# Patient Record
Sex: FEMALE | Race: WHITE | NOT HISPANIC OR LATINO | Employment: OTHER | URBAN - METROPOLITAN AREA
[De-identification: names, ages, dates, MRNs, and addresses within clinical notes are randomized per-mention and may not be internally consistent; named-entity substitution may affect disease eponyms.]

---

## 2017-01-09 ENCOUNTER — HISTORICAL (OUTPATIENT)
Dept: LAB | Facility: HOSPITAL | Age: 72
End: 2017-01-09

## 2018-03-22 ENCOUNTER — HISTORICAL (OUTPATIENT)
Dept: LAB | Facility: HOSPITAL | Age: 73
End: 2018-03-22

## 2018-03-22 LAB
CRP SERPL HS-MCNC: 3.79 MG/L (ref 0–3)
DEPRECATED CALCIDIOL+CALCIFEROL SERPL-MC: 28.84 NG/ML (ref 30–80)

## 2018-04-16 ENCOUNTER — HISTORICAL (OUTPATIENT)
Dept: LAB | Facility: HOSPITAL | Age: 73
End: 2018-04-16

## 2018-04-16 LAB
CREAT UR-MCNC: 125 MG/DL
MICROALBUMIN UR-MCNC: 3.2 MG/DL
MICROALBUMIN/CREAT RATIO PNL UR: 25.6 MG/GM CR (ref 0–30)

## 2018-09-14 LAB
BILIRUB SERPL-MCNC: NEGATIVE MG/DL
BLOOD URINE, POC: NORMAL
CLARITY, POC UA: NORMAL
COLOR, POC UA: NORMAL
GLUCOSE UR QL STRIP: NEGATIVE
KETONES UR QL STRIP: NEGATIVE
LEUKOCYTE EST, POC UA: NORMAL
NITRITE, POC UA: NEGATIVE
PH, POC UA: 6
PROTEIN, POC: NEGATIVE
SPECIFIC GRAVITY, POC UA: 1.02
UROBILINOGEN, POC UA: NORMAL

## 2018-09-15 ENCOUNTER — HISTORICAL (OUTPATIENT)
Dept: LAB | Facility: HOSPITAL | Age: 73
End: 2018-09-15

## 2018-09-18 LAB — FINAL CULTURE: NORMAL

## 2018-12-03 ENCOUNTER — HISTORICAL (OUTPATIENT)
Dept: LAB | Facility: HOSPITAL | Age: 73
End: 2018-12-03

## 2018-12-03 LAB — DEPRECATED CALCIDIOL+CALCIFEROL SERPL-MC: 45.08 NG/ML (ref 30–80)

## 2020-05-16 LAB
ABS NEUT (OLG): 13.48 X10(3)/MCL (ref 2.1–9.2)
ALBUMIN SERPL-MCNC: 1.8 GM/DL (ref 3.4–4.8)
ALBUMIN/GLOB SERPL: 0.5 RATIO (ref 1.1–2)
ALP SERPL-CCNC: 106 UNIT/L (ref 40–150)
ALT SERPL-CCNC: 12 UNIT/L (ref 0–55)
APPEARANCE, UA: ABNORMAL
AST SERPL-CCNC: 9 UNIT/L (ref 5–34)
BACTERIA SPEC CULT: ABNORMAL /HPF
BASOPHILS # BLD AUTO: 0.06 X10(3)/MCL (ref 0–0.2)
BASOPHILS NFR BLD AUTO: 0.4 % (ref 0–1)
BILIRUB SERPL-MCNC: 0.4 MG/DL (ref 0.2–1.2)
BILIRUB UR QL STRIP: NEGATIVE
BILIRUBIN DIRECT+TOT PNL SERPL-MCNC: 0.1 MG/DL (ref 0–0.8)
BILIRUBIN DIRECT+TOT PNL SERPL-MCNC: 0.3 MG/DL (ref 0–0.5)
BUN SERPL-MCNC: 57.7 MG/DL (ref 9.8–20.1)
CALCIUM SERPL-MCNC: 8.7 MG/DL (ref 8.4–10.2)
CHLORIDE SERPL-SCNC: 104 MMOL/L (ref 98–107)
CHOLEST SERPL-MCNC: 109 MG/DL
CHOLEST/HDLC SERPL: 5 {RATIO} (ref 0–5)
CO2 SERPL-SCNC: 22 MMOL/L (ref 23–31)
COLOR UR: ABNORMAL
CREAT SERPL-MCNC: 6.11 MG/DL (ref 0.57–1.11)
DEPRECATED CALCIDIOL+CALCIFEROL SERPL-MC: 35.8 NG/ML (ref 6.6–49.9)
EOSINOPHIL # BLD AUTO: 0.18 X10(3)/MCL (ref 0–0.9)
EOSINOPHIL NFR BLD AUTO: 1.1 % (ref 0–6.4)
ERYTHROCYTE [DISTWIDTH] IN BLOOD BY AUTOMATED COUNT: 14 % (ref 11.5–17)
EST. AVERAGE GLUCOSE BLD GHB EST-MCNC: 162.8 MG/DL
GLOBULIN SER-MCNC: 3.7 GM/DL (ref 2.4–3.5)
GLUCOSE (UA): NEGATIVE
GLUCOSE SERPL-MCNC: 157 MG/DL (ref 82–115)
HBA1C MFR BLD: 7.3 %
HCT VFR BLD AUTO: 35.3 % (ref 37–47)
HDLC SERPL-MCNC: 22 MG/DL (ref 40–60)
HGB BLD-MCNC: 11.5 GM/DL (ref 12–16)
HGB UR QL STRIP: ABNORMAL
IMM GRANULOCYTES # BLD AUTO: 0.39 10*3/UL (ref 0–0.02)
IMM GRANULOCYTES NFR BLD AUTO: 2.3 % (ref 0–0.43)
KETONES UR QL STRIP: NEGATIVE
LDLC SERPL CALC-MCNC: 53 MG/DL (ref 50–140)
LEUKOCYTE ESTERASE UR QL STRIP: ABNORMAL
LYMPHOCYTES # BLD AUTO: 1.41 X10(3)/MCL (ref 0.6–4.6)
LYMPHOCYTES NFR BLD AUTO: 8.3 % (ref 16–44)
MAGNESIUM SERPL-MCNC: 2.18 MG/DL (ref 1.6–2.6)
MCH RBC QN AUTO: 30.7 PG (ref 27–31)
MCHC RBC AUTO-ENTMCNC: 32.6 GM/DL (ref 33–36)
MCV RBC AUTO: 94.1 FL (ref 80–94)
MONOCYTES # BLD AUTO: 1.54 X10(3)/MCL (ref 0.1–1.3)
MONOCYTES NFR BLD AUTO: 9 % (ref 4–12.1)
NEUTROPHILS # BLD AUTO: 13.48 X10(3)/MCL (ref 2.1–9.2)
NEUTROPHILS NFR BLD AUTO: 78.9 % (ref 43–73)
NITRITE UR QL STRIP: NEGATIVE
NRBC BLD AUTO-RTO: 0 % (ref 0–0.2)
PH UR STRIP: 5.5 [PH] (ref 5–7)
PHOSPHATE SERPL-MCNC: 7 MG/DL (ref 2.3–4.7)
PLATELET # BLD AUTO: 162 X10(3)/MCL (ref 130–400)
PMV BLD AUTO: 11.2 FL (ref 7.4–10.4)
POTASSIUM SERPL-SCNC: 4.2 MMOL/L (ref 3.5–5.1)
PREALB SERPL-MCNC: 10.9 MG/DL (ref 14–37)
PROT SERPL-MCNC: 5.5 GM/DL (ref 5.8–7.6)
PROT UR QL STRIP: ABNORMAL
RBC # BLD AUTO: 3.75 X10(6)/MCL (ref 4.2–5.4)
RBC #/AREA URNS HPF: ABNORMAL /HPF
SODIUM SERPL-SCNC: 138 MMOL/L (ref 136–145)
SP GR UR STRIP: 1.02 (ref 1–1.03)
SQUAMOUS EPITHELIAL, UA: ABNORMAL /LPF
TRIGL SERPL-MCNC: 171 MG/DL (ref 0–150)
UROBILINOGEN UR STRIP-ACNC: NEGATIVE
VLDLC SERPL CALC-MCNC: 34 MG/DL
WBC # SPEC AUTO: 17.1 X10(3)/MCL (ref 4.5–11.5)
WBC #/AREA URNS HPF: ABNORMAL /HPF

## 2020-05-18 LAB
ABS NEUT (OLG): 10.89 X10(3)/MCL (ref 2.1–9.2)
ALBUMIN SERPL-MCNC: 1.9 GM/DL (ref 3.4–4.8)
ALBUMIN/GLOB SERPL: 0.5 RATIO (ref 1.1–2)
ALP SERPL-CCNC: 111 UNIT/L (ref 40–150)
ALT SERPL-CCNC: 14 UNIT/L (ref 0–55)
AST SERPL-CCNC: 13 UNIT/L (ref 5–34)
BASOPHILS # BLD AUTO: 0.06 X10(3)/MCL (ref 0–0.2)
BASOPHILS NFR BLD AUTO: 0.4 % (ref 0–1)
BILIRUB SERPL-MCNC: 0.4 MG/DL (ref 0.2–1.2)
BILIRUBIN DIRECT+TOT PNL SERPL-MCNC: 0.2 MG/DL (ref 0–0.5)
BILIRUBIN DIRECT+TOT PNL SERPL-MCNC: 0.2 MG/DL (ref 0–0.8)
BUN SERPL-MCNC: 60.2 MG/DL (ref 9.8–20.1)
CALCIUM SERPL-MCNC: 8.8 MG/DL (ref 8.4–10.2)
CHLORIDE SERPL-SCNC: 101 MMOL/L (ref 98–107)
CO2 SERPL-SCNC: 23 MMOL/L (ref 23–31)
CREAT SERPL-MCNC: 6.37 MG/DL (ref 0.57–1.11)
EOSINOPHIL # BLD AUTO: 0.27 X10(3)/MCL (ref 0–0.9)
EOSINOPHIL NFR BLD AUTO: 1.9 % (ref 0–6.4)
ERYTHROCYTE [DISTWIDTH] IN BLOOD BY AUTOMATED COUNT: 13.4 % (ref 11.5–17)
GLOBULIN SER-MCNC: 3.8 GM/DL (ref 2.4–3.5)
GLUCOSE SERPL-MCNC: 157 MG/DL (ref 82–115)
HCT VFR BLD AUTO: 33.9 % (ref 37–47)
HGB BLD-MCNC: 11.2 GM/DL (ref 12–16)
IMM GRANULOCYTES # BLD AUTO: 0.21 10*3/UL (ref 0–0.02)
IMM GRANULOCYTES NFR BLD AUTO: 1.5 % (ref 0–0.43)
LYMPHOCYTES # BLD AUTO: 1.36 X10(3)/MCL (ref 0.6–4.6)
LYMPHOCYTES NFR BLD AUTO: 9.5 % (ref 16–44)
MAGNESIUM SERPL-MCNC: 2.37 MG/DL (ref 1.6–2.6)
MCH RBC QN AUTO: 30.9 PG (ref 27–31)
MCHC RBC AUTO-ENTMCNC: 33 GM/DL (ref 33–36)
MCV RBC AUTO: 93.4 FL (ref 80–94)
MONOCYTES # BLD AUTO: 1.54 X10(3)/MCL (ref 0.1–1.3)
MONOCYTES NFR BLD AUTO: 10.7 % (ref 4–12.1)
NEUTROPHILS # BLD AUTO: 10.89 X10(3)/MCL (ref 2.1–9.2)
NEUTROPHILS NFR BLD AUTO: 76 % (ref 43–73)
NRBC BLD AUTO-RTO: 0 % (ref 0–0.2)
PHOSPHATE SERPL-MCNC: 8.5 MG/DL (ref 2.3–4.7)
PLATELET # BLD AUTO: 236 X10(3)/MCL (ref 130–400)
PMV BLD AUTO: 11.2 FL (ref 7.4–10.4)
POTASSIUM SERPL-SCNC: 4.2 MMOL/L (ref 3.5–5.1)
PREALB SERPL-MCNC: 11.8 MG/DL (ref 14–37)
PROT SERPL-MCNC: 5.7 GM/DL (ref 5.8–7.6)
RBC # BLD AUTO: 3.63 X10(6)/MCL (ref 4.2–5.4)
SODIUM SERPL-SCNC: 137 MMOL/L (ref 136–145)
WBC # SPEC AUTO: 14.3 X10(3)/MCL (ref 4.5–11.5)

## 2020-05-19 ENCOUNTER — HISTORICAL (OUTPATIENT)
Dept: ADMINISTRATIVE | Facility: HOSPITAL | Age: 75
End: 2020-05-19

## 2020-05-20 LAB
ABS NEUT (OLG): 8.56 X10(3)/MCL (ref 2.1–9.2)
ALBUMIN SERPL-MCNC: 2 GM/DL (ref 3.4–4.8)
ALBUMIN/GLOB SERPL: 0.5 RATIO (ref 1.1–2)
ALP SERPL-CCNC: 100 UNIT/L (ref 40–150)
ALT SERPL-CCNC: 11 UNIT/L (ref 0–55)
AST SERPL-CCNC: 10 UNIT/L (ref 5–34)
BASOPHILS # BLD AUTO: 0.05 X10(3)/MCL (ref 0–0.2)
BASOPHILS NFR BLD AUTO: 0.4 % (ref 0–1)
BILIRUB SERPL-MCNC: 0.5 MG/DL (ref 0.2–1.2)
BILIRUBIN DIRECT+TOT PNL SERPL-MCNC: 0.2 MG/DL (ref 0–0.8)
BILIRUBIN DIRECT+TOT PNL SERPL-MCNC: 0.3 MG/DL (ref 0–0.5)
BUN SERPL-MCNC: 41.7 MG/DL (ref 9.8–20.1)
CALCIUM SERPL-MCNC: 8.7 MG/DL (ref 8.4–10.2)
CHLORIDE SERPL-SCNC: 96 MMOL/L (ref 98–107)
CO2 SERPL-SCNC: 26 MMOL/L (ref 23–31)
CREAT SERPL-MCNC: 6.2 MG/DL (ref 0.57–1.11)
EOSINOPHIL # BLD AUTO: 0.3 X10(3)/MCL (ref 0–0.9)
EOSINOPHIL NFR BLD AUTO: 2.6 % (ref 0–6.4)
ERYTHROCYTE [DISTWIDTH] IN BLOOD BY AUTOMATED COUNT: 13.1 % (ref 11.5–17)
GLOBULIN SER-MCNC: 4 GM/DL (ref 2.4–3.5)
GLUCOSE SERPL-MCNC: 157 MG/DL (ref 82–115)
HCT VFR BLD AUTO: 33.3 % (ref 37–47)
HGB BLD-MCNC: 10.9 GM/DL (ref 12–16)
IMM GRANULOCYTES # BLD AUTO: 0.12 10*3/UL (ref 0–0.02)
IMM GRANULOCYTES NFR BLD AUTO: 1.1 % (ref 0–0.43)
LYMPHOCYTES # BLD AUTO: 1.13 X10(3)/MCL (ref 0.6–4.6)
LYMPHOCYTES NFR BLD AUTO: 9.9 % (ref 16–44)
MCH RBC QN AUTO: 30.6 PG (ref 27–31)
MCHC RBC AUTO-ENTMCNC: 32.7 GM/DL (ref 33–36)
MCV RBC AUTO: 93.5 FL (ref 80–94)
MONOCYTES # BLD AUTO: 1.2 X10(3)/MCL (ref 0.1–1.3)
MONOCYTES NFR BLD AUTO: 10.6 % (ref 4–12.1)
NEUTROPHILS # BLD AUTO: 8.56 X10(3)/MCL (ref 2.1–9.2)
NEUTROPHILS NFR BLD AUTO: 75.4 % (ref 43–73)
NRBC BLD AUTO-RTO: 0 % (ref 0–0.2)
PLATELET # BLD AUTO: 273 X10(3)/MCL (ref 130–400)
PMV BLD AUTO: 10.5 FL (ref 7.4–10.4)
POTASSIUM SERPL-SCNC: 4.2 MMOL/L (ref 3.5–5.1)
PROT SERPL-MCNC: 6 GM/DL (ref 5.8–7.6)
RBC # BLD AUTO: 3.56 X10(6)/MCL (ref 4.2–5.4)
SODIUM SERPL-SCNC: 135 MMOL/L (ref 136–145)
WBC # SPEC AUTO: 11.4 X10(3)/MCL (ref 4.5–11.5)

## 2020-05-22 LAB
BUN SERPL-MCNC: 40.3 MG/DL (ref 9.8–20.1)
CALCIUM SERPL-MCNC: 9.1 MG/DL (ref 8.4–10.2)
CHLORIDE SERPL-SCNC: 97 MMOL/L (ref 98–107)
CO2 SERPL-SCNC: 28 MMOL/L (ref 23–31)
CREAT SERPL-MCNC: 6.34 MG/DL (ref 0.57–1.11)
CREAT/UREA NIT SERPL: 6
GLUCOSE SERPL-MCNC: 122 MG/DL (ref 82–115)
PHOSPHATE SERPL-MCNC: 7.2 MG/DL (ref 2.3–4.7)
POTASSIUM SERPL-SCNC: 4.3 MMOL/L (ref 3.5–5.1)
SODIUM SERPL-SCNC: 139 MMOL/L (ref 136–145)

## 2020-05-25 LAB
ABS NEUT (OLG): 5.3 X10(3)/MCL (ref 2.1–9.2)
ABS NEUT (OLG): 5.3 X10(3)/MCL (ref 2.1–9.2)
ALBUMIN SERPL-MCNC: 2.2 GM/DL (ref 3.4–4.8)
ALBUMIN/GLOB SERPL: 0.6 RATIO (ref 1.1–2)
ALP SERPL-CCNC: 102 UNIT/L (ref 40–150)
ALT SERPL-CCNC: 11 UNIT/L (ref 0–55)
AST SERPL-CCNC: 9 UNIT/L (ref 5–34)
BASOPHILS # BLD AUTO: 0.04 X10(3)/MCL (ref 0–0.2)
BASOPHILS NFR BLD AUTO: 0.5 % (ref 0–1)
BILIRUB SERPL-MCNC: 0.7 MG/DL (ref 0.2–1.2)
BILIRUBIN DIRECT+TOT PNL SERPL-MCNC: 0.3 MG/DL (ref 0–0.5)
BILIRUBIN DIRECT+TOT PNL SERPL-MCNC: 0.4 MG/DL (ref 0–0.8)
BUN SERPL-MCNC: 58.7 MG/DL (ref 9.8–20.1)
CALCIUM SERPL-MCNC: 8.9 MG/DL (ref 8.4–10.2)
CHLORIDE SERPL-SCNC: 97 MMOL/L (ref 98–107)
CO2 SERPL-SCNC: 26 MMOL/L (ref 23–31)
CREAT SERPL-MCNC: 8.32 MG/DL (ref 0.57–1.11)
EOSINOPHIL # BLD AUTO: 0.32 X10(3)/MCL (ref 0–0.9)
EOSINOPHIL NFR BLD AUTO: 4.2 % (ref 0–6.4)
ERYTHROCYTE [DISTWIDTH] IN BLOOD BY AUTOMATED COUNT: 12.3 % (ref 11.5–17)
ERYTHROCYTE [DISTWIDTH] IN BLOOD BY AUTOMATED COUNT: 12.3 % (ref 11.5–17)
GLOBULIN SER-MCNC: 3.9 GM/DL (ref 2.4–3.5)
GLUCOSE SERPL-MCNC: 132 MG/DL (ref 82–115)
HCT VFR BLD AUTO: 31.5 % (ref 37–47)
HCT VFR BLD AUTO: 31.5 % (ref 37–47)
HGB BLD-MCNC: 10.1 GM/DL (ref 12–16)
HGB BLD-MCNC: 10.1 GM/DL (ref 12–16)
IMM GRANULOCYTES # BLD AUTO: 0.09 10*3/UL (ref 0–0.02)
IMM GRANULOCYTES NFR BLD AUTO: 1.2 % (ref 0–0.43)
LYMPHOCYTES # BLD AUTO: 0.92 X10(3)/MCL (ref 0.6–4.6)
LYMPHOCYTES NFR BLD AUTO: 12.2 % (ref 16–44)
MAGNESIUM SERPL-MCNC: 2.58 MG/DL (ref 1.6–2.6)
MCH RBC QN AUTO: 30.2 PG (ref 27–31)
MCH RBC QN AUTO: 30.2 PG (ref 27–31)
MCHC RBC AUTO-ENTMCNC: 32.1 GM/DL (ref 33–36)
MCHC RBC AUTO-ENTMCNC: 32.1 GM/DL (ref 33–36)
MCV RBC AUTO: 94.3 FL (ref 80–94)
MCV RBC AUTO: 94.3 FL (ref 80–94)
MONOCYTES # BLD AUTO: 0.88 X10(3)/MCL (ref 0.1–1.3)
MONOCYTES NFR BLD AUTO: 11.7 % (ref 4–12.1)
NEUTROPHILS # BLD AUTO: 5.3 X10(3)/MCL (ref 2.1–9.2)
NEUTROPHILS NFR BLD AUTO: 70.2 % (ref 43–73)
NRBC BLD AUTO-RTO: 0 % (ref 0–0.2)
NRBC BLD AUTO-RTO: 0 % (ref 0–0.2)
PHOSPHATE SERPL-MCNC: 7.2 MG/DL (ref 2.3–4.7)
PLATELET # BLD AUTO: 225 X10(3)/MCL (ref 130–400)
PLATELET # BLD AUTO: 225 X10(3)/MCL (ref 130–400)
PMV BLD AUTO: 10 FL (ref 7.4–10.4)
PMV BLD AUTO: 10 FL (ref 7.4–10.4)
POTASSIUM SERPL-SCNC: 4.8 MMOL/L (ref 3.5–5.1)
PREALB SERPL-MCNC: 16.3 MG/DL (ref 14–37)
PROT SERPL-MCNC: 6.1 GM/DL (ref 5.8–7.6)
RBC # BLD AUTO: 3.34 X10(6)/MCL (ref 4.2–5.4)
RBC # BLD AUTO: 3.34 X10(6)/MCL (ref 4.2–5.4)
SODIUM SERPL-SCNC: 136 MMOL/L (ref 136–145)
WBC # SPEC AUTO: 7.6 X10(3)/MCL (ref 4.5–11.5)
WBC # SPEC AUTO: 7.6 X10(3)/MCL (ref 4.5–11.5)

## 2020-05-28 LAB — SARS-COV-2 RNA RESP QL NAA+PROBE: NOT DETECTED

## 2020-08-13 LAB
BUN SERPL-MCNC: 18 MG/DL (ref 9.8–20.1)
CALCIUM SERPL-MCNC: 9.2 MG/DL (ref 8.4–10.2)
CHLORIDE SERPL-SCNC: 97 MMOL/L (ref 98–107)
CO2 SERPL-SCNC: 29 MMOL/L (ref 23–31)
CREAT SERPL-MCNC: 4.01 MG/DL (ref 0.55–1.02)
CREAT/UREA NIT SERPL: 4
ERYTHROCYTE [DISTWIDTH] IN BLOOD BY AUTOMATED COUNT: 14.2 % (ref 11.5–17)
GLUCOSE SERPL-MCNC: 198 MG/DL (ref 82–115)
HCT VFR BLD AUTO: 37 % (ref 37–47)
HGB BLD-MCNC: 11.4 GM/DL (ref 12–16)
MCH RBC QN AUTO: 31.9 PG (ref 27–31)
MCHC RBC AUTO-ENTMCNC: 30.8 GM/DL (ref 33–36)
MCV RBC AUTO: 103.6 FL (ref 80–94)
PLATELET # BLD AUTO: 273 X10(3)/MCL (ref 130–400)
PMV BLD AUTO: 10.1 FL (ref 9.4–12.4)
POTASSIUM SERPL-SCNC: 3.6 MMOL/L (ref 3.5–5.1)
RBC # BLD AUTO: 3.57 X10(6)/MCL (ref 4.2–5.4)
SODIUM SERPL-SCNC: 141 MMOL/L (ref 136–145)
WBC # SPEC AUTO: 7.7 X10(3)/MCL (ref 4.5–11.5)

## 2020-08-19 ENCOUNTER — HISTORICAL (OUTPATIENT)
Dept: ADMINISTRATIVE | Facility: HOSPITAL | Age: 75
End: 2020-08-19

## 2020-08-19 LAB
BUN SERPL-MCNC: 31.3 MG/DL (ref 9.8–20.1)
CALCIUM SERPL-MCNC: 8.9 MG/DL (ref 8.4–10.2)
CHLORIDE SERPL-SCNC: 98 MMOL/L (ref 98–107)
CO2 SERPL-SCNC: 26 MMOL/L (ref 23–31)
CREAT SERPL-MCNC: 5.84 MG/DL (ref 0.55–1.02)
CREAT/UREA NIT SERPL: 5
GLUCOSE SERPL-MCNC: 143 MG/DL (ref 82–115)
POTASSIUM SERPL-SCNC: 2.9 MMOL/L (ref 3.5–5.1)
SODIUM SERPL-SCNC: 138 MMOL/L (ref 136–145)

## 2020-08-31 ENCOUNTER — HISTORICAL (OUTPATIENT)
Dept: ADMINISTRATIVE | Facility: HOSPITAL | Age: 75
End: 2020-08-31

## 2020-08-31 LAB
ABS NEUT (OLG): 5.32 X10(3)/MCL (ref 2.1–9.2)
ALBUMIN SERPL-MCNC: 3 GM/DL (ref 3.4–4.8)
ALBUMIN/GLOB SERPL: 0.9 RATIO (ref 1.1–2)
ALP SERPL-CCNC: 101 UNIT/L (ref 40–150)
ALT SERPL-CCNC: 12 UNIT/L (ref 0–55)
AST SERPL-CCNC: 21 UNIT/L (ref 5–34)
BASOPHILS # BLD AUTO: 0 X10(3)/MCL (ref 0–0.2)
BASOPHILS NFR BLD AUTO: 0 %
BILIRUB SERPL-MCNC: 0.4 MG/DL
BILIRUBIN DIRECT+TOT PNL SERPL-MCNC: 0.2 MG/DL (ref 0–0.5)
BILIRUBIN DIRECT+TOT PNL SERPL-MCNC: 0.2 MG/DL (ref 0–0.8)
BUN SERPL-MCNC: 35.3 MG/DL (ref 9.8–20.1)
CALCIUM SERPL-MCNC: 8.6 MG/DL (ref 8.4–10.2)
CHLORIDE SERPL-SCNC: 97 MMOL/L (ref 98–107)
CHOLEST SERPL-MCNC: 138 MG/DL
CHOLEST/HDLC SERPL: 3 {RATIO} (ref 0–5)
CO2 SERPL-SCNC: 28 MMOL/L (ref 23–31)
CREAT SERPL-MCNC: 5.62 MG/DL (ref 0.55–1.02)
EOSINOPHIL # BLD AUTO: 0.3 X10(3)/MCL (ref 0–0.9)
EOSINOPHIL NFR BLD AUTO: 4 %
ERYTHROCYTE [DISTWIDTH] IN BLOOD BY AUTOMATED COUNT: 14.1 % (ref 11.5–17)
EST. AVERAGE GLUCOSE BLD GHB EST-MCNC: 105.4 MG/DL
GLOBULIN SER-MCNC: 3.3 GM/DL (ref 2.4–3.5)
GLUCOSE SERPL-MCNC: 100 MG/DL (ref 82–115)
HBA1C MFR BLD: 5.3 %
HCT VFR BLD AUTO: 38.4 % (ref 37–47)
HDLC SERPL-MCNC: 46 MG/DL (ref 35–60)
HGB BLD-MCNC: 12.1 GM/DL (ref 12–16)
LDLC SERPL CALC-MCNC: 57 MG/DL (ref 50–140)
LYMPHOCYTES # BLD AUTO: 1.3 X10(3)/MCL (ref 0.6–4.6)
LYMPHOCYTES NFR BLD AUTO: 17 %
MCH RBC QN AUTO: 31.4 PG (ref 27–31)
MCHC RBC AUTO-ENTMCNC: 31.5 GM/DL (ref 33–36)
MCV RBC AUTO: 99.7 FL (ref 80–94)
MONOCYTES # BLD AUTO: 0.6 X10(3)/MCL (ref 0.1–1.3)
MONOCYTES NFR BLD AUTO: 9 %
NEUTROPHILS # BLD AUTO: 5.32 X10(3)/MCL (ref 2.1–9.2)
NEUTROPHILS NFR BLD AUTO: 70 %
PLATELET # BLD AUTO: 216 X10(3)/MCL (ref 130–400)
PMV BLD AUTO: 10.9 FL (ref 9.4–12.4)
POTASSIUM SERPL-SCNC: 3.8 MMOL/L (ref 3.5–5.1)
PROT SERPL-MCNC: 6.3 GM/DL (ref 5.8–7.6)
RBC # BLD AUTO: 3.85 X10(6)/MCL (ref 4.2–5.4)
SODIUM SERPL-SCNC: 141 MMOL/L (ref 136–145)
TRIGL SERPL-MCNC: 173 MG/DL (ref 37–140)
VLDLC SERPL CALC-MCNC: 35 MG/DL
WBC # SPEC AUTO: 7.6 X10(3)/MCL (ref 4.5–11.5)

## 2021-03-17 ENCOUNTER — HISTORICAL (OUTPATIENT)
Dept: ADMINISTRATIVE | Facility: HOSPITAL | Age: 76
End: 2021-03-17

## 2021-03-17 LAB
BUN SERPL-MCNC: 35.5 MG/DL (ref 9.8–20.1)
CALCIUM SERPL-MCNC: 9.1 MG/DL (ref 8.4–10.2)
CHLORIDE SERPL-SCNC: 106 MMOL/L (ref 98–107)
CO2 SERPL-SCNC: 24 MMOL/L (ref 23–31)
CREAT SERPL-MCNC: 3.42 MG/DL (ref 0.55–1.02)
CREAT/UREA NIT SERPL: 10
GLUCOSE SERPL-MCNC: 96 MG/DL (ref 82–115)
POTASSIUM SERPL-SCNC: 4.2 MMOL/L (ref 3.5–5.1)
SODIUM SERPL-SCNC: 145 MMOL/L (ref 136–145)

## 2021-03-19 ENCOUNTER — HISTORICAL (OUTPATIENT)
Dept: ADMINISTRATIVE | Facility: HOSPITAL | Age: 76
End: 2021-03-19

## 2021-03-19 LAB
BUN SERPL-MCNC: 49.3 MG/DL (ref 9.8–20.1)
CALCIUM SERPL-MCNC: 9 MG/DL (ref 8.4–10.2)
CHLORIDE SERPL-SCNC: 107 MMOL/L (ref 98–107)
CO2 SERPL-SCNC: 27 MMOL/L (ref 23–31)
CREAT SERPL-MCNC: 3.9 MG/DL (ref 0.55–1.02)
CREAT/UREA NIT SERPL: 13
GLUCOSE SERPL-MCNC: 87 MG/DL (ref 82–115)
POTASSIUM SERPL-SCNC: 4.6 MMOL/L (ref 3.5–5.1)
SODIUM SERPL-SCNC: 144 MMOL/L (ref 136–145)

## 2021-04-19 ENCOUNTER — HISTORICAL (OUTPATIENT)
Dept: ADMINISTRATIVE | Facility: HOSPITAL | Age: 76
End: 2021-04-19

## 2021-04-19 LAB
BUN SERPL-MCNC: 49.5 MG/DL (ref 9.8–20.1)
CALCIUM SERPL-MCNC: 9.2 MG/DL (ref 8.4–10.2)
CHLORIDE SERPL-SCNC: 106 MMOL/L (ref 98–107)
CO2 SERPL-SCNC: 21 MMOL/L (ref 23–31)
CREAT SERPL-MCNC: 3.72 MG/DL (ref 0.55–1.02)
CREAT/UREA NIT SERPL: 13
GLUCOSE SERPL-MCNC: 81 MG/DL (ref 82–115)
POTASSIUM SERPL-SCNC: 4.9 MMOL/L (ref 3.5–5.1)
SODIUM SERPL-SCNC: 141 MMOL/L (ref 136–145)

## 2021-05-10 ENCOUNTER — HISTORICAL (OUTPATIENT)
Dept: ADMINISTRATIVE | Facility: HOSPITAL | Age: 76
End: 2021-05-10

## 2021-05-10 LAB
ALBUMIN SERPL-MCNC: 3.3 GM/DL (ref 3.4–4.8)
ALBUMIN/GLOB SERPL: 1.3 RATIO (ref 1.1–2)
ALP SERPL-CCNC: 146 UNIT/L (ref 40–150)
ALT SERPL-CCNC: 22 UNIT/L (ref 0–55)
AST SERPL-CCNC: 18 UNIT/L (ref 5–34)
BILIRUB SERPL-MCNC: 0.6 MG/DL
BILIRUBIN DIRECT+TOT PNL SERPL-MCNC: 0.3 MG/DL (ref 0–0.5)
BILIRUBIN DIRECT+TOT PNL SERPL-MCNC: 0.3 MG/DL (ref 0–0.8)
BUN SERPL-MCNC: 45.8 MG/DL (ref 9.8–20.1)
CALCIUM SERPL-MCNC: 9.4 MG/DL (ref 8.4–10.2)
CHLORIDE SERPL-SCNC: 110 MMOL/L (ref 98–107)
CO2 SERPL-SCNC: 24 MMOL/L (ref 23–31)
CREAT SERPL-MCNC: 3.29 MG/DL (ref 0.55–1.02)
ERYTHROCYTE [DISTWIDTH] IN BLOOD BY AUTOMATED COUNT: 13 % (ref 11.5–17)
GLOBULIN SER-MCNC: 2.5 GM/DL (ref 2.4–3.5)
GLUCOSE SERPL-MCNC: 91 MG/DL (ref 82–115)
HCT VFR BLD AUTO: 33 % (ref 37–47)
HGB BLD-MCNC: 10.4 GM/DL (ref 12–16)
MCH RBC QN AUTO: 31.7 PG (ref 27–31)
MCHC RBC AUTO-ENTMCNC: 31.5 GM/DL (ref 33–36)
MCV RBC AUTO: 100.6 FL (ref 80–94)
PLATELET # BLD AUTO: 175 X10(3)/MCL (ref 130–400)
PMV BLD AUTO: 11.8 FL (ref 9.4–12.4)
POTASSIUM SERPL-SCNC: 4.3 MMOL/L (ref 3.5–5.1)
PROT SERPL-MCNC: 5.8 GM/DL (ref 5.8–7.6)
RBC # BLD AUTO: 3.28 X10(6)/MCL (ref 4.2–5.4)
SODIUM SERPL-SCNC: 143 MMOL/L (ref 136–145)
WBC # SPEC AUTO: 6.1 X10(3)/MCL (ref 4.5–11.5)

## 2021-06-17 ENCOUNTER — HISTORICAL (OUTPATIENT)
Dept: ADMINISTRATIVE | Facility: HOSPITAL | Age: 76
End: 2021-06-17

## 2021-06-17 LAB
ABS NEUT (OLG): 5.48 X10(3)/MCL (ref 2.1–9.2)
ALBUMIN SERPL-MCNC: 3.3 GM/DL (ref 3.4–4.8)
ALBUMIN/GLOB SERPL: 1.1 RATIO (ref 1.1–2)
ALP SERPL-CCNC: 160 UNIT/L (ref 40–150)
ALT SERPL-CCNC: 21 UNIT/L (ref 0–55)
AST SERPL-CCNC: 22 UNIT/L (ref 5–34)
BASOPHILS # BLD AUTO: 0 X10(3)/MCL (ref 0–0.2)
BASOPHILS NFR BLD AUTO: 0 %
BILIRUB SERPL-MCNC: 0.6 MG/DL
BILIRUBIN DIRECT+TOT PNL SERPL-MCNC: 0.3 MG/DL (ref 0–0.5)
BILIRUBIN DIRECT+TOT PNL SERPL-MCNC: 0.3 MG/DL (ref 0–0.8)
BUN SERPL-MCNC: 53.8 MG/DL (ref 9.8–20.1)
CALCIUM SERPL-MCNC: 9.6 MG/DL (ref 8.4–10.2)
CHLORIDE SERPL-SCNC: 107 MMOL/L (ref 98–107)
CO2 SERPL-SCNC: 24 MMOL/L (ref 23–31)
CREAT SERPL-MCNC: 3.57 MG/DL (ref 0.55–1.02)
DEPRECATED CALCIDIOL+CALCIFEROL SERPL-MC: 50 NG/ML (ref 30–80)
EOSINOPHIL # BLD AUTO: 0.2 X10(3)/MCL (ref 0–0.9)
EOSINOPHIL NFR BLD AUTO: 3 %
ERYTHROCYTE [DISTWIDTH] IN BLOOD BY AUTOMATED COUNT: 12.7 % (ref 11.5–17)
FERRITIN SERPL-MCNC: 1424.21 NG/ML (ref 4.63–204)
GLOBULIN SER-MCNC: 2.9 GM/DL (ref 2.4–3.5)
GLUCOSE SERPL-MCNC: 113 MG/DL (ref 82–115)
HCT VFR BLD AUTO: 35 % (ref 37–47)
HGB BLD-MCNC: 11.1 GM/DL (ref 12–16)
IRON SATN MFR SERPL: 55 % (ref 20–50)
IRON SERPL-MCNC: 105 UG/DL (ref 50–170)
LYMPHOCYTES # BLD AUTO: 0.7 X10(3)/MCL (ref 0.6–4.6)
LYMPHOCYTES NFR BLD AUTO: 10 %
MAGNESIUM SERPL-MCNC: 2.1 MG/DL (ref 1.6–2.6)
MCH RBC QN AUTO: 31.6 PG (ref 27–31)
MCHC RBC AUTO-ENTMCNC: 31.7 GM/DL (ref 33–36)
MCV RBC AUTO: 99.7 FL (ref 80–94)
MONOCYTES # BLD AUTO: 0.6 X10(3)/MCL (ref 0.1–1.3)
MONOCYTES NFR BLD AUTO: 8 %
NEUTROPHILS # BLD AUTO: 5.48 X10(3)/MCL (ref 2.1–9.2)
NEUTROPHILS NFR BLD AUTO: 78 %
PHOSPHATE SERPL-MCNC: 4.5 MG/DL (ref 2.3–4.7)
PLATELET # BLD AUTO: 209 X10(3)/MCL (ref 130–400)
PMV BLD AUTO: 11.8 FL (ref 9.4–12.4)
POTASSIUM SERPL-SCNC: 4.2 MMOL/L (ref 3.5–5.1)
PROT SERPL-MCNC: 6.2 GM/DL (ref 5.8–7.6)
RBC # BLD AUTO: 3.51 X10(6)/MCL (ref 4.2–5.4)
SODIUM SERPL-SCNC: 144 MMOL/L (ref 136–145)
TIBC SERPL-MCNC: 190 UG/DL (ref 250–450)
TIBC SERPL-MCNC: 85 UG/DL (ref 70–310)
TRANSFERRIN SERPL-MCNC: 159 MG/DL (ref 173–360)
WBC # SPEC AUTO: 7 X10(3)/MCL (ref 4.5–11.5)

## 2021-06-24 ENCOUNTER — HISTORICAL (OUTPATIENT)
Dept: ADMINISTRATIVE | Facility: HOSPITAL | Age: 76
End: 2021-06-24

## 2021-06-24 LAB
APPEARANCE, UA: ABNORMAL
BACTERIA SPEC CULT: ABNORMAL /HPF
BILIRUB UR QL STRIP: NEGATIVE
COLOR UR: YELLOW
GLUCOSE (UA): NEGATIVE
HGB UR QL STRIP: ABNORMAL
KETONES UR QL STRIP: NEGATIVE
LEUKOCYTE ESTERASE UR QL STRIP: ABNORMAL
NITRITE UR QL STRIP: NEGATIVE
PH UR STRIP: 5.5 [PH] (ref 5–9)
PROT UR QL STRIP: ABNORMAL
RBC #/AREA URNS HPF: 6 /HPF (ref 0–2)
SP GR UR STRIP: 1.01 (ref 1–1.03)
SQUAMOUS EPITHELIAL, UA: ABNORMAL /HPF (ref 0–4)
UROBILINOGEN UR STRIP-ACNC: 0.2
WBC #/AREA URNS HPF: 83 /HPF (ref 0–3)

## 2021-07-06 ENCOUNTER — HISTORICAL (OUTPATIENT)
Dept: ADMINISTRATIVE | Facility: HOSPITAL | Age: 76
End: 2021-07-06

## 2021-07-06 LAB
APPEARANCE, UA: CLEAR
BACTERIA SPEC CULT: ABNORMAL /HPF
BILIRUB UR QL STRIP: NEGATIVE
COLOR UR: YELLOW
GLUCOSE (UA): NEGATIVE
HGB UR QL STRIP: ABNORMAL
KETONES UR QL STRIP: NEGATIVE
LEUKOCYTE ESTERASE UR QL STRIP: ABNORMAL
NITRITE UR QL STRIP: NEGATIVE
PH UR STRIP: 7.5 [PH] (ref 5–9)
PROT UR QL STRIP: ABNORMAL
RBC #/AREA URNS HPF: ABNORMAL /HPF
SP GR UR STRIP: 1.01 (ref 1–1.03)
SQUAMOUS EPITHELIAL, UA: ABNORMAL /HPF
UA WBC MAN: ABNORMAL /HPF
UROBILINOGEN UR STRIP-ACNC: 0.2

## 2021-07-20 ENCOUNTER — HISTORICAL (OUTPATIENT)
Dept: ADMINISTRATIVE | Facility: HOSPITAL | Age: 76
End: 2021-07-20

## 2021-07-20 LAB
ABS NEUT (OLG): 4.44 X10(3)/MCL (ref 2.1–9.2)
ALBUMIN SERPL-MCNC: 2.9 GM/DL (ref 3.4–4.8)
ALBUMIN/GLOB SERPL: 1 RATIO (ref 1.1–2)
ALP SERPL-CCNC: 138 UNIT/L (ref 40–150)
ALT SERPL-CCNC: 16 UNIT/L (ref 0–55)
AST SERPL-CCNC: 16 UNIT/L (ref 5–34)
BASOPHILS # BLD AUTO: 0 X10(3)/MCL (ref 0–0.2)
BASOPHILS NFR BLD AUTO: 0 %
BILIRUB SERPL-MCNC: 0.5 MG/DL
BILIRUBIN DIRECT+TOT PNL SERPL-MCNC: 0.2 MG/DL (ref 0–0.8)
BILIRUBIN DIRECT+TOT PNL SERPL-MCNC: 0.3 MG/DL (ref 0–0.5)
BUN SERPL-MCNC: 46.6 MG/DL (ref 9.8–20.1)
CALCIUM SERPL-MCNC: 8.9 MG/DL (ref 8.4–10.2)
CHLORIDE SERPL-SCNC: 105 MMOL/L (ref 98–107)
CO2 SERPL-SCNC: 24 MMOL/L (ref 23–31)
CREAT SERPL-MCNC: 3.15 MG/DL (ref 0.55–1.02)
DEPRECATED CALCIDIOL+CALCIFEROL SERPL-MC: 44.3 NG/ML (ref 30–80)
EOSINOPHIL # BLD AUTO: 0.3 X10(3)/MCL (ref 0–0.9)
EOSINOPHIL NFR BLD AUTO: 5 %
ERYTHROCYTE [DISTWIDTH] IN BLOOD BY AUTOMATED COUNT: 12.5 % (ref 11.5–17)
GLOBULIN SER-MCNC: 2.8 GM/DL (ref 2.4–3.5)
GLUCOSE SERPL-MCNC: 199 MG/DL (ref 82–115)
HCT VFR BLD AUTO: 34.2 % (ref 37–47)
HGB BLD-MCNC: 10.9 GM/DL (ref 12–16)
LYMPHOCYTES # BLD AUTO: 0.9 X10(3)/MCL (ref 0.6–4.6)
LYMPHOCYTES NFR BLD AUTO: 15 %
MAGNESIUM SERPL-MCNC: 2 MG/DL (ref 1.6–2.6)
MCH RBC QN AUTO: 31.8 PG (ref 27–31)
MCHC RBC AUTO-ENTMCNC: 31.9 GM/DL (ref 33–36)
MCV RBC AUTO: 99.7 FL (ref 80–94)
MONOCYTES # BLD AUTO: 0.5 X10(3)/MCL (ref 0.1–1.3)
MONOCYTES NFR BLD AUTO: 8 %
NEUTROPHILS # BLD AUTO: 4.44 X10(3)/MCL (ref 2.1–9.2)
NEUTROPHILS NFR BLD AUTO: 72 %
PHOSPHATE SERPL-MCNC: 4.1 MG/DL (ref 2.3–4.7)
PLATELET # BLD AUTO: 196 X10(3)/MCL (ref 130–400)
PMV BLD AUTO: 11.4 FL (ref 9.4–12.4)
POTASSIUM SERPL-SCNC: 4.1 MMOL/L (ref 3.5–5.1)
PROT SERPL-MCNC: 5.7 GM/DL (ref 5.8–7.6)
PTH-INTACT SERPL-MCNC: 130.6 PG/ML (ref 8.7–77.1)
RBC # BLD AUTO: 3.43 X10(6)/MCL (ref 4.2–5.4)
SODIUM SERPL-SCNC: 140 MMOL/L (ref 136–145)
WBC # SPEC AUTO: 6.2 X10(3)/MCL (ref 4.5–11.5)

## 2021-08-20 ENCOUNTER — HISTORICAL (OUTPATIENT)
Dept: ADMINISTRATIVE | Facility: HOSPITAL | Age: 76
End: 2021-08-20

## 2021-08-20 LAB
ABS NEUT (OLG): 4.86 X10(3)/MCL (ref 2.1–9.2)
ALBUMIN SERPL-MCNC: 3.3 GM/DL (ref 3.4–4.8)
ALBUMIN/GLOB SERPL: 1.3 RATIO (ref 1.1–2)
ALP SERPL-CCNC: 150 UNIT/L (ref 40–150)
ALT SERPL-CCNC: 24 UNIT/L (ref 0–55)
AST SERPL-CCNC: 22 UNIT/L (ref 5–34)
BASOPHILS # BLD AUTO: 0 X10(3)/MCL (ref 0–0.2)
BASOPHILS NFR BLD AUTO: 0 %
BILIRUB SERPL-MCNC: 0.5 MG/DL
BILIRUBIN DIRECT+TOT PNL SERPL-MCNC: 0.2 MG/DL (ref 0–0.5)
BILIRUBIN DIRECT+TOT PNL SERPL-MCNC: 0.3 MG/DL (ref 0–0.8)
BUN SERPL-MCNC: 49.9 MG/DL (ref 9.8–20.1)
CALCIUM SERPL-MCNC: 9.3 MG/DL (ref 8.4–10.2)
CHLORIDE SERPL-SCNC: 110 MMOL/L (ref 98–107)
CO2 SERPL-SCNC: 21 MMOL/L (ref 23–31)
CREAT SERPL-MCNC: 3.16 MG/DL (ref 0.55–1.02)
DEPRECATED CALCIDIOL+CALCIFEROL SERPL-MC: 45.6 NG/ML (ref 30–80)
EOSINOPHIL # BLD AUTO: 0.3 X10(3)/MCL (ref 0–0.9)
EOSINOPHIL NFR BLD AUTO: 4 %
ERYTHROCYTE [DISTWIDTH] IN BLOOD BY AUTOMATED COUNT: 13 % (ref 11.5–17)
FERRITIN SERPL-MCNC: 1641.16 NG/ML (ref 4.63–204)
GLOBULIN SER-MCNC: 2.5 GM/DL (ref 2.4–3.5)
GLUCOSE SERPL-MCNC: 119 MG/DL (ref 82–115)
HCT VFR BLD AUTO: 37.1 % (ref 37–47)
HGB BLD-MCNC: 11.4 GM/DL (ref 12–16)
IRON SERPL-MCNC: 67 UG/DL (ref 50–170)
LYMPHOCYTES # BLD AUTO: 1 X10(3)/MCL (ref 0.6–4.6)
LYMPHOCYTES NFR BLD AUTO: 15 %
MAGNESIUM SERPL-MCNC: 2.1 MG/DL (ref 1.6–2.6)
MCH RBC QN AUTO: 32 PG (ref 27–31)
MCHC RBC AUTO-ENTMCNC: 30.7 GM/DL (ref 33–36)
MCV RBC AUTO: 104.2 FL (ref 80–94)
MONOCYTES # BLD AUTO: 0.6 X10(3)/MCL (ref 0.1–1.3)
MONOCYTES NFR BLD AUTO: 9 %
NEUTROPHILS # BLD AUTO: 4.86 X10(3)/MCL (ref 2.1–9.2)
NEUTROPHILS NFR BLD AUTO: 71 %
PHOSPHATE SERPL-MCNC: 4.8 MG/DL (ref 2.3–4.7)
PLATELET # BLD AUTO: 225 X10(3)/MCL (ref 130–400)
PMV BLD AUTO: 12.2 FL (ref 9.4–12.4)
POTASSIUM SERPL-SCNC: 4.7 MMOL/L (ref 3.5–5.1)
PROT SERPL-MCNC: 5.8 GM/DL (ref 5.8–7.6)
PTH-INTACT SERPL-MCNC: 139.5 PG/ML (ref 8.7–77.1)
RBC # BLD AUTO: 3.56 X10(6)/MCL (ref 4.2–5.4)
SODIUM SERPL-SCNC: 143 MMOL/L (ref 136–145)
WBC # SPEC AUTO: 6.8 X10(3)/MCL (ref 4.5–11.5)

## 2021-09-17 LAB
ABS NEUT (OLG): 5.86 X10(3)/MCL (ref 2.1–9.2)
ALBUMIN SERPL-MCNC: 3.4 GM/DL (ref 3.4–4.8)
ALBUMIN/GLOB SERPL: 1.1 RATIO (ref 1.1–2)
ALP SERPL-CCNC: 161 UNIT/L (ref 40–150)
ALT SERPL-CCNC: 17 UNIT/L (ref 0–55)
AST SERPL-CCNC: 25 UNIT/L (ref 5–34)
BASOPHILS # BLD AUTO: 0 X10(3)/MCL (ref 0–0.2)
BASOPHILS NFR BLD AUTO: 0 %
BILIRUB SERPL-MCNC: 0.7 MG/DL
BILIRUBIN DIRECT+TOT PNL SERPL-MCNC: 0.2 MG/DL (ref 0–0.5)
BILIRUBIN DIRECT+TOT PNL SERPL-MCNC: 0.5 MG/DL (ref 0–0.8)
BUN SERPL-MCNC: 50.1 MG/DL (ref 9.8–20.1)
CALCIUM SERPL-MCNC: 9.4 MG/DL (ref 8.4–10.2)
CHLORIDE SERPL-SCNC: 107 MMOL/L (ref 98–107)
CO2 SERPL-SCNC: 19 MMOL/L (ref 23–31)
CREAT SERPL-MCNC: 3.03 MG/DL (ref 0.55–1.02)
DEPRECATED CALCIDIOL+CALCIFEROL SERPL-MC: 51 NG/ML (ref 30–80)
EOSINOPHIL # BLD AUTO: 0.4 X10(3)/MCL (ref 0–0.9)
EOSINOPHIL NFR BLD AUTO: 4 %
ERYTHROCYTE [DISTWIDTH] IN BLOOD BY AUTOMATED COUNT: 12.3 % (ref 11.5–17)
FERRITIN SERPL-MCNC: 1584.75 NG/ML (ref 4.63–204)
GLOBULIN SER-MCNC: 3 GM/DL (ref 2.4–3.5)
GLUCOSE SERPL-MCNC: 136 MG/DL (ref 82–115)
HCT VFR BLD AUTO: 37.3 % (ref 37–47)
HGB BLD-MCNC: 12 GM/DL (ref 12–16)
IRON SATN MFR SERPL: 46 % (ref 20–50)
IRON SERPL-MCNC: 89 UG/DL (ref 50–170)
LYMPHOCYTES # BLD AUTO: 1.2 X10(3)/MCL (ref 0.6–4.6)
LYMPHOCYTES NFR BLD AUTO: 14 %
MAGNESIUM SERPL-MCNC: 2.1 MG/DL (ref 1.6–2.6)
MCH RBC QN AUTO: 31.2 PG (ref 27–31)
MCHC RBC AUTO-ENTMCNC: 32.2 GM/DL (ref 33–36)
MCV RBC AUTO: 96.9 FL (ref 80–94)
MONOCYTES # BLD AUTO: 0.6 X10(3)/MCL (ref 0.1–1.3)
MONOCYTES NFR BLD AUTO: 8 %
NEUTROPHILS # BLD AUTO: 5.86 X10(3)/MCL (ref 2.1–9.2)
NEUTROPHILS NFR BLD AUTO: 72 %
PLATELET # BLD AUTO: 221 X10(3)/MCL (ref 130–400)
PMV BLD AUTO: 11.6 FL (ref 9.4–12.4)
POTASSIUM SERPL-SCNC: 4.2 MMOL/L (ref 3.5–5.1)
PROT SERPL-MCNC: 6.4 GM/DL (ref 5.8–7.6)
PTH-INTACT SERPL-MCNC: 166.3 PG/ML (ref 8.7–77.1)
RBC # BLD AUTO: 3.85 X10(6)/MCL (ref 4.2–5.4)
SODIUM SERPL-SCNC: 142 MMOL/L (ref 136–145)
TIBC SERPL-MCNC: 103 UG/DL (ref 70–310)
TIBC SERPL-MCNC: 192 UG/DL (ref 250–450)
TRANSFERRIN SERPL-MCNC: 156 MG/DL (ref 173–360)
WBC # SPEC AUTO: 8.1 X10(3)/MCL (ref 4.5–11.5)

## 2021-10-18 ENCOUNTER — HISTORICAL (OUTPATIENT)
Dept: ADMINISTRATIVE | Facility: HOSPITAL | Age: 76
End: 2021-10-18

## 2021-10-18 LAB
ABS NEUT (OLG): 4.64 X10(3)/MCL (ref 2.1–9.2)
ALBUMIN SERPL-MCNC: 3.2 GM/DL (ref 3.4–4.8)
ALBUMIN/GLOB SERPL: 1.2 RATIO (ref 1.1–2)
ALP SERPL-CCNC: 163 UNIT/L (ref 40–150)
ALT SERPL-CCNC: 17 UNIT/L (ref 0–55)
AST SERPL-CCNC: 15 UNIT/L (ref 5–34)
BASOPHILS # BLD AUTO: 0 X10(3)/MCL (ref 0–0.2)
BASOPHILS NFR BLD AUTO: 0 %
BILIRUB SERPL-MCNC: 0.5 MG/DL
BILIRUBIN DIRECT+TOT PNL SERPL-MCNC: 0.2 MG/DL (ref 0–0.8)
BILIRUBIN DIRECT+TOT PNL SERPL-MCNC: 0.3 MG/DL (ref 0–0.5)
BUN SERPL-MCNC: 48.8 MG/DL (ref 9.8–20.1)
CALCIUM SERPL-MCNC: 9.6 MG/DL (ref 8.7–10.5)
CHLORIDE SERPL-SCNC: 107 MMOL/L (ref 98–107)
CO2 SERPL-SCNC: 27 MMOL/L (ref 23–31)
CREAT SERPL-MCNC: 2.94 MG/DL (ref 0.55–1.02)
DEPRECATED CALCIDIOL+CALCIFEROL SERPL-MC: 46.2 NG/ML (ref 30–80)
EOSINOPHIL # BLD AUTO: 0.2 X10(3)/MCL (ref 0–0.9)
EOSINOPHIL NFR BLD AUTO: 3 %
ERYTHROCYTE [DISTWIDTH] IN BLOOD BY AUTOMATED COUNT: 12.8 % (ref 11.5–17)
EST. AVERAGE GLUCOSE BLD GHB EST-MCNC: 105.4 MG/DL
GLOBULIN SER-MCNC: 2.6 GM/DL (ref 2.4–3.5)
GLUCOSE SERPL-MCNC: 95 MG/DL (ref 82–115)
HBA1C MFR BLD: 5.3 %
HCT VFR BLD AUTO: 33.4 % (ref 37–47)
HGB BLD-MCNC: 10.7 GM/DL (ref 12–16)
LYMPHOCYTES # BLD AUTO: 0.9 X10(3)/MCL (ref 0.6–4.6)
LYMPHOCYTES NFR BLD AUTO: 14 %
MAGNESIUM SERPL-MCNC: 2 MG/DL (ref 1.6–2.6)
MCH RBC QN AUTO: 31.5 PG (ref 27–31)
MCHC RBC AUTO-ENTMCNC: 32 GM/DL (ref 33–36)
MCV RBC AUTO: 98.2 FL (ref 80–94)
MONOCYTES # BLD AUTO: 0.7 X10(3)/MCL (ref 0.1–1.3)
MONOCYTES NFR BLD AUTO: 11 %
NEUTROPHILS # BLD AUTO: 4.64 X10(3)/MCL (ref 2.1–9.2)
NEUTROPHILS NFR BLD AUTO: 71 %
PHOSPHATE SERPL-MCNC: 4.2 MG/DL (ref 2.3–4.7)
PLATELET # BLD AUTO: 185 X10(3)/MCL (ref 130–400)
PMV BLD AUTO: 11.7 FL (ref 9.4–12.4)
POTASSIUM SERPL-SCNC: 3.9 MMOL/L (ref 3.5–5.1)
PROT SERPL-MCNC: 5.8 GM/DL (ref 5.8–7.6)
PTH-INTACT SERPL-MCNC: 184.9 PG/ML (ref 8.7–77.1)
RBC # BLD AUTO: 3.4 X10(6)/MCL (ref 4.2–5.4)
SODIUM SERPL-SCNC: 145 MMOL/L (ref 136–145)
TSH SERPL-ACNC: <0.0083 UIU/ML (ref 0.35–4.94)
WBC # SPEC AUTO: 6.5 X10(3)/MCL (ref 4.5–11.5)

## 2021-11-24 ENCOUNTER — HISTORICAL (OUTPATIENT)
Dept: ADMINISTRATIVE | Facility: HOSPITAL | Age: 76
End: 2021-11-24

## 2021-11-24 LAB
ABS NEUT (OLG): 4.89 X10(3)/MCL (ref 2.1–9.2)
ALBUMIN SERPL-MCNC: 3.4 GM/DL (ref 3.4–4.8)
ALBUMIN/GLOB SERPL: 1.3 RATIO (ref 1.1–2)
ALP SERPL-CCNC: 139 UNIT/L (ref 40–150)
ALT SERPL-CCNC: 12 UNIT/L (ref 0–55)
AST SERPL-CCNC: 15 UNIT/L (ref 5–34)
BASOPHILS # BLD AUTO: 0 X10(3)/MCL (ref 0–0.2)
BASOPHILS NFR BLD AUTO: 0 %
BILIRUB SERPL-MCNC: 0.5 MG/DL
BILIRUBIN DIRECT+TOT PNL SERPL-MCNC: 0.2 MG/DL (ref 0–0.5)
BILIRUBIN DIRECT+TOT PNL SERPL-MCNC: 0.3 MG/DL (ref 0–0.8)
BUN SERPL-MCNC: 57.8 MG/DL (ref 9.8–20.1)
CALCIUM SERPL-MCNC: 9.1 MG/DL (ref 8.7–10.5)
CHLORIDE SERPL-SCNC: 105 MMOL/L (ref 98–107)
CO2 SERPL-SCNC: 26 MMOL/L (ref 23–31)
CREAT SERPL-MCNC: 3.39 MG/DL (ref 0.55–1.02)
DEPRECATED CALCIDIOL+CALCIFEROL SERPL-MC: 46.2 NG/ML (ref 30–80)
EOSINOPHIL # BLD AUTO: 0.4 X10(3)/MCL (ref 0–0.9)
EOSINOPHIL NFR BLD AUTO: 5 %
ERYTHROCYTE [DISTWIDTH] IN BLOOD BY AUTOMATED COUNT: 12.7 % (ref 11.5–17)
FERRITIN SERPL-MCNC: 1274.27 NG/ML (ref 4.63–204)
GLOBULIN SER-MCNC: 2.6 GM/DL (ref 2.4–3.5)
GLUCOSE SERPL-MCNC: 110 MG/DL (ref 82–115)
HCT VFR BLD AUTO: 35.6 % (ref 37–47)
HGB BLD-MCNC: 11.4 GM/DL (ref 12–16)
IRON SATN MFR SERPL: 41 % (ref 20–50)
IRON SERPL-MCNC: 77 UG/DL (ref 50–170)
LYMPHOCYTES # BLD AUTO: 1.1 X10(3)/MCL (ref 0.6–4.6)
LYMPHOCYTES NFR BLD AUTO: 16 %
MAGNESIUM SERPL-MCNC: 1.9 MG/DL (ref 1.6–2.6)
MCH RBC QN AUTO: 31.2 PG (ref 27–31)
MCHC RBC AUTO-ENTMCNC: 32 GM/DL (ref 33–36)
MCV RBC AUTO: 97.5 FL (ref 80–94)
MONOCYTES # BLD AUTO: 0.7 X10(3)/MCL (ref 0.1–1.3)
MONOCYTES NFR BLD AUTO: 10 %
NEUTROPHILS # BLD AUTO: 4.89 X10(3)/MCL (ref 2.1–9.2)
NEUTROPHILS NFR BLD AUTO: 69 %
PHOSPHATE SERPL-MCNC: 4 MG/DL (ref 2.3–4.7)
PLATELET # BLD AUTO: 208 X10(3)/MCL (ref 130–400)
PMV BLD AUTO: 11.3 FL (ref 9.4–12.4)
POTASSIUM SERPL-SCNC: 3.9 MMOL/L (ref 3.5–5.1)
PROT SERPL-MCNC: 6 GM/DL (ref 5.8–7.6)
PTH-INTACT SERPL-MCNC: 191.3 PG/ML (ref 8.7–77.1)
RBC # BLD AUTO: 3.65 X10(6)/MCL (ref 4.2–5.4)
SODIUM SERPL-SCNC: 141 MMOL/L (ref 136–145)
TIBC SERPL-MCNC: 110 UG/DL (ref 70–310)
TIBC SERPL-MCNC: 187 UG/DL (ref 250–450)
TRANSFERRIN SERPL-MCNC: 165 MG/DL (ref 173–360)
WBC # SPEC AUTO: 7.1 X10(3)/MCL (ref 4.5–11.5)

## 2022-01-07 ENCOUNTER — HISTORICAL (OUTPATIENT)
Dept: ADMINISTRATIVE | Facility: HOSPITAL | Age: 77
End: 2022-01-07

## 2022-01-07 LAB
ABS NEUT (OLG): 4.92 X10(3)/MCL (ref 2.1–9.2)
ALBUMIN SERPL-MCNC: 3.2 GM/DL (ref 3.4–4.8)
ALBUMIN/GLOB SERPL: 1.1 RATIO (ref 1.1–2)
ALP SERPL-CCNC: 143 UNIT/L (ref 40–150)
ALT SERPL-CCNC: 16 UNIT/L (ref 0–55)
AST SERPL-CCNC: 17 UNIT/L (ref 5–34)
BASOPHILS # BLD AUTO: 0 X10(3)/MCL (ref 0–0.2)
BASOPHILS NFR BLD AUTO: 0 %
BILIRUB SERPL-MCNC: 0.5 MG/DL
BILIRUBIN DIRECT+TOT PNL SERPL-MCNC: 0.2 MG/DL (ref 0–0.8)
BILIRUBIN DIRECT+TOT PNL SERPL-MCNC: 0.3 MG/DL (ref 0–0.5)
BUN SERPL-MCNC: 55.8 MG/DL (ref 9.8–20.1)
CALCIUM SERPL-MCNC: 9.4 MG/DL (ref 8.7–10.5)
CHLORIDE SERPL-SCNC: 105 MMOL/L (ref 98–107)
CO2 SERPL-SCNC: 25 MMOL/L (ref 23–31)
CREAT SERPL-MCNC: 2.95 MG/DL (ref 0.55–1.02)
DEPRECATED CALCIDIOL+CALCIFEROL SERPL-MC: 46.6 NG/ML (ref 30–80)
EOSINOPHIL # BLD AUTO: 0.4 X10(3)/MCL (ref 0–0.9)
EOSINOPHIL NFR BLD AUTO: 6 %
ERYTHROCYTE [DISTWIDTH] IN BLOOD BY AUTOMATED COUNT: 12.1 % (ref 11.5–17)
FERRITIN SERPL-MCNC: 1498.67 NG/ML (ref 4.63–204)
GLOBULIN SER-MCNC: 3 GM/DL (ref 2.4–3.5)
GLUCOSE SERPL-MCNC: 92 MG/DL (ref 82–115)
HCT VFR BLD AUTO: 38.7 % (ref 37–47)
HGB BLD-MCNC: 12.1 GM/DL (ref 12–16)
IRON SATN MFR SERPL: 36 % (ref 20–50)
IRON SERPL-MCNC: 63 UG/DL (ref 50–170)
LYMPHOCYTES # BLD AUTO: 1.1 X10(3)/MCL (ref 0.6–4.6)
LYMPHOCYTES NFR BLD AUTO: 16 %
MAGNESIUM SERPL-MCNC: 2.1 MG/DL (ref 1.6–2.6)
MCH RBC QN AUTO: 30.6 PG (ref 27–31)
MCHC RBC AUTO-ENTMCNC: 31.3 GM/DL (ref 33–36)
MCV RBC AUTO: 97.7 FL (ref 80–94)
MONOCYTES # BLD AUTO: 0.7 X10(3)/MCL (ref 0.1–1.3)
MONOCYTES NFR BLD AUTO: 10 %
NEUTROPHILS # BLD AUTO: 4.92 X10(3)/MCL (ref 2.1–9.2)
NEUTROPHILS NFR BLD AUTO: 68 %
PHOSPHATE SERPL-MCNC: 3.9 MG/DL (ref 2.3–4.7)
PLATELET # BLD AUTO: 246 X10(3)/MCL (ref 130–400)
PMV BLD AUTO: 11.1 FL (ref 9.4–12.4)
POTASSIUM SERPL-SCNC: 4 MMOL/L (ref 3.5–5.1)
PROT SERPL-MCNC: 6.2 GM/DL (ref 5.8–7.6)
PTH-INTACT SERPL-MCNC: 143.1 PG/ML (ref 8.7–77.1)
RBC # BLD AUTO: 3.96 X10(6)/MCL (ref 4.2–5.4)
SODIUM SERPL-SCNC: 141 MMOL/L (ref 136–145)
TIBC SERPL-MCNC: 113 UG/DL (ref 70–310)
TIBC SERPL-MCNC: 176 UG/DL (ref 250–450)
TRANSFERRIN SERPL-MCNC: 149 MG/DL (ref 173–360)
WBC # SPEC AUTO: 7.3 X10(3)/MCL (ref 4.5–11.5)

## 2022-02-10 ENCOUNTER — HISTORICAL (OUTPATIENT)
Dept: ADMINISTRATIVE | Facility: HOSPITAL | Age: 77
End: 2022-02-10

## 2022-02-10 LAB
ABS NEUT (OLG): 4.83 (ref 2.1–9.2)
ALBUMIN SERPL-MCNC: 3 G/DL (ref 3.4–4.8)
ALBUMIN/GLOB SERPL: 1 {RATIO} (ref 1.1–2)
ALP SERPL-CCNC: 138 U/L (ref 40–150)
ALT SERPL-CCNC: 32 U/L (ref 0–55)
AST SERPL-CCNC: 29 U/L (ref 5–34)
BASOPHILS # BLD AUTO: 0 10*3/UL (ref 0–0.2)
BASOPHILS NFR BLD AUTO: 0 %
BILIRUB SERPL-MCNC: 0.6 MG/DL
BILIRUBIN DIRECT+TOT PNL SERPL-MCNC: 0.3 (ref 0–0.5)
BILIRUBIN DIRECT+TOT PNL SERPL-MCNC: 0.3 (ref 0–0.8)
BUN SERPL-MCNC: 42.3 MG/DL (ref 9.8–20.1)
CALCIUM SERPL-MCNC: 9.3 MG/DL (ref 8.7–10.5)
CHLORIDE SERPL-SCNC: 106 MMOL/L (ref 98–107)
CO2 SERPL-SCNC: 23 MMOL/L (ref 23–31)
CREAT SERPL-MCNC: 2.64 MG/DL (ref 0.55–1.02)
DEPRECATED CALCIDIOL+CALCIFEROL SERPL-MC: 48.5 NG/ML (ref 30–80)
EOSINOPHIL # BLD AUTO: 0.4 10*3/UL (ref 0–0.9)
EOSINOPHIL NFR BLD AUTO: 6 %
ERYTHROCYTE [DISTWIDTH] IN BLOOD BY AUTOMATED COUNT: 11.9 % (ref 11.5–17)
FERRITIN SERPL-MCNC: 1969.74 NG/ML (ref 4.63–204)
GLOBULIN SER-MCNC: 2.9 G/DL (ref 2.4–3.5)
GLUCOSE SERPL-MCNC: 102 MG/DL (ref 82–115)
HCT VFR BLD AUTO: 36 % (ref 37–47)
HEMOLYSIS INTERF INDEX SERPL-ACNC: <0
HEMOLYSIS INTERF INDEX SERPL-ACNC: <0
HGB BLD-MCNC: 11.5 G/DL (ref 12–16)
ICTERIC INTERF INDEX SERPL-ACNC: 1
IRON SATN MFR SERPL: 39 % (ref 20–50)
IRON SERPL-MCNC: 68 UG/DL (ref 50–170)
LIPEMIC INTERF INDEX SERPL-ACNC: <0
LYMPHOCYTES # BLD AUTO: 0.9 10*3/UL (ref 0.6–4.6)
LYMPHOCYTES NFR BLD AUTO: 13 %
MAGNESIUM SERPL-MCNC: 1.8 MG/DL (ref 1.6–2.6)
MANUAL DIFF? (OHS): NO
MCH RBC QN AUTO: 29.9 PG (ref 27–31)
MCHC RBC AUTO-ENTMCNC: 31.9 G/DL (ref 33–36)
MCV RBC AUTO: 93.8 FL (ref 80–94)
MONOCYTES # BLD AUTO: 0.8 10*3/UL (ref 0.1–1.3)
MONOCYTES NFR BLD AUTO: 11 %
NEUTROPHILS # BLD AUTO: 4.83 10*3/UL (ref 2.1–9.2)
NEUTROPHILS NFR BLD AUTO: 70 %
PHOSPHATE SERPL-MCNC: 3.7 MG/DL (ref 2.3–4.7)
PLATELET # BLD AUTO: 233 10*3/UL (ref 130–400)
PMV BLD AUTO: 11 FL (ref 9.4–12.4)
POTASSIUM SERPL-SCNC: 3.5 MMOL/L (ref 3.5–5.1)
PROT SERPL-MCNC: 5.9 G/DL (ref 5.8–7.6)
PTH-INTACT SERPL-MCNC: 84.9 PG/ML (ref 8.7–77.1)
RBC # BLD AUTO: 3.84 10*6/UL (ref 4.2–5.4)
SODIUM SERPL-SCNC: 142 MMOL/L (ref 136–145)
TIBC SERPL-MCNC: 108 UG/DL (ref 70–310)
TIBC SERPL-MCNC: 176 UG/DL (ref 250–450)
TRANSFERRIN SERPL-MCNC: 140 MG/DL (ref 173–360)
WBC # SPEC AUTO: 6.9 10*3/UL (ref 4.5–11.5)

## 2022-03-08 ENCOUNTER — HISTORICAL (OUTPATIENT)
Dept: ADMINISTRATIVE | Facility: HOSPITAL | Age: 77
End: 2022-03-08

## 2022-03-08 LAB
ABS NEUT (OLG): 4.92 (ref 2.1–9.2)
BASOPHILS # BLD AUTO: 0 10*3/UL (ref 0–0.2)
BASOPHILS NFR BLD AUTO: 0 %
DEPRECATED CALCIDIOL+CALCIFEROL SERPL-MC: 48.5 NG/ML (ref 30–80)
EOSINOPHIL # BLD AUTO: 0.3 10*3/UL (ref 0–0.9)
EOSINOPHIL NFR BLD AUTO: 5 %
ERYTHROCYTE [DISTWIDTH] IN BLOOD BY AUTOMATED COUNT: 13.5 % (ref 11.5–17)
FERRITIN SERPL-MCNC: 1549.77 NG/ML (ref 4.63–204)
HCT VFR BLD AUTO: 37.1 % (ref 37–47)
HEMOLYSIS INTERF INDEX SERPL-ACNC: 2
HGB BLD-MCNC: 11.7 G/DL (ref 12–16)
IRON SATN MFR SERPL: 35 % (ref 20–50)
IRON SERPL-MCNC: 72 UG/DL (ref 50–170)
LYMPHOCYTES # BLD AUTO: 0.8 10*3/UL (ref 0.6–4.6)
LYMPHOCYTES NFR BLD AUTO: 13 %
MAGNESIUM SERPL-MCNC: 1.9 MG/DL (ref 1.6–2.6)
MANUAL DIFF? (OHS): NO
MCH RBC QN AUTO: 30 PG (ref 27–31)
MCHC RBC AUTO-ENTMCNC: 31.5 G/DL (ref 33–36)
MCV RBC AUTO: 95.1 FL (ref 80–94)
MONOCYTES # BLD AUTO: 0.6 10*3/UL (ref 0.1–1.3)
MONOCYTES NFR BLD AUTO: 9 %
NEUTROPHILS # BLD AUTO: 4.92 10*3/UL (ref 2.1–9.2)
NEUTROPHILS NFR BLD AUTO: 73 %
PHOSPHATE SERPL-MCNC: 3.7 MG/DL (ref 2.3–4.7)
PLATELET # BLD AUTO: 188 10*3/UL (ref 130–400)
PMV BLD AUTO: 11.7 FL (ref 9.4–12.4)
PTH-INTACT SERPL-MCNC: 167.6 PG/ML (ref 8.7–77.1)
RBC # BLD AUTO: 3.9 10*6/UL (ref 4.2–5.4)
TIBC SERPL-MCNC: 133 UG/DL (ref 70–310)
TIBC SERPL-MCNC: 205 UG/DL (ref 250–450)
TRANSFERRIN SERPL-MCNC: 181 MG/DL (ref 173–360)
WBC # SPEC AUTO: 6.8 10*3/UL (ref 4.5–11.5)

## 2022-03-10 LAB
ALBUMIN SERPL-MCNC: 3.2 G/DL (ref 3.4–4.8)
ALBUMIN/GLOB SERPL: 1.2 {RATIO} (ref 1.1–2)
ALP SERPL-CCNC: 135 U/L (ref 40–150)
ALT SERPL-CCNC: 19 U/L (ref 0–55)
AST SERPL-CCNC: 19 U/L (ref 5–34)
BILIRUB SERPL-MCNC: 0.6 MG/DL
BILIRUBIN DIRECT+TOT PNL SERPL-MCNC: 0.3 (ref 0–0.5)
BILIRUBIN DIRECT+TOT PNL SERPL-MCNC: 0.3 (ref 0–0.8)
BUN SERPL-MCNC: 41.3 MG/DL (ref 9.8–20.1)
CALCIUM SERPL-MCNC: 9 MG/DL (ref 8.7–10.5)
CHLORIDE SERPL-SCNC: 106 MMOL/L (ref 98–107)
CO2 SERPL-SCNC: 25 MMOL/L (ref 23–31)
CREAT SERPL-MCNC: 2.78 MG/DL (ref 0.55–1.02)
GLOBULIN SER-MCNC: 2.6 G/DL (ref 2.4–3.5)
GLUCOSE SERPL-MCNC: 164 MG/DL (ref 82–115)
HEMOLYSIS INTERF INDEX SERPL-ACNC: 0
ICTERIC INTERF INDEX SERPL-ACNC: 0
LIPEMIC INTERF INDEX SERPL-ACNC: <0
POTASSIUM SERPL-SCNC: 4.1 MMOL/L (ref 3.5–5.1)
PROT SERPL-MCNC: 5.8 G/DL (ref 5.8–7.6)
SODIUM SERPL-SCNC: 142 MMOL/L (ref 136–145)

## 2022-03-31 ENCOUNTER — HISTORICAL (OUTPATIENT)
Dept: ADMINISTRATIVE | Facility: HOSPITAL | Age: 77
End: 2022-03-31

## 2022-03-31 LAB
ABS NEUT (OLG): 5.44 (ref 2.1–9.2)
ALBUMIN SERPL-MCNC: 3.4 G/DL (ref 3.4–4.8)
ALBUMIN/GLOB SERPL: 1.1 {RATIO} (ref 1.1–2)
ALP SERPL-CCNC: 167 U/L (ref 40–150)
ALT SERPL-CCNC: 31 U/L (ref 0–55)
AST SERPL-CCNC: 30 U/L (ref 5–34)
BASOPHILS # BLD AUTO: 0 10*3/UL (ref 0–0.2)
BASOPHILS NFR BLD AUTO: 1 %
BILIRUB SERPL-MCNC: 0.8 MG/DL
BILIRUBIN DIRECT+TOT PNL SERPL-MCNC: 0.4 (ref 0–0.5)
BILIRUBIN DIRECT+TOT PNL SERPL-MCNC: 0.4 (ref 0–0.8)
BUN SERPL-MCNC: 49.9 MG/DL (ref 9.8–20.1)
CALCIUM SERPL-MCNC: 9.4 MG/DL (ref 8.7–10.5)
CHLORIDE SERPL-SCNC: 105 MMOL/L (ref 98–107)
CO2 SERPL-SCNC: 23 MMOL/L (ref 23–31)
CREAT SERPL-MCNC: 2.7 MG/DL (ref 0.55–1.02)
DEPRECATED CALCIDIOL+CALCIFEROL SERPL-MC: 51.2 NG/ML (ref 30–80)
EOSINOPHIL # BLD AUTO: 0.4 10*3/UL (ref 0–0.9)
EOSINOPHIL NFR BLD AUTO: 5 %
ERYTHROCYTE [DISTWIDTH] IN BLOOD BY AUTOMATED COUNT: 13.8 % (ref 11.5–17)
FERRITIN SERPL-MCNC: 1754.62 NG/ML (ref 4.63–204)
GLOBULIN SER-MCNC: 3 G/DL (ref 2.4–3.5)
GLUCOSE SERPL-MCNC: 88 MG/DL (ref 82–115)
HCT VFR BLD AUTO: 40.4 % (ref 37–47)
HEMOLYSIS INTERF INDEX SERPL-ACNC: 0
HEMOLYSIS INTERF INDEX SERPL-ACNC: 0
HGB BLD-MCNC: 12.9 G/DL (ref 12–16)
ICTERIC INTERF INDEX SERPL-ACNC: 1
IRON SATN MFR SERPL: 45 % (ref 20–50)
IRON SERPL-MCNC: 103 UG/DL (ref 50–170)
LIPEMIC INTERF INDEX SERPL-ACNC: <0
LYMPHOCYTES # BLD AUTO: 1.4 10*3/UL (ref 0.6–4.6)
LYMPHOCYTES NFR BLD AUTO: 17 %
MAGNESIUM SERPL-MCNC: 2 MG/DL (ref 1.6–2.6)
MANUAL DIFF? (OHS): NO
MCH RBC QN AUTO: 30.9 PG (ref 27–31)
MCHC RBC AUTO-ENTMCNC: 31.9 G/DL (ref 33–36)
MCV RBC AUTO: 96.9 FL (ref 80–94)
MONOCYTES # BLD AUTO: 0.7 10*3/UL (ref 0.1–1.3)
MONOCYTES NFR BLD AUTO: 9 %
NEUTROPHILS # BLD AUTO: 5.44 10*3/UL (ref 2.1–9.2)
NEUTROPHILS NFR BLD AUTO: 68 %
PHOSPHATE SERPL-MCNC: 4.1 MG/DL (ref 2.3–4.7)
PLATELET # BLD AUTO: 263 10*3/UL (ref 130–400)
PMV BLD AUTO: 11 FL (ref 9.4–12.4)
POTASSIUM SERPL-SCNC: 3.9 MMOL/L (ref 3.5–5.1)
PROT SERPL-MCNC: 6.4 G/DL (ref 5.8–7.6)
PTH-INTACT SERPL-MCNC: 235.2 PG/ML (ref 8.7–77.1)
RBC # BLD AUTO: 4.17 10*6/UL (ref 4.2–5.4)
SODIUM SERPL-SCNC: 143 MMOL/L (ref 136–145)
TIBC SERPL-MCNC: 126 UG/DL (ref 70–310)
TIBC SERPL-MCNC: 229 UG/DL (ref 250–450)
TRANSFERRIN SERPL-MCNC: 186 MG/DL (ref 173–360)
WBC # SPEC AUTO: 8 10*3/UL (ref 4.5–11.5)

## 2022-04-10 ENCOUNTER — HISTORICAL (OUTPATIENT)
Dept: ADMINISTRATIVE | Facility: HOSPITAL | Age: 77
End: 2022-04-10
Payer: COMMERCIAL

## 2022-04-26 ENCOUNTER — HISTORICAL (OUTPATIENT)
Dept: ADMINISTRATIVE | Facility: HOSPITAL | Age: 77
End: 2022-04-26
Payer: COMMERCIAL

## 2022-04-26 LAB
ABS NEUT (OLG): 5.47 (ref 2.1–9.2)
ALBUMIN SERPL-MCNC: 3.5 G/DL (ref 3.4–4.8)
ALBUMIN/GLOB SERPL: 1.2 {RATIO} (ref 1.1–2)
ALP SERPL-CCNC: 144 U/L (ref 40–150)
ALT SERPL-CCNC: 18 U/L (ref 0–55)
AST SERPL-CCNC: 20 U/L (ref 5–34)
BASOPHILS # BLD AUTO: 0 10*3/UL (ref 0–0.2)
BASOPHILS NFR BLD AUTO: 0 %
BILIRUB SERPL-MCNC: 0.7 MG/DL
BILIRUBIN DIRECT+TOT PNL SERPL-MCNC: 0.3 (ref 0–0.5)
BILIRUBIN DIRECT+TOT PNL SERPL-MCNC: 0.4 (ref 0–0.8)
BUN SERPL-MCNC: 45.8 MG/DL (ref 9.8–20.1)
CALCIUM SERPL-MCNC: 9.5 MG/DL (ref 8.7–10.5)
CHLORIDE SERPL-SCNC: 105 MMOL/L (ref 98–107)
CO2 SERPL-SCNC: 26 MMOL/L (ref 23–31)
CREAT SERPL-MCNC: 2.71 MG/DL (ref 0.55–1.02)
CREAT UR-MCNC: 59.3 MG/DL (ref 45–106)
DEPRECATED CALCIDIOL+CALCIFEROL SERPL-MC: 51.4 NG/ML (ref 30–80)
EOSINOPHIL # BLD AUTO: 0.4 10*3/UL (ref 0–0.9)
EOSINOPHIL NFR BLD AUTO: 5 %
ERYTHROCYTE [DISTWIDTH] IN BLOOD BY AUTOMATED COUNT: 13.6 % (ref 11.5–17)
EST. AVERAGE GLUCOSE BLD GHB EST-MCNC: 99.7 MG/DL
GLOBULIN SER-MCNC: 3 G/DL (ref 2.4–3.5)
GLUCOSE SERPL-MCNC: 93 MG/DL (ref 82–115)
HBA1C MFR BLD: 5.1 %
HCT VFR BLD AUTO: 40.2 % (ref 37–47)
HEMOLYSIS INTERF INDEX SERPL-ACNC: 25
HEMOLYSIS INTERF INDEX SERPL-ACNC: 25
HGB BLD-MCNC: 12.8 G/DL (ref 12–16)
ICTERIC INTERF INDEX SERPL-ACNC: 1
LIPEMIC INTERF INDEX SERPL-ACNC: 0
LYMPHOCYTES # BLD AUTO: 1.4 10*3/UL (ref 0.6–4.6)
LYMPHOCYTES NFR BLD AUTO: 17 %
MAGNESIUM SERPL-MCNC: 2 MG/DL (ref 1.6–2.6)
MANUAL DIFF? (OHS): NO
MCH RBC QN AUTO: 30.8 PG (ref 27–31)
MCHC RBC AUTO-ENTMCNC: 31.8 G/DL (ref 33–36)
MCV RBC AUTO: 96.9 FL (ref 80–94)
MICROALBUMIN UR-MCNC: 172.9
MICROALBUMIN/CREAT RATIO PNL UR: 291.6 (ref 0–30)
MONOCYTES # BLD AUTO: 0.6 10*3/UL (ref 0.1–1.3)
MONOCYTES NFR BLD AUTO: 8 %
NEUTROPHILS # BLD AUTO: 5.47 10*3/UL (ref 2.1–9.2)
NEUTROPHILS NFR BLD AUTO: 69 %
PHOSPHATE SERPL-MCNC: 4.2 MG/DL (ref 2.3–4.7)
PLATELET # BLD AUTO: 249 10*3/UL (ref 130–400)
PMV BLD AUTO: 11.4 FL (ref 9.4–12.4)
POTASSIUM SERPL-SCNC: 4.5 MMOL/L (ref 3.5–5.1)
PROT SERPL-MCNC: 6.5 G/DL (ref 5.8–7.6)
PTH-INTACT SERPL-MCNC: 310.7 PG/ML (ref 8.7–77.1)
RBC # BLD AUTO: 4.15 10*6/UL (ref 4.2–5.4)
SODIUM SERPL-SCNC: 143 MMOL/L (ref 136–145)
WBC # SPEC AUTO: 7.9 10*3/UL (ref 4.5–11.5)

## 2022-04-28 VITALS
HEIGHT: 68 IN | OXYGEN SATURATION: 95 % | WEIGHT: 282.63 LBS | DIASTOLIC BLOOD PRESSURE: 101 MMHG | BODY MASS INDEX: 42.83 KG/M2 | SYSTOLIC BLOOD PRESSURE: 177 MMHG

## 2022-04-30 NOTE — DISCHARGE SUMMARY
Patient:   Debra Puentes            MRN: [id number]            FIN: [id number]               Age:   74 years     Sex:  Female     :  1945   Associated Diagnoses:   None   Author:   Levar Vazquez MD      Date of Service: 2020      Discharge Information      Discharge Summary Information   Date of Admission: 5/15/2020  Date of Discharge: 2020  Admit Diagnosis: Debility         E. coli bacteremia and UTI         ESRD on HD (new onset)         Chronic atrial fibrillation (new onset)         Coronary artery disease         Hypertension         Non-Hodgkin's lymphoma         Vitamin D deficiency         Hypomagnesemia         Anemia of chronic disease  Discharge Diagnosis: Debility (stable with improvement)           E. coli bacteremia and UTI (resolved)           ESRD on HD (stable)           Chronic atrial fibrillation (stable)           Coronary artery disease (stable)           Hypertension (stable)           Non-Hodgkin's lymphoma (stable)           Vitamin D deficiency (stable)           Hypomagnesemia (stable)           Internal hemorrhoids (improving)           Restless leg syndrome (stable with improvement)           Anemia of chronic disease (stable)  Attending: Levar Vazquez MD  Internal Medicine (attending): Levar Vazquez MD  Physiatry (consulting): Gregorio Degroot MD  Nephrology: Rehan Kramer MD    OUTPATIENT PROVIDERS  PCP: Aide Combs MD  Cardiology: Emir Gonzalez MD      Hospital Course   74-year-old white female presented to [facility] ED on 2020 with slurred speech and urinary incontinence.  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  Work-up significant for acute hypoxic respiratory failure 2/2 CAP with leukocytosis and elevated lactic acid, and OLRI with no response to Lasix.  Rocephin and azithromycin initiated.  Transferred to ICU on  5/7.  Zosyn initiated on admission to ICU.  Urine culture and blood culture both significant for E. coli.  New onset atrial fib RVR appreciated on 5/8.  Converted with amiodarone and metoprolol.  Developed anuric LORI and encephalopathy requiring dialysis 5/7, 5/8, and 5/9.  He remained oliguric and there was no evidence of renal function recovery.  Scheduled HD initiated MWF.  Eliquis 2.5 mg twice daily initiated on 5/13.  Patient began tolerating physical therapy.  Tolerated transfer to [facility] inpatient rehab unit on 5/15 without incident.    Clinical condition progressed with continued aggressive therapies.  Noted improvements in strengthening, mobility, endurance, and conditioning.  Nephrology consulted on 5/15 and managed electrolytes and hemodialysis throughout hospital stay.  Patient tolerated hemodialysis with no reported complications aside from ongoing extreme fatigue upon completion of hemodialysis.  Adequate rate control with no reported bleeds during hospital stay.  Toprol-XL 50 mg daily was changed to metoprolol tartrate 25 mg twice daily.  Patient denied chest pains or palpitations.  Phosphate binder initiated for hyperphosphatemia.  Completed recommended 8-day course of Rocephin with no subsequent evidence of infection or sepsis.  Initial depression screen suggested underlying depression.  This improved with the addition of Wellbutrin  mg daily, however patient continues with poor motivation.  Ropinirole 1 mg daily improved restless legs.  Colace 100 mg daily adequately controlled constipation.  Blood pressure remained well controlled.  Hypomagnesemia well controlled with magnesium replacement.  Internal hemorrhoids improved with the addition of Proctofoam.  Ongoing bilateral lower extremity edema slightly improved with continued dialysis.  Bilateral lower extremity venous NIVA on 5/19 showed no evidence of thrombus.  H&H remained stable without transfusions.  Ongoing balance, basic ADLs,  endurance, equipment, mobility, range of motion, safety awareness, strength, visual perceptual, ambulation, and bed mobility deficits.  Recommended continued daily skilled therapy.  COVID-19 PCR screen returned negative on 5/28.  Condition stable for transfer to SNF on 5/29.  Patient to follow-up with SNF MD/NP in next 24 to 48 hours.  Will likely require transition to long-term care given dangerous prior home environment.    Medications   Discontinued admission medications    Rocephin 2 g IV daily x8 days   Continued admission medications (not given inpatient)    Victoza 1.8 mg daily    Cinnamon 1000 mg daily    Centrum Silver ultra women's oral tablet daily    Vitamin E 400 IU daily   Modified admission medications    Amiodarone 200 mg daily (decreased from 200 mg twice daily)    Metoprolol tartrate 25 mg twice daily (decreased from Toprol XL 50 mg daily)   Continued admission medications (given inpatient)    Eliquis 2.5 mg twice daily    Aspirin 81 mg daily    Vitamin D3 2000 IU daily    Magnesium oxide 400 mg daily    Crestor 10 mg daily    Sodium bicarb 650 mg 3 times daily    Co-Q10 200 mg daily   Medications initiated inpatient    Wellbutrin  mg daily    Colace 100 mg twice daily    Ropinirole 1 mg daily    Sevelamer carbonate 1600 mg 3 times daily    Chief Complaint: S/p debility, E. coli bacteremia, and ESRD on HD      Physical Examination      Vital Signs (last 24 hrs)_____  Last Charted___________  Temp Oral         37 DegC  (MAY 29 06:15)  Heart Rate Peripheral   74 bpm  (MAY 29 06:15)  SBP      H 151mmHg  (MAY 29 06:15)  DBP      77 mmHg  (MAY 29 06:15)  SpO2      96 %  (MAY 29 06:15)     General:  Alert and oriented, No acute distress.    Eye:  Pupils are equal, round and reactive to light, Vision unchanged.    HENT:  Normocephalic.    Neck:  Supple, Non-tender.    Respiratory:  Lungs are clear to auscultation, Respirations are non-labored, Breath sounds are equal, Symmetrical chest wall  expansion, No chest wall tenderness.    Cardiovascular:  Normal rate, Regular rhythm, BL LE 2+ edema, minimal erythema noted to medial BLE, HD catheter to RCW.    Gastrointestinal:  Soft, Non-tender, Normal bowel sounds, Obese.    Musculoskeletal:  Generalized weakness.    Integumentary:  Warm, Dry, Pressure related injury   Incision, Wound Pressure Ulcer Stage: II, HD catheter to RCW.    Neurologic:  Alert, Oriented, Normal sensory, Normal motor function, No focal deficits, Cranial Nerves II-XII are grossly intact.    Cognition and Speech:  Oriented, Speech clear and coherent, Functional cognition intact.    Psychiatric:  Cooperative, Appropriate mood & affect, Normal judgment, Non-suicidal.    -      Results Review   General results   Today's results   5/28/2020 14:27 CDT      SARS-CoV-2 PCR            Not Detected    5/25/2020 5:35 CDT       WBC                       7.6 x10(3)/mcL                             WBC                       7.6 x10(3)/mcL                             RBC                       3.34 x10(6)/mcL  LOW                             RBC                       3.34 x10(6)/mcL  LOW                             Hgb                       10.1 gm/dL  LOW                             Hgb                       10.1 gm/dL  LOW                             Hct                       31.5 %  LOW                             Hct                       31.5 %  LOW                             Platelet                  225 x10(3)/mcL                             Platelet                  225 x10(3)/mcL                             MCV                       94.3 fL  HI                             MCV                       94.3 fL  HI                             MCH                       30.2 pg                             MCH                       30.2 pg                             MCHC                      32.1 gm/dL  LOW                             MCHC                      32.1 gm/dL  LOW                              RDW                       12.3 %                             RDW                       12.3 %                             MPV                       10.0 fL                             MPV                       10.0 fL                             Abs Neut                  5.30 x10(3)/mcL                             Abs Neut                  5.30 x10(3)/mcL                             Neutro Auto               70.2 %                             Lymph Auto                12.2 %  LOW                             Mono Auto                 11.7 %                             Eos Auto                  4.2 %                             Abs Eos                   0.32 x10(3)/mcL                             Basophil Auto             0.5 %                             Abs Neutro                5.30 x10(3)/mcL                             Abs Lymph                 0.92 x10(3)/mcL                             Abs Mono                  0.88 x10(3)/mcL                             Abs Baso                  0.04 x10(3)/mcL                             NRBC%                     0.0 %                             NRBC%                     0.0 %                             IG%                       1.200 %  HI                             IG#                       0.0900  HI                             Sodium Lvl                136 mmol/L                             Potassium Lvl             4.8 mmol/L                             Chloride                  97 mmol/L  LOW                             CO2                       26 mmol/L                             Calcium Lvl               8.9 mg/dL                             Magnesium Lvl             2.58 mg/dL                             Glucose Lvl               132 mg/dL  HI                             BUN                       58.7 mg/dL  HI                             Creatinine                8.32 mg/dL  HI                             eGFR-AA                   6  NA                              eGFR-MISHA                  5  NA                             Bili Total                0.7 mg/dL                             Bili Direct               0.3 mg/dL                             Bili Indirect             0.40 mg/dL                             AST                       9 unit/L                             ALT                       11 unit/L                             Alk Phos                  102 unit/L                             Total Protein             6.1 gm/dL                             Albumin Lvl               2.2 gm/dL  LOW                             Globulin                  3.9 gm/dL  HI                             A/G Ratio                 0.6 ratio  LOW                             Phosphorus                7.2 mg/dL  HI                             Prealbumin                16.3 mg/dL     Chest x-ray results   Anterior/posterior, Reported at  5/13/2020 16:10:00, Interpretation:  Lungs are underinflated with persistent bandlike opacity peripheral right midlung. Mediastinum is not shifted.   Radiology results   CT, Head , Reported at  5/6/2020 19:05:00, Reveals no acute disease process   Radiology results   US, Retroperitoneal , Reported at  5/7/2020 18:19:00, Interpretation:  Small heterogeneous left kidney suggestive of medical renal disease. Normal sonographic appearance of the right kidney.    -      Discharge Plan   Discharge Summary Plan   Discharge Status: improved.        Location: Discharge to SNF     Medications: See discharge medicine reconciliation     Activity: As tolerated     Diet: Renal on (on dialysis)     Instructions:  Take all medications as prescribed.          Attend appointments as scheduled.          Return to ED if symptoms worsen, or if t > 100.4.     Education: ESRD.  Atrial fibrillation.  Bleeding precautions when on anticoagulant therapy.     Follow-up: SNF MD/NP in next 24-48 hours         PCP within x7 days of discharge from [facility]         Nephrology and next 3-5  days    Discussed plan of care, and patient communicated understanding. Agreed to comply with recommendations.    This discharge took 34 minutes to complete.

## 2022-04-30 NOTE — CONSULTS
DATE OF CONSULTATION:      ATTENDING PHYSICIAN:  Levar Vazquez MD  CONSULTING PHYSICIAN:  Rehan Farley MD    We are called in consultation by Dr. Vazquez to see this 74-year-old  female who had been recently started on hemodialysis due to acute kidney injury.  Patient had been previously seen at [facility] with septic shock, complicated gram-negative urinary tract infection.  She developed acute kidney injury, and needed to be started on hemodialysis, which was done on May 7, 2020.  Patient also developed atrial fibrillation with rapid ventricular response.  However, this has been controlled with amiodarone, and she is currently having a regular rhythm.  The patient has been moved to [facility] for continued treatment.    ALLERGIES:  Lactose and codeine.    MEDICATIONS:  Medication list can be found in the medication section of the chart.    LABORATORY ANALYSIS:  Today showed a hemoglobin and hematocrit of 12.9 and 39.7%, white blood cell count somewhat elevated to 13,900, platelet count normal at 162,000.  She also had a sodium, potassium, and chloride all within normal limits, but CO2 was decreased to 18.  Blood sugar 149, BUN 42.7, creatinine 4.88.    PHYSICAL EXAMINATION:  GENERAL:  The patient is fully alert and oriented.  She is not in acute distress.   VITAL SIGNS:  Blood pressure is 141/83, with a heart rate of 85 which is regular, respiration 18, temperature 36.4 degrees Celsius.   HEENT:  Atraumatic, pupils equal and reactive to light.  No scleral discoloration.  No discharge.  Ears and nose free of lesions.   NECK:  Without jugular venous distention or masses.   CHEST:  Clear.  She has a right upper chest dialysis catheter with a clean exit site.     HEART:  Having regular rhythm.   ABDOMEN:  Soft, nontender, no masses palpated.   EXTREMITIES:  Were free of cyanosis or clubbing.  There is 1+ pretibial edema  bilaterally.    ASSESSMENT:    1. Acute kidney injury due to septic shock requiring start of hemodialysis.  2. Complicated urinary tract infection, which has been treated.  3. Disseminated intravascular coagulation coagulopathy, which is resolving.  4. Thrombocytopenia, which has resolved.  5. Atrial fibrillation with a rapid ventricular rate, but that has been resolved.  She is in a regular sinus rhythm now, on oral amiodarone.  6. Type 2 diabetes mellitus.  7. Arterial hypertension.  8. Morbid obesity.  9. Dysphagia.  She is on a modified diet.    PLAN:  We will continue monitoring this patient, and continue her dialysis on a Tuesday, Thursday, and Saturday schedule.     Thank you very much for allowing us to participate in the care of this nice lady.        ______________________________  Rehan Farley MD    AJL/UG  DD:  05/15/2020  Time:  12:32PM  DT:  05/15/2020  Time:  12:59PM  Job #:  [id number]    cc: Levar Vazquez MD

## 2022-04-30 NOTE — OP NOTE
Patient:   Debra Puentes            MRN: [id number]            FIN: [id number]               Age:   74 years     Sex:  Female     :  1945   Associated Diagnoses:   ESRD on dialysis   Author:   Alyx Michael MD      Brief Operative Note   Operative Information   Date/ Time:  2020 09:12:00.     Preoperative Diagnosis: ESRD on dialysis ([id number] N18.6).     Postoperative Diagnosis: ESRD on dialysis ([id number] N18.6).     Procedures Performed: Left upper extremity dialysis graft placement.     Surgeon: Alyx Michael MD.     Assistant: Lien Castillo.     Esimated blood loss: loss less than  100  cc.     Description of Procedure/Findings/    Complications: The patient was taken to the OR and placed in a supine position on the operative table.  The left upper extremity was prepped and draped in the usual sterile fashion.   2 grams ancef infusion started prior to the incision.  Appropriate timeout was performed. B mode u/s was utilized to identify the brachial artery and axillary vein in the proximal upper arm.  Incision was made and dissection was performed through the subcutaneous tissues and facia to expose the brachial artery and axillary vein.  Each were controlled with vessel loops.  A 4 to 7 mm goretex graft was placed via subcutaneous tunnelling in loop configuration with assistance of a counterincision in the distal upper arm.  5000 units of heparin was administered and a longitudinal incision was made on the brachial artery.   Anastomosis was performed with a [place]-Miki CV6 suture.  Flow was restored through the brachial artery.  The distal end of the graft was spatulated and venotomy was made.   Another anastamosis with [place] CV-6  suture was performed.  Flow was restored to the system.  There was thrill to the graft and a lightly palpable radial pulse.    Protamine was administered and hemostasis was obtained.  Wound was irrigated with antibiotic solution.   Both wounds  were closed with 3-0 vicryl suture and 4-0 monocryl suture.   Dermabond dressing was placed.       Lien Holman expertly assisted throughout the case.  She assisted with vessel exposure and anastamosis.  She performed wound closure.    .

## 2022-04-30 NOTE — H&P
Patient:   Debra Puentes            MRN: [id number]            FIN: [id number]               Age:   74 years     Sex:  Female     :  1945   Associated Diagnoses:   None   Author:   Jovon Kwon      Date of Service: 5/15/2020       Chief Complaint   Debility 2/2 sepsis (E. coli UTI/bacteremia), and ATN leading to new onset ESRD requiring HD, and new onset atrial fibrillation      History of Present Illness   74-year-old white female presented to [facility] ED on 2020 with slurred speech and urinary incontinence.  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  Work-up significant for acute hypoxic respiratory failure 2/2 CAP with leukocytosis and elevated lactic acid, and LORI with no response to Lasix.  Rocephin and azithromycin initiated.  Transferred to ICU on .  Zosyn initiated on admission to ICU.  Urine culture and blood culture both significant for E. coli.  New onset atrial fib RVR appreciated on .  Converted with amiodarone and metoprolol.  Developed anuric LORI and encephalopathy requiring dialysis , , and .  He remained oliguric and there was no evidence of renal function recovery.  Scheduled HD initiated MWF.  Eliquis 2.5 mg twice daily initiated on .  Patient began tolerating physical therapy.  Tolerated transfer to [facility] inpatient rehab unit on 5/15 without incident.    Sitting comfortably up in chair eating lunch.  Reports good sleep and bowel maintenance.  Last BM 5/15.  Reports appetite is improving.  Vital signs overall well controlled.  CBGs moderately controlled.  Bicarb 18.  BUN 42.7.  Creatinine 4.88.  Leukocytosis 13.9.  H&H stable within normal limits.  Recent imaging reported below.      Histories   Past Medical History: Sepsis 2/2 bacteremia and UTI, ESRD on HD (new onset 2020) , Atrial fibrillation (new onset 5/10 120),  CAD, HLD, HTN, History of non-Hodgkin's lymphoma in remission since 2018   Procedure history: Hysterectomy, Tonsillectomy   Family History: Mother: Thyroid disease + DJD +  at age 99, Father: CAD +  at age 67   Social History     (-) TOB.     (-) ETOH.     (-) Illicit drug use.     Masters degree in education.  Retired teacher, supervisor,  and principal.  Help design [employer] in [place], [place].  Single.  She has no children.  Sister and brother-in-law will be able to help upon discharge.     Prior level of function: Independent ADLs, drives, cooks, does chores, does laundry, grocery shops, manages finances, manages own meds, hired someone for lawn maintenance  Residence: Lives alone in [place], [place] in a one-story home with a small threshold.  Has a tub/shower combo.  No door, no grab bars, does not have elevated toilet or grab bars adjacent  DME: She has access to her mother's cane and rolling walker.  Hand-held shower in tub/shower  Anticipated discharge destination: Home with home health      Review of Systems   Complete 12-point review of systems negative except for HPI      Health Status   Allergies:    Allergic Reactions (Selected)  Severity Not Documented  Codeine- Vomiting.   Current medications:  (Selected)   Documented Medications  Documented  Aspir 81 oral delayed release tablet: 81 mg = 1 tab(s), Oral, Daily, # 30 tab(s), 0 Refill(s)  Centrum Silver Ultra Women's oral tablet: 1 tab(s), Oral, Daily, 0 Refill(s)  Cinnamon 500 mg oral capsule: 1,000 mg = 2 cap(s), Oral, Daily, 0 Refill(s)  Co-Q10 100 mg oral capsule: 200 mg = 2 cap(s), Oral, Daily, 0 Refill(s)  Eliquis 2.5 mg oral tablet: 2.5 mg = 1 tab(s), Oral, BID, 0 Refill(s)  METOPROL SUC TAB 50MG ER: 50 mg = 1 tab(s), Oral, Daily  Victoza 18 mg/3 mL subcutaneous injection: 1.8 mg, Subcutaneous, Daily, # 9 mL, 0 Refill(s)  Vitamin D3 1000 intl units oral capsule: 2,000 IntUnit = 2 cap(s), Oral,  Daily, 0 Refill(s)  amiodarone 200 mg oral Tab: 200 mg = 1 tab(s), Oral, BID, 0 Refill(s)  amiodarone 200 mg oral Tab: 200 mg = 1 tab(s), Oral, Daily, start 5/16, 0 Refill(s)  cefTRIAXone 2 g intravenous injection: 2,000 mg = 1 EA, IV Piggyback, q24hr, 0 Refill(s)  magnesium oxide 500 mg oral tablet: 500 mg = 1 tab(s), Oral, Daily, 0 Refill(s)  sodium bicarbonate 650 mg oral tablet: 650 mg = 1 tab(s), Oral, TID, 0 Refill(s)  vitamin E 400 intl units oral capsule: 400 IntUnit = 1 cap(s), Oral, Daily, 0 Refill(s)      Physical Examination      Vital Signs (last 24 hrs)_____  Last Charted___________  Temp Oral         36.4 DegC  (MAY 15 10:00)  Heart Rate Peripheral   85 bpm  (MAY 15 10:00)  Resp Rate             18 br/min  (MAY 15 10:00)  SBP      H 141mmHg  (MAY 15 10:00)  DBP      83 mmHg  (MAY 15 10:00)  SpO2      97 %  (MAY 15 10:00)  Weight      122.5 kg  (MAY 15 10:35)  Height      173 cm  (MAY 15 10:35)  BMI      40.93  (MAY 15 10:35)     General:  Alert and oriented, No acute distress.    Eye:  Pupils are equal, round and reactive to light.    HENT:  Normocephalic.    Neck:  Supple.    Respiratory:  Lungs are clear to auscultation, Symmetrical chest wall expansion, No chest wall tenderness.    Cardiovascular:  Normal rate, Regularly irregular rhythm, BL LE 2+ edema, HD catheter to RCW.    Gastrointestinal:  Soft, Non-tender, Normal bowel sounds, Obese.    Musculoskeletal:  Generalized weakness.    Integumentary:  Warm, Dry, HD catheter to RCW.    Neurologic:  Alert, Oriented, Cranial Nerves II-XII are grossly intact.    Cognition and Speech:  Oriented, Speech clear and coherent, Functional cognition intact.    Psychiatric:  Cooperative, Appropriate mood & affect, Normal judgment, Non-suicidal.       Review / Management   Laboratory Results   Today's Lab Results : PowerNote Discrete Results   5/15/2020 5:58 CDT       WBC                       13.9 x10(3)/mcL  HI                             RBC                        4.30 x10(6)/mcL                             Hgb                       12.9 gm/dL                             Hct                       39.7 %                             Platelet                  162 x10(3)/mcL                             MCV                       92.3 fL                             MCH                       30.0 pg                             MCHC                      32.5 gm/dL  LOW                             RDW                       14.1 %                             MPV                       11.8 fL                             Abs Neut                  11.04 x10(3)/mcL  HI                             Neutro Auto               79 %  NA                             Lymph Auto                7 %  NA                             Mono Auto                 8 %  NA                             Eos Auto                  1 %  NA                             Abs Eos                   0.2 x10(3)/mcL                             Basophil Auto             1 %  NA                             Abs Neutro                11.04 x10(3)/mcL  HI                             Abs Lymph                 1.0 x10(3)/mcL                             Abs Mono                  1.0 x10(3)/mcL                             Abs Baso                  0.1 x10(3)/mcL                             Sodium Lvl                136 mmol/L                             Potassium Lvl             4.3 mmol/L                             Chloride                  104 mmol/L                             CO2                       18 mmol/L  LOW                             Calcium Lvl               8.4 mg/dL                             Glucose Lvl               149 mg/dL  HI                             BUN                       42.7 mg/dL  HI                             Creatinine                4.88 mg/dL  HI                             eGFR-AA                   11  NA                             eGFR-MISHA                  9  NA                              Bili Total                0.5 mg/dL                             Bili Direct               0.3 mg/dL                             Bili Indirect             0.20 mg/dL                             AST                       11 unit/L                             ALT                       14 unit/L                             Alk Phos                  124 unit/L                             Total Protein             6.1 gm/dL                             Albumin Lvl               2.1 gm/dL  LOW                             Globulin                  4.0 gm/dL  HI                             A/G Ratio                 0.5 ratio  LOW        Chest x-ray results   Anterior/posterior, Reported at  5/13/2020 16:10:00, Interpretation:  Lungs are underinflated with persistent bandlike opacity peripheral right midlung. Mediastinum is not shifted.   Radiology results   CT, Head , Reported at  5/6/2020 19:05:00, Reveals no acute disease process   Radiology results   US, Retroperitoneal , Reported at  5/7/2020 18:19:00, Interpretation:  Small heterogeneous left kidney suggestive of medical renal disease. Normal sonographic appearance of the right kidney.      Impression and Plan   73 yo WF admitted on 5/15/2020    Debility  - 2/2 sepsis (E. coli UTI/bacteremia)  - 2/2 ATN leading to new onset ESRD requiring HD  - Consult physiatry for rehab and pain management  - PT/OT/RT/ST to eval and treat    Sepsis 2/2 E. coli bacteremia and UTI  - still with leukocytosis  - continue    Rocephin 2 g every 24 hours 5/14present (end date 5/20)  - obtain TTE    New onset ESRD on HD   - consult neurology for HD TTS  - continue    Sodium bicarb 650 mg 3 times daily    New onset atrial fibrillation  - Rate controlled with no evidence  - conitnue    Amiodarone 200 mg twice daily with stop date on 5/16    Amiodarone 200 mg daily (5/16)    Eliquis 2.5 mg twice daily    Metoprolol tartrate 25 mg twice daily    CAD  - stable  - [facility] with no PCI or CABG  -  continue    Aspirin 81 mg twice daily    Metoprolol tartrate 25 mg twice daily    Lipitor 40 mg daily  - ECG and Nitro PRN  - cardiac diet  - No ACEI recorded  - to follow-up with cardiology outpatient    HTN  - at goal!!  - continue    Metoprolol tartrate 25 mg twice daily  - initiate    Hydralazine 10 mg every 2 hours as needed for BP > 160/90    Labetalol 10 mg every 2 hours as needed  - Low-sodium diet     Non-Hodgkin's lymphoma  - 2/2 skin (left eyebrow)  - S/p excision and 15 XRT treatments with no evidence of metastasis in 2018    Vitamin D deficiency  - Vitamin D level with next labs  - Continue    Vitamin D3 2000 units daily    Hypomagnesemia  - Continue    Magnesium oxide 400 mg daily    AB therapy  Azithromycin 5/65/7  Rocephin 5/6  Zosyn 5/75/11  Rocephin 5/11present (end date 5/20)    VTE Prophylaxis: Eliquis 2.5 mg twice daily    POA: No  Living will: No  Contacts:  Leona Keenan  (sister) [phone]  Elio hi (brother-in-law) [phone]    CODE STATUS: FULL CODE  Internal Medicine (attending): Levar Vazquez MD  Physiatry (consulting): Gregorio Degroot MD    OUTPATIENT PROVIDERS  PCP: iAde Combs MD  Cardiology: Emir Gonzalez MD    DISPOSITION: Condition stable. Tolerated transfer.  Recent labs and imaging reviewed.  Rehab admission orders initiated.  Med reconciliation completed.  Consult physiatry for rehab and pain management.  PT/OT/RT/ST to eval and treat.  Obtain admission lab work in the morning.  Monitor closely.  Notify MD of acute changes.  Discussed plan of care with patient and sister.  Both understand POC.  Very pleasant.  Believe she will benefit greatly from her time on rehab    Total time spent on this encounter including chart review and direct 1-on-1 patient interaction: 114 minutes

## 2022-05-03 NOTE — HISTORICAL [facility] CERNER
This is a historical note converted from [place]. Formatting and pictures may have been removed.  Please reference Cerchristian for original formatting and attached multimedia. Chief Complaint  Debility 2/2 Sepsis/UTI  Reason for Consultation  Physiatry  History of Present Illness  Admit MD: Levar Vazquez MD  Consult Physiatry: Gregorio Degroot MD  HPI:???74-year-old white female presented to [facility] ED on 5/6/2020 with slurred speech and urinary incontinence. 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 ?Work-up significant for acute hypoxic respiratory failure 2/2 CAP with leukocytosis and elevated lactic acid, and LORI with no response to Lasix. ?Rocephin and azithromycin initiated. ?Transferred to ICU on 5/7. ?Zosyn initiated on admission to ICU. ?Urine culture and blood culture both significant for E. coli. ?New onset atrial fib RVR appreciated on 5/8. ?Converted with amiodarone and metoprolol. ?Developed anuric LORI and encephalopathy requiring dialysis 5/7, 5/8, and 5/9. ?He remained oliguric and there was no evidence of renal function recovery. ?Scheduled HD initiated MWF. ?Eliquis 2.5 mg twice daily initiated on 5/13. ?Patient began tolerating physical therapy. ?Tolerated transfer to [facility] inpatient rehab unit on 5/15 without incident.  5/15: Seen upon arrival in patient room, lying in bed. Patient reports coming to rehab following an UTI that caused her to be unconscious for 4 days and require dialysis. Tells me her sister found her unresponsive at home. Reports good sleep. Appetite improving with change from Pureed Diet to Chopped. States that she continues to struggle with the thickened water but is ok with thickened juice if cold. Reports cough is due to her throat being dry because she is talking alot. Reports continued diarrhea today with food changes. Denies current pain but tells me that she  has had back pain 2/2 kidney stones in the past and relieved by the chiropractor. Motivated to get stronger and start therapy. VSSAF.  Review of Systems  Comprehensive Review of Systems performed with no exceptions other than as noted in HPI.  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 Single. Children (none)/Family: ? 1) sister -she and  can assist as needed  Power of  (yes, sister/Living Will (yes)  Medical:Sepsis 2/2 bacteremia and UTI,?ESRD on HD (new onset 5/2020)?,?Atrial fibrillation (new onset 5/10 120),?CAD,?HLD,?HTN,?History of non-Hodgkins lymphoma in remission since 2018  Functional:  Prior Level of Fx:?independent ADLs, drives, cooks, does chores, does laundry, grocery shops, manages finances, manages own meds, hires someone for lawn maintenance; does not have medic alert; did flower bed maintenance but recently stopped due to a fall in the yard  Residence: ?(alone in [place]) 1 story with small threshold; has tub/shower combo (no door, no grab bars, has HHS) ?*does not have elevated toilet or grab bars adjacent  DME: ? has access to her mothers cane & RW, HHS in tub/shower combo  Diet: ?ADA, low sodium dysphagia advanced with nectar thick liquids, no straws  Psychiatric:Hx mental health/substance abuse: ?denies all  ?  ?  Physical Exam  Vitals & Measurements  T:?37? ?C (Oral)? TMIN:?36.9? ?C (Oral)? TMAX:?37? ?C (Oral)? HR:?76(Monitored)? RR:?18? BP:?128/74? SpO2:?93%?  General:?well-developed, morbid obese, in no acute distress  Eye: EOMI, clear conjunctiva, eyelids normal  HENT:?normocephalic, ?oropharynx and nasal mucosal surfaces moist  Neck: full range of motion, supple  Respiratory:?clear to auscultation bilaterally, dry cough  Cardiovascular:?regular rate and rhythm without murmurs, gallops or  rubs  Gastrointestinal:?soft, non-tender, non-distended with normal bowel sounds, without masses to palpation  Musculoskeletal:?full range of motion of all extremities/spine with generalized weakness  Integumentary: no rashes or skin lesions present, perineal/sacral thickened reddened rash  Neurologic: cranial nerves intact, no signs of peripheral neurological deficit, motor/sensory function intact  ?  Assessment/Plan  1.?Debility?R53.81  2.?Sepsis secondary to UTI?A41.9  3.?DIC (disseminated intravascular coagulation)?D65  4.?Acute renal failure?N17.9  5.?Hemodialysis status?Z99.2  6.?Diabetes mellitus?E11.9  7.?CAD - Coronary artery disease?I25.10  8.?Afib?I48.91  9.?Hypertension?I10  10.?LVH (left ventricular hypertrophy) due to hypertensive disease?I11.9  11.?Hypercholesteremia?E78.0  12.?Fatty liver?K76.0  13.?Diverticulosis of colon?K57.30  14.?Osteopenia?M85.80  15.?Morbid obesity?E66.01  16.?Thrombocytopenia?D69.6  17.?Dysphagia?R13.10  18.?Diarrhea?R19.7  19.?NH lymphoma?C85.90  20.?Vitamin D deficiency?E55.9  21.?Hypomagnesemia?E83.42  Pain:?denies ?  Wounds:perineal/sacral thickened reddened rash-wound care following?  Precautions: fallrisk? ?  Bracing: none  Bowels/Bladder: last BM 5/16?  Swallowing:?Diabetic/Low Sodium Diet with Nectar thick Liquids; no straws??  Sleep:?good ?  Depression/Anxiety:  Function: Tolerating therapy. Continue PT/OT/RT  VTE Prophylaxis:?Eliquis 2.5 mg twice daily  Code Status:??FULL CODE ?  Discharge:?(alone in [place]) 1 story with small threshold. sister -she and  can assist as needed. Date pending.?  ?  ???   Problem List/Past Medical History  Ongoing  Acute renal failure  Afib  Anaplastic large cell lymphoma  CAD - Coronary artery disease  Diabetes mellitus  Diverticulosis of colon  Fatty liver  Hypercholesteremia  Hypertension  Kidney stones  Low back pain  LVH (left ventricular hypertrophy) due to hypertensive disease  Morbid obesity  Obesity(  Confirmed   )  Osteopenia  Seasonal Allergies(  Confirmed  )  Vitamin D deficiency  Historical  No qualifying data  Procedure/Surgical History  Catheter Insertion Palindrome (None) (05/07/2020)  kidney stone extraction (04/01/2016)  Mammogram (06/10/2014)  Biopsy of breast lesion (06/01/2014)  Mammogram (06/01/2012)  Bone Density (05/25/2012)  Hysterectomy (1984)  Tonsillectomy (1953)  Dental surgical procedure   Medications  Inpatient  acetaminophen 325 mg oral tablet, 325 mg= 1 tab(s), Oral, q4hr, PRN  acetaminophen 325 mg oral tablet, 650 mg= 2 tab(s), Oral, q6hr, PRN  amiodarone 200 mg oral Tab, 200 mg= 1 tab(s), Oral, Daily  aspirin, 81 mg= 1 tab(s), Oral, Daily  atorvastatin 40 mg oral tablet, 40 mg= 1 tab(s), Oral, Daily  Co-Q10 100 mg oral capsule, 200 mg= 2 cap(s), Oral, Daily  Colace 100 mg oral capsule, 100 mg= 1 cap(s), Oral, BID  Dextrose 50% and Water (50 mL vial/syringe), 12.5 gm= 25 mL, IV Push, Once, PRN  Dextrose 50% and Water (50 mL vial/syringe), 12.5 gm= 25 mL, IV Push, As Directed, PRN  Dextrose 50% and Water (50 mL vial/syringe), 25 gm= 50 mL, IV Push, As Directed, PRN  Dextrose 50% in Water intravenous solution, 12.5 gm= 25 mL, IV Push, As Directed, PRN  Dulcolax Laxative 10 mg RECTAL suppository, 10 mg= 1 supp, AL (rectal), q3day, PRN  Eliquis 2.5 mg oral tablet, 2.5 mg= 1 tab(s), Oral, BID  glucagon, 1 mg= 1 EA, IM, q10min, PRN  glucagon, 1 mg= 1 EA, IM, q10min, PRN  hydrALAZINE, 10 mg= 0.5 mL, IV Push, q4hr, PRN  insulin lispro, 2-14 units, Subcutaneous, As Directed, PRN  labetalol, 10 mg= 2 mL, IV Push, q10min, PRN  lactulose 10 g/15 mL oral syrup, 20 gm= 30 mL, Oral, Every other day, PRN  magnesium oxide, 400 mg= 1 tab(s), Oral, Daily  metoprolol tartrate 1 mg/mL injectable sol, 5 mg= 5 mL, IV Push, q2hr, PRN  metoprolol tartrate 25 mg oral tab, 25 mg= 1 tab(s), Oral, BID  miconazole 2% topical powder, 1 krista, TOP, BID  nitroglycerin 0.4 mg sublingual TAB, 0.4 mg= 1 tab(s), SL, q5min,  PRN  ondansetron 4 mg oral tablet, disintegrating, 8 mg= 2 tab(s), Oral, q8hr, PRN  Pepcid 20 mg oral tablet, 20 mg= 1 tab(s), Oral, Daily, PRN  Rocephin (for IVPB) + Sodium Chloride 0.9% intravenous solution 100 mL  sodium bicarbonate 650 mg oral tablet, 650 mg= 1 tab(s), Oral, TID  Tessalon Perles, 100 mg= 1 cap(s), Oral, TID, PRN  Tradjenta 5 mg oral tablet, 5 mg= 1 tab(s), Oral, Daily  Vistaril, 50 mg= 1 cap(s), Oral, At Bedtime, PRN  Vitamin D3 1000 intl units (25 mcg) oral tablet, 2000 units= 2 tab(s), Oral, Daily  Xanax 0.25 mg oral tablet, 0.25 mg= 1 tab(s), Oral, TID, PRN  Zofran ODT 4 mg oral tablet, disintegrating, 4 mg= 1 tab(s), Oral, q6hr, PRN  Home  amiodarone 200 mg oral Tab, 200 mg= 1 tab(s), Oral, BID  amiodarone 200 mg oral Tab, 200 mg= 1 tab(s), Oral, Daily  Aspir 81 oral delayed release tablet, 81 mg= 1 tab(s), Oral, Daily  Evans Contour Testing Strips, See Instructions, 11 refills  cefTRIAXone 2 g intravenous injection, 2000 mg= 1 EA, IV Piggyback, q24hr  Centrum Silver Ultra Womens oral tablet, 1 tab(s), Oral, Daily  Cinnamon 500 mg oral capsule, 1000 mg= 2 cap(s), Oral, Daily  Co-Q10 100 mg oral capsule, 200 mg= 2 cap(s), Oral, Daily  Eliquis 2.5 mg oral tablet, 2.5 mg= 1 tab(s), Oral, BID  magnesium oxide 500 mg oral tablet, 500 mg= 1 tab(s), Oral, Daily  METOPROL SUC TAB 50MG ER, 50 mg= 1 tab(s), Oral, Daily  rosuvastatin 10 mg oral tablet, 10 mg= 1 tab(s), Oral, Daily, 11 refills  sodium bicarbonate 650 mg oral tablet, 650 mg= 1 tab(s), Oral, TID  Victoza 18 mg/3 mL subcutaneous injection, 1.8 mg, Subcutaneous, Daily  Vitamin D3 1000 intl units oral capsule, 2000 IntUnit= 2 cap(s), Oral, Daily  vitamin E 400 intl units oral capsule, 400 IntUnit= 1 cap(s), Oral, Daily  Allergies  Lactose?(diarrhea)  codeine?(Vomiting)  Social History  Abuse/Neglect  No, No, Yes, 05/15/2020  No, 05/07/2020  No, No, Yes, 03/10/2020  Alcohol  Liquor, 1-2 times per year, 07/15/2019  Employment/School  Retired,  05/26/2015  Exercise  Exercise type: none., 08/29/2016  Home/Environment  Lives with Alone., 05/26/2015  Nutrition/Health  Regular, Diabetic, 08/13/2018  Sexual  Sexually active: No., 09/28/2015  Substance Use - Denies Substance Abuse, 05/26/2015  Never, 09/28/2015  Tobacco - Denies Tobacco Use, 05/26/2015  Never (less than 100 in lifetime), No, 05/15/2020  Never (less than 100 in lifetime), N/A, 05/07/2020  Never (less than 100 in lifetime), N/A, 03/10/2020  Family History  Blind: Negative: Mother.  Blind eye: Negative: Father.  Coronary heart disease: Father.  DJD (degenerative joint disease).: Mother.  Hypercholesteremia.: Sister.  Thyroid disease: Mother.  Immunizations  Vaccine Date Status   influenza virus vaccine, inactivated 11/12/2019 Given   influenza virus vaccine, inactivated 12/03/2018 Given   influenza virus vaccine, inactivated 11/01/2017 Recorded   influenza virus vaccine, inactivated 11/30/2016 Given   influenza virus vaccine, inactivated 01/07/2016 Given   influenza virus vaccine, inactivated 01/07/2016 Given   influenza virus vaccine, inactivated 10/08/2014 Recorded   influenza virus vaccine, inactivated 11/12/2013 Recorded   tetanus/diphtheria/pertussis, acel(Tdap) 05/16/2012 Recorded   pneumococcal 23-polyvalent vaccine 05/05/2011 Recorded   Lab Results  Test Name Test Result Date/Time   Sodium Lvl 137 mmol/L 05/18/2020 05:42 CDT   Potassium Lvl 4.2 mmol/L 05/18/2020 05:42 CDT   Chloride 101 mmol/L 05/18/2020 05:42 CDT   CO2 23 mmol/L 05/18/2020 05:42 CDT   Calcium Lvl 8.8 mg/dL 05/18/2020 05:42 CDT   Magnesium Lvl 2.37 mg/dL 05/18/2020 05:42 CDT   Glucose Lvl 157 mg/dL (High) 05/18/2020 05:42 CDT   BUN 60.2 mg/dL (High) 05/18/2020 05:42 CDT   Creatinine 6.37 mg/dL (High) 05/18/2020 05:42 CDT   eGFR-AA 8 05/18/2020 05:42 CDT   eGFR-MISHA 7 05/18/2020 05:42 CDT   Bili Total 0.4 mg/dL 05/18/2020 05:42 CDT   Bili Direct 0.2 mg/dL 05/18/2020 05:42 CDT   Bili Indirect 0.20 mg/dL 05/18/2020 05:42 CDT    AST 13 unit/L 05/18/2020 05:42 CDT   ALT 14 unit/L 05/18/2020 05:42 CDT   Alk Phos 111 unit/L 05/18/2020 05:42 CDT   Total Protein 5.7 gm/dL (Low) 05/18/2020 05:42 CDT   Albumin Lvl 1.9 gm/dL (Low) 05/18/2020 05:42 CDT   Globulin 3.8 gm/dL (High) 05/18/2020 05:42 CDT   A/G Ratio 0.5 ratio (Low) 05/18/2020 05:42 CDT   Phosphorus 8.5 mg/dL (High) 05/18/2020 05:42 CDT   Prealbumin 11.8 mg/dL (Low) 05/18/2020 05:42 CDT   WBC 14.3 x10(3)/mcL (High) 05/18/2020 05:42 CDT   RBC 3.63 x10(6)/mcL (Low) 05/18/2020 05:42 CDT   Hgb 11.2 gm/dL (Low) 05/18/2020 05:42 CDT   Hct 33.9 % (Low) 05/18/2020 05:42 CDT   Platelet 236 x10(3)/mcL 05/18/2020 05:42 CDT   MCV 93.4 fL 05/18/2020 05:42 CDT   MCH 30.9 pg 05/18/2020 05:42 CDT   MCHC 33.0 gm/dL 05/18/2020 05:42 CDT   RDW 13.4 % 05/18/2020 05:42 CDT   MPV 11.2 fL (High) 05/18/2020 05:42 CDT   Abs Neut 10.89 x10(3)/mcL (High) 05/18/2020 05:42 CDT   Neutro Auto 76.0 % (High) 05/18/2020 05:42 CDT   Lymph Auto 9.5 % (Low) 05/18/2020 05:42 CDT   Mono Auto 10.7 % 05/18/2020 05:42 CDT   Eos Auto 1.9 % 05/18/2020 05:42 CDT   Abs Eos 0.27 x10(3)/mcL 05/18/2020 05:42 CDT   Basophil Auto 0.4 % 05/18/2020 05:42 CDT   Abs Neutro 10.89 x10(3)/mcL (High) 05/18/2020 05:42 CDT   Abs Lymph 1.36 x10(3)/mcL 05/18/2020 05:42 CDT   Abs Mono 1.54 x10(3)/mcL (High) 05/18/2020 05:42 CDT   Abs Baso 0.06 x10(3)/mcL 05/18/2020 05:42 CDT   NRBC% 0.0 % 05/18/2020 05:42 CDT   IG% 1.500 % (High) 05/18/2020 05:42 CDT   IG# 0.2100 (High) 05/18/2020 05:42 CDT   UA Appear CLOUDY 05/16/2020 08:15 CDT   UA Color STRAW 05/16/2020 08:15 CDT   UA Spec Grav 1.025 05/16/2020 08:15 CDT   UA Bili Negative UA 05/16/2020 08:15 CDT   UA pH 5.5 05/16/2020 08:15 CDT   UA Urobilinogen Negative UA 05/16/2020 08:15 CDT   UA Blood 2+ (Abnormal) 05/16/2020 08:15 CDT   UA Glucose Negative UA 05/16/2020 08:15 CDT   UA Ketones Negative UA 05/16/2020 08:15 CDT   UA Protein 1+ (Abnormal) 05/16/2020 08:15 CDT   UA Nitrite Negative UA  05/16/2020 08:15 CDT   UA Leuk Est 1+ (Abnormal) 05/16/2020 08:15 CDT   UA WBC  (Abnormal) 05/16/2020 08:15 CDT   UA RBC 5-10 (Abnormal) 05/16/2020 08:15 CDT   UA Bacteria Rare (Abnormal) 05/16/2020 08:15 CDT   UA Yeast Rare (Abnormal) 05/16/2020 08:15 CDT   UA Squam Epithelial Few 05/16/2020 08:15 CDT   Diagnostic Results  ????Chest x-ray results?  ????Anterior/posterior, Reported at ?5/13/2020 16:10:00, Interpretation: ?Lungs are underinflated with persistent bandlike opacity peripheral right midlung. Mediastinum is not shifted.?  ????Radiology results?  ????CT, Head , Reported at ?5/6/2020 19:05:00, Reveals no acute disease process?  ????Radiology results?  ????US, Retroperitoneal , Reported at ?5/7/2020 18:19:00, Interpretation: ?Small heterogeneous left kidney suggestive of medical renal disease. Normal sonographic appearance of the right kidney.?  ? [1]  ?  Urine culture-No growth @24hours     [1]?Rehab Admission H&P; [facility] FNP, Jovon Rush 05/15/2020 10:57 CDT   I have?seen and examined patient?in initial Physiatry consult  case & medical records reviewed  I have done? prescreen  ?history and PE are consistent with findings on prescreen  I have reviewed case with Chayito Devine NP  Medical management by Dr George HAYDEN completed by Dr Vazquez- I have reviewed and agree  PT,OT,ST,RT evaluations ordered and in progress  Med Reconciliation completed and reviewed in EHR  Patient remains appropriate for, in need of, should tolerate and benefit from IRF  ?

## 2022-06-01 ENCOUNTER — LAB REQUISITION (OUTPATIENT)
Dept: LAB | Facility: HOSPITAL | Age: 77
End: 2022-06-01
Payer: COMMERCIAL

## 2022-06-01 DIAGNOSIS — U07.1 COVID-19: ICD-10-CM

## 2022-06-01 LAB
ABS NEUT (OLG): 4.8 X10(3)/MCL (ref 2.1–9.2)
ALBUMIN SERPL-MCNC: 3.2 GM/DL (ref 3.4–4.8)
ALBUMIN/GLOB SERPL: 1.2 RATIO (ref 1.1–2)
ALP SERPL-CCNC: 125 UNIT/L (ref 40–150)
ALT SERPL-CCNC: 24 UNIT/L (ref 0–55)
AST SERPL-CCNC: 28 UNIT/L (ref 5–34)
BILIRUBIN DIRECT+TOT PNL SERPL-MCNC: 0.6 MG/DL
BUN SERPL-MCNC: 54.4 MG/DL (ref 9.8–20.1)
CALCIUM SERPL-MCNC: 8.9 MG/DL (ref 8.4–10.2)
CHLORIDE SERPL-SCNC: 107 MMOL/L (ref 98–107)
CO2 SERPL-SCNC: 20 MMOL/L (ref 23–31)
CREAT SERPL-MCNC: 3.37 MG/DL (ref 0.55–1.02)
D DIMER PPP IA.FEU-MCNC: 0.45 UG/ML FEU (ref 0–0.5)
EOSINOPHIL NFR BLD MANUAL: 0.06 X10(3)/MCL (ref 0–0.9)
EOSINOPHIL NFR BLD MANUAL: 1 %
ERYTHROCYTE [DISTWIDTH] IN BLOOD BY AUTOMATED COUNT: 12.5 % (ref 11.5–17)
ERYTHROCYTE [SEDIMENTATION RATE] IN BLOOD: 14 MM/HR (ref 0–20)
FERRITIN SERPL-MCNC: 2067.96 NG/ML (ref 4.63–204)
GLOBULIN SER-MCNC: 2.7 GM/DL (ref 2.4–3.5)
GLUCOSE SERPL-MCNC: 139 MG/DL (ref 82–115)
HCT VFR BLD AUTO: 38.4 % (ref 37–47)
HGB BLD-MCNC: 12.8 GM/DL (ref 12–16)
IMM GRANULOCYTES # BLD AUTO: 0.03 X10(3)/MCL (ref 0–0.02)
IMM GRANULOCYTES NFR BLD AUTO: 0.5 % (ref 0–0.43)
INSTRUMENT WBC (OLG): 6.4 X10(3)/MCL
LYMPHOCYTES NFR BLD MANUAL: 0.45 X10(3)/MCL
LYMPHOCYTES NFR BLD MANUAL: 7 %
MAGNESIUM SERPL-MCNC: 2 MG/DL (ref 1.6–2.6)
MCH RBC QN AUTO: 31.4 PG (ref 27–31)
MCHC RBC AUTO-ENTMCNC: 33.3 MG/DL (ref 33–36)
MCV RBC AUTO: 94.3 FL (ref 80–94)
MONOCYTES NFR BLD MANUAL: 1.09 X10(3)/MCL (ref 0.1–1.3)
MONOCYTES NFR BLD MANUAL: 17 %
NEUTROPHILS NFR BLD MANUAL: 75 %
NRBC BLD AUTO-RTO: 0 %
PLATELET # BLD AUTO: 172 X10(3)/MCL (ref 130–400)
PLATELET # BLD EST: ADEQUATE 10*3/UL
PMV BLD AUTO: 10.8 FL (ref 9.4–12.4)
POTASSIUM SERPL-SCNC: 4.3 MMOL/L (ref 3.5–5.1)
PROT SERPL-MCNC: 5.9 GM/DL (ref 5.8–7.6)
RBC # BLD AUTO: 4.07 X10(6)/MCL (ref 4.2–5.4)
SODIUM SERPL-SCNC: 142 MMOL/L (ref 136–145)
WBC # SPEC AUTO: 6.4 X10(3)/MCL (ref 4.5–11.5)

## 2022-06-01 PROCEDURE — 85379 FIBRIN DEGRADATION QUANT: CPT | Performed by: NURSE PRACTITIONER

## 2022-06-01 PROCEDURE — 83735 ASSAY OF MAGNESIUM: CPT | Performed by: NURSE PRACTITIONER

## 2022-06-01 PROCEDURE — 85651 RBC SED RATE NONAUTOMATED: CPT | Performed by: NURSE PRACTITIONER

## 2022-06-01 PROCEDURE — 82728 ASSAY OF FERRITIN: CPT | Performed by: NURSE PRACTITIONER

## 2022-06-01 PROCEDURE — 85025 COMPLETE CBC W/AUTO DIFF WBC: CPT | Performed by: NURSE PRACTITIONER

## 2022-06-01 PROCEDURE — 85007 BL SMEAR W/DIFF WBC COUNT: CPT | Mod: XB | Performed by: NURSE PRACTITIONER

## 2022-06-01 PROCEDURE — 80053 COMPREHEN METABOLIC PANEL: CPT | Performed by: NURSE PRACTITIONER

## 2022-07-11 ENCOUNTER — LAB REQUISITION (OUTPATIENT)
Dept: LAB | Facility: HOSPITAL | Age: 77
End: 2022-07-11
Payer: COMMERCIAL

## 2022-07-11 DIAGNOSIS — N18.4 CHRONIC KIDNEY DISEASE, STAGE 4 (SEVERE): ICD-10-CM

## 2022-07-11 LAB
ALBUMIN SERPL-MCNC: 3.5 GM/DL (ref 3.4–4.8)
ALBUMIN/GLOB SERPL: 1.2 RATIO (ref 1.1–2)
ALP SERPL-CCNC: 163 UNIT/L (ref 40–150)
ALT SERPL-CCNC: 18 UNIT/L (ref 0–55)
AST SERPL-CCNC: 18 UNIT/L (ref 5–34)
BASOPHILS # BLD AUTO: 0.03 X10(3)/MCL (ref 0–0.2)
BASOPHILS NFR BLD AUTO: 0.4 %
BILIRUBIN DIRECT+TOT PNL SERPL-MCNC: 0.5 MG/DL
BUN SERPL-MCNC: 57 MG/DL (ref 9.8–20.1)
CALCIUM SERPL-MCNC: 9.3 MG/DL (ref 8.4–10.2)
CHLORIDE SERPL-SCNC: 106 MMOL/L (ref 98–107)
CO2 SERPL-SCNC: 24 MMOL/L (ref 23–31)
CREAT SERPL-MCNC: 3.41 MG/DL (ref 0.55–1.02)
DEPRECATED CALCIDIOL+CALCIFEROL SERPL-MC: 51.5 NG/ML (ref 30–80)
EOSINOPHIL # BLD AUTO: 0.36 X10(3)/MCL (ref 0–0.9)
EOSINOPHIL NFR BLD AUTO: 4.6 %
ERYTHROCYTE [DISTWIDTH] IN BLOOD BY AUTOMATED COUNT: 13.2 % (ref 11.5–17)
FERRITIN SERPL-MCNC: 1332.29 NG/ML (ref 4.63–204)
GLOBULIN SER-MCNC: 3 GM/DL (ref 2.4–3.5)
GLUCOSE SERPL-MCNC: 133 MG/DL (ref 82–115)
HCT VFR BLD AUTO: 40.6 % (ref 37–47)
HGB BLD-MCNC: 12.9 GM/DL (ref 12–16)
IMM GRANULOCYTES # BLD AUTO: 0.03 X10(3)/MCL (ref 0–0.04)
IMM GRANULOCYTES NFR BLD AUTO: 0.4 %
IRON SATN MFR SERPL: 41 % (ref 20–50)
IRON SERPL-MCNC: 91 UG/DL (ref 50–170)
LYMPHOCYTES # BLD AUTO: 1.21 X10(3)/MCL (ref 0.6–4.6)
LYMPHOCYTES NFR BLD AUTO: 15.5 %
MAGNESIUM SERPL-MCNC: 2.1 MG/DL (ref 1.6–2.6)
MCH RBC QN AUTO: 30.8 PG (ref 27–31)
MCHC RBC AUTO-ENTMCNC: 31.8 MG/DL (ref 33–36)
MCV RBC AUTO: 96.9 FL (ref 80–94)
MONOCYTES # BLD AUTO: 0.81 X10(3)/MCL (ref 0.1–1.3)
MONOCYTES NFR BLD AUTO: 10.4 %
NEUTROPHILS # BLD AUTO: 5.4 X10(3)/MCL (ref 2.1–9.2)
NEUTROPHILS NFR BLD AUTO: 68.7 %
NRBC BLD AUTO-RTO: 0 %
PHOSPHATE SERPL-MCNC: 4.2 MG/DL (ref 2.3–4.7)
PLATELET # BLD AUTO: 235 X10(3)/MCL (ref 130–400)
PMV BLD AUTO: 11 FL (ref 7.4–10.4)
POTASSIUM SERPL-SCNC: 4.3 MMOL/L (ref 3.5–5.1)
PROT SERPL-MCNC: 6.5 GM/DL (ref 5.8–7.6)
PTH-INTACT SERPL-MCNC: 256.9 PG/ML (ref 8.7–77)
RBC # BLD AUTO: 4.19 X10(6)/MCL (ref 4.2–5.4)
SODIUM SERPL-SCNC: 140 MMOL/L (ref 136–145)
TIBC SERPL-MCNC: 131 UG/DL (ref 70–310)
TIBC SERPL-MCNC: 222 UG/DL (ref 250–450)
TRANSFERRIN SERPL-MCNC: 192 MG/DL (ref 173–360)
WBC # SPEC AUTO: 7.8 X10(3)/MCL (ref 4.5–11.5)

## 2022-07-11 PROCEDURE — 80053 COMPREHEN METABOLIC PANEL: CPT | Performed by: INTERNAL MEDICINE

## 2022-07-11 PROCEDURE — 84100 ASSAY OF PHOSPHORUS: CPT | Performed by: INTERNAL MEDICINE

## 2022-07-11 PROCEDURE — 83970 ASSAY OF PARATHORMONE: CPT | Performed by: INTERNAL MEDICINE

## 2022-07-11 PROCEDURE — 83540 ASSAY OF IRON: CPT | Performed by: INTERNAL MEDICINE

## 2022-07-11 PROCEDURE — 82306 VITAMIN D 25 HYDROXY: CPT | Performed by: INTERNAL MEDICINE

## 2022-07-11 PROCEDURE — 82728 ASSAY OF FERRITIN: CPT | Performed by: INTERNAL MEDICINE

## 2022-07-11 PROCEDURE — 85025 COMPLETE CBC W/AUTO DIFF WBC: CPT | Performed by: INTERNAL MEDICINE

## 2022-07-11 PROCEDURE — 83735 ASSAY OF MAGNESIUM: CPT | Performed by: INTERNAL MEDICINE

## 2022-07-21 ENCOUNTER — LAB REQUISITION (OUTPATIENT)
Dept: LAB | Facility: HOSPITAL | Age: 77
End: 2022-07-21
Payer: COMMERCIAL

## 2022-07-21 DIAGNOSIS — N18.4 CHRONIC KIDNEY DISEASE, STAGE 4 (SEVERE): ICD-10-CM

## 2022-07-21 LAB
ANION GAP SERPL CALC-SCNC: 12 MEQ/L
BUN SERPL-MCNC: 50.8 MG/DL (ref 9.8–20.1)
CALCIUM SERPL-MCNC: 8.9 MG/DL (ref 8.4–10.2)
CHLORIDE SERPL-SCNC: 106 MMOL/L (ref 98–107)
CO2 SERPL-SCNC: 22 MMOL/L (ref 23–31)
CREAT SERPL-MCNC: 2.92 MG/DL (ref 0.55–1.02)
CREAT/UREA NIT SERPL: 17
GLUCOSE SERPL-MCNC: 83 MG/DL (ref 82–115)
POTASSIUM SERPL-SCNC: 4.5 MMOL/L (ref 3.5–5.1)
SODIUM SERPL-SCNC: 140 MMOL/L (ref 136–145)

## 2022-07-21 PROCEDURE — 80048 BASIC METABOLIC PNL TOTAL CA: CPT | Performed by: INTERNAL MEDICINE

## 2022-08-09 ENCOUNTER — LAB REQUISITION (OUTPATIENT)
Dept: LAB | Facility: HOSPITAL | Age: 77
End: 2022-08-09
Payer: COMMERCIAL

## 2022-08-09 DIAGNOSIS — R31.9 HEMATURIA, UNSPECIFIED: ICD-10-CM

## 2022-08-09 LAB
APPEARANCE UR: ABNORMAL
BACTERIA #/AREA URNS AUTO: ABNORMAL /HPF
BILIRUB UR QL STRIP.AUTO: NEGATIVE MG/DL
COLOR UR AUTO: ABNORMAL
GLUCOSE UR QL STRIP.AUTO: NEGATIVE MG/DL
KETONES UR QL STRIP.AUTO: NEGATIVE MG/DL
LEUKOCYTE ESTERASE UR QL STRIP.AUTO: ABNORMAL UNIT/L
NITRITE UR QL STRIP.AUTO: NEGATIVE
PH UR STRIP.AUTO: 5.5 [PH]
PROT UR QL STRIP.AUTO: ABNORMAL MG/DL
RBC #/AREA URNS AUTO: 898 /HPF
RBC UR QL AUTO: ABNORMAL UNIT/L
SP GR UR STRIP.AUTO: 1.01 (ref 1–1.03)
SQUAMOUS #/AREA URNS AUTO: <5 /HPF
UROBILINOGEN UR STRIP-ACNC: 0.2 MG/DL
WBC #/AREA URNS AUTO: 201 /HPF

## 2022-08-09 PROCEDURE — 81001 URINALYSIS AUTO W/SCOPE: CPT | Performed by: NURSE PRACTITIONER

## 2022-08-09 PROCEDURE — 87077 CULTURE AEROBIC IDENTIFY: CPT | Performed by: NURSE PRACTITIONER

## 2022-08-10 ENCOUNTER — LAB REQUISITION (OUTPATIENT)
Dept: LAB | Facility: HOSPITAL | Age: 77
End: 2022-08-10
Payer: COMMERCIAL

## 2022-08-10 DIAGNOSIS — N18.4 CHRONIC KIDNEY DISEASE, STAGE 4 (SEVERE): ICD-10-CM

## 2022-08-10 LAB
ALBUMIN SERPL-MCNC: 2.8 GM/DL (ref 3.4–4.8)
ALBUMIN/GLOB SERPL: 0.8 RATIO (ref 1.1–2)
ALP SERPL-CCNC: 145 UNIT/L (ref 40–150)
ALT SERPL-CCNC: 21 UNIT/L (ref 0–55)
AST SERPL-CCNC: 19 UNIT/L (ref 5–34)
BASOPHILS # BLD AUTO: 0.02 X10(3)/MCL (ref 0–0.2)
BASOPHILS NFR BLD AUTO: 0.2 %
BILIRUBIN DIRECT+TOT PNL SERPL-MCNC: 0.6 MG/DL
BUN SERPL-MCNC: 56.7 MG/DL (ref 9.8–20.1)
CALCIUM SERPL-MCNC: 8.6 MG/DL (ref 8.4–10.2)
CHLORIDE SERPL-SCNC: 107 MMOL/L (ref 98–107)
CO2 SERPL-SCNC: 25 MMOL/L (ref 23–31)
CREAT SERPL-MCNC: 2.92 MG/DL (ref 0.55–1.02)
DEPRECATED CALCIDIOL+CALCIFEROL SERPL-MC: 48.8 NG/ML (ref 30–80)
EOSINOPHIL # BLD AUTO: 0.26 X10(3)/MCL (ref 0–0.9)
EOSINOPHIL NFR BLD AUTO: 3.1 %
ERYTHROCYTE [DISTWIDTH] IN BLOOD BY AUTOMATED COUNT: 12.8 % (ref 11.5–17)
FERRITIN SERPL-MCNC: 1472.03 NG/ML (ref 4.63–204)
GFR SERPLBLD CREATININE-BSD FMLA CKD-EPI: 16 MLS/MIN/1.73/M2
GLOBULIN SER-MCNC: 3.3 GM/DL (ref 2.4–3.5)
GLUCOSE SERPL-MCNC: 72 MG/DL (ref 82–115)
HCT VFR BLD AUTO: 36.9 % (ref 37–47)
HGB BLD-MCNC: 11.7 GM/DL (ref 12–16)
IMM GRANULOCYTES # BLD AUTO: 0.1 X10(3)/MCL (ref 0–0.04)
IMM GRANULOCYTES NFR BLD AUTO: 1.2 %
LYMPHOCYTES # BLD AUTO: 1.11 X10(3)/MCL (ref 0.6–4.6)
LYMPHOCYTES NFR BLD AUTO: 13 %
MAGNESIUM SERPL-MCNC: 2.3 MG/DL (ref 1.6–2.6)
MCH RBC QN AUTO: 31 PG (ref 27–31)
MCHC RBC AUTO-ENTMCNC: 31.7 MG/DL (ref 33–36)
MCV RBC AUTO: 97.6 FL (ref 80–94)
MONOCYTES # BLD AUTO: 0.74 X10(3)/MCL (ref 0.1–1.3)
MONOCYTES NFR BLD AUTO: 8.7 %
NEUTROPHILS # BLD AUTO: 6.3 X10(3)/MCL (ref 2.1–9.2)
NEUTROPHILS NFR BLD AUTO: 73.8 %
NRBC BLD AUTO-RTO: 0 %
PHOSPHATE SERPL-MCNC: 4.2 MG/DL (ref 2.3–4.7)
PLATELET # BLD AUTO: 267 X10(3)/MCL (ref 130–400)
PMV BLD AUTO: 10.7 FL (ref 7.4–10.4)
POTASSIUM SERPL-SCNC: 4.3 MMOL/L (ref 3.5–5.1)
PROT SERPL-MCNC: 6.1 GM/DL (ref 5.8–7.6)
PTH-INTACT SERPL-MCNC: 228.1 PG/ML (ref 8.7–77)
RBC # BLD AUTO: 3.78 X10(6)/MCL (ref 4.2–5.4)
SODIUM SERPL-SCNC: 141 MMOL/L (ref 136–145)
WBC # SPEC AUTO: 8.5 X10(3)/MCL (ref 4.5–11.5)

## 2022-08-10 PROCEDURE — 82728 ASSAY OF FERRITIN: CPT | Performed by: INTERNAL MEDICINE

## 2022-08-10 PROCEDURE — 83735 ASSAY OF MAGNESIUM: CPT | Performed by: INTERNAL MEDICINE

## 2022-08-10 PROCEDURE — 83540 ASSAY OF IRON: CPT | Performed by: INTERNAL MEDICINE

## 2022-08-10 PROCEDURE — 85025 COMPLETE CBC W/AUTO DIFF WBC: CPT | Performed by: INTERNAL MEDICINE

## 2022-08-10 PROCEDURE — 83970 ASSAY OF PARATHORMONE: CPT | Performed by: INTERNAL MEDICINE

## 2022-08-10 PROCEDURE — 84100 ASSAY OF PHOSPHORUS: CPT | Performed by: INTERNAL MEDICINE

## 2022-08-10 PROCEDURE — 80053 COMPREHEN METABOLIC PANEL: CPT | Performed by: INTERNAL MEDICINE

## 2022-08-10 PROCEDURE — 82306 VITAMIN D 25 HYDROXY: CPT | Performed by: INTERNAL MEDICINE

## 2022-08-11 LAB — BACTERIA UR CULT: ABNORMAL

## 2022-08-12 LAB
IRON SATN MFR SERPL: 32 % (ref 20–50)
IRON SERPL-MCNC: 54 UG/DL (ref 50–170)
TIBC SERPL-MCNC: 116 UG/DL (ref 70–310)
TIBC SERPL-MCNC: 170 UG/DL (ref 250–450)
TRANSFERRIN SERPL-MCNC: 148 MG/DL (ref 173–360)

## 2022-09-05 ENCOUNTER — OFFICE VISIT (OUTPATIENT)
Dept: URGENT CARE | Facility: CLINIC | Age: 77
End: 2022-09-05
Payer: COMMERCIAL

## 2022-09-05 VITALS
BODY MASS INDEX: 31.52 KG/M2 | SYSTOLIC BLOOD PRESSURE: 129 MMHG | WEIGHT: 208 LBS | OXYGEN SATURATION: 100 % | HEART RATE: 64 BPM | TEMPERATURE: 99 F | RESPIRATION RATE: 20 BRPM | HEIGHT: 68 IN | DIASTOLIC BLOOD PRESSURE: 70 MMHG

## 2022-09-05 DIAGNOSIS — R31.0 GROSS HEMATURIA: Primary | ICD-10-CM

## 2022-09-05 DIAGNOSIS — N18.4 CKD (CHRONIC KIDNEY DISEASE) STAGE 4, GFR 15-29 ML/MIN: ICD-10-CM

## 2022-09-05 DIAGNOSIS — N39.0 URINARY TRACT INFECTION WITH HEMATURIA, SITE UNSPECIFIED: ICD-10-CM

## 2022-09-05 DIAGNOSIS — R31.9 URINARY TRACT INFECTION WITH HEMATURIA, SITE UNSPECIFIED: ICD-10-CM

## 2022-09-05 LAB
APPEARANCE UR: ABNORMAL
BACTERIA #/AREA URNS AUTO: ABNORMAL /HPF
BILIRUB UR QL STRIP.AUTO: ABNORMAL
BILIRUB UR QL STRIP: POSITIVE
CAOX CRY URNS QL MICRO: ABNORMAL /HPF
COLOR UR AUTO: ABNORMAL
GLUCOSE UR QL STRIP.AUTO: ABNORMAL
GLUCOSE UR QL STRIP: NEGATIVE
GRAN CASTS URNS QL MICRO: ABNORMAL /HPF
KETONES UR QL STRIP.AUTO: ABNORMAL
KETONES UR QL STRIP: NEGATIVE
LEUKOCYTE ESTERASE UR QL STRIP.AUTO: ABNORMAL
LEUKOCYTE ESTERASE UR QL STRIP: POSITIVE
NITRITE UR QL STRIP.AUTO: ABNORMAL
PH UR STRIP.AUTO: ABNORMAL [PH]
PH, POC UA: 5
POC BLOOD, URINE: POSITIVE
POC NITRATES, URINE: POSITIVE
PROT UR QL STRIP.AUTO: ABNORMAL
PROT UR QL STRIP: POSITIVE
RBC #/AREA URNS AUTO: >200 /HPF
RBC UR QL AUTO: ABNORMAL
SP GR UR STRIP.AUTO: 1.01 (ref 1–1.03)
SP GR UR STRIP: 1.01 (ref 1–1.03)
SQUAMOUS #/AREA URNS AUTO: 5 /HPF
UROBILINOGEN UR STRIP-ACNC: 8 (ref 0.1–1.1)
UROBILINOGEN UR STRIP-ACNC: ABNORMAL
WBC #/AREA URNS AUTO: 15 /HPF

## 2022-09-05 PROCEDURE — 3074F PR MOST RECENT SYSTOLIC BLOOD PRESSURE < 130 MM HG: ICD-10-PCS | Mod: CPTII,,, | Performed by: FAMILY MEDICINE

## 2022-09-05 PROCEDURE — 3288F PR FALLS RISK ASSESSMENT DOCUMENTED: ICD-10-PCS | Mod: CPTII,,, | Performed by: FAMILY MEDICINE

## 2022-09-05 PROCEDURE — 1159F PR MEDICATION LIST DOCUMENTED IN MEDICAL RECORD: ICD-10-PCS | Mod: CPTII,,, | Performed by: FAMILY MEDICINE

## 2022-09-05 PROCEDURE — 1159F MED LIST DOCD IN RCRD: CPT | Mod: CPTII,,, | Performed by: FAMILY MEDICINE

## 2022-09-05 PROCEDURE — 3074F SYST BP LT 130 MM HG: CPT | Mod: CPTII,,, | Performed by: FAMILY MEDICINE

## 2022-09-05 PROCEDURE — 81001 URINALYSIS AUTO W/SCOPE: CPT | Performed by: STUDENT IN AN ORGANIZED HEALTH CARE EDUCATION/TRAINING PROGRAM

## 2022-09-05 PROCEDURE — 3078F DIAST BP <80 MM HG: CPT | Mod: CPTII,,, | Performed by: FAMILY MEDICINE

## 2022-09-05 PROCEDURE — 1101F PT FALLS ASSESS-DOCD LE1/YR: CPT | Mod: CPTII,,, | Performed by: FAMILY MEDICINE

## 2022-09-05 PROCEDURE — 1101F PR PT FALLS ASSESS DOC 0-1 FALLS W/OUT INJ PAST YR: ICD-10-PCS | Mod: CPTII,,, | Performed by: FAMILY MEDICINE

## 2022-09-05 PROCEDURE — 81003 POCT URINALYSIS, DIPSTICK, AUTOMATED, W/O SCOPE: ICD-10-PCS | Mod: QW,,, | Performed by: FAMILY MEDICINE

## 2022-09-05 PROCEDURE — 3288F FALL RISK ASSESSMENT DOCD: CPT | Mod: CPTII,,, | Performed by: FAMILY MEDICINE

## 2022-09-05 PROCEDURE — 99214 OFFICE O/P EST MOD 30 MIN: CPT | Mod: ,,, | Performed by: FAMILY MEDICINE

## 2022-09-05 PROCEDURE — 81003 URINALYSIS AUTO W/O SCOPE: CPT | Mod: QW,,, | Performed by: FAMILY MEDICINE

## 2022-09-05 PROCEDURE — 87077 CULTURE AEROBIC IDENTIFY: CPT | Performed by: STUDENT IN AN ORGANIZED HEALTH CARE EDUCATION/TRAINING PROGRAM

## 2022-09-05 PROCEDURE — 3078F PR MOST RECENT DIASTOLIC BLOOD PRESSURE < 80 MM HG: ICD-10-PCS | Mod: CPTII,,, | Performed by: FAMILY MEDICINE

## 2022-09-05 PROCEDURE — 99214 PR OFFICE/OUTPT VISIT, EST, LEVL IV, 30-39 MIN: ICD-10-PCS | Mod: ,,, | Performed by: FAMILY MEDICINE

## 2022-09-05 RX ORDER — DOXERCALCIFEROL 0.5 UG/1
1 CAPSULE ORAL
Status: ON HOLD | COMMUNITY
Start: 2022-08-31 | End: 2023-11-01 | Stop reason: HOSPADM

## 2022-09-05 RX ORDER — LIRAGLUTIDE 6 MG/ML
INJECTION SUBCUTANEOUS
Status: ON HOLD | COMMUNITY
Start: 2022-08-23 | End: 2023-11-01 | Stop reason: HOSPADM

## 2022-09-05 RX ORDER — ROSUVASTATIN CALCIUM 10 MG/1
10 TABLET, COATED ORAL NIGHTLY
COMMUNITY
Start: 2022-08-20

## 2022-09-05 RX ORDER — MULTIVITAMIN WITH FOLIC ACID 400 MCG
1 TABLET ORAL DAILY
COMMUNITY
Start: 2022-08-20

## 2022-09-05 RX ORDER — FOLIC ACID 1 MG/1
1000 TABLET ORAL DAILY
COMMUNITY
Start: 2022-08-20

## 2022-09-05 RX ORDER — CEFUROXIME AXETIL 500 MG/1
500 TABLET ORAL DAILY
Qty: 7 TABLET | Refills: 0 | Status: SHIPPED | OUTPATIENT
Start: 2022-09-05 | End: 2022-09-12

## 2022-09-05 RX ORDER — SODIUM BICARBONATE 650 MG/1
650 TABLET ORAL EVERY OTHER DAY
Status: ON HOLD | COMMUNITY
Start: 2022-08-20 | End: 2024-01-16 | Stop reason: HOSPADM

## 2022-09-05 RX ORDER — DOCUSATE SODIUM 100 MG/1
1 CAPSULE, LIQUID FILLED ORAL 2 TIMES DAILY
COMMUNITY

## 2022-09-05 RX ORDER — DOXYCYCLINE 100 MG/1
100 CAPSULE ORAL
Status: ON HOLD | COMMUNITY
Start: 2022-08-10 | End: 2023-11-01 | Stop reason: HOSPADM

## 2022-09-05 RX ORDER — IBUPROFEN 100 MG/5ML
1000 SUSPENSION, ORAL (FINAL DOSE FORM) ORAL
COMMUNITY

## 2022-09-05 RX ORDER — ASPIRIN 81 MG/1
81 TABLET ORAL DAILY
COMMUNITY
Start: 2022-08-20

## 2022-09-05 RX ORDER — AMIODARONE HYDROCHLORIDE 200 MG/1
200 TABLET ORAL DAILY
COMMUNITY
Start: 2022-08-20

## 2022-09-05 RX ORDER — DIMENHYDRINATE 50 MG
TABLET ORAL
Status: ON HOLD | COMMUNITY
Start: 2022-08-20 | End: 2023-11-01 | Stop reason: HOSPADM

## 2022-09-05 RX ORDER — RIVAROXABAN 15 MG/1
15 TABLET, FILM COATED ORAL DAILY
Status: ON HOLD | COMMUNITY
Start: 2022-08-19 | End: 2023-11-01 | Stop reason: HOSPADM

## 2022-09-05 RX ORDER — LORAZEPAM 2 MG
2 TABLET ORAL
Status: ON HOLD | COMMUNITY
End: 2023-11-01 | Stop reason: HOSPADM

## 2022-09-05 RX ORDER — FERRIC CITRATE 210 MG/1
TABLET, COATED ORAL DAILY PRN
Status: ON HOLD | COMMUNITY
Start: 2022-08-19 | End: 2023-11-01 | Stop reason: HOSPADM

## 2022-09-05 RX ORDER — BUPROPION HYDROCHLORIDE 150 MG/1
150 TABLET ORAL DAILY
COMMUNITY
Start: 2022-08-20

## 2022-09-05 RX ORDER — ACETAMINOPHEN 500 MG
2000 TABLET ORAL DAILY
COMMUNITY
Start: 2022-08-20

## 2022-09-05 RX ORDER — AMLODIPINE BESYLATE 5 MG/1
10 TABLET ORAL 2 TIMES DAILY
Status: ON HOLD | COMMUNITY
Start: 2022-08-20 | End: 2024-01-16 | Stop reason: HOSPADM

## 2022-09-05 RX ORDER — METOPROLOL TARTRATE 25 MG/1
25 TABLET, FILM COATED ORAL DAILY
Status: ON HOLD | COMMUNITY
Start: 2022-08-20 | End: 2023-11-01 | Stop reason: HOSPADM

## 2022-09-05 NOTE — PROGRESS NOTES
"Subjective:       Patient ID: Debra Puentes is a 76 y.o. female.    Vitals:  height is 5' 8" (1.727 m) and weight is 94.3 kg (208 lb). Her oral temperature is 98.9 °F (37.2 °C). Her blood pressure is 129/70 and her pulse is 64. Her respiration is 20 and oxygen saturation is 100%.     Chief Complaint: Hematuria (Blood in urine and diarrhea since Saturday. )    HPI  76-year-old female with HX of CKD (previously on HD for 1 year secondary to pyelonephritis), recurrent UTIs, and chronic nephrolithiasis s/p R nephrolithotomy presents with hematuria for 2 days.  Hematuria is recurrent.  Pt had hematuria secondary to UTI 1 month ago which resolved after completing course of Abx (Pt cannot recall which Abx).  Denies fever, dysuria, urinary urgency or frequency, N/V, abdominopelvic pain, or flank pain.  Denies Hx of smoking.    ROS    As per HPI    Objective:      Physical Exam  Gen: Pleasant, elderly female, NAD, afebrile  Abdomen: Soft, nondistended, nontender, normoactive bowel sounds  : No suprapubic or CVA tenderness      Assessment:       1. Gross hematuria    2. Urinary tract infection with hematuria, site unspecified    3. CKD (chronic kidney disease) stage 4, GFR 15-29 ml/min          Plan:         Gross hematuria  -     POCT Urinalysis, Dipstick, Automated, W/O Scope    Urinary tract infection with hematuria, site unspecified  -     Urinalysis  -     Urine Culture High Risk  -     cefUROXime (CEFTIN) 500 MG tablet; Take 1 tablet (500 mg total) by mouth once daily. for 7 days  Dispense: 7 tablet; Refill: 0    CKD (chronic kidney disease) stage 4, GFR 15-29 ml/min     UTI with hematuria; POCT UA nitrite +.  Hx pansensitive UCx.  Sending 7-day course of cefuroxime, renally dosed.  ER precautions given for worsening UTI Sx.            "

## 2022-09-09 LAB
BACTERIA UR CULT: ABNORMAL
BACTERIA UR CULT: ABNORMAL

## 2022-09-12 ENCOUNTER — TELEPHONE (OUTPATIENT)
Dept: URGENT CARE | Facility: CLINIC | Age: 77
End: 2022-09-12
Payer: COMMERCIAL

## 2022-09-12 NOTE — TELEPHONE ENCOUNTER
I attempted to call the following patient with final urine culture results. Per [place] [facility] [place] it seems as though pt was never contacted. The person who answered to the phone # stated clinic has incorrect number.     Sending out a letter to address on file today.

## 2022-09-17 ENCOUNTER — HISTORICAL (OUTPATIENT)
Dept: ADMINISTRATIVE | Facility: HOSPITAL | Age: 77
End: 2022-09-17
Payer: COMMERCIAL

## 2022-09-19 ENCOUNTER — LAB REQUISITION (OUTPATIENT)
Dept: LAB | Facility: HOSPITAL | Age: 77
End: 2022-09-19
Payer: COMMERCIAL

## 2022-09-19 DIAGNOSIS — N18.4 CHRONIC KIDNEY DISEASE, STAGE 4 (SEVERE): ICD-10-CM

## 2022-09-19 LAB
ALBUMIN SERPL-MCNC: 3.3 GM/DL (ref 3.4–4.8)
ALBUMIN/GLOB SERPL: 1.1 RATIO (ref 1.1–2)
ALP SERPL-CCNC: 150 UNIT/L (ref 40–150)
ALT SERPL-CCNC: 23 UNIT/L (ref 0–55)
AST SERPL-CCNC: 21 UNIT/L (ref 5–34)
BASOPHILS # BLD AUTO: 0.02 X10(3)/MCL (ref 0–0.2)
BASOPHILS NFR BLD AUTO: 0.2 %
BILIRUBIN DIRECT+TOT PNL SERPL-MCNC: 0.5 MG/DL
BUN SERPL-MCNC: 51.3 MG/DL (ref 9.8–20.1)
CALCIUM SERPL-MCNC: 9 MG/DL (ref 8.4–10.2)
CHLORIDE SERPL-SCNC: 108 MMOL/L (ref 98–107)
CO2 SERPL-SCNC: 18 MMOL/L (ref 23–31)
CREAT SERPL-MCNC: 2.91 MG/DL (ref 0.55–1.02)
DEPRECATED CALCIDIOL+CALCIFEROL SERPL-MC: 42.4 NG/ML (ref 30–80)
EOSINOPHIL # BLD AUTO: 0.35 X10(3)/MCL (ref 0–0.9)
EOSINOPHIL NFR BLD AUTO: 4.2 %
ERYTHROCYTE [DISTWIDTH] IN BLOOD BY AUTOMATED COUNT: 13.7 % (ref 11.5–17)
FERRITIN SERPL-MCNC: 1140.62 NG/ML (ref 4.63–204)
GFR SERPLBLD CREATININE-BSD FMLA CKD-EPI: 16 MLS/MIN/1.73/M2
GLOBULIN SER-MCNC: 2.9 GM/DL (ref 2.4–3.5)
GLUCOSE SERPL-MCNC: 69 MG/DL (ref 82–115)
HCT VFR BLD AUTO: 37.1 % (ref 37–47)
HGB BLD-MCNC: 11.8 GM/DL (ref 12–16)
IMM GRANULOCYTES # BLD AUTO: 0.02 X10(3)/MCL (ref 0–0.04)
IMM GRANULOCYTES NFR BLD AUTO: 0.2 %
IRON SATN MFR SERPL: 38 % (ref 20–50)
IRON SERPL-MCNC: 81 UG/DL (ref 50–170)
LYMPHOCYTES # BLD AUTO: 1.31 X10(3)/MCL (ref 0.6–4.6)
LYMPHOCYTES NFR BLD AUTO: 15.8 %
MAGNESIUM SERPL-MCNC: 1.8 MG/DL (ref 1.6–2.6)
MCH RBC QN AUTO: 31.5 PG (ref 27–31)
MCHC RBC AUTO-ENTMCNC: 31.8 MG/DL (ref 33–36)
MCV RBC AUTO: 98.9 FL (ref 80–94)
MONOCYTES # BLD AUTO: 0.79 X10(3)/MCL (ref 0.1–1.3)
MONOCYTES NFR BLD AUTO: 9.6 %
NEUTROPHILS # BLD AUTO: 5.8 X10(3)/MCL (ref 2.1–9.2)
NEUTROPHILS NFR BLD AUTO: 70 %
NRBC BLD AUTO-RTO: 0 %
PHOSPHATE SERPL-MCNC: 4 MG/DL (ref 2.3–4.7)
PLATELET # BLD AUTO: 226 X10(3)/MCL (ref 130–400)
PMV BLD AUTO: 11 FL (ref 7.4–10.4)
POTASSIUM SERPL-SCNC: 4 MMOL/L (ref 3.5–5.1)
PROT SERPL-MCNC: 6.2 GM/DL (ref 5.8–7.6)
PTH-INTACT SERPL-MCNC: 278.3 PG/ML (ref 8.7–77)
RBC # BLD AUTO: 3.75 X10(6)/MCL (ref 4.2–5.4)
SODIUM SERPL-SCNC: 142 MMOL/L (ref 136–145)
TIBC SERPL-MCNC: 130 UG/DL (ref 70–310)
TIBC SERPL-MCNC: 211 UG/DL (ref 250–450)
TRANSFERRIN SERPL-MCNC: 177 MG/DL (ref 173–360)
WBC # SPEC AUTO: 8.3 X10(3)/MCL (ref 4.5–11.5)

## 2022-09-19 PROCEDURE — 83970 ASSAY OF PARATHORMONE: CPT | Performed by: INTERNAL MEDICINE

## 2022-09-19 PROCEDURE — 80053 COMPREHEN METABOLIC PANEL: CPT | Performed by: INTERNAL MEDICINE

## 2022-09-19 PROCEDURE — 82728 ASSAY OF FERRITIN: CPT | Performed by: INTERNAL MEDICINE

## 2022-09-19 PROCEDURE — 84100 ASSAY OF PHOSPHORUS: CPT | Performed by: INTERNAL MEDICINE

## 2022-09-19 PROCEDURE — 85025 COMPLETE CBC W/AUTO DIFF WBC: CPT | Performed by: INTERNAL MEDICINE

## 2022-09-19 PROCEDURE — 83735 ASSAY OF MAGNESIUM: CPT | Performed by: INTERNAL MEDICINE

## 2022-09-19 PROCEDURE — 83540 ASSAY OF IRON: CPT | Performed by: INTERNAL MEDICINE

## 2022-09-19 PROCEDURE — 82306 VITAMIN D 25 HYDROXY: CPT | Performed by: INTERNAL MEDICINE

## 2022-10-13 ENCOUNTER — LAB REQUISITION (OUTPATIENT)
Dept: LAB | Facility: HOSPITAL | Age: 77
End: 2022-10-13
Payer: COMMERCIAL

## 2022-10-13 DIAGNOSIS — E11.22 TYPE 2 DIABETES MELLITUS WITH DIABETIC CHRONIC KIDNEY DISEASE: ICD-10-CM

## 2022-10-13 LAB
ALBUMIN SERPL-MCNC: 3.4 GM/DL (ref 3.4–4.8)
ALBUMIN/GLOB SERPL: 1.2 RATIO (ref 1.1–2)
ALP SERPL-CCNC: 160 UNIT/L (ref 40–150)
ALT SERPL-CCNC: 19 UNIT/L (ref 0–55)
AST SERPL-CCNC: 20 UNIT/L (ref 5–34)
BILIRUBIN DIRECT+TOT PNL SERPL-MCNC: 0.6 MG/DL
BUN SERPL-MCNC: 57.4 MG/DL (ref 9.8–20.1)
CALCIUM SERPL-MCNC: 9.2 MG/DL (ref 8.4–10.2)
CHLORIDE SERPL-SCNC: 108 MMOL/L (ref 98–107)
CO2 SERPL-SCNC: 23 MMOL/L (ref 23–31)
CREAT SERPL-MCNC: 3.45 MG/DL (ref 0.55–1.02)
DEPRECATED CALCIDIOL+CALCIFEROL SERPL-MC: 46.7 NG/ML (ref 30–80)
FERRITIN SERPL-MCNC: 1138.85 NG/ML (ref 4.63–204)
GFR SERPLBLD CREATININE-BSD FMLA CKD-EPI: 13 MLS/MIN/1.73/M2
GLOBULIN SER-MCNC: 2.8 GM/DL (ref 2.4–3.5)
GLUCOSE SERPL-MCNC: 120 MG/DL (ref 82–115)
IRON SATN MFR SERPL: 27 % (ref 20–50)
IRON SERPL-MCNC: 62 UG/DL (ref 50–170)
MAGNESIUM SERPL-MCNC: 2.1 MG/DL (ref 1.6–2.6)
PHOSPHATE SERPL-MCNC: 3.7 MG/DL (ref 2.3–4.7)
POTASSIUM SERPL-SCNC: 4.4 MMOL/L (ref 3.5–5.1)
PROT SERPL-MCNC: 6.2 GM/DL (ref 5.8–7.6)
SODIUM SERPL-SCNC: 140 MMOL/L (ref 136–145)
TIBC SERPL-MCNC: 168 UG/DL (ref 70–310)
TIBC SERPL-MCNC: 230 UG/DL (ref 250–450)
TRANSFERRIN SERPL-MCNC: 198 MG/DL (ref 173–360)

## 2022-10-13 PROCEDURE — 80053 COMPREHEN METABOLIC PANEL: CPT | Performed by: INTERNAL MEDICINE

## 2022-10-13 PROCEDURE — 84100 ASSAY OF PHOSPHORUS: CPT | Performed by: INTERNAL MEDICINE

## 2022-10-13 PROCEDURE — 83735 ASSAY OF MAGNESIUM: CPT | Performed by: INTERNAL MEDICINE

## 2022-10-13 PROCEDURE — 83540 ASSAY OF IRON: CPT | Performed by: INTERNAL MEDICINE

## 2022-10-13 PROCEDURE — 82728 ASSAY OF FERRITIN: CPT | Performed by: INTERNAL MEDICINE

## 2022-10-13 PROCEDURE — 82306 VITAMIN D 25 HYDROXY: CPT | Performed by: INTERNAL MEDICINE

## 2022-10-14 ENCOUNTER — LAB REQUISITION (OUTPATIENT)
Dept: LAB | Facility: HOSPITAL | Age: 77
End: 2022-10-14
Payer: COMMERCIAL

## 2022-10-14 DIAGNOSIS — D63.1 ANEMIA IN CHRONIC KIDNEY DISEASE (CODE): ICD-10-CM

## 2022-10-14 DIAGNOSIS — E11.22 TYPE 2 DIABETES MELLITUS WITH DIABETIC CHRONIC KIDNEY DISEASE: ICD-10-CM

## 2022-10-14 LAB
BASOPHILS # BLD AUTO: 0.02 X10(3)/MCL (ref 0–0.2)
BASOPHILS NFR BLD AUTO: 0.3 %
EOSINOPHIL # BLD AUTO: 0.25 X10(3)/MCL (ref 0–0.9)
EOSINOPHIL NFR BLD AUTO: 4.4 %
ERYTHROCYTE [DISTWIDTH] IN BLOOD BY AUTOMATED COUNT: 13.2 % (ref 11.5–17)
HCT VFR BLD AUTO: 35.8 % (ref 37–47)
HGB BLD-MCNC: 11.3 GM/DL (ref 12–16)
IMM GRANULOCYTES # BLD AUTO: 0.01 X10(3)/MCL (ref 0–0.04)
IMM GRANULOCYTES NFR BLD AUTO: 0.2 %
LYMPHOCYTES # BLD AUTO: 0.84 X10(3)/MCL (ref 0.6–4.6)
LYMPHOCYTES NFR BLD AUTO: 14.6 %
MCH RBC QN AUTO: 31.3 PG (ref 27–31)
MCHC RBC AUTO-ENTMCNC: 31.6 MG/DL (ref 33–36)
MCV RBC AUTO: 99.2 FL (ref 80–94)
MONOCYTES # BLD AUTO: 0.48 X10(3)/MCL (ref 0.1–1.3)
MONOCYTES NFR BLD AUTO: 8.4 %
NEUTROPHILS # BLD AUTO: 4.1 X10(3)/MCL (ref 2.1–9.2)
NEUTROPHILS NFR BLD AUTO: 72.1 %
NRBC BLD AUTO-RTO: 0 %
PLATELET # BLD AUTO: 191 X10(3)/MCL (ref 130–400)
PMV BLD AUTO: 11.2 FL (ref 7.4–10.4)
PTH-INTACT SERPL-MCNC: 200.3 PG/ML (ref 8.7–77)
RBC # BLD AUTO: 3.61 X10(6)/MCL (ref 4.2–5.4)
WBC # SPEC AUTO: 5.7 X10(3)/MCL (ref 4.5–11.5)

## 2022-10-14 PROCEDURE — 83970 ASSAY OF PARATHORMONE: CPT | Performed by: INTERNAL MEDICINE

## 2022-10-14 PROCEDURE — 85025 COMPLETE CBC W/AUTO DIFF WBC: CPT | Performed by: INTERNAL MEDICINE

## 2022-11-29 ENCOUNTER — LAB REQUISITION (OUTPATIENT)
Dept: LAB | Facility: HOSPITAL | Age: 77
End: 2022-11-29
Payer: COMMERCIAL

## 2022-11-29 DIAGNOSIS — N18.4 CHRONIC KIDNEY DISEASE, STAGE 4 (SEVERE): ICD-10-CM

## 2022-11-29 DIAGNOSIS — D63.1 ANEMIA IN CHRONIC KIDNEY DISEASE (CODE): ICD-10-CM

## 2022-11-29 DIAGNOSIS — E11.22 TYPE 2 DIABETES MELLITUS WITH DIABETIC CHRONIC KIDNEY DISEASE: ICD-10-CM

## 2022-11-29 LAB
ALBUMIN SERPL-MCNC: 3 GM/DL (ref 3.4–4.8)
ALBUMIN/GLOB SERPL: 1 RATIO (ref 1.1–2)
ALP SERPL-CCNC: 143 UNIT/L (ref 40–150)
ALT SERPL-CCNC: 19 UNIT/L (ref 0–55)
AST SERPL-CCNC: 16 UNIT/L (ref 5–34)
BASOPHILS # BLD AUTO: 0.02 X10(3)/MCL (ref 0–0.2)
BASOPHILS NFR BLD AUTO: 0.3 %
BILIRUBIN DIRECT+TOT PNL SERPL-MCNC: 0.6 MG/DL
BUN SERPL-MCNC: 56.7 MG/DL (ref 9.8–20.1)
CALCIUM SERPL-MCNC: 9.4 MG/DL (ref 8.4–10.2)
CHLORIDE SERPL-SCNC: 110 MMOL/L (ref 98–107)
CO2 SERPL-SCNC: 25 MMOL/L (ref 23–31)
CREAT SERPL-MCNC: 3.08 MG/DL (ref 0.55–1.02)
DEPRECATED CALCIDIOL+CALCIFEROL SERPL-MC: 48.3 NG/ML (ref 30–80)
EOSINOPHIL # BLD AUTO: 0.35 X10(3)/MCL (ref 0–0.9)
EOSINOPHIL NFR BLD AUTO: 5 %
ERYTHROCYTE [DISTWIDTH] IN BLOOD BY AUTOMATED COUNT: 12.6 % (ref 11.5–17)
GFR SERPLBLD CREATININE-BSD FMLA CKD-EPI: 15 MLS/MIN/1.73/M2
GLOBULIN SER-MCNC: 3.1 GM/DL (ref 2.4–3.5)
GLUCOSE SERPL-MCNC: 83 MG/DL (ref 82–115)
HCT VFR BLD AUTO: 35.2 % (ref 37–47)
HGB BLD-MCNC: 11.4 GM/DL (ref 12–16)
IMM GRANULOCYTES # BLD AUTO: 0.02 X10(3)/MCL (ref 0–0.04)
IMM GRANULOCYTES NFR BLD AUTO: 0.3 %
LYMPHOCYTES # BLD AUTO: 1.33 X10(3)/MCL (ref 0.6–4.6)
LYMPHOCYTES NFR BLD AUTO: 18.8 %
MAGNESIUM SERPL-MCNC: 2.2 MG/DL (ref 1.6–2.6)
MCH RBC QN AUTO: 31.3 PG (ref 27–31)
MCHC RBC AUTO-ENTMCNC: 32.4 MG/DL (ref 33–36)
MCV RBC AUTO: 96.7 FL (ref 80–94)
MONOCYTES # BLD AUTO: 0.82 X10(3)/MCL (ref 0.1–1.3)
MONOCYTES NFR BLD AUTO: 11.6 %
NEUTROPHILS # BLD AUTO: 4.5 X10(3)/MCL (ref 2.1–9.2)
NEUTROPHILS NFR BLD AUTO: 64 %
NRBC BLD AUTO-RTO: 0 %
PHOSPHATE SERPL-MCNC: 4.6 MG/DL (ref 2.3–4.7)
PLATELET # BLD AUTO: 208 X10(3)/MCL (ref 130–400)
PMV BLD AUTO: 10.9 FL (ref 7.4–10.4)
POTASSIUM SERPL-SCNC: 4.2 MMOL/L (ref 3.5–5.1)
PROT SERPL-MCNC: 6.1 GM/DL (ref 5.8–7.6)
PTH-INTACT SERPL-MCNC: 215.6 PG/ML (ref 8.7–77)
RBC # BLD AUTO: 3.64 X10(6)/MCL (ref 4.2–5.4)
SODIUM SERPL-SCNC: 143 MMOL/L (ref 136–145)
WBC # SPEC AUTO: 7.1 X10(3)/MCL (ref 4.5–11.5)

## 2022-11-29 PROCEDURE — 80053 COMPREHEN METABOLIC PANEL: CPT | Performed by: INTERNAL MEDICINE

## 2022-11-29 PROCEDURE — 84100 ASSAY OF PHOSPHORUS: CPT | Performed by: INTERNAL MEDICINE

## 2022-11-29 PROCEDURE — 83735 ASSAY OF MAGNESIUM: CPT | Performed by: INTERNAL MEDICINE

## 2022-11-29 PROCEDURE — 82306 VITAMIN D 25 HYDROXY: CPT | Performed by: INTERNAL MEDICINE

## 2022-11-29 PROCEDURE — 85025 COMPLETE CBC W/AUTO DIFF WBC: CPT | Performed by: INTERNAL MEDICINE

## 2022-11-29 PROCEDURE — 83970 ASSAY OF PARATHORMONE: CPT | Performed by: INTERNAL MEDICINE

## 2022-12-29 ENCOUNTER — LAB REQUISITION (OUTPATIENT)
Dept: LAB | Facility: HOSPITAL | Age: 77
End: 2022-12-29
Payer: COMMERCIAL

## 2022-12-29 DIAGNOSIS — N18.4 CHRONIC KIDNEY DISEASE, STAGE 4 (SEVERE): ICD-10-CM

## 2022-12-29 LAB
ALBUMIN SERPL-MCNC: 3.1 G/DL (ref 3.4–4.8)
ALBUMIN/GLOB SERPL: 1.2 RATIO (ref 1.1–2)
ALP SERPL-CCNC: 130 UNIT/L (ref 40–150)
ALT SERPL-CCNC: 15 UNIT/L (ref 0–55)
AST SERPL-CCNC: 17 UNIT/L (ref 5–34)
BASOPHILS # BLD AUTO: 0.03 X10(3)/MCL (ref 0–0.2)
BASOPHILS NFR BLD AUTO: 0.5 %
BILIRUBIN DIRECT+TOT PNL SERPL-MCNC: 0.6 MG/DL
BUN SERPL-MCNC: 54 MG/DL (ref 9.8–20.1)
CALCIUM SERPL-MCNC: 9.3 MG/DL (ref 8.4–10.2)
CHLORIDE SERPL-SCNC: 109 MMOL/L (ref 98–107)
CO2 SERPL-SCNC: 22 MMOL/L (ref 23–31)
CREAT SERPL-MCNC: 3.04 MG/DL (ref 0.55–1.02)
DEPRECATED CALCIDIOL+CALCIFEROL SERPL-MC: 43.7 NG/ML (ref 30–80)
EOSINOPHIL # BLD AUTO: 0.35 X10(3)/MCL (ref 0–0.9)
EOSINOPHIL NFR BLD AUTO: 5.5 %
ERYTHROCYTE [DISTWIDTH] IN BLOOD BY AUTOMATED COUNT: 13.2 % (ref 11–14.5)
GFR SERPLBLD CREATININE-BSD FMLA CKD-EPI: 15 MLS/MIN/1.73/M2
GLOBULIN SER-MCNC: 2.5 GM/DL (ref 2.4–3.5)
GLUCOSE SERPL-MCNC: 74 MG/DL (ref 82–115)
HCT VFR BLD AUTO: 35.5 % (ref 37–47)
HGB BLD-MCNC: 11.5 GM/DL (ref 12–16)
IMM GRANULOCYTES # BLD AUTO: 0.01 X10(3)/MCL (ref 0–0.04)
IMM GRANULOCYTES NFR BLD AUTO: 0.2 %
LYMPHOCYTES # BLD AUTO: 1.4 X10(3)/MCL (ref 0.6–4.6)
LYMPHOCYTES NFR BLD AUTO: 22.1 %
MAGNESIUM SERPL-MCNC: 2.2 MG/DL (ref 1.6–2.6)
MCH RBC QN AUTO: 30.7 PG
MCHC RBC AUTO-ENTMCNC: 32.4 MG/DL (ref 33–36)
MCV RBC AUTO: 94.7 FL (ref 80–94)
MONOCYTES # BLD AUTO: 0.76 X10(3)/MCL (ref 0.1–1.3)
MONOCYTES NFR BLD AUTO: 12 %
NEUTROPHILS # BLD AUTO: 3.79 X10(3)/MCL (ref 2.1–9.2)
NEUTROPHILS NFR BLD AUTO: 59.7 %
NRBC BLD AUTO-RTO: 0 % (ref 0–1)
PHOSPHATE SERPL-MCNC: 4.3 MG/DL (ref 2.3–4.7)
PLATELET # BLD AUTO: 196 X10(3)/MCL (ref 140–371)
PMV BLD AUTO: 11.1 FL (ref 9.4–12.4)
POTASSIUM SERPL-SCNC: 4.3 MMOL/L (ref 3.5–5.1)
PROT SERPL-MCNC: 5.6 GM/DL (ref 5.8–7.6)
PTH-INTACT SERPL-MCNC: 163.2 PG/ML (ref 8.7–77)
RBC # BLD AUTO: 3.75 X10(6)/MCL (ref 4.2–5.4)
SODIUM SERPL-SCNC: 143 MMOL/L (ref 136–145)
WBC # SPEC AUTO: 6.3 X10(3)/MCL (ref 4.5–11.5)

## 2022-12-29 PROCEDURE — 82306 VITAMIN D 25 HYDROXY: CPT | Performed by: INTERNAL MEDICINE

## 2022-12-29 PROCEDURE — 84100 ASSAY OF PHOSPHORUS: CPT | Performed by: INTERNAL MEDICINE

## 2022-12-29 PROCEDURE — 80053 COMPREHEN METABOLIC PANEL: CPT | Performed by: INTERNAL MEDICINE

## 2022-12-29 PROCEDURE — 83735 ASSAY OF MAGNESIUM: CPT | Performed by: INTERNAL MEDICINE

## 2022-12-29 PROCEDURE — 85025 COMPLETE CBC W/AUTO DIFF WBC: CPT | Performed by: INTERNAL MEDICINE

## 2022-12-29 PROCEDURE — 83970 ASSAY OF PARATHORMONE: CPT | Performed by: INTERNAL MEDICINE

## 2023-01-31 ENCOUNTER — LAB REQUISITION (OUTPATIENT)
Dept: LAB | Facility: HOSPITAL | Age: 78
End: 2023-01-31
Payer: COMMERCIAL

## 2023-01-31 DIAGNOSIS — N18.4 CHRONIC KIDNEY DISEASE, STAGE 4 (SEVERE): ICD-10-CM

## 2023-01-31 LAB
ALBUMIN SERPL-MCNC: 3.1 G/DL (ref 3.4–4.8)
ALBUMIN/GLOB SERPL: 1.3 RATIO (ref 1.1–2)
ALP SERPL-CCNC: 128 UNIT/L (ref 40–150)
ALT SERPL-CCNC: 14 UNIT/L (ref 0–55)
AST SERPL-CCNC: 17 UNIT/L (ref 5–34)
BASOPHILS # BLD AUTO: 0.01 X10(3)/MCL (ref 0–0.2)
BASOPHILS NFR BLD AUTO: 0.1 %
BILIRUBIN DIRECT+TOT PNL SERPL-MCNC: 0.6 MG/DL
BUN SERPL-MCNC: 44.7 MG/DL (ref 9.8–20.1)
CALCIUM SERPL-MCNC: 8.9 MG/DL (ref 8.4–10.2)
CHLORIDE SERPL-SCNC: 112 MMOL/L (ref 98–107)
CO2 SERPL-SCNC: 22 MMOL/L (ref 23–31)
CREAT SERPL-MCNC: 2.91 MG/DL (ref 0.55–1.02)
DEPRECATED CALCIDIOL+CALCIFEROL SERPL-MC: 41.9 NG/ML (ref 30–80)
EOSINOPHIL # BLD AUTO: 0.43 X10(3)/MCL (ref 0–0.9)
EOSINOPHIL NFR BLD AUTO: 6.3 %
ERYTHROCYTE [DISTWIDTH] IN BLOOD BY AUTOMATED COUNT: 13.3 % (ref 11.5–17)
FERRITIN SERPL-MCNC: 854.94 NG/ML (ref 4.63–204)
GFR SERPLBLD CREATININE-BSD FMLA CKD-EPI: 16 MLS/MIN/1.73/M2
GLOBULIN SER-MCNC: 2.4 GM/DL (ref 2.4–3.5)
GLUCOSE SERPL-MCNC: 77 MG/DL (ref 82–115)
HCT VFR BLD AUTO: 36.7 % (ref 37–47)
HGB BLD-MCNC: 11.6 GM/DL (ref 12–16)
IMM GRANULOCYTES # BLD AUTO: 0.03 X10(3)/MCL (ref 0–0.04)
IMM GRANULOCYTES NFR BLD AUTO: 0.4 %
IRON SATN MFR SERPL: 36 % (ref 20–50)
IRON SERPL-MCNC: 68 UG/DL (ref 50–170)
LYMPHOCYTES # BLD AUTO: 1.48 X10(3)/MCL (ref 0.6–4.6)
LYMPHOCYTES NFR BLD AUTO: 21.6 %
MAGNESIUM SERPL-MCNC: 2 MG/DL (ref 1.6–2.6)
MCH RBC QN AUTO: 30.6 PG
MCHC RBC AUTO-ENTMCNC: 31.6 MG/DL (ref 33–36)
MCV RBC AUTO: 96.8 FL (ref 80–94)
MONOCYTES # BLD AUTO: 0.7 X10(3)/MCL (ref 0.1–1.3)
MONOCYTES NFR BLD AUTO: 10.2 %
NEUTROPHILS # BLD AUTO: 4.19 X10(3)/MCL (ref 2.1–9.2)
NEUTROPHILS NFR BLD AUTO: 61.4 %
NRBC BLD AUTO-RTO: 0 %
PHOSPHATE SERPL-MCNC: 4.2 MG/DL (ref 2.3–4.7)
PLATELET # BLD AUTO: 176 X10(3)/MCL (ref 130–400)
PMV BLD AUTO: 11.3 FL (ref 7.4–10.4)
POTASSIUM SERPL-SCNC: 4.4 MMOL/L (ref 3.5–5.1)
PROT SERPL-MCNC: 5.5 GM/DL (ref 5.8–7.6)
PTH-INTACT SERPL-MCNC: 253.3 PG/ML (ref 8.7–77)
RBC # BLD AUTO: 3.79 X10(6)/MCL (ref 4.2–5.4)
SODIUM SERPL-SCNC: 144 MMOL/L (ref 136–145)
TIBC SERPL-MCNC: 120 UG/DL (ref 70–310)
TIBC SERPL-MCNC: 188 UG/DL (ref 250–450)
TRANSFERRIN SERPL-MCNC: 161 MG/DL (ref 173–360)
WBC # SPEC AUTO: 6.8 X10(3)/MCL (ref 4.5–11.5)

## 2023-01-31 PROCEDURE — 84100 ASSAY OF PHOSPHORUS: CPT | Performed by: INTERNAL MEDICINE

## 2023-01-31 PROCEDURE — 83550 IRON BINDING TEST: CPT | Performed by: INTERNAL MEDICINE

## 2023-01-31 PROCEDURE — 83735 ASSAY OF MAGNESIUM: CPT | Performed by: INTERNAL MEDICINE

## 2023-01-31 PROCEDURE — 82306 VITAMIN D 25 HYDROXY: CPT | Performed by: INTERNAL MEDICINE

## 2023-01-31 PROCEDURE — 85025 COMPLETE CBC W/AUTO DIFF WBC: CPT | Performed by: INTERNAL MEDICINE

## 2023-01-31 PROCEDURE — 82728 ASSAY OF FERRITIN: CPT | Performed by: INTERNAL MEDICINE

## 2023-01-31 PROCEDURE — 83970 ASSAY OF PARATHORMONE: CPT | Performed by: INTERNAL MEDICINE

## 2023-01-31 PROCEDURE — 80053 COMPREHEN METABOLIC PANEL: CPT | Performed by: INTERNAL MEDICINE

## 2023-03-10 ENCOUNTER — LAB REQUISITION (OUTPATIENT)
Dept: LAB | Facility: HOSPITAL | Age: 78
End: 2023-03-10
Payer: COMMERCIAL

## 2023-03-10 DIAGNOSIS — N18.4 CHRONIC KIDNEY DISEASE, STAGE 4 (SEVERE): ICD-10-CM

## 2023-03-10 LAB
ALBUMIN SERPL-MCNC: 3.2 G/DL (ref 3.4–4.8)
ALBUMIN/GLOB SERPL: 1.3 RATIO (ref 1.1–2)
ALP SERPL-CCNC: 126 UNIT/L (ref 40–150)
ALT SERPL-CCNC: 14 UNIT/L (ref 0–55)
AST SERPL-CCNC: 18 UNIT/L (ref 5–34)
BASOPHILS # BLD AUTO: 0.03 X10(3)/MCL (ref 0–0.2)
BASOPHILS NFR BLD AUTO: 0.4 %
BILIRUBIN DIRECT+TOT PNL SERPL-MCNC: 0.5 MG/DL
BUN SERPL-MCNC: 60.4 MG/DL (ref 9.8–20.1)
CALCIUM SERPL-MCNC: 9.1 MG/DL (ref 8.4–10.2)
CHLORIDE SERPL-SCNC: 110 MMOL/L (ref 98–107)
CO2 SERPL-SCNC: 22 MMOL/L (ref 23–31)
CREAT SERPL-MCNC: 3.43 MG/DL (ref 0.55–1.02)
DEPRECATED CALCIDIOL+CALCIFEROL SERPL-MC: 46.4 NG/ML (ref 30–80)
EOSINOPHIL # BLD AUTO: 0.37 X10(3)/MCL (ref 0–0.9)
EOSINOPHIL NFR BLD AUTO: 5.3 %
ERYTHROCYTE [DISTWIDTH] IN BLOOD BY AUTOMATED COUNT: 13.2 % (ref 11.5–17)
FERRITIN SERPL-MCNC: 904.7 NG/ML (ref 4.63–204)
GFR SERPLBLD CREATININE-BSD FMLA CKD-EPI: 13 MLS/MIN/1.73/M2
GLOBULIN SER-MCNC: 2.4 GM/DL (ref 2.4–3.5)
GLUCOSE SERPL-MCNC: 73 MG/DL (ref 82–115)
HCT VFR BLD AUTO: 37.8 % (ref 37–47)
HGB BLD-MCNC: 12 G/DL (ref 12–16)
IMM GRANULOCYTES # BLD AUTO: 0.02 X10(3)/MCL (ref 0–0.04)
IMM GRANULOCYTES NFR BLD AUTO: 0.3 %
IRON SATN MFR SERPL: 61 % (ref 20–50)
IRON SERPL-MCNC: 121 UG/DL (ref 50–170)
LYMPHOCYTES # BLD AUTO: 1.57 X10(3)/MCL (ref 0.6–4.6)
LYMPHOCYTES NFR BLD AUTO: 22.7 %
MAGNESIUM SERPL-MCNC: 2.2 MG/DL (ref 1.6–2.6)
MCH RBC QN AUTO: 30.8 PG
MCHC RBC AUTO-ENTMCNC: 31.7 G/DL (ref 33–36)
MCV RBC AUTO: 97.2 FL (ref 80–94)
MONOCYTES # BLD AUTO: 0.79 X10(3)/MCL (ref 0.1–1.3)
MONOCYTES NFR BLD AUTO: 11.4 %
NEUTROPHILS # BLD AUTO: 4.14 X10(3)/MCL (ref 2.1–9.2)
NEUTROPHILS NFR BLD AUTO: 59.9 %
NRBC BLD AUTO-RTO: 0 %
PHOSPHATE SERPL-MCNC: 5 MG/DL (ref 2.3–4.7)
PLATELET # BLD AUTO: 177 X10(3)/MCL (ref 130–400)
PMV BLD AUTO: 11.3 FL (ref 7.4–10.4)
POTASSIUM SERPL-SCNC: 4.4 MMOL/L (ref 3.5–5.1)
PROT SERPL-MCNC: 5.6 GM/DL (ref 5.8–7.6)
PTH-INTACT SERPL-MCNC: 197.4 PG/ML (ref 8.7–77)
RBC # BLD AUTO: 3.89 X10(6)/MCL (ref 4.2–5.4)
SODIUM SERPL-SCNC: 145 MMOL/L (ref 136–145)
TIBC SERPL-MCNC: 198 UG/DL (ref 250–450)
TIBC SERPL-MCNC: 77 UG/DL (ref 70–310)
TRANSFERRIN SERPL-MCNC: 157 MG/DL (ref 173–360)
WBC # SPEC AUTO: 6.9 X10(3)/MCL (ref 4.5–11.5)

## 2023-03-10 PROCEDURE — 84100 ASSAY OF PHOSPHORUS: CPT | Performed by: INTERNAL MEDICINE

## 2023-03-10 PROCEDURE — 83970 ASSAY OF PARATHORMONE: CPT | Performed by: INTERNAL MEDICINE

## 2023-03-10 PROCEDURE — 82306 VITAMIN D 25 HYDROXY: CPT | Performed by: INTERNAL MEDICINE

## 2023-03-10 PROCEDURE — 82728 ASSAY OF FERRITIN: CPT | Performed by: INTERNAL MEDICINE

## 2023-03-10 PROCEDURE — 85025 COMPLETE CBC W/AUTO DIFF WBC: CPT | Performed by: INTERNAL MEDICINE

## 2023-03-10 PROCEDURE — 83735 ASSAY OF MAGNESIUM: CPT | Performed by: INTERNAL MEDICINE

## 2023-03-10 PROCEDURE — 83550 IRON BINDING TEST: CPT | Performed by: INTERNAL MEDICINE

## 2023-03-10 PROCEDURE — 80053 COMPREHEN METABOLIC PANEL: CPT | Performed by: INTERNAL MEDICINE

## 2023-04-06 ENCOUNTER — LAB REQUISITION (OUTPATIENT)
Dept: LAB | Facility: HOSPITAL | Age: 78
End: 2023-04-06
Payer: COMMERCIAL

## 2023-04-06 DIAGNOSIS — N18.4 CHRONIC KIDNEY DISEASE, STAGE 4 (SEVERE): ICD-10-CM

## 2023-04-06 LAB
BASOPHILS # BLD AUTO: 0.02 X10(3)/MCL (ref 0–0.2)
BASOPHILS NFR BLD AUTO: 0.3 %
DEPRECATED CALCIDIOL+CALCIFEROL SERPL-MC: 42 NG/ML (ref 30–80)
EOSINOPHIL # BLD AUTO: 0.39 X10(3)/MCL (ref 0–0.9)
EOSINOPHIL NFR BLD AUTO: 5.1 %
ERYTHROCYTE [DISTWIDTH] IN BLOOD BY AUTOMATED COUNT: 13.6 % (ref 11.5–17)
FERRITIN SERPL-MCNC: 810.74 NG/ML (ref 4.63–204)
HCT VFR BLD AUTO: 33.8 % (ref 37–47)
HGB BLD-MCNC: 10.9 G/DL (ref 12–16)
IMM GRANULOCYTES # BLD AUTO: 0.02 X10(3)/MCL (ref 0–0.04)
IMM GRANULOCYTES NFR BLD AUTO: 0.3 %
IRON SATN MFR SERPL: 32 % (ref 20–50)
IRON SERPL-MCNC: 58 UG/DL (ref 50–170)
LYMPHOCYTES # BLD AUTO: 1.15 X10(3)/MCL (ref 0.6–4.6)
LYMPHOCYTES NFR BLD AUTO: 15.1 %
MAGNESIUM SERPL-MCNC: 2 MG/DL (ref 1.6–2.6)
MCH RBC QN AUTO: 31.2 PG (ref 27–31)
MCHC RBC AUTO-ENTMCNC: 32.2 G/DL (ref 33–36)
MCV RBC AUTO: 96.8 FL (ref 80–94)
MONOCYTES # BLD AUTO: 0.85 X10(3)/MCL (ref 0.1–1.3)
MONOCYTES NFR BLD AUTO: 11.2 %
NEUTROPHILS # BLD AUTO: 5.19 X10(3)/MCL (ref 2.1–9.2)
NEUTROPHILS NFR BLD AUTO: 68 %
NRBC BLD AUTO-RTO: 0 %
PHOSPHATE SERPL-MCNC: 4.1 MG/DL (ref 2.3–4.7)
PLATELET # BLD AUTO: 167 X10(3)/MCL (ref 130–400)
PMV BLD AUTO: 11.3 FL (ref 7.4–10.4)
PTH-INTACT SERPL-MCNC: 219.9 PG/ML (ref 8.7–77)
RBC # BLD AUTO: 3.49 X10(6)/MCL (ref 4.2–5.4)
TIBC SERPL-MCNC: 126 UG/DL (ref 70–310)
TIBC SERPL-MCNC: 184 UG/DL (ref 250–450)
WBC # SPEC AUTO: 7.6 X10(3)/MCL (ref 4.5–11.5)

## 2023-04-06 PROCEDURE — 83970 ASSAY OF PARATHORMONE: CPT | Performed by: INTERNAL MEDICINE

## 2023-04-06 PROCEDURE — 82306 VITAMIN D 25 HYDROXY: CPT | Performed by: INTERNAL MEDICINE

## 2023-04-06 PROCEDURE — 82728 ASSAY OF FERRITIN: CPT | Performed by: INTERNAL MEDICINE

## 2023-04-06 PROCEDURE — 84100 ASSAY OF PHOSPHORUS: CPT | Performed by: INTERNAL MEDICINE

## 2023-04-06 PROCEDURE — 85025 COMPLETE CBC W/AUTO DIFF WBC: CPT | Performed by: INTERNAL MEDICINE

## 2023-04-06 PROCEDURE — 83735 ASSAY OF MAGNESIUM: CPT | Performed by: INTERNAL MEDICINE

## 2023-04-06 PROCEDURE — 83550 IRON BINDING TEST: CPT | Performed by: INTERNAL MEDICINE

## 2023-04-11 ENCOUNTER — LAB REQUISITION (OUTPATIENT)
Dept: LAB | Facility: HOSPITAL | Age: 78
End: 2023-04-11
Payer: COMMERCIAL

## 2023-04-11 DIAGNOSIS — D63.1 ANEMIA IN CHRONIC KIDNEY DISEASE (CODE): ICD-10-CM

## 2023-04-11 DIAGNOSIS — E83.42 HYPOMAGNESEMIA: ICD-10-CM

## 2023-04-11 DIAGNOSIS — I12.9 HYPERTENSIVE CHRONIC KIDNEY DISEASE WITH STAGE 1 THROUGH STAGE 4 CHRONIC KIDNEY DISEASE, OR UNSPECIFIED CHRONIC KIDNEY DISEASE: ICD-10-CM

## 2023-04-11 DIAGNOSIS — N18.4 CHRONIC KIDNEY DISEASE, STAGE 4 (SEVERE): ICD-10-CM

## 2023-04-11 DIAGNOSIS — E11.22 TYPE 2 DIABETES MELLITUS WITH DIABETIC CHRONIC KIDNEY DISEASE: ICD-10-CM

## 2023-04-11 DIAGNOSIS — E55.9 VITAMIN D DEFICIENCY, UNSPECIFIED: ICD-10-CM

## 2023-04-11 LAB
ALBUMIN SERPL-MCNC: 3.1 G/DL (ref 3.4–4.8)
ALBUMIN/GLOB SERPL: 1.1 RATIO (ref 1.1–2)
ALP SERPL-CCNC: 122 UNIT/L (ref 40–150)
ALT SERPL-CCNC: 14 UNIT/L (ref 0–55)
AST SERPL-CCNC: 16 UNIT/L (ref 5–34)
BILIRUBIN DIRECT+TOT PNL SERPL-MCNC: 0.8 MG/DL
BUN SERPL-MCNC: 46.7 MG/DL (ref 9.8–20.1)
CALCIUM SERPL-MCNC: 9.7 MG/DL (ref 8.4–10.2)
CHLORIDE SERPL-SCNC: 111 MMOL/L (ref 98–107)
CO2 SERPL-SCNC: 23 MMOL/L (ref 23–31)
CREAT SERPL-MCNC: 2.97 MG/DL (ref 0.55–1.02)
GFR SERPLBLD CREATININE-BSD FMLA CKD-EPI: 16 MLS/MIN/1.73/M2
GLOBULIN SER-MCNC: 2.9 GM/DL (ref 2.4–3.5)
GLUCOSE SERPL-MCNC: 107 MG/DL (ref 82–115)
POTASSIUM SERPL-SCNC: 4.2 MMOL/L (ref 3.5–5.1)
PROT SERPL-MCNC: 6 GM/DL (ref 5.8–7.6)
SODIUM SERPL-SCNC: 143 MMOL/L (ref 136–145)

## 2023-04-11 PROCEDURE — 80053 COMPREHEN METABOLIC PANEL: CPT | Performed by: INTERNAL MEDICINE

## 2023-05-30 ENCOUNTER — LAB REQUISITION (OUTPATIENT)
Dept: LAB | Facility: HOSPITAL | Age: 78
End: 2023-05-30
Payer: COMMERCIAL

## 2023-05-30 DIAGNOSIS — N18.4 CHRONIC KIDNEY DISEASE, STAGE 4 (SEVERE): ICD-10-CM

## 2023-05-30 DIAGNOSIS — E55.9 VITAMIN D DEFICIENCY, UNSPECIFIED: ICD-10-CM

## 2023-05-30 DIAGNOSIS — E21.3 HYPERPARATHYROIDISM, UNSPECIFIED: ICD-10-CM

## 2023-05-30 DIAGNOSIS — D63.1 ANEMIA IN CHRONIC KIDNEY DISEASE (CODE): ICD-10-CM

## 2023-05-30 DIAGNOSIS — E11.22 TYPE 2 DIABETES MELLITUS WITH DIABETIC CHRONIC KIDNEY DISEASE: ICD-10-CM

## 2023-05-30 DIAGNOSIS — I12.9 HYPERTENSIVE CHRONIC KIDNEY DISEASE WITH STAGE 1 THROUGH STAGE 4 CHRONIC KIDNEY DISEASE, OR UNSPECIFIED CHRONIC KIDNEY DISEASE: ICD-10-CM

## 2023-05-30 LAB
ABS NEUT CALC (OHS): 2.85 X10(3)/MCL (ref 2.1–9.2)
ALBUMIN SERPL-MCNC: 2.9 G/DL (ref 3.4–4.8)
ALBUMIN/GLOB SERPL: 0.9 RATIO (ref 1.1–2)
ALP SERPL-CCNC: 118 UNIT/L (ref 40–150)
ALT SERPL-CCNC: 15 UNIT/L (ref 0–55)
AST SERPL-CCNC: 19 UNIT/L (ref 5–34)
BILIRUBIN DIRECT+TOT PNL SERPL-MCNC: 0.5 MG/DL
BUN SERPL-MCNC: 48.2 MG/DL (ref 9.8–20.1)
CALCIUM SERPL-MCNC: 9.5 MG/DL (ref 8.4–10.2)
CHLORIDE SERPL-SCNC: 109 MMOL/L (ref 98–107)
CO2 SERPL-SCNC: 22 MMOL/L (ref 23–31)
CREAT SERPL-MCNC: 2.62 MG/DL (ref 0.55–1.02)
DEPRECATED CALCIDIOL+CALCIFEROL SERPL-MC: 56.6 NG/ML (ref 30–80)
EOSINOPHIL NFR BLD MANUAL: 0.59 X10(3)/MCL (ref 0–0.9)
EOSINOPHIL NFR BLD MANUAL: 10 % (ref 0–8)
ERYTHROCYTE [DISTWIDTH] IN BLOOD BY AUTOMATED COUNT: 12.9 % (ref 11.5–17)
FERRITIN SERPL-MCNC: 813.76 NG/ML (ref 4.63–204)
GFR SERPLBLD CREATININE-BSD FMLA CKD-EPI: 18 MLS/MIN/1.73/M2
GLOBULIN SER-MCNC: 3.1 GM/DL (ref 2.4–3.5)
GLUCOSE SERPL-MCNC: 56 MG/DL (ref 82–115)
HCT VFR BLD AUTO: 37.2 % (ref 37–47)
HGB BLD-MCNC: 12 G/DL (ref 12–16)
IRON SATN MFR SERPL: 30 % (ref 20–50)
IRON SERPL-MCNC: 55 UG/DL (ref 50–170)
LYMPHOCYTES NFR BLD MANUAL: 1.66 X10(3)/MCL
LYMPHOCYTES NFR BLD MANUAL: 28 % (ref 13–40)
MAGNESIUM SERPL-MCNC: 2.1 MG/DL (ref 1.6–2.6)
MCH RBC QN AUTO: 31.4 PG (ref 27–31)
MCHC RBC AUTO-ENTMCNC: 32.3 G/DL (ref 33–36)
MCV RBC AUTO: 97.4 FL (ref 80–94)
MONOCYTES NFR BLD MANUAL: 0.83 X10(3)/MCL (ref 0.1–1.3)
MONOCYTES NFR BLD MANUAL: 14 % (ref 2–11)
NEUTROPHILS NFR BLD MANUAL: 48 % (ref 47–80)
NRBC BLD AUTO-RTO: 0 %
PHOSPHATE SERPL-MCNC: 3.9 MG/DL (ref 2.3–4.7)
PLATELET # BLD AUTO: 182 X10(3)/MCL (ref 130–400)
PLATELET # BLD EST: ADEQUATE 10*3/UL
PMV BLD AUTO: 11.2 FL (ref 7.4–10.4)
POTASSIUM SERPL-SCNC: 4.1 MMOL/L (ref 3.5–5.1)
PROT SERPL-MCNC: 6 GM/DL (ref 5.8–7.6)
PTH-INTACT SERPL-MCNC: 124.2 PG/ML (ref 8.7–77)
RBC # BLD AUTO: 3.82 X10(6)/MCL (ref 4.2–5.4)
RBC MORPH BLD: NORMAL
SODIUM SERPL-SCNC: 143 MMOL/L (ref 136–145)
TIBC SERPL-MCNC: 130 UG/DL (ref 70–310)
TIBC SERPL-MCNC: 185 UG/DL (ref 250–450)
TRANSFERRIN SERPL-MCNC: 162 MG/DL (ref 173–360)
WBC # SPEC AUTO: 5.93 X10(3)/MCL (ref 4.5–11.5)

## 2023-05-30 PROCEDURE — 83970 ASSAY OF PARATHORMONE: CPT | Performed by: INTERNAL MEDICINE

## 2023-05-30 PROCEDURE — 85027 COMPLETE CBC AUTOMATED: CPT | Performed by: INTERNAL MEDICINE

## 2023-05-30 PROCEDURE — 80053 COMPREHEN METABOLIC PANEL: CPT | Performed by: INTERNAL MEDICINE

## 2023-05-30 PROCEDURE — 82306 VITAMIN D 25 HYDROXY: CPT | Performed by: INTERNAL MEDICINE

## 2023-05-30 PROCEDURE — 83550 IRON BINDING TEST: CPT | Performed by: INTERNAL MEDICINE

## 2023-05-30 PROCEDURE — 83735 ASSAY OF MAGNESIUM: CPT | Performed by: INTERNAL MEDICINE

## 2023-05-30 PROCEDURE — 84100 ASSAY OF PHOSPHORUS: CPT | Performed by: INTERNAL MEDICINE

## 2023-05-30 PROCEDURE — 82728 ASSAY OF FERRITIN: CPT | Performed by: INTERNAL MEDICINE

## 2023-06-22 ENCOUNTER — LAB REQUISITION (OUTPATIENT)
Dept: LAB | Facility: HOSPITAL | Age: 78
End: 2023-06-22
Payer: COMMERCIAL

## 2023-06-22 DIAGNOSIS — E11.22 TYPE 2 DIABETES MELLITUS WITH DIABETIC CHRONIC KIDNEY DISEASE: ICD-10-CM

## 2023-06-22 LAB
EST. AVERAGE GLUCOSE BLD GHB EST-MCNC: 108.3 MG/DL
HBA1C MFR BLD: 5.4 %

## 2023-06-22 PROCEDURE — 83036 HEMOGLOBIN GLYCOSYLATED A1C: CPT | Performed by: NURSE PRACTITIONER

## 2023-07-13 ENCOUNTER — LAB REQUISITION (OUTPATIENT)
Dept: LAB | Facility: HOSPITAL | Age: 78
End: 2023-07-13
Payer: COMMERCIAL

## 2023-07-13 DIAGNOSIS — E11.22 TYPE 2 DIABETES MELLITUS WITH DIABETIC CHRONIC KIDNEY DISEASE: ICD-10-CM

## 2023-07-13 DIAGNOSIS — E83.42 HYPOMAGNESEMIA: ICD-10-CM

## 2023-07-13 DIAGNOSIS — D63.1 ANEMIA IN CHRONIC KIDNEY DISEASE (CODE): ICD-10-CM

## 2023-07-13 DIAGNOSIS — E21.3 HYPERPARATHYROIDISM, UNSPECIFIED: ICD-10-CM

## 2023-07-13 DIAGNOSIS — N18.4 CHRONIC KIDNEY DISEASE, STAGE 4 (SEVERE): ICD-10-CM

## 2023-07-13 DIAGNOSIS — I12.9 HYPERTENSIVE CHRONIC KIDNEY DISEASE WITH STAGE 1 THROUGH STAGE 4 CHRONIC KIDNEY DISEASE, OR UNSPECIFIED CHRONIC KIDNEY DISEASE: ICD-10-CM

## 2023-07-13 LAB
ALBUMIN SERPL-MCNC: 2.9 G/DL (ref 3.4–4.8)
ALBUMIN/GLOB SERPL: 1.1 RATIO (ref 1.1–2)
ALP SERPL-CCNC: 124 UNIT/L (ref 40–150)
ALT SERPL-CCNC: 18 UNIT/L (ref 0–55)
AST SERPL-CCNC: 20 UNIT/L (ref 5–34)
BASOPHILS # BLD AUTO: 0.04 X10(3)/MCL
BASOPHILS NFR BLD AUTO: 0.6 %
BILIRUBIN DIRECT+TOT PNL SERPL-MCNC: 0.4 MG/DL
BUN SERPL-MCNC: 58.1 MG/DL (ref 9.8–20.1)
CALCIUM SERPL-MCNC: 9.5 MG/DL (ref 8.4–10.2)
CHLORIDE SERPL-SCNC: 108 MMOL/L (ref 98–107)
CO2 SERPL-SCNC: 25 MMOL/L (ref 23–31)
CREAT SERPL-MCNC: 2.95 MG/DL (ref 0.55–1.02)
EOSINOPHIL # BLD AUTO: 0.37 X10(3)/MCL (ref 0–0.9)
EOSINOPHIL NFR BLD AUTO: 5.5 %
ERYTHROCYTE [DISTWIDTH] IN BLOOD BY AUTOMATED COUNT: 13.1 % (ref 11.5–17)
GFR SERPLBLD CREATININE-BSD FMLA CKD-EPI: 16 MLS/MIN/1.73/M2
GLOBULIN SER-MCNC: 2.7 GM/DL (ref 2.4–3.5)
GLUCOSE SERPL-MCNC: 103 MG/DL (ref 82–115)
HCT VFR BLD AUTO: 37 % (ref 37–47)
HGB BLD-MCNC: 11.9 G/DL (ref 12–16)
IMM GRANULOCYTES # BLD AUTO: 0.02 X10(3)/MCL (ref 0–0.04)
IMM GRANULOCYTES NFR BLD AUTO: 0.3 %
LYMPHOCYTES # BLD AUTO: 1.63 X10(3)/MCL (ref 0.6–4.6)
LYMPHOCYTES NFR BLD AUTO: 24.2 %
MAGNESIUM SERPL-MCNC: 2.3 MG/DL (ref 1.6–2.6)
MCH RBC QN AUTO: 30.8 PG (ref 27–31)
MCHC RBC AUTO-ENTMCNC: 32.2 G/DL (ref 33–36)
MCV RBC AUTO: 95.9 FL (ref 80–94)
MONOCYTES # BLD AUTO: 0.94 X10(3)/MCL (ref 0.1–1.3)
MONOCYTES NFR BLD AUTO: 13.9 %
NEUTROPHILS # BLD AUTO: 3.74 X10(3)/MCL (ref 2.1–9.2)
NEUTROPHILS NFR BLD AUTO: 55.5 %
NRBC BLD AUTO-RTO: 0 %
PHOSPHATE SERPL-MCNC: 4.4 MG/DL (ref 2.3–4.7)
PLATELET # BLD AUTO: 181 X10(3)/MCL (ref 130–400)
PMV BLD AUTO: 10.9 FL (ref 7.4–10.4)
POTASSIUM SERPL-SCNC: 4.8 MMOL/L (ref 3.5–5.1)
PROT SERPL-MCNC: 5.6 GM/DL (ref 5.8–7.6)
RBC # BLD AUTO: 3.86 X10(6)/MCL (ref 4.2–5.4)
SODIUM SERPL-SCNC: 146 MMOL/L (ref 136–145)
WBC # SPEC AUTO: 6.74 X10(3)/MCL (ref 4.5–11.5)

## 2023-07-13 PROCEDURE — 80053 COMPREHEN METABOLIC PANEL: CPT | Performed by: INTERNAL MEDICINE

## 2023-07-13 PROCEDURE — 83735 ASSAY OF MAGNESIUM: CPT | Performed by: INTERNAL MEDICINE

## 2023-07-13 PROCEDURE — 84100 ASSAY OF PHOSPHORUS: CPT | Performed by: INTERNAL MEDICINE

## 2023-07-13 PROCEDURE — 85025 COMPLETE CBC W/AUTO DIFF WBC: CPT | Performed by: INTERNAL MEDICINE

## 2023-08-08 ENCOUNTER — OFFICE VISIT (OUTPATIENT)
Dept: URGENT CARE | Facility: CLINIC | Age: 78
End: 2023-08-08
Payer: COMMERCIAL

## 2023-08-08 VITALS
BODY MASS INDEX: 33.34 KG/M2 | HEART RATE: 68 BPM | HEIGHT: 68 IN | WEIGHT: 220 LBS | SYSTOLIC BLOOD PRESSURE: 147 MMHG | TEMPERATURE: 99 F | RESPIRATION RATE: 16 BRPM | OXYGEN SATURATION: 96 % | DIASTOLIC BLOOD PRESSURE: 81 MMHG

## 2023-08-08 DIAGNOSIS — J00 NASOPHARYNGITIS: Primary | ICD-10-CM

## 2023-08-08 DIAGNOSIS — R05.9 COUGH, UNSPECIFIED TYPE: ICD-10-CM

## 2023-08-08 LAB
CTP QC/QA: YES
SARS-COV-2 RDRP RESP QL NAA+PROBE: NEGATIVE

## 2023-08-08 PROCEDURE — 87635 SARS-COV-2 COVID-19 AMP PRB: CPT | Mod: QW,,, | Performed by: FAMILY MEDICINE

## 2023-08-08 PROCEDURE — 99213 PR OFFICE/OUTPT VISIT, EST, LEVL III, 20-29 MIN: ICD-10-PCS | Mod: ,,, | Performed by: FAMILY MEDICINE

## 2023-08-08 PROCEDURE — 87635: ICD-10-PCS | Mod: QW,,, | Performed by: FAMILY MEDICINE

## 2023-08-08 PROCEDURE — 99213 OFFICE O/P EST LOW 20 MIN: CPT | Mod: ,,, | Performed by: FAMILY MEDICINE

## 2023-08-08 RX ORDER — ROPINIROLE 0.5 MG/1
0.5 TABLET, FILM COATED ORAL DAILY
COMMUNITY

## 2023-08-08 RX ORDER — CEPHALEXIN 250 MG/1
0.5 TABLET ORAL
Status: ON HOLD | COMMUNITY
Start: 2023-07-20 | End: 2023-11-01 | Stop reason: HOSPADM

## 2023-08-08 RX ORDER — METOPROLOL SUCCINATE 50 MG/1
50 TABLET, EXTENDED RELEASE ORAL 2 TIMES DAILY
Status: ON HOLD | COMMUNITY
Start: 2023-06-12 | End: 2024-01-16 | Stop reason: HOSPADM

## 2023-08-08 RX ORDER — ESTRADIOL 0.1 MG/G
CREAM VAGINAL
Status: ON HOLD | COMMUNITY
Start: 2023-08-06 | End: 2023-11-01 | Stop reason: HOSPADM

## 2023-08-08 NOTE — PROGRESS NOTES
"Subjective:      Patient ID: Debra Puentes is a 77 y.o. female.    Vitals:  height is 5' 8" (1.727 m) and weight is 99.8 kg (220 lb). Her temperature is 99 °F (37.2 °C). Her blood pressure is 147/81 (abnormal) and her pulse is 68. Her respiration is 16 and oxygen saturation is 96%.     Chief Complaint: Cough (Sunday night cough, sinus drip. Took benadryl. Cough keeping her up at night. )    2-3 days of cough mainly at bedtime.  No fever, no CP or SOB.  Overall cough is improving.         Constitution: Negative for chills, fatigue and fever.   HENT:  Positive for postnasal drip. Negative for congestion, sinus pressure and trouble swallowing.    Neck: Negative for neck pain and neck stiffness.   Cardiovascular:  Negative for chest pain, leg swelling and sob on exertion.   Respiratory:  Positive for cough. Negative for chest tightness, shortness of breath and wheezing.    Neurological:  Negative for dizziness, disorientation and altered mental status.   Psychiatric/Behavioral:  Negative for altered mental status and disorientation.       Objective:     Physical Exam   Constitutional: She is oriented to person, place, and time. She appears well-developed.  Non-toxic appearance. No distress.   HENT:   Head: Normocephalic.   Ears:   Right Ear: Tympanic membrane and external ear normal.   Left Ear: Tympanic membrane and external ear normal.   Nose: Rhinorrhea present.   Mouth/Throat: Uvula is midline and mucous membranes are normal. No uvula swelling. Posterior oropharyngeal erythema and cobblestoning present. No oropharyngeal exudate or posterior oropharyngeal edema. Tonsils are 0 on the right. Tonsils are 0 on the left. No tonsillar exudate.   Eyes: Pupils are equal, round, and reactive to light. Right eye exhibits no discharge. Left eye exhibits no discharge.   Neck: Neck supple. No tracheal deviation present.   Cardiovascular: Normal rate, regular rhythm and normal heart sounds.   No murmur " heard.  Pulmonary/Chest: Effort normal and breath sounds normal. No stridor. No respiratory distress. She has no wheezes.   Lymphadenopathy:     She has no cervical adenopathy.   Neurological: no focal deficit. She is alert and oriented to person, place, and time.   Skin: Skin is warm and dry.   Psychiatric: Mood and thought content normal.   Nursing note and vitals reviewed.      Assessment:     1. Nasopharyngitis    2. Cough, unspecified type        Plan:       Nasopharyngitis    Cough, unspecified type  -     POCT COVID-19 Rapid Screening           COVID test negative.

## 2023-08-11 ENCOUNTER — LAB REQUISITION (OUTPATIENT)
Dept: LAB | Facility: HOSPITAL | Age: 78
End: 2023-08-11
Payer: COMMERCIAL

## 2023-08-11 DIAGNOSIS — E21.3 HYPERPARATHYROIDISM, UNSPECIFIED: ICD-10-CM

## 2023-08-11 DIAGNOSIS — N18.4 CHRONIC KIDNEY DISEASE, STAGE 4 (SEVERE): ICD-10-CM

## 2023-08-11 DIAGNOSIS — E11.22 TYPE 2 DIABETES MELLITUS WITH DIABETIC CHRONIC KIDNEY DISEASE: ICD-10-CM

## 2023-08-11 DIAGNOSIS — D63.1 ANEMIA IN CHRONIC KIDNEY DISEASE (CODE): ICD-10-CM

## 2023-08-11 LAB
ALBUMIN SERPL-MCNC: 3.1 G/DL (ref 3.4–4.8)
ALBUMIN/GLOB SERPL: 1.3 RATIO (ref 1.1–2)
ALP SERPL-CCNC: 123 UNIT/L (ref 40–150)
ALT SERPL-CCNC: 14 UNIT/L (ref 0–55)
AST SERPL-CCNC: 18 UNIT/L (ref 5–34)
BASOPHILS # BLD AUTO: 0.02 X10(3)/MCL
BASOPHILS NFR BLD AUTO: 0.3 %
BILIRUB SERPL-MCNC: 0.5 MG/DL
BUN SERPL-MCNC: 54.9 MG/DL (ref 9.8–20.1)
CALCIUM SERPL-MCNC: 9 MG/DL (ref 8.4–10.2)
CHLORIDE SERPL-SCNC: 108 MMOL/L (ref 98–107)
CO2 SERPL-SCNC: 24 MMOL/L (ref 23–31)
CREAT SERPL-MCNC: 2.33 MG/DL (ref 0.55–1.02)
DEPRECATED CALCIDIOL+CALCIFEROL SERPL-MC: 51.3 NG/ML (ref 30–80)
EOSINOPHIL # BLD AUTO: 0.32 X10(3)/MCL (ref 0–0.9)
EOSINOPHIL NFR BLD AUTO: 5.5 %
ERYTHROCYTE [DISTWIDTH] IN BLOOD BY AUTOMATED COUNT: 13.4 % (ref 11.5–17)
GFR SERPLBLD CREATININE-BSD FMLA CKD-EPI: 21 MLS/MIN/1.73/M2
GLOBULIN SER-MCNC: 2.4 GM/DL (ref 2.4–3.5)
GLUCOSE SERPL-MCNC: 77 MG/DL (ref 82–115)
HCT VFR BLD AUTO: 37.9 % (ref 37–47)
HGB BLD-MCNC: 12.1 G/DL (ref 12–16)
IMM GRANULOCYTES # BLD AUTO: 0.03 X10(3)/MCL (ref 0–0.04)
IMM GRANULOCYTES NFR BLD AUTO: 0.5 %
LYMPHOCYTES # BLD AUTO: 1.66 X10(3)/MCL (ref 0.6–4.6)
LYMPHOCYTES NFR BLD AUTO: 28.4 %
MAGNESIUM SERPL-MCNC: 2.2 MG/DL (ref 1.6–2.6)
MCH RBC QN AUTO: 30.6 PG (ref 27–31)
MCHC RBC AUTO-ENTMCNC: 31.9 G/DL (ref 33–36)
MCV RBC AUTO: 95.9 FL (ref 80–94)
MONOCYTES # BLD AUTO: 0.62 X10(3)/MCL (ref 0.1–1.3)
MONOCYTES NFR BLD AUTO: 10.6 %
NEUTROPHILS # BLD AUTO: 3.2 X10(3)/MCL (ref 2.1–9.2)
NEUTROPHILS NFR BLD AUTO: 54.7 %
NRBC BLD AUTO-RTO: 0 %
PHOSPHATE SERPL-MCNC: 4.1 MG/DL (ref 2.3–4.7)
PLATELET # BLD AUTO: 180 X10(3)/MCL (ref 130–400)
PMV BLD AUTO: 11.1 FL (ref 7.4–10.4)
POTASSIUM SERPL-SCNC: 4.1 MMOL/L (ref 3.5–5.1)
PROT SERPL-MCNC: 5.5 GM/DL (ref 5.8–7.6)
PTH-INTACT SERPL-MCNC: 221.1 PG/ML (ref 8.7–77)
RBC # BLD AUTO: 3.95 X10(6)/MCL (ref 4.2–5.4)
SODIUM SERPL-SCNC: 144 MMOL/L (ref 136–145)
WBC # SPEC AUTO: 5.85 X10(3)/MCL (ref 4.5–11.5)

## 2023-08-11 PROCEDURE — 83735 ASSAY OF MAGNESIUM: CPT | Performed by: INTERNAL MEDICINE

## 2023-08-11 PROCEDURE — 83970 ASSAY OF PARATHORMONE: CPT | Performed by: INTERNAL MEDICINE

## 2023-08-11 PROCEDURE — 80053 COMPREHEN METABOLIC PANEL: CPT | Performed by: INTERNAL MEDICINE

## 2023-08-11 PROCEDURE — 84100 ASSAY OF PHOSPHORUS: CPT | Performed by: INTERNAL MEDICINE

## 2023-08-11 PROCEDURE — 85025 COMPLETE CBC W/AUTO DIFF WBC: CPT | Performed by: INTERNAL MEDICINE

## 2023-08-11 PROCEDURE — 82306 VITAMIN D 25 HYDROXY: CPT | Performed by: INTERNAL MEDICINE

## 2023-08-15 PROCEDURE — 84156 ASSAY OF PROTEIN URINE: CPT | Performed by: INTERNAL MEDICINE

## 2023-08-15 PROCEDURE — 82575 CREATININE CLEARANCE TEST: CPT | Performed by: INTERNAL MEDICINE

## 2023-08-16 ENCOUNTER — LAB REQUISITION (OUTPATIENT)
Dept: LAB | Facility: HOSPITAL | Age: 78
End: 2023-08-16
Payer: COMMERCIAL

## 2023-08-16 DIAGNOSIS — D63.1 ANEMIA IN CHRONIC KIDNEY DISEASE (CODE): ICD-10-CM

## 2023-08-16 LAB
CREAT CL 24H UR+SERPL-VRATE: 12.6 ML/MIN (ref 70–157)
CREAT SERPL-MCNC: 2.87 MG/DL (ref 0.55–1.02)
CREAT SERPL-MCNC: 2.87 MG/DL (ref 0.55–1.02)
CREAT UR-MCNC: 45.3 MG/DL (ref 45–106)
CREATININE, SER - MANUAL VALUE (OHS): 2.87
GFR SERPLBLD CREATININE-BSD FMLA CKD-EPI: 16 MLS/MIN/1.73/M2
HRS COLLECTED CREATININE (OHS): 24 HR
PROT 24H UR-MCNC: 342.7 MG/24 H (ref 0–165)
PROT UR STRIP-MCNC: 29.8 MG/DL
TOTAL VOLUME  (OHS): 1150 ML
TOTAL VOLUME  (OHS): 1150 ML

## 2023-08-18 ENCOUNTER — LAB REQUISITION (OUTPATIENT)
Dept: LAB | Facility: HOSPITAL | Age: 78
End: 2023-08-18
Payer: COMMERCIAL

## 2023-08-18 DIAGNOSIS — N18.4 CHRONIC KIDNEY DISEASE, STAGE 4 (SEVERE): ICD-10-CM

## 2023-08-18 LAB
ANION GAP SERPL CALC-SCNC: 12 MEQ/L
BUN SERPL-MCNC: 60.1 MG/DL (ref 9.8–20.1)
CALCIUM SERPL-MCNC: 9.2 MG/DL (ref 8.4–10.2)
CHLORIDE SERPL-SCNC: 109 MMOL/L (ref 98–107)
CO2 SERPL-SCNC: 23 MMOL/L (ref 23–31)
CREAT SERPL-MCNC: 2.74 MG/DL (ref 0.55–1.02)
CREAT/UREA NIT SERPL: 22
GFR SERPLBLD CREATININE-BSD FMLA CKD-EPI: 17 MLS/MIN/1.73/M2
GLUCOSE SERPL-MCNC: 70 MG/DL (ref 82–115)
POTASSIUM SERPL-SCNC: 4 MMOL/L (ref 3.5–5.1)
SODIUM SERPL-SCNC: 144 MMOL/L (ref 136–145)

## 2023-08-18 PROCEDURE — 80048 BASIC METABOLIC PNL TOTAL CA: CPT | Performed by: INTERNAL MEDICINE

## 2023-09-21 ENCOUNTER — LAB REQUISITION (OUTPATIENT)
Dept: LAB | Facility: HOSPITAL | Age: 78
End: 2023-09-21
Payer: COMMERCIAL

## 2023-09-21 DIAGNOSIS — N18.4 CHRONIC KIDNEY DISEASE, STAGE 4 (SEVERE): ICD-10-CM

## 2023-09-21 LAB
ALBUMIN SERPL-MCNC: 3.1 G/DL (ref 3.4–4.8)
ALBUMIN/GLOB SERPL: 1.2 RATIO (ref 1.1–2)
ALP SERPL-CCNC: 128 UNIT/L (ref 40–150)
ALT SERPL-CCNC: 12 UNIT/L (ref 0–55)
AST SERPL-CCNC: 15 UNIT/L (ref 5–34)
BASOPHILS # BLD AUTO: 0.03 X10(3)/MCL
BASOPHILS NFR BLD AUTO: 0.5 %
BILIRUB SERPL-MCNC: 0.5 MG/DL
BUN SERPL-MCNC: 49.8 MG/DL (ref 9.8–20.1)
CALCIUM SERPL-MCNC: 8.9 MG/DL (ref 8.4–10.2)
CHLORIDE SERPL-SCNC: 109 MMOL/L (ref 98–107)
CO2 SERPL-SCNC: 24 MMOL/L (ref 23–31)
CREAT SERPL-MCNC: 3.38 MG/DL (ref 0.55–1.02)
DEPRECATED CALCIDIOL+CALCIFEROL SERPL-MC: 49.4 NG/ML (ref 30–80)
EOSINOPHIL # BLD AUTO: 0.42 X10(3)/MCL (ref 0–0.9)
EOSINOPHIL NFR BLD AUTO: 6.6 %
ERYTHROCYTE [DISTWIDTH] IN BLOOD BY AUTOMATED COUNT: 14 % (ref 11.5–17)
GFR SERPLBLD CREATININE-BSD FMLA CKD-EPI: 13 MLS/MIN/1.73/M2
GLOBULIN SER-MCNC: 2.5 GM/DL (ref 2.4–3.5)
GLUCOSE SERPL-MCNC: 60 MG/DL (ref 82–115)
HCT VFR BLD AUTO: 39 % (ref 37–47)
HGB BLD-MCNC: 12.5 G/DL (ref 12–16)
IMM GRANULOCYTES # BLD AUTO: 0.02 X10(3)/MCL (ref 0–0.04)
IMM GRANULOCYTES NFR BLD AUTO: 0.3 %
LYMPHOCYTES # BLD AUTO: 1.42 X10(3)/MCL (ref 0.6–4.6)
LYMPHOCYTES NFR BLD AUTO: 22.3 %
MAGNESIUM SERPL-MCNC: 2.3 MG/DL (ref 1.6–2.6)
MCH RBC QN AUTO: 31.4 PG (ref 27–31)
MCHC RBC AUTO-ENTMCNC: 32.1 G/DL (ref 33–36)
MCV RBC AUTO: 98 FL (ref 80–94)
MONOCYTES # BLD AUTO: 0.86 X10(3)/MCL (ref 0.1–1.3)
MONOCYTES NFR BLD AUTO: 13.5 %
NEUTROPHILS # BLD AUTO: 3.61 X10(3)/MCL (ref 2.1–9.2)
NEUTROPHILS NFR BLD AUTO: 56.8 %
NRBC BLD AUTO-RTO: 0 %
PHOSPHATE SERPL-MCNC: 4.5 MG/DL (ref 2.3–4.7)
PLATELET # BLD AUTO: 179 X10(3)/MCL (ref 130–400)
PMV BLD AUTO: 10.9 FL (ref 7.4–10.4)
POTASSIUM SERPL-SCNC: 4 MMOL/L (ref 3.5–5.1)
PROT SERPL-MCNC: 5.6 GM/DL (ref 5.8–7.6)
PTH-INTACT SERPL-MCNC: 295.5 PG/ML (ref 8.7–77)
RBC # BLD AUTO: 3.98 X10(6)/MCL (ref 4.2–5.4)
SODIUM SERPL-SCNC: 147 MMOL/L (ref 136–145)
WBC # SPEC AUTO: 6.36 X10(3)/MCL (ref 4.5–11.5)

## 2023-09-21 PROCEDURE — 83970 ASSAY OF PARATHORMONE: CPT | Performed by: INTERNAL MEDICINE

## 2023-09-21 PROCEDURE — 84100 ASSAY OF PHOSPHORUS: CPT | Performed by: INTERNAL MEDICINE

## 2023-09-21 PROCEDURE — 83735 ASSAY OF MAGNESIUM: CPT | Performed by: INTERNAL MEDICINE

## 2023-09-21 PROCEDURE — 82306 VITAMIN D 25 HYDROXY: CPT | Performed by: INTERNAL MEDICINE

## 2023-09-21 PROCEDURE — 80053 COMPREHEN METABOLIC PANEL: CPT | Performed by: INTERNAL MEDICINE

## 2023-09-21 PROCEDURE — 85025 COMPLETE CBC W/AUTO DIFF WBC: CPT | Performed by: INTERNAL MEDICINE

## 2023-10-26 ENCOUNTER — HOSPITAL ENCOUNTER (INPATIENT)
Facility: HOSPITAL | Age: 78
LOS: 14 days | Discharge: NURSING, REHAB OR LONG-TERM CARE | DRG: 683 | End: 2023-11-09
Attending: EMERGENCY MEDICINE | Admitting: INTERNAL MEDICINE
Payer: COMMERCIAL

## 2023-10-26 DIAGNOSIS — E86.0 DEHYDRATION: ICD-10-CM

## 2023-10-26 DIAGNOSIS — S22.080D COMPRESSION FRACTURE OF T12 VERTEBRA WITH ROUTINE HEALING, SUBSEQUENT ENCOUNTER: Primary | ICD-10-CM

## 2023-10-26 DIAGNOSIS — M54.9 BACK PAIN, UNSPECIFIED BACK LOCATION, UNSPECIFIED BACK PAIN LATERALITY, UNSPECIFIED CHRONICITY: ICD-10-CM

## 2023-10-26 DIAGNOSIS — N17.9 ACUTE KIDNEY INJURY: ICD-10-CM

## 2023-10-26 DIAGNOSIS — N17.9 AKI (ACUTE KIDNEY INJURY): ICD-10-CM

## 2023-10-26 LAB
ALBUMIN SERPL-MCNC: 2.8 G/DL (ref 3.4–4.8)
ALBUMIN/GLOB SERPL: 0.6 RATIO (ref 1.1–2)
ALP SERPL-CCNC: 136 UNIT/L (ref 40–150)
ALT SERPL-CCNC: 15 UNIT/L (ref 0–55)
APPEARANCE UR: ABNORMAL
AST SERPL-CCNC: 17 UNIT/L (ref 5–34)
BACTERIA #/AREA URNS AUTO: 3 /HPF
BASOPHILS # BLD AUTO: 0.04 X10(3)/MCL
BASOPHILS NFR BLD AUTO: 0.3 %
BILIRUB SERPL-MCNC: 0.7 MG/DL
BILIRUB UR QL STRIP.AUTO: NEGATIVE
BUN SERPL-MCNC: 129.8 MG/DL (ref 9.8–20.1)
CALCIUM SERPL-MCNC: 9.7 MG/DL (ref 8.4–10.2)
CHLORIDE SERPL-SCNC: 102 MMOL/L (ref 98–107)
CO2 SERPL-SCNC: 15 MMOL/L (ref 23–31)
COLOR UR AUTO: ABNORMAL
CREAT SERPL-MCNC: 7.29 MG/DL (ref 0.55–1.02)
EOSINOPHIL # BLD AUTO: 0.04 X10(3)/MCL (ref 0–0.9)
EOSINOPHIL NFR BLD AUTO: 0.3 %
ERYTHROCYTE [DISTWIDTH] IN BLOOD BY AUTOMATED COUNT: 13.5 % (ref 11.5–17)
GFR SERPLBLD CREATININE-BSD FMLA CKD-EPI: 5 MLS/MIN/1.73/M2
GLOBULIN SER-MCNC: 4.5 GM/DL (ref 2.4–3.5)
GLUCOSE SERPL-MCNC: 200 MG/DL (ref 82–115)
GLUCOSE UR QL STRIP.AUTO: NEGATIVE
HCT VFR BLD AUTO: 45.5 % (ref 37–47)
HGB BLD-MCNC: 15.2 G/DL (ref 12–16)
IMM GRANULOCYTES # BLD AUTO: 0.1 X10(3)/MCL (ref 0–0.04)
IMM GRANULOCYTES NFR BLD AUTO: 0.7 %
KETONES UR QL STRIP.AUTO: NEGATIVE
LEUKOCYTE ESTERASE UR QL STRIP.AUTO: ABNORMAL
LIPASE SERPL-CCNC: 51 U/L
LYMPHOCYTES # BLD AUTO: 0.78 X10(3)/MCL (ref 0.6–4.6)
LYMPHOCYTES NFR BLD AUTO: 5.7 %
MAGNESIUM SERPL-MCNC: 2.7 MG/DL (ref 1.6–2.6)
MCH RBC QN AUTO: 32 PG (ref 27–31)
MCHC RBC AUTO-ENTMCNC: 33.4 G/DL (ref 33–36)
MCV RBC AUTO: 95.8 FL (ref 80–94)
MONOCYTES # BLD AUTO: 1.49 X10(3)/MCL (ref 0.1–1.3)
MONOCYTES NFR BLD AUTO: 10.9 %
NEUTROPHILS # BLD AUTO: 11.19 X10(3)/MCL (ref 2.1–9.2)
NEUTROPHILS NFR BLD AUTO: 82.1 %
NITRITE UR QL STRIP.AUTO: NEGATIVE
NRBC BLD AUTO-RTO: 0 %
PH UR STRIP.AUTO: 6.5 [PH]
PLATELET # BLD AUTO: 326 X10(3)/MCL (ref 130–400)
PMV BLD AUTO: 10.4 FL (ref 7.4–10.4)
POTASSIUM SERPL-SCNC: 4 MMOL/L (ref 3.5–5.1)
PROT SERPL-MCNC: 7.3 GM/DL (ref 5.8–7.6)
PROT UR QL STRIP.AUTO: 30
RBC # BLD AUTO: 4.75 X10(6)/MCL (ref 4.2–5.4)
RBC #/AREA URNS AUTO: ABNORMAL /HPF
RBC UR QL AUTO: ABNORMAL
SODIUM SERPL-SCNC: 138 MMOL/L (ref 136–145)
SP GR UR STRIP.AUTO: 1.01 (ref 1–1.03)
SQUAMOUS #/AREA URNS AUTO: ABNORMAL /HPF
UROBILINOGEN UR STRIP-ACNC: 0.2
WBC # SPEC AUTO: 13.64 X10(3)/MCL (ref 4.5–11.5)
WBC #/AREA URNS AUTO: ABNORMAL /HPF

## 2023-10-26 PROCEDURE — 63600175 PHARM REV CODE 636 W HCPCS: Performed by: EMERGENCY MEDICINE

## 2023-10-26 PROCEDURE — 87077 CULTURE AEROBIC IDENTIFY: CPT | Performed by: EMERGENCY MEDICINE

## 2023-10-26 PROCEDURE — 25000003 PHARM REV CODE 250: Performed by: EMERGENCY MEDICINE

## 2023-10-26 PROCEDURE — 99285 EMERGENCY DEPT VISIT HI MDM: CPT

## 2023-10-26 PROCEDURE — 83735 ASSAY OF MAGNESIUM: CPT | Performed by: EMERGENCY MEDICINE

## 2023-10-26 PROCEDURE — 83690 ASSAY OF LIPASE: CPT | Performed by: EMERGENCY MEDICINE

## 2023-10-26 PROCEDURE — 21400001 HC TELEMETRY ROOM

## 2023-10-26 PROCEDURE — 96361 HYDRATE IV INFUSION ADD-ON: CPT

## 2023-10-26 PROCEDURE — 81001 URINALYSIS AUTO W/SCOPE: CPT | Performed by: EMERGENCY MEDICINE

## 2023-10-26 PROCEDURE — 11000001 HC ACUTE MED/SURG PRIVATE ROOM

## 2023-10-26 PROCEDURE — 85025 COMPLETE CBC W/AUTO DIFF WBC: CPT | Performed by: EMERGENCY MEDICINE

## 2023-10-26 PROCEDURE — 96374 THER/PROPH/DIAG INJ IV PUSH: CPT

## 2023-10-26 PROCEDURE — 80053 COMPREHEN METABOLIC PANEL: CPT | Performed by: EMERGENCY MEDICINE

## 2023-10-26 RX ORDER — SODIUM CHLORIDE 9 MG/ML
1000 INJECTION, SOLUTION INTRAVENOUS
Status: COMPLETED | OUTPATIENT
Start: 2023-10-26 | End: 2023-10-26

## 2023-10-26 RX ORDER — MORPHINE SULFATE 4 MG/ML
4 INJECTION, SOLUTION INTRAMUSCULAR; INTRAVENOUS
Status: COMPLETED | OUTPATIENT
Start: 2023-10-26 | End: 2023-10-26

## 2023-10-26 RX ORDER — MORPHINE SULFATE 4 MG/ML
2 INJECTION, SOLUTION INTRAMUSCULAR; INTRAVENOUS
Status: COMPLETED | OUTPATIENT
Start: 2023-10-26 | End: 2023-10-26

## 2023-10-26 RX ORDER — ONDANSETRON 2 MG/ML
4 INJECTION INTRAMUSCULAR; INTRAVENOUS
Status: COMPLETED | OUTPATIENT
Start: 2023-10-26 | End: 2023-10-26

## 2023-10-26 RX ADMIN — MORPHINE SULFATE 2 MG: 4 INJECTION, SOLUTION INTRAMUSCULAR; INTRAVENOUS at 07:10

## 2023-10-26 RX ADMIN — MORPHINE SULFATE 4 MG: 4 INJECTION, SOLUTION INTRAMUSCULAR; INTRAVENOUS at 08:10

## 2023-10-26 RX ADMIN — ONDANSETRON 4 MG: 2 INJECTION INTRAMUSCULAR; INTRAVENOUS at 07:10

## 2023-10-26 RX ADMIN — SODIUM CHLORIDE 1000 ML: 9 INJECTION, SOLUTION INTRAVENOUS at 08:10

## 2023-10-26 NOTE — ED TRIAGE NOTES
c/o continuing back pain and weakness s/p fall on Sunday. Sates no appetite since the fall and diagnosis of T12 compression fracture. Pt in back brace

## 2023-10-26 NOTE — Clinical Note
Diagnosis: Acute kidney injury [[id number]]   Admitting Provider:: JOON MADDEN [[id number]]   Future Attending Provider: JOON MADDEN [[id number]]   Reason for IP Medical Treatment  (Clinical interventions that can only be accomplished in the IP setting? ) :: nephrology consult, fluids, neurosurgery   I certify that Inpatient services for greater than or equal to 2 midnights are medically necessary:: Yes   Plans for Post-Acute care--if anticipated (pick the single best option):: A. No post acute care anticipated at this time

## 2023-10-26 NOTE — ED PROVIDER NOTES
Encounter Date: 10/26/2023       History     Chief Complaint   Patient presents with    Fatigue     c/o continuing back pain and weakness s/p fall on Sunday. Sates no appetite since the fall and diagnosis of T12 compression fracture. Pt in back brace     77-year-old female who lives at assisted living with a history of chronic kidney disease, diabetes, hypertension, hyperlipidemia complains of continued back pain.  She states she is had back pain for 2 weeks, seen in the emergency room 2 days ago and diagnosed with an acute T-12 compression fracture and L3 hemangioma.  She states that the pain medications have done nothing for her.  She states that she usually can walk with a walker or with a cane and now she is unable to walk.  She states she is also not eating drinking much of the severe back pain.  She is had no new injuries or falls.        Review of patient's allergies indicates:   Allergen Reactions    Lactose Diarrhea    Codeine Other (See Comments) and Nausea And Vomiting     Past Medical History:   Diagnosis Date    Cancer     Chronic kidney disease, unspecified     Diabetes mellitus, type 2     History of arteriovenostomy for renal dialysis     Hyperlipidemia     Hypertension      Past Surgical History:   Procedure Laterality Date    HYSTERECTOMY      KIDNEY STONE SURGERY      TONSILLECTOMY       Family History   Problem Relation Age of Onset    No Known Problems Mother     No Known Problems Father     No Known Problems Sister     No Known Problems Brother      Social History     Tobacco Use    Smoking status: Never     Passive exposure: Never    Smokeless tobacco: Never   Substance Use Topics    Alcohol use: Never    Drug use: Never     Review of Systems   Constitutional:  Positive for appetite change.   Musculoskeletal:  Positive for back pain and gait problem.   All other systems reviewed and are negative.      Physical Exam     Initial Vitals [10/26/23 1830]   BP Pulse Resp Temp SpO2   136/73 104 18  98.6 °F (37 °C) 97 %      MAP       --         Physical Exam    Nursing note and vitals reviewed.  Constitutional: She appears well-developed and well-nourished. She is not diaphoretic. No distress.   HENT:   Head: Normocephalic and atraumatic.   Mouth/Throat: Oropharynx is clear and moist.   Eyes: Conjunctivae are normal. Pupils are equal, round, and reactive to light.   Neck: Neck supple.   Cardiovascular:  Normal rate, regular rhythm, normal heart sounds and intact distal pulses.           Pulmonary/Chest: Breath sounds normal. No respiratory distress. She has no wheezes. She has no rhonchi. She has no rales.   Abdominal: Abdomen is soft. She exhibits no distension. There is abdominal tenderness.   Mild left flank tenderness of uncertain etiology There is no guarding.   Musculoskeletal:      Cervical back: Neck supple.      Comments: No tenderness to the lower extremities.  She is able to lift each leg against gravity, flex and extend at the knee with reasonable strength, normal strength with great toe dorsiflexion.  Pedal pulses are trace.  Sensation to touch throughout the lower extremities.     Neurological: She is alert and oriented to person, place, and time.   Skin: Skin is warm and dry. Capillary refill takes less than 2 seconds. No rash noted.   Psychiatric: She has a normal mood and affect. Thought content normal.         ED Course   Procedures  Labs Reviewed   CBC WITH DIFFERENTIAL - Abnormal; Notable for the following components:       Result Value    WBC 13.64 (*)     MCV 95.8 (*)     MCH 32.0 (*)     Neut # 11.19 (*)     Mono # 1.49 (*)     IG# 0.10 (*)     All other components within normal limits   COMPREHENSIVE METABOLIC PANEL - Abnormal; Notable for the following components:    Carbon Dioxide 15 (*)     Glucose Level 200 (*)     Blood Urea Nitrogen 129.8 (*)     Creatinine 7.29 (*)     Albumin Level 2.8 (*)     Globulin 4.5 (*)     Albumin/Globulin Ratio 0.6 (*)     All other components within  normal limits   MAGNESIUM - Abnormal; Notable for the following components:    Magnesium Level 2.70 (*)     All other components within normal limits   URINALYSIS, REFLEX TO URINE CULTURE - Abnormal; Notable for the following components:    Appearance, UA Cloudy (*)     Protein, UA 30 (*)     Blood, UA Trace (*)     Leukocyte Esterase, UA Large (*)     All other components within normal limits   URINALYSIS, MICROSCOPIC - Abnormal; Notable for the following components:    WBC, UA 21-50 (*)     Squamous Epithelial Cells, UA Few (*)     All other components within normal limits   BASIC METABOLIC PANEL - Abnormal; Notable for the following components:    Carbon Dioxide 14 (*)     Glucose Level 151 (*)     Blood Urea Nitrogen 121.7 (*)     Creatinine 6.61 (*)     All other components within normal limits   CBC WITH DIFFERENTIAL - Abnormal; Notable for the following components:    MCV 96.2 (*)     MCH 31.9 (*)     IG# 0.06 (*)     All other components within normal limits   POCT GLUCOSE - Abnormal; Notable for the following components:    POCT Glucose 141 (*)     All other components within normal limits   POCT GLUCOSE - Abnormal; Notable for the following components:    POCT Glucose 182 (*)     All other components within normal limits   LIPASE - Normal   CULTURE, URINE   CBC W/ AUTO DIFFERENTIAL    Narrative:     The following orders were created for panel order CBC auto differential.  Procedure                               Abnormality         Status                     ---------                               -----------         ------                     CBC with Differential[[id number]]       Abnormal            Final result                 Please view results for these tests on the individual orders.   HEMOGLOBIN A1C   POCT GLUCOSE MONITORING CONTINUOUS          Imaging Results              US Retroperitoneal Complete (Final result)  Result time 10/27/23 18:47:52      Final result by Reza Huerta MD (10/27/23  18:47:52)                   Impression:      1.  No significant right renal sonographic abnormality identified.    2.  Left kidney cortical thinning and loss of corticomedullary differentiation is consistent with medical renal disease.      Electronically signed by: Reza Huerta  Date:    10/27/2023  Time:    18:47               Narrative:    EXAMINATION:  US RETROPERITONEAL COMPLETE    CLINICAL HISTORY:  Acute CKD.    TECHNIQUE:  Multiple real-time images were performed of the kidneys in various planes by the sonographer.    COMPARISON:  CT abdomen pelvis October 23, 2023.    FINDINGS:  This is a limited study due to patient's body habitus.    Right kidney measures 10.0 x 5.9 x 4.6 cm.  Right kidney cortical volume is adequate and the corticomedullary differentiation preserved.  No right kidney collecting system dilatation.    Left kidney measures 10.2 x 3.4 x 4.5 cm.  There is left kidney considerable loss of cortical volume and partial loss of corticomedullary differentiation.  Previous CT exam was remarkable for left kidney lower pole non occluding calculus which is not delineated with ultrasound exam.  Left kidney collecting system is without dilatation.    Urinary bladder is adequately distended without thickened wall and there is no intraluminal echogenic debris.                                       Medications   cefTRIAXone (ROCEPHIN) 1 g in dextrose 5 % in water (D5W) 100 mL IVPB (MB+) (0 g Intravenous Stopped 10/27/23 0437)   morphine injection 2 mg (2 mg Intravenous Given 10/27/23 1503)   acetaminophen tablet 650 mg (has no administration in time range)   ondansetron injection 4 mg (4 mg Intravenous Given 10/27/23 0827)   amiodarone tablet 200 mg (200 mg Oral Given 10/27/23 0832)   amLODIPine tablet 10 mg (10 mg Oral Given 10/27/23 2030)   ascorbic acid (vitamin C) tablet 1,000 mg (1,000 mg Oral Given 10/27/23 0831)   aspirin EC tablet 81 mg (81 mg Oral Given 10/27/23 0832)   buPROPion TB24 tablet 150 mg  (150 mg Oral Given 10/27/23 0832)   multivitamin tablet (1 tablet Oral Given 10/27/23 0831)   docusate sodium capsule 100 mg (100 mg Oral Given 10/27/23 2030)   folic acid tablet 1,000 mcg (1,000 mcg Oral Given 10/27/23 0832)   metoprolol succinate (TOPROL-XL) 24 hr tablet 50 mg (50 mg Oral Given 10/27/23 2030)   rOPINIRole tablet 0.5 mg (0.5 mg Oral Given 10/27/23 1628)   atorvastatin tablet 10 mg (10 mg Oral Given 10/27/23 0832)   vitamin E (dl, acetate) capsule 400 Units (400 Units Oral Given 10/27/23 0842)   rivaroxaban tablet 15 mg (15 mg Oral Given 10/27/23 0831)   glucose chewable tablet 16 g (has no administration in time range)   glucose chewable tablet 24 g (has no administration in time range)   glucagon (human recombinant) injection 1 mg (has no administration in time range)   insulin aspart U-100 injection 0-10 Units (2 Units Subcutaneous Given 10/27/23 1627)   dextrose 10% bolus 125 mL 125 mL (has no administration in time range)   dextrose 10% bolus 250 mL 250 mL (has no administration in time range)   sodium bicarbonate 150 mEq in dextrose 5 % (D5W) 1,000 mL infusion ( Intravenous New Bag 10/27/23 1949)   sodium bicarbonate tablet 650 mg (650 mg Oral Given 10/27/23 2030)   traMADoL tablet 50 mg (50 mg Oral Not Given 10/27/23 1812)   morphine injection 2 mg (2 mg Intravenous Given 10/26/23 1928)   ondansetron injection 4 mg (4 mg Intravenous Given 10/26/23 1928)   0.9%  NaCl infusion (0 mLs Intravenous Stopped 10/26/23 2111)   morphine injection 4 mg (4 mg Intravenous Given 10/26/23 2012)   morphine injection 2 mg (2 mg Intravenous Given 10/27/23 0644)     Medical Decision Making  77-year-old female who lives at assisted living with a history of chronic kidney disease, diabetes, hypertension, hyperlipidemia complains of continued back pain.  She states she is had back pain for 2 weeks, seen in the emergency room 2 days ago and diagnosed with an acute T-12 compression fracture and L3 hemangioma.  She  states that the pain medications have done nothing for her.  She states that she usually can walk with a walker or with a cane and now she is unable to walk.  She states she is also not eating drinking much of the severe back pain.  She is had no new injuries or falls.              Amount and/or Complexity of Data Reviewed  Labs: ordered. Decision-making details documented in ED Course.  Radiology: ordered.  Discussion of management or test interpretation with external provider(s): Care was transferred to Dr. Lay at 7:00 p.m. who found the patient had severe LORI and ordered antibiotics, and has arranged for hospital admission.    Risk  Prescription drug management.               ED Course as of 10/27/23 2339   Thu Oct 26, 2023   1954 Creatinine(!): 7.29  Patient's creatinine is typically in the 2.7-3.3 range, so doubled today. [GM]   2014 Message left with hospitalist.  [GM]   Fri Oct 27, 2023   0326 UA shows UTI. Ceftriaxone ordered. Consults placed to nephrology. Upon arrival to [facility] will need neurosurgery consult for possible kyphoplasty.  [GM]      ED Course User Index  [GM] Dinorah Lay MD                    Clinical Impression:   Final diagnoses:  [S22.080D] Compression fracture of T12 vertebra with routine healing, subsequent encounter (Primary)  [N17.9] LORI (acute kidney injury)  [M54.9] Back pain, unspecified back location, unspecified back pain laterality, unspecified chronicity  [E86.0] Dehydration  [N17.9] Acute kidney injury        ED Disposition Condition    Transfer to Another Facility Stable                Emmy Coates MD  10/27/23 0623

## 2023-10-27 ENCOUNTER — HOSPITAL ENCOUNTER (OUTPATIENT)
Dept: RADIOLOGY | Facility: HOSPITAL | Age: 78
Discharge: HOME | End: 2023-10-27
Attending: EMERGENCY MEDICINE
Payer: COMMERCIAL

## 2023-10-27 PROBLEM — I48.0 PAROXYSMAL A-FIB: Status: ACTIVE | Noted: 2023-10-27

## 2023-10-27 PROBLEM — I10 HTN (HYPERTENSION): Status: ACTIVE | Noted: 2023-10-27

## 2023-10-27 PROBLEM — E11.9 T2DM (TYPE 2 DIABETES MELLITUS): Status: ACTIVE | Noted: 2023-10-27

## 2023-10-27 PROBLEM — S22.080A COMPRESSION FRACTURE OF T12 VERTEBRA: Status: ACTIVE | Noted: 2023-10-27

## 2023-10-27 PROBLEM — N17.9 ACUTE KIDNEY INJURY: Status: ACTIVE | Noted: 2023-10-27

## 2023-10-27 PROBLEM — N18.9 ACUTE KIDNEY INJURY SUPERIMPOSED ON CHRONIC KIDNEY DISEASE: Status: ACTIVE | Noted: 2023-10-27

## 2023-10-27 PROBLEM — E78.5 HLD (HYPERLIPIDEMIA): Status: ACTIVE | Noted: 2023-10-27

## 2023-10-27 PROBLEM — N17.9 ACUTE KIDNEY INJURY SUPERIMPOSED ON CHRONIC KIDNEY DISEASE: Status: ACTIVE | Noted: 2023-10-27

## 2023-10-27 PROBLEM — N39.0 UTI (URINARY TRACT INFECTION): Status: ACTIVE | Noted: 2023-10-27

## 2023-10-27 LAB
ANION GAP SERPL CALC-SCNC: 18 MEQ/L
BASOPHILS # BLD AUTO: 0.04 X10(3)/MCL
BASOPHILS NFR BLD AUTO: 0.4 %
BUN SERPL-MCNC: 121.7 MG/DL (ref 9.8–20.1)
CALCIUM SERPL-MCNC: 9.4 MG/DL (ref 8.4–10.2)
CHLORIDE SERPL-SCNC: 105 MMOL/L (ref 98–107)
CO2 SERPL-SCNC: 14 MMOL/L (ref 23–31)
CREAT SERPL-MCNC: 6.61 MG/DL (ref 0.55–1.02)
CREAT/UREA NIT SERPL: 18
EOSINOPHIL # BLD AUTO: 0.26 X10(3)/MCL (ref 0–0.9)
EOSINOPHIL NFR BLD AUTO: 2.7 %
ERYTHROCYTE [DISTWIDTH] IN BLOOD BY AUTOMATED COUNT: 13.8 % (ref 11.5–17)
EST. AVERAGE GLUCOSE BLD GHB EST-MCNC: 111.2 MG/DL
GFR SERPLBLD CREATININE-BSD FMLA CKD-EPI: 6 MLS/MIN/1.73/M2
GLUCOSE SERPL-MCNC: 151 MG/DL (ref 82–115)
HBA1C MFR BLD: 5.5 %
HCT VFR BLD AUTO: 45.8 % (ref 37–47)
HGB BLD-MCNC: 15.2 G/DL (ref 12–16)
IMM GRANULOCYTES # BLD AUTO: 0.06 X10(3)/MCL (ref 0–0.04)
IMM GRANULOCYTES NFR BLD AUTO: 0.6 %
LYMPHOCYTES # BLD AUTO: 1.23 X10(3)/MCL (ref 0.6–4.6)
LYMPHOCYTES NFR BLD AUTO: 12.8 %
MCH RBC QN AUTO: 31.9 PG (ref 27–31)
MCHC RBC AUTO-ENTMCNC: 33.2 G/DL (ref 33–36)
MCV RBC AUTO: 96.2 FL (ref 80–94)
MONOCYTES # BLD AUTO: 1.3 X10(3)/MCL (ref 0.1–1.3)
MONOCYTES NFR BLD AUTO: 13.5 %
NEUTROPHILS # BLD AUTO: 6.75 X10(3)/MCL (ref 2.1–9.2)
NEUTROPHILS NFR BLD AUTO: 70 %
NRBC BLD AUTO-RTO: 0 %
PLATELET # BLD AUTO: 263 X10(3)/MCL (ref 130–400)
PMV BLD AUTO: 10.3 FL (ref 7.4–10.4)
POCT GLUCOSE: 141 MG/DL (ref 70–110)
POCT GLUCOSE: 144 MG/DL (ref 70–110)
POCT GLUCOSE: 182 MG/DL (ref 70–110)
POTASSIUM SERPL-SCNC: 4 MMOL/L (ref 3.5–5.1)
RBC # BLD AUTO: 4.76 X10(6)/MCL (ref 4.2–5.4)
SODIUM SERPL-SCNC: 137 MMOL/L (ref 136–145)
WBC # SPEC AUTO: 9.64 X10(3)/MCL (ref 4.5–11.5)

## 2023-10-27 PROCEDURE — 11000001 HC ACUTE MED/SURG PRIVATE ROOM

## 2023-10-27 PROCEDURE — 83036 HEMOGLOBIN GLYCOSYLATED A1C: CPT | Performed by: INTERNAL MEDICINE

## 2023-10-27 PROCEDURE — 25000003 PHARM REV CODE 250: Performed by: EMERGENCY MEDICINE

## 2023-10-27 PROCEDURE — 85025 COMPLETE CBC W/AUTO DIFF WBC: CPT | Performed by: EMERGENCY MEDICINE

## 2023-10-27 PROCEDURE — 25000003 PHARM REV CODE 250: Performed by: INTERNAL MEDICINE

## 2023-10-27 PROCEDURE — 76770 US EXAM ABDO BACK WALL COMP: CPT | Mod: TC

## 2023-10-27 PROCEDURE — 63600175 PHARM REV CODE 636 W HCPCS: Performed by: EMERGENCY MEDICINE

## 2023-10-27 PROCEDURE — 25000003 PHARM REV CODE 250: Performed by: STUDENT IN AN ORGANIZED HEALTH CARE EDUCATION/TRAINING PROGRAM

## 2023-10-27 PROCEDURE — 80048 BASIC METABOLIC PNL TOTAL CA: CPT | Performed by: EMERGENCY MEDICINE

## 2023-10-27 PROCEDURE — 63600175 PHARM REV CODE 636 W HCPCS: Performed by: INTERNAL MEDICINE

## 2023-10-27 PROCEDURE — 21400001 HC TELEMETRY ROOM

## 2023-10-27 RX ORDER — BUPROPION HYDROCHLORIDE 150 MG/1
150 TABLET ORAL DAILY
Status: DISCONTINUED | OUTPATIENT
Start: 2023-10-27 | End: 2023-11-09 | Stop reason: HOSPADM

## 2023-10-27 RX ORDER — FOLIC ACID 1 MG/1
1000 TABLET ORAL DAILY
Status: DISCONTINUED | OUTPATIENT
Start: 2023-10-27 | End: 2023-11-09 | Stop reason: HOSPADM

## 2023-10-27 RX ORDER — AMIODARONE HYDROCHLORIDE 200 MG/1
200 TABLET ORAL DAILY
Status: DISCONTINUED | OUTPATIENT
Start: 2023-10-27 | End: 2023-11-09 | Stop reason: HOSPADM

## 2023-10-27 RX ORDER — MORPHINE SULFATE 4 MG/ML
2 INJECTION, SOLUTION INTRAMUSCULAR; INTRAVENOUS
Status: COMPLETED | OUTPATIENT
Start: 2023-10-27 | End: 2023-10-27

## 2023-10-27 RX ORDER — ACETAMINOPHEN 325 MG/1
650 TABLET ORAL EVERY 6 HOURS PRN
Status: DISCONTINUED | OUTPATIENT
Start: 2023-10-27 | End: 2023-11-09 | Stop reason: HOSPADM

## 2023-10-27 RX ORDER — ROPINIROLE 0.25 MG/1
0.5 TABLET, FILM COATED ORAL DAILY
Status: DISCONTINUED | OUTPATIENT
Start: 2023-10-27 | End: 2023-11-09 | Stop reason: HOSPADM

## 2023-10-27 RX ORDER — MORPHINE SULFATE 4 MG/ML
2 INJECTION, SOLUTION INTRAMUSCULAR; INTRAVENOUS EVERY 4 HOURS PRN
Status: DISCONTINUED | OUTPATIENT
Start: 2023-10-27 | End: 2023-11-09 | Stop reason: HOSPADM

## 2023-10-27 RX ORDER — TRAMADOL HYDROCHLORIDE 50 MG/1
50 TABLET ORAL EVERY 8 HOURS PRN
Status: DISCONTINUED | OUTPATIENT
Start: 2023-10-27 | End: 2023-11-09 | Stop reason: HOSPADM

## 2023-10-27 RX ORDER — SODIUM CHLORIDE 450 MG/100ML
INJECTION, SOLUTION INTRAVENOUS CONTINUOUS
Status: DISCONTINUED | OUTPATIENT
Start: 2023-10-27 | End: 2023-10-27

## 2023-10-27 RX ORDER — SODIUM CHLORIDE 450 MG/100ML
INJECTION, SOLUTION INTRAVENOUS
Status: DISCONTINUED | OUTPATIENT
Start: 2023-10-27 | End: 2023-10-27

## 2023-10-27 RX ORDER — INSULIN ASPART 100 [IU]/ML
0-10 INJECTION, SOLUTION INTRAVENOUS; SUBCUTANEOUS
Status: DISCONTINUED | OUTPATIENT
Start: 2023-10-27 | End: 2023-11-09 | Stop reason: HOSPADM

## 2023-10-27 RX ORDER — ATORVASTATIN CALCIUM 10 MG/1
10 TABLET, FILM COATED ORAL DAILY
Status: DISCONTINUED | OUTPATIENT
Start: 2023-10-27 | End: 2023-11-09 | Stop reason: HOSPADM

## 2023-10-27 RX ORDER — SODIUM BICARBONATE 650 MG/1
650 TABLET ORAL EVERY OTHER DAY
Status: DISCONTINUED | OUTPATIENT
Start: 2023-10-27 | End: 2023-10-27

## 2023-10-27 RX ORDER — ONDANSETRON 2 MG/ML
4 INJECTION INTRAMUSCULAR; INTRAVENOUS EVERY 6 HOURS PRN
Status: DISCONTINUED | OUTPATIENT
Start: 2023-10-27 | End: 2023-11-09 | Stop reason: HOSPADM

## 2023-10-27 RX ORDER — IBUPROFEN 200 MG
16 TABLET ORAL
Status: DISCONTINUED | OUTPATIENT
Start: 2023-10-27 | End: 2023-11-09 | Stop reason: HOSPADM

## 2023-10-27 RX ORDER — SODIUM BICARBONATE 650 MG/1
650 TABLET ORAL 3 TIMES DAILY
Status: DISCONTINUED | OUTPATIENT
Start: 2023-10-27 | End: 2023-10-31

## 2023-10-27 RX ORDER — ASCORBIC ACID 250 MG
1000 TABLET ORAL DAILY
Status: DISCONTINUED | OUTPATIENT
Start: 2023-10-27 | End: 2023-11-09 | Stop reason: HOSPADM

## 2023-10-27 RX ORDER — VITAMIN E MIXED 400 UNIT
400 CAPSULE ORAL DAILY
Status: DISCONTINUED | OUTPATIENT
Start: 2023-10-27 | End: 2023-11-09 | Stop reason: HOSPADM

## 2023-10-27 RX ORDER — ASPIRIN 81 MG/1
81 TABLET ORAL DAILY
Status: DISCONTINUED | OUTPATIENT
Start: 2023-10-27 | End: 2023-11-09 | Stop reason: HOSPADM

## 2023-10-27 RX ORDER — AMLODIPINE BESYLATE 5 MG/1
10 TABLET ORAL 2 TIMES DAILY
Status: DISCONTINUED | OUTPATIENT
Start: 2023-10-27 | End: 2023-11-09 | Stop reason: HOSPADM

## 2023-10-27 RX ORDER — METOPROLOL SUCCINATE 50 MG/1
50 TABLET, EXTENDED RELEASE ORAL 2 TIMES DAILY
Status: DISCONTINUED | OUTPATIENT
Start: 2023-10-27 | End: 2023-11-09 | Stop reason: HOSPADM

## 2023-10-27 RX ORDER — GLUCAGON 1 MG
1 KIT INJECTION
Status: DISCONTINUED | OUTPATIENT
Start: 2023-10-27 | End: 2023-11-09 | Stop reason: HOSPADM

## 2023-10-27 RX ORDER — IBUPROFEN 200 MG
24 TABLET ORAL
Status: DISCONTINUED | OUTPATIENT
Start: 2023-10-27 | End: 2023-11-09 | Stop reason: HOSPADM

## 2023-10-27 RX ORDER — DOCUSATE SODIUM 100 MG/1
100 CAPSULE, LIQUID FILLED ORAL 2 TIMES DAILY
Status: DISCONTINUED | OUTPATIENT
Start: 2023-10-27 | End: 2023-11-09 | Stop reason: HOSPADM

## 2023-10-27 RX ADMIN — SODIUM BICARBONATE 650 MG: 650 TABLET ORAL at 08:10

## 2023-10-27 RX ADMIN — RIVAROXABAN 15 MG: 15 TABLET, FILM COATED ORAL at 08:10

## 2023-10-27 RX ADMIN — AMLODIPINE BESYLATE 10 MG: 5 TABLET ORAL at 08:10

## 2023-10-27 RX ADMIN — Medication 400 UNITS: at 08:10

## 2023-10-27 RX ADMIN — INSULIN ASPART 2 UNITS: 100 INJECTION, SOLUTION INTRAVENOUS; SUBCUTANEOUS at 04:10

## 2023-10-27 RX ADMIN — FOLIC ACID 1000 MCG: 1 TABLET ORAL at 08:10

## 2023-10-27 RX ADMIN — AMIODARONE HYDROCHLORIDE 200 MG: 200 TABLET ORAL at 08:10

## 2023-10-27 RX ADMIN — ONDANSETRON 4 MG: 2 INJECTION INTRAMUSCULAR; INTRAVENOUS at 08:10

## 2023-10-27 RX ADMIN — METOPROLOL SUCCINATE 50 MG: 50 TABLET, EXTENDED RELEASE ORAL at 08:10

## 2023-10-27 RX ADMIN — SODIUM CHLORIDE: 4.5 INJECTION, SOLUTION INTRAVENOUS at 04:10

## 2023-10-27 RX ADMIN — CEFTRIAXONE SODIUM 1 G: 1 INJECTION, POWDER, FOR SOLUTION INTRAMUSCULAR; INTRAVENOUS at 04:10

## 2023-10-27 RX ADMIN — BUPROPION HYDROCHLORIDE 150 MG: 150 TABLET, FILM COATED, EXTENDED RELEASE ORAL at 08:10

## 2023-10-27 RX ADMIN — Medication 1000 MG: at 08:10

## 2023-10-27 RX ADMIN — MORPHINE SULFATE 2 MG: 4 INJECTION, SOLUTION INTRAMUSCULAR; INTRAVENOUS at 03:10

## 2023-10-27 RX ADMIN — MORPHINE SULFATE 2 MG: 4 INJECTION, SOLUTION INTRAMUSCULAR; INTRAVENOUS at 06:10

## 2023-10-27 RX ADMIN — ATORVASTATIN CALCIUM 10 MG: 10 TABLET, FILM COATED ORAL at 08:10

## 2023-10-27 RX ADMIN — SODIUM BICARBONATE: 84 INJECTION, SOLUTION INTRAVENOUS at 08:10

## 2023-10-27 RX ADMIN — MULTIPLE VITAMINS W/ MINERALS TAB 1 TABLET: TAB at 08:10

## 2023-10-27 RX ADMIN — DOCUSATE SODIUM 100 MG: 100 CAPSULE, LIQUID FILLED ORAL at 08:10

## 2023-10-27 RX ADMIN — SODIUM BICARBONATE 650 MG: 650 TABLET ORAL at 03:10

## 2023-10-27 RX ADMIN — MORPHINE SULFATE 2 MG: 4 INJECTION, SOLUTION INTRAMUSCULAR; INTRAVENOUS at 04:10

## 2023-10-27 RX ADMIN — ASPIRIN 81 MG: 81 TABLET, COATED ORAL at 08:10

## 2023-10-27 RX ADMIN — MORPHINE SULFATE 2 MG: 4 INJECTION, SOLUTION INTRAMUSCULAR; INTRAVENOUS at 08:10

## 2023-10-27 RX ADMIN — SODIUM BICARBONATE: 84 INJECTION, SOLUTION INTRAVENOUS at 07:10

## 2023-10-27 RX ADMIN — ROPINIROLE HYDROCHLORIDE 0.5 MG: 0.25 TABLET, FILM COATED ORAL at 04:10

## 2023-10-27 NOTE — H&P
[facility] - Emergency Dept  [facility]  History & Physical    Patient Name: Debra Puentes  MRN: [id number]  Patient Class: IP- Inpatient  Admission Date: 10/26/2023  Attending Physician: Abdirizak Rondon MD   Primary Care Provider: [facility]         Patient information was obtained from patient and ER records.     Subjective:     Principal Problem:Acute kidney injury superimposed on chronic kidney disease    Chief Complaint:   Chief Complaint   Patient presents with    Fatigue     c/o continuing back pain and weakness s/p fall on Sunday. Sates no appetite since the fall and diagnosis of T12 compression fracture. Pt in back brace        HPI: Chief complaint: Back pain    History given by: patient    HPI: 69 year old F patient from an assisted living presents to the ER with back pain and weakness since sustaining a fall on Sunday. She was seen 2 days ago in the ER and diagnosed with a compression fracture of the T12 vertebra. She reports the pain as constant and 8/10 in intensity. She was previously able to walk with a cane or walker but has been unable to walk since the fall. Denies fever, chills, dysuria, burning micturition, nausea, vomiting, urinary retention, constipation.     I personally spoke with ED clinician regarding the case and reviewed their notes. I personally reviewed all imaging and lab findings as well as any prior medical records that were available within the chart prior to admission. Workup in the ER showed Cr 7.29 (baseline 3.3), UA shows UTI, CT shows acute compression fracture of T12 vertebra and L3 hemangioma. She was admitted for further management.       Past Medical History:   Diagnosis Date    Cancer     Chronic kidney disease, unspecified     Diabetes mellitus, type 2     History of arteriovenostomy for renal dialysis     Hyperlipidemia     Hypertension        Past Surgical History:   Procedure Laterality Date    HYSTERECTOMY       KIDNEY STONE SURGERY      TONSILLECTOMY         Review of patient's allergies indicates:   Allergen Reactions    Lactose Diarrhea    Codeine Other (See Comments) and Nausea And Vomiting       No current facility-administered medications on file prior to encounter.     Current Outpatient Medications on File Prior to Encounter   Medication Sig    amiodarone (PACERONE) 200 MG Tab Take 200 mg by mouth once daily.    amLODIPine (NORVASC) 5 MG tablet Take 10 mg by mouth 2 (two) times daily.    ascorbic acid, vitamin C, (VITAMIN C) 1000 MG tablet Take 1,000 mg by mouth.    aspirin (ECOTRIN) 81 MG EC tablet Take 81 mg by mouth once daily.    buPROPion (WELLBUTRIN XL) 150 MG TB24 tablet Take 150 mg by mouth daily as needed.    CO Q-10 100 mg capsule Take by mouth.    DAILY-GEETA, WITH FOLIC ACID, 400 mcg Tab Take by mouth.    docusate sodium (COLACE) 100 MG capsule Take 1 capsule by mouth 2 (two) times daily.    doxercalciferoL (HECTOROL) 0.5 MCG capsule Take 1 mcg by mouth. Take 2 caps by mouth 1 time each day    estradioL (ESTRACE) 0.01 % (0.1 mg/gram) vaginal cream Place vaginally.    folic acid (FOLVITE) 1 MG tablet Take 1,000 mcg by mouth once daily.    metoprolol succinate (TOPROL-XL) 50 MG 24 hr tablet Take 50 mg by mouth 2 (two) times daily.    NON FORMULARY MEDICATION Take 10 mLs by mouth. Amino Acids-Protein Hydrolys (Proteinex-18) liquid    rOPINIRole (REQUIP) 0.5 MG tablet Take 0.5 mg by mouth Daily.    rosuvastatin (CRESTOR) 10 MG tablet Take 10 mg by mouth every evening.    sodium bicarbonate 650 MG tablet Take 650 mg by mouth every other day.    VICTOZA 2-MERON 0.6 mg/0.1 mL (18 mg/3 mL) PnIj pen Inject into the skin.    vitamin E 400 UNIT capsule Take 400 Units by mouth every Mon, Wed, Fri.    XARELTO 15 mg Tab Take 15 mg by mouth once daily.    AURYXIA 210 mg iron Tab Take by mouth daily as needed.    cephalexin 250 mg Tab Take 0.5 tablets by mouth.    cholecalciferol, vitamin D3,  (VITAMIN D3) 50 mcg (2,000 unit) Cap capsule Take 2,000 Units by mouth once daily.    doxycycline (VIBRAMYCIN) 100 MG Cap Take 100 mg by mouth.    HYDROcodone-acetaminophen (NORCO) 7.5-325 mg per tablet Take 1 tablet by mouth every 6 (six) hours as needed for Pain.    methocarbamoL (ROBAXIN) 500 MG Tab Take 500 mg by mouth 3 (three) times daily.    metoprolol tartrate (LOPRESSOR) 25 MG tablet Take 25 mg by mouth once daily.    ubidecarenone/red yeast rice (CO Q10-RED YEAST RICE)  mg Cap Take 2 capsules by mouth.     Family History       Problem Relation (Age of Onset)    No Known Problems Mother, Father, Sister, Brother          Tobacco Use    Smoking status: Never     Passive exposure: Never    Smokeless tobacco: Never   Substance and Sexual Activity    Alcohol use: Never    Drug use: Never    Sexual activity: Not on file     Review of Systems  Except as documented, all other systems are negative.      Objective:     Vital Signs (Most Recent):  Temp: 97.6 °F (36.4 °C) (10/27/23 0025)  Pulse: 92 (10/27/23 0355)  Resp: 14 (10/27/23 0355)  BP: 132/70 (10/27/23 0355)  SpO2: 95 % (10/27/23 0025) Vital Signs (24h Range):  Temp:  [97.6 °F (36.4 °C)-98.6 °F (37 °C)] 97.6 °F (36.4 °C)  Pulse:  [] 92  Resp:  [14-20] 14  SpO2:  [89 %-98 %] 95 %  BP: (110-136)/(63-78) 132/70     Weight: 104.3 kg (230 lb)  Body mass index is 34.97 kg/m².     Physical Exam     CONSTITUTIONAL: vitals as noted, alert, awake, no distress  HEENT: No scleral icterus, oropharynx clear  RESPIRATORY: clear to auscultation  CVS: regular rate and rhythm  GASTROINTESTINAL: soft, non-tender, non-distended  NEURO: grossly normal exam, moves all extremities  HEM/LYMPH: no lymphadenopathy  PSYCH: alert and oriented x 3, normal affect  SKIN: no rashes noted         Significant Labs: All pertinent labs within the past 24 hours have been reviewed.  CBC:   Recent Labs   Lab 10/26/23  1900   WBC 13.64*   HGB 15.2   HCT 45.5        CMP:    Recent Labs   Lab 10/26/23  1900      K 4.0   CO2 15*   .8*   CREATININE 7.29*   CALCIUM 9.7   ALBUMIN 2.8*   BILITOT 0.7   ALKPHOS 136   AST 17   ALT 15       Significant Imaging: I have reviewed all pertinent imaging results/findings within the past 24 hours. As noted in HPI    Assessment/Plan:     * Acute kidney injury superimposed on chronic kidney disease  - likely pre-renal as patient is not eating or drinking  - IVF for now  -  kidney  - nephrology consult    Compression fracture of T12 vertebra  - pain control  - will likely need rehab    UTI (urinary tract infection)  - IV ceftriaxone and follow cultures    Paroxysmal A-fib  - home meds including amiodarone and Xarelto    T2DM (type 2 diabetes mellitus)  -SSI and Accu-Cheks    HLD (hyperlipidemia)  - home meds    HTN (hypertension)  - home meds      VTE Risk Mitigation (From admission, onward)         Ordered     rivaroxaban tablet 15 mg  Daily         10/27/23 0346     Place sequential compression device  Until discontinued         10/27/23 0342                 The patient is expected to have a LOS more than 2 midnights and will be admitted to inpatient status under my care    Service was provided using HIPAA compliant web platform using SOC for audio/visual equipment.   Patient Location: Hospital  Provider Location: Home ([place], [place])  Participants on call: bedside RN, patient  Consent was obtained, and the patient was seen with nurse assisting from the bedside.        Patricia Sevilla MD  Department of Hospital Medicine  [facility] - Emergency Dept

## 2023-10-27 NOTE — CONSULTS
Nephrology Initial Consult Note    Patient Name: Debra Puentes  Age: 77 y.o.  : 1945  MRN: [id number]  Admission Date: 10/26/2023    Reason for Consult:      Acute kidney injury on CKD  Self, Aaareferral    HPI:     Debra Puentes is a 77 y.o. female with back pain, and decreased p.o. intake.  Past medical history significant for CKD 4, diabetes, hypertension, and hyperlipidemia.  She presents with 1 week of worsening back pain.  She says that it has caused her to essentially not be able to move.  Due to this her appetite has decreased.  Water intake has also decreased, she estimates she was only drinking about 500 mL to 1 L daily over the last few days.  Of note patient was reportedly also started on antibiotics 2 weeks ago for a urinary tract infection by her urologist.  She states she has finished antibiotics.  She denies taking NSAIDs.  She only takes Tylenol, and Robaxin for back pain.  Of note she was seen in in the emergency department 2 days ago and found to have a T12 compression fracture.  She sees Dr. Smith in clinic.  Last seen in August at which time Farxiga was started.  Of note patient did require hemodialysis temporarily in .  Her baseline creatinine appears to be around 2.6-3.0.  Creatinine at admission had worsened to 7.2 which improved to 6.6 at time of Nephrology consultation with IV fluids overnight.  She also has metabolic acidosis with bicarb of 14.  Urinalysis shows 30 protein, trace occult blood, large leukocytes, 21-50 WBC, 0-2 RBC.  She denies chest pain, shortness of breath, nausea, vomiting, or lower extremity edema.        Current Facility-Administered Medications   Medication Dose Route Frequency Provider Last Rate Last Admin    acetaminophen tablet 650 mg  650 mg Oral Q6H PRN Patricia Sevilla MD        amiodarone tablet 200 mg  200 mg Oral Daily Patricia Sevilla MD        amLODIPine tablet 10 mg  10 mg Oral BID Patricia Sevilla MD        ascorbic acid  (vitamin C) tablet 1,000 mg  1,000 mg Oral Daily Patricia Sevilla MD        aspirin EC tablet 81 mg  81 mg Oral Daily Patricia Sevilla MD        atorvastatin tablet 10 mg  10 mg Oral Daily Patricia Sevilla MD        buPROPion TB24 tablet 150 mg  150 mg Oral Daily Patricia Sevilla MD        cefTRIAXone (ROCEPHIN) 1 g in dextrose 5 % in water (D5W) 100 mL IVPB (MB+)  1 g Intravenous Q24H Dinorah Lay  mL/hr at 10/27/23 0407 1 g at 10/27/23 0407    dextrose 10% bolus 125 mL 125 mL  12.5 g Intravenous PRN Patricia Sevilla MD        dextrose 10% bolus 250 mL 250 mL  25 g Intravenous PRN Patricia Sevilla MD        docusate sodium capsule 100 mg  100 mg Oral BID Patricia Sevilla MD        folic acid tablet 1,000 mcg  1,000 mcg Oral Daily Patricia Sevilla MD        glucagon (human recombinant) injection 1 mg  1 mg Intramuscular PRN Patricia Sevilla MD        glucose chewable tablet 16 g  16 g Oral PRN Patricia Sevilla MD        glucose chewable tablet 24 g  24 g Oral PRN Patricia Sevilla MD        insulin aspart U-100 injection 0-10 Units  0-10 Units Subcutaneous QID (AC + HS) PRN Patricia Sevilla MD        metoprolol succinate (TOPROL-XL) 24 hr tablet 50 mg  50 mg Oral BID Patricia Sevilla MD        morphine injection 2 mg  2 mg Intravenous Q4H PRN Dinorah Lay MD   2 mg at 10/27/23 0407    multivitamin tablet  1 tablet Oral Daily Patricia Sevilla MD        ondansetron injection 4 mg  4 mg Intravenous Q6H PRN Patricia Sevilla MD        rivaroxaban tablet 15 mg  15 mg Oral Daily Patricia Sevilla MD        rOPINIRole tablet 0.5 mg  0.5 mg Oral Daily Patricia Sevilla MD        sodium bicarbonate 150 mEq in dextrose 5 % (D5W) 1,000 mL infusion   Intravenous Continuous Noah Jung T,         sodium bicarbonate tablet 650 mg  650 mg Oral TID Noah Jung,         vitamin E (dl, acetate) capsule 400 Units  400 Units Oral Daily Roel  Patricia CORRIGAN MD         Current Outpatient Medications   Medication Sig Dispense Refill    amiodarone (PACERONE) 200 MG Tab Take 200 mg by mouth once daily.      amLODIPine (NORVASC) 5 MG tablet Take 10 mg by mouth 2 (two) times daily.      ascorbic acid, vitamin C, (VITAMIN C) 1000 MG tablet Take 1,000 mg by mouth.      aspirin (ECOTRIN) 81 MG EC tablet Take 81 mg by mouth once daily.      buPROPion (WELLBUTRIN XL) 150 MG TB24 tablet Take 150 mg by mouth daily as needed.      CO Q-10 100 mg capsule Take by mouth.      DAILY-GEETA, WITH FOLIC ACID, 400 mcg Tab Take by mouth.      docusate sodium (COLACE) 100 MG capsule Take 1 capsule by mouth 2 (two) times daily.      doxercalciferoL (HECTOROL) 0.5 MCG capsule Take 1 mcg by mouth. Take 2 caps by mouth 1 time each day      estradioL (ESTRACE) 0.01 % (0.1 mg/gram) vaginal cream Place vaginally.      folic acid (FOLVITE) 1 MG tablet Take 1,000 mcg by mouth once daily.      metoprolol succinate (TOPROL-XL) 50 MG 24 hr tablet Take 50 mg by mouth 2 (two) times daily.      NON FORMULARY MEDICATION Take 10 mLs by mouth. Amino Acids-Protein Hydrolys (Proteinex-18) liquid      rOPINIRole (REQUIP) 0.5 MG tablet Take 0.5 mg by mouth Daily.      rosuvastatin (CRESTOR) 10 MG tablet Take 10 mg by mouth every evening.      sodium bicarbonate 650 MG tablet Take 650 mg by mouth every other day.      VICTOZA 2-MERON 0.6 mg/0.1 mL (18 mg/3 mL) PnIj pen Inject into the skin.      vitamin E 400 UNIT capsule Take 400 Units by mouth every Mon, Wed, Fri.      XARELTO 15 mg Tab Take 15 mg by mouth once daily.      AURYXIA 210 mg iron Tab Take by mouth daily as needed.      cephalexin 250 mg Tab Take 0.5 tablets by mouth.      cholecalciferol, vitamin D3, (VITAMIN D3) 50 mcg (2,000 unit) Cap capsule Take 2,000 Units by mouth once daily.      doxycycline (VIBRAMYCIN) 100 MG Cap Take 100 mg by mouth.      HYDROcodone-acetaminophen (NORCO) 7.5-325 mg per tablet Take 1 tablet by mouth every 6 (six)  hours as needed for Pain. 20 tablet 0    methocarbamoL (ROBAXIN) 500 MG Tab Take 500 mg by mouth 3 (three) times daily.      metoprolol tartrate (LOPRESSOR) 25 MG tablet Take 25 mg by mouth once daily.      ubidecarenone/red yeast rice (CO Q10-RED YEAST RICE)  mg Cap Take 2 capsules by mouth.         No, Primary Doctor    Past Medical History:   Diagnosis Date    Cancer     Chronic kidney disease, unspecified     Diabetes mellitus, type 2     History of arteriovenostomy for renal dialysis     Hyperlipidemia     Hypertension       Past Surgical History:   Procedure Laterality Date    HYSTERECTOMY      KIDNEY STONE SURGERY      TONSILLECTOMY        Family History   Problem Relation Age of Onset    No Known Problems Mother     No Known Problems Father     No Known Problems Sister     No Known Problems Brother      Social History     Tobacco Use    Smoking status: Never     Passive exposure: Never    Smokeless tobacco: Never   Substance Use Topics    Alcohol use: Never     (Not in a hospital admission)    Review of patient's allergies indicates:   Allergen Reactions    Lactose Diarrhea    Codeine Other (See Comments) and Nausea And Vomiting            Review of Systems:     Comprehensive 10pt ROS negative except as noted per history.      Objective:       VITAL SIGNS: 24 HR MIN & MAX LAST    Temp  Min: 97.6 °F (36.4 °C)  Max: 98.6 °F (37 °C)  97.6 °F (36.4 °C)        BP  Min: 110/72  Max: 136/73  132/70     Pulse  Min: 92  Max: 104  92     Resp  Min: 14  Max: 20  20    SpO2  Min: 89 %  Max: 98 %  95 %      GEN:  Well-appearing elderly WF  HEENT: Conjunctiva anicteric, pupils equal   CV: RRR +S1,S2 without murmur  PULM: CTAB, unlabored  ABD: Soft, NT/ND abdomen with NABS  EXT: No cyanosis or edema  SKIN: Warm and dry  PSYCH: Awake, alert and appropriately conversant.   Dialysis access:  No dialysis access            Component Value Date/Time     10/27/2023 0557     10/26/2023 1900     (L)  07/02/2020 0600     06/11/2020 2324    K 4.0 10/27/2023 0557    K 4.0 10/26/2023 1900    K 3.9 07/02/2020 0600    K 3.0 (L) 06/11/2020 2324    CHLORIDE 105 10/27/2023 0557    CHLORIDE 102 10/26/2023 1900    CO2 14 (L) 10/27/2023 0557    CO2 15 (L) 10/26/2023 1900    CO2 30 07/02/2020 0600    CO2 30 06/11/2020 2324    .7 (H) 10/27/2023 0557    .8 (H) 10/26/2023 1900    BUN 17 07/02/2020 0600    BUN 33 (H) 06/11/2020 2324    CREATININE 6.61 (H) 10/27/2023 0557    CREATININE 7.29 (H) 10/26/2023 1900    CREATININE 2.57 (H) 07/02/2020 0600    CREATININE 6.33 (H) 06/11/2020 2324    CALCIUM 9.4 10/27/2023 0557    CALCIUM 9.7 10/26/2023 1900    CALCIUM 7.6 (L) 07/02/2020 0600    CALCIUM 7.9 (L) 06/11/2020 2324    PHOS 4.5 09/21/2023 0445            Component Value Date/Time    WBC 9.64 10/27/2023 0557    WBC 13.64 (H) 10/26/2023 1900    WBC 6.4 06/01/2022 1205    HGB 15.2 10/27/2023 0557    HGB 15.2 10/26/2023 1900    HCT 45.8 10/27/2023 0557    HCT 45.5 10/26/2023 1900     10/27/2023 0557     10/26/2023 1900             US Retroperitoneal Complete    (Results Pending)       Assessment / Plan:       Active Hospital Problems    Diagnosis  POA    *Acute kidney injury superimposed on chronic kidney disease [N17.9, N18.9]  Yes    Compression fracture of T12 vertebra [S22.080A]  Yes    UTI (urinary tract infection) [N39.0]  Yes    HTN (hypertension) [I10]  Yes    HLD (hyperlipidemia) [E78.5]  Yes    T2DM (type 2 diabetes mellitus) [E11.9]  Yes    Paroxysmal A-fib [I48.0]  Yes      Resolved Hospital Problems   No resolved problems to display.     Acute kidney injury on CKD 4 - baseline creatinine 2.6-3.0.  Follows with Dr. Smith.  Metabolic acidosis  Back pain/T12 compression fracture  Nephrolithiasis    Plan:  Renal function slightly improved overnight with IV fluid administration.  Patient currently not having any uremic symptoms or volume overload.  Change IV fluids to D5+150 mEq sodium  bicarbonate at 100 mL/hr for metabolic acidosis.  Hopefully this is just prerenal LORI, however, patient does have 21-50 WBC on UA and recent history of antibiotic exposure.  This is concerning for acute interstitial nephritis.  If patient's renal function does not continue to improve with IV fluids, she may need a renal biopsy.  Renal ultrasound has been ordered due to history of nephrolithiasis to rule out obstruction.  No acute indication for hemodialysis at this time.  Repeat labs in a.m..  We will follow.  Patient is planning to transfer to [facility].     Noah Jung DO  Nephrology  [facility]  Clinic number: [phone]

## 2023-10-27 NOTE — SUBJECTIVE & OBJECTIVE
Past Medical History:   Diagnosis Date    Cancer     Chronic kidney disease, unspecified     Diabetes mellitus, type 2     History of arteriovenostomy for renal dialysis     Hyperlipidemia     Hypertension        Past Surgical History:   Procedure Laterality Date    HYSTERECTOMY      KIDNEY STONE SURGERY      TONSILLECTOMY         Review of patient's allergies indicates:   Allergen Reactions    Lactose Diarrhea    Codeine Other (See Comments) and Nausea And Vomiting       No current facility-administered medications on file prior to encounter.     Current Outpatient Medications on File Prior to Encounter   Medication Sig    amiodarone (PACERONE) 200 MG Tab Take 200 mg by mouth once daily.    amLODIPine (NORVASC) 5 MG tablet Take 10 mg by mouth 2 (two) times daily.    ascorbic acid, vitamin C, (VITAMIN C) 1000 MG tablet Take 1,000 mg by mouth.    aspirin (ECOTRIN) 81 MG EC tablet Take 81 mg by mouth once daily.    buPROPion (WELLBUTRIN XL) 150 MG TB24 tablet Take 150 mg by mouth daily as needed.    CO Q-10 100 mg capsule Take by mouth.    DAILY-GEETA, WITH FOLIC ACID, 400 mcg Tab Take by mouth.    docusate sodium (COLACE) 100 MG capsule Take 1 capsule by mouth 2 (two) times daily.    doxercalciferoL (HECTOROL) 0.5 MCG capsule Take 1 mcg by mouth. Take 2 caps by mouth 1 time each day    estradioL (ESTRACE) 0.01 % (0.1 mg/gram) vaginal cream Place vaginally.    folic acid (FOLVITE) 1 MG tablet Take 1,000 mcg by mouth once daily.    metoprolol succinate (TOPROL-XL) 50 MG 24 hr tablet Take 50 mg by mouth 2 (two) times daily.    NON FORMULARY MEDICATION Take 10 mLs by mouth. Amino Acids-Protein Hydrolys (Proteinex-18) liquid    rOPINIRole (REQUIP) 0.5 MG tablet Take 0.5 mg by mouth Daily.    rosuvastatin (CRESTOR) 10 MG tablet Take 10 mg by mouth every evening.    sodium bicarbonate 650 MG tablet Take 650 mg by mouth every other day.    VICTOZA 2-MERON 0.6 mg/0.1 mL (18 mg/3 mL) PnIj pen Inject into the skin.    vitamin E  400 UNIT capsule Take 400 Units by mouth every Mon, Wed, Fri.    XARELTO 15 mg Tab Take 15 mg by mouth once daily.    AURYXIA 210 mg iron Tab Take by mouth daily as needed.    cephalexin 250 mg Tab Take 0.5 tablets by mouth.    cholecalciferol, vitamin D3, (VITAMIN D3) 50 mcg (2,000 unit) Cap capsule Take 2,000 Units by mouth once daily.    doxycycline (VIBRAMYCIN) 100 MG Cap Take 100 mg by mouth.    HYDROcodone-acetaminophen (NORCO) 7.5-325 mg per tablet Take 1 tablet by mouth every 6 (six) hours as needed for Pain.    methocarbamoL (ROBAXIN) 500 MG Tab Take 500 mg by mouth 3 (three) times daily.    metoprolol tartrate (LOPRESSOR) 25 MG tablet Take 25 mg by mouth once daily.    ubidecarenone/red yeast rice (CO Q10-RED YEAST RICE)  mg Cap Take 2 capsules by mouth.     Family History       Problem Relation (Age of Onset)    No Known Problems Mother, Father, Sister, Brother          Tobacco Use    Smoking status: Never     Passive exposure: Never    Smokeless tobacco: Never   Substance and Sexual Activity    Alcohol use: Never    Drug use: Never    Sexual activity: Not on file     Review of Systems  Except as documented, all other systems are negative.      Objective:     Vital Signs (Most Recent):  Temp: 97.6 °F (36.4 °C) (10/27/23 0025)  Pulse: 92 (10/27/23 0355)  Resp: 14 (10/27/23 0355)  BP: 132/70 (10/27/23 0355)  SpO2: 95 % (10/27/23 0025) Vital Signs (24h Range):  Temp:  [97.6 °F (36.4 °C)-98.6 °F (37 °C)] 97.6 °F (36.4 °C)  Pulse:  [] 92  Resp:  [14-20] 14  SpO2:  [89 %-98 %] 95 %  BP: (110-136)/(63-78) 132/70     Weight: 104.3 kg (230 lb)  Body mass index is 34.97 kg/m².     Physical Exam     CONSTITUTIONAL: vitals as noted, alert, awake, no distress  HEENT: No scleral icterus, oropharynx clear  RESPIRATORY: clear to auscultation  CVS: regular rate and rhythm  GASTROINTESTINAL: soft, non-tender, non-distended  NEURO: grossly normal exam, moves all extremities  HEM/LYMPH: no lymphadenopathy  PSYCH:  alert and oriented x 3, normal affect  SKIN: no rashes noted         Significant Labs: All pertinent labs within the past 24 hours have been reviewed.  CBC:   Recent Labs   Lab 10/26/23  1900   WBC 13.64*   HGB 15.2   HCT 45.5        CMP:   Recent Labs   Lab 10/26/23  1900      K 4.0   CO2 15*   .8*   CREATININE 7.29*   CALCIUM 9.7   ALBUMIN 2.8*   BILITOT 0.7   ALKPHOS 136   AST 17   ALT 15       Significant Imaging: I have reviewed all pertinent imaging results/findings within the past 24 hours. As noted in HPI

## 2023-10-27 NOTE — HPI
Chief complaint: Back pain    History given by: patient    HPI: 69 year old F patient from an assisted living presents to the ER with back pain and weakness since sustaining a fall on Sunday. She was seen 2 days ago in the ER and diagnosed with a compression fracture of the T12 vertebra. She reports the pain as constant and 8/10 in intensity. She was previously able to walk with a cane or walker but has been unable to walk since the fall. Denies fever, chills, dysuria, burning micturition, nausea, vomiting, urinary retention, constipation.     I personally spoke with ED clinician regarding the case and reviewed their notes. I personally reviewed all imaging and lab findings as well as any prior medical records that were available within the chart prior to admission. Workup in the ER showed Cr 7.29 (baseline 3.3), UA shows UTI, CT shows acute compression fracture of T12 vertebra and L3 hemangioma. She was admitted for further management.

## 2023-10-27 NOTE — ASSESSMENT & PLAN NOTE
- likely pre-renal as patient is not eating or drinking  - IVF for now  -  kidney  - nephrology consult

## 2023-10-28 ENCOUNTER — APPOINTMENT (OUTPATIENT)
Dept: RADIOLOGY | Facility: HOSPITAL | Age: 78
End: 2023-10-28
Attending: NEUROLOGICAL SURGERY
Payer: COMMERCIAL

## 2023-10-28 LAB
ANION GAP SERPL CALC-SCNC: 14 MEQ/L
BASOPHILS # BLD AUTO: 0.03 X10(3)/MCL
BASOPHILS NFR BLD AUTO: 0.4 %
BUN SERPL-MCNC: 123.5 MG/DL (ref 9.8–20.1)
CALCIUM SERPL-MCNC: 8.7 MG/DL (ref 8.4–10.2)
CHLORIDE SERPL-SCNC: 101 MMOL/L (ref 98–107)
CO2 SERPL-SCNC: 23 MMOL/L (ref 23–31)
CREAT SERPL-MCNC: 5.33 MG/DL (ref 0.55–1.02)
CREAT/UREA NIT SERPL: 23
EOSINOPHIL # BLD AUTO: 0.47 X10(3)/MCL (ref 0–0.9)
EOSINOPHIL NFR BLD AUTO: 5.9 %
ERYTHROCYTE [DISTWIDTH] IN BLOOD BY AUTOMATED COUNT: 13.6 % (ref 11.5–17)
GFR SERPLBLD CREATININE-BSD FMLA CKD-EPI: 8 MLS/MIN/1.73/M2
GLUCOSE SERPL-MCNC: 177 MG/DL (ref 82–115)
HCT VFR BLD AUTO: 40.4 % (ref 37–47)
HGB BLD-MCNC: 13.6 G/DL (ref 12–16)
IMM GRANULOCYTES # BLD AUTO: 0.08 X10(3)/MCL (ref 0–0.04)
IMM GRANULOCYTES NFR BLD AUTO: 1 %
LYMPHOCYTES # BLD AUTO: 1.2 X10(3)/MCL (ref 0.6–4.6)
LYMPHOCYTES NFR BLD AUTO: 15 %
MCH RBC QN AUTO: 31.6 PG (ref 27–31)
MCHC RBC AUTO-ENTMCNC: 33.7 G/DL (ref 33–36)
MCV RBC AUTO: 93.7 FL (ref 80–94)
MONOCYTES # BLD AUTO: 1.25 X10(3)/MCL (ref 0.1–1.3)
MONOCYTES NFR BLD AUTO: 15.6 %
NEUTROPHILS # BLD AUTO: 4.98 X10(3)/MCL (ref 2.1–9.2)
NEUTROPHILS NFR BLD AUTO: 62.1 %
NRBC BLD AUTO-RTO: 0 %
PLATELET # BLD AUTO: 254 X10(3)/MCL (ref 130–400)
PMV BLD AUTO: 10.2 FL (ref 7.4–10.4)
POCT GLUCOSE: 121 MG/DL (ref 70–110)
POCT GLUCOSE: 151 MG/DL (ref 70–110)
POCT GLUCOSE: 161 MG/DL (ref 70–110)
POTASSIUM SERPL-SCNC: 3.3 MMOL/L (ref 3.5–5.1)
RBC # BLD AUTO: 4.31 X10(6)/MCL (ref 4.2–5.4)
SODIUM SERPL-SCNC: 138 MMOL/L (ref 136–145)
WBC # SPEC AUTO: 8.01 X10(3)/MCL (ref 4.5–11.5)

## 2023-10-28 PROCEDURE — 25000003 PHARM REV CODE 250: Performed by: INTERNAL MEDICINE

## 2023-10-28 PROCEDURE — 25000003 PHARM REV CODE 250: Performed by: EMERGENCY MEDICINE

## 2023-10-28 PROCEDURE — 25000003 PHARM REV CODE 250: Performed by: NURSE PRACTITIONER

## 2023-10-28 PROCEDURE — 80048 BASIC METABOLIC PNL TOTAL CA: CPT | Performed by: EMERGENCY MEDICINE

## 2023-10-28 PROCEDURE — 25000003 PHARM REV CODE 250: Performed by: STUDENT IN AN ORGANIZED HEALTH CARE EDUCATION/TRAINING PROGRAM

## 2023-10-28 PROCEDURE — 72146 MRI CHEST SPINE W/O DYE: CPT | Mod: TC

## 2023-10-28 PROCEDURE — 85025 COMPLETE CBC W/AUTO DIFF WBC: CPT | Performed by: EMERGENCY MEDICINE

## 2023-10-28 PROCEDURE — 21400001 HC TELEMETRY ROOM

## 2023-10-28 PROCEDURE — 63600175 PHARM REV CODE 636 W HCPCS: Performed by: EMERGENCY MEDICINE

## 2023-10-28 PROCEDURE — 63600175 PHARM REV CODE 636 W HCPCS: Performed by: INTERNAL MEDICINE

## 2023-10-28 RX ORDER — FAMOTIDINE 20 MG/1
20 TABLET, FILM COATED ORAL DAILY
Status: DISCONTINUED | OUTPATIENT
Start: 2023-10-28 | End: 2023-11-09 | Stop reason: HOSPADM

## 2023-10-28 RX ORDER — GABAPENTIN 100 MG/1
100 CAPSULE ORAL 2 TIMES DAILY
Status: DISCONTINUED | OUTPATIENT
Start: 2023-10-28 | End: 2023-11-09 | Stop reason: HOSPADM

## 2023-10-28 RX ORDER — POTASSIUM CHLORIDE 20 MEQ/1
20 TABLET, EXTENDED RELEASE ORAL ONCE
Status: COMPLETED | OUTPATIENT
Start: 2023-10-28 | End: 2023-10-28

## 2023-10-28 RX ORDER — SODIUM CHLORIDE 9 MG/ML
INJECTION, SOLUTION INTRAVENOUS CONTINUOUS
Status: DISCONTINUED | OUTPATIENT
Start: 2023-10-28 | End: 2023-10-30

## 2023-10-28 RX ADMIN — ASPIRIN 81 MG: 81 TABLET, COATED ORAL at 09:10

## 2023-10-28 RX ADMIN — AMIODARONE HYDROCHLORIDE 200 MG: 200 TABLET ORAL at 09:10

## 2023-10-28 RX ADMIN — SODIUM BICARBONATE 650 MG: 650 TABLET ORAL at 09:10

## 2023-10-28 RX ADMIN — TRAMADOL HYDROCHLORIDE 50 MG: 50 TABLET, COATED ORAL at 07:10

## 2023-10-28 RX ADMIN — INSULIN ASPART 2 UNITS: 100 INJECTION, SOLUTION INTRAVENOUS; SUBCUTANEOUS at 06:10

## 2023-10-28 RX ADMIN — SODIUM BICARBONATE 650 MG: 650 TABLET ORAL at 10:10

## 2023-10-28 RX ADMIN — MULTIPLE VITAMINS W/ MINERALS TAB 1 TABLET: TAB at 09:10

## 2023-10-28 RX ADMIN — Medication 400 UNITS: at 10:10

## 2023-10-28 RX ADMIN — AMLODIPINE BESYLATE 10 MG: 5 TABLET ORAL at 09:10

## 2023-10-28 RX ADMIN — Medication 1000 MG: at 09:10

## 2023-10-28 RX ADMIN — GABAPENTIN 100 MG: 100 CAPSULE ORAL at 10:10

## 2023-10-28 RX ADMIN — BUPROPION HYDROCHLORIDE 150 MG: 150 TABLET, FILM COATED, EXTENDED RELEASE ORAL at 09:10

## 2023-10-28 RX ADMIN — METOPROLOL SUCCINATE 50 MG: 50 TABLET, EXTENDED RELEASE ORAL at 10:10

## 2023-10-28 RX ADMIN — SODIUM CHLORIDE: 9 INJECTION, SOLUTION INTRAVENOUS at 03:10

## 2023-10-28 RX ADMIN — DOCUSATE SODIUM 100 MG: 100 CAPSULE, LIQUID FILLED ORAL at 10:10

## 2023-10-28 RX ADMIN — AMLODIPINE BESYLATE 10 MG: 5 TABLET ORAL at 10:10

## 2023-10-28 RX ADMIN — APIXABAN 5 MG: 5 TABLET, FILM COATED ORAL at 10:10

## 2023-10-28 RX ADMIN — DOCUSATE SODIUM 100 MG: 100 CAPSULE, LIQUID FILLED ORAL at 09:10

## 2023-10-28 RX ADMIN — ATORVASTATIN CALCIUM 10 MG: 10 TABLET, FILM COATED ORAL at 09:10

## 2023-10-28 RX ADMIN — FAMOTIDINE 20 MG: 20 TABLET, FILM COATED ORAL at 01:10

## 2023-10-28 RX ADMIN — CEFTRIAXONE SODIUM 1 G: 1 INJECTION, POWDER, FOR SOLUTION INTRAMUSCULAR; INTRAVENOUS at 03:10

## 2023-10-28 RX ADMIN — INSULIN ASPART 2 UNITS: 100 INJECTION, SOLUTION INTRAVENOUS; SUBCUTANEOUS at 01:10

## 2023-10-28 RX ADMIN — POTASSIUM CHLORIDE 20 MEQ: 1500 TABLET, EXTENDED RELEASE ORAL at 03:10

## 2023-10-28 RX ADMIN — APIXABAN 5 MG: 5 TABLET, FILM COATED ORAL at 01:10

## 2023-10-28 RX ADMIN — METOPROLOL SUCCINATE 50 MG: 50 TABLET, EXTENDED RELEASE ORAL at 09:10

## 2023-10-28 RX ADMIN — SODIUM BICARBONATE 650 MG: 650 TABLET ORAL at 03:10

## 2023-10-28 RX ADMIN — FOLIC ACID 1000 MCG: 1 TABLET ORAL at 09:10

## 2023-10-28 RX ADMIN — ROPINIROLE HYDROCHLORIDE 0.5 MG: 0.25 TABLET, FILM COATED ORAL at 04:10

## 2023-10-28 NOTE — NURSING
Pharmacist advised nurse to not give Xarelto given patient's creatinine level and substitute with Eliquis instead. ROHIIN Bhatt was notified and told nurse to hold Xarelto until nephrology rounds on patient tomorrow morning.

## 2023-10-28 NOTE — NURSING
"Nurses Note -- 4 Eyes      10/27/2023   8:20 PM    Patient refusing to turn. Patient states "I'm comfortable in this bed. I don't want to turn" and "I'm already in a lot of pain" when asked to turn over to do 4-eyes assessment.         "

## 2023-10-28 NOTE — PROGRESS NOTES
[facility] Hospital Medicine Progress Note        Chief Complaint: Inpatient Follow-up for back pain    HPI:   69-year-old obese woman with history of AFib on Xarelto, T2 dm, CKD, hypertension, hyperlipidemia presented with worsening of lower back pain, generalized weakness.  Patient had a fall 2 days ago, diagnosed with T12 vertebral fracture.  She denied any loss of bladder or bowels.  At admission she was normotensive and hemodynamically stable.  Lab work revealed elevated BUN and serum creatinine, cloudy urine with multiple white cells.  Urine sample were sent for cultures.  Recent CT scan obtained on 10/23/2023 revealed atrophy of left kidney, nonobstructing nephrolithiasis in the lower pole of the left kidney, cholelithiasis, umbilical hernia, diverticulosis.    Interval Hx:   Blood pressure borderline low.  Except for back pain she has no issues.  Alert, lethargic.  Motivated for physical therapy.  Labs showed mild hypokalemia, stable hemoglobin and platelets    MRI spine spine revealed T12 vertebral body compression deformity with multilevel spondylitic changes.    Urine cultures in the lab and she currently is on ceftriaxone.    Objective/physical exam:  Vitals:    10/28/23 0359 10/28/23 0700 10/28/23 0945 10/28/23 1057   BP: (!) 103/57  (!) 103/57 98/62   BP Location:       Patient Position:       Pulse: 86   85   Resp: 18 16     Temp: 97.6 °F (36.4 °C)   97.9 °F (36.6 °C)   TempSrc: Oral   Oral   SpO2: (!) 93%   (!) 92%   Weight:       Height:         General: In no acute distress, afebrile  Respiratory: Clear to auscultation bilaterally  Cardiovascular: S1, S2, no appreciable murmur  Abdomen: Soft, nontender, BS +  MSK: Warm, no lower extremity edema, no clubbing or cyanosis  Neurologic: Alert and oriented x4, bilateral lower extremities 1-2/5.  Upper extremities intact    Lab Results   Component Value Date     10/28/2023    K 3.3 (L) 10/28/2023    CL 92 (L)  07/02/2020    CO2 23 10/28/2023    .5 (H) 10/28/2023    CREATININE 5.33 (H) 10/28/2023    CALCIUM 8.7 10/28/2023    ANIONGAP 7 (L) 07/02/2020    ESTGFRAFRICA 22 07/02/2020    EGFRNONAA 17 07/21/2022      Lab Results   Component Value Date    ALT 15 10/26/2023    AST 17 10/26/2023    ALKPHOS 136 10/26/2023    BILITOT 0.7 10/26/2023      Lab Results   Component Value Date    WBC 8.01 10/28/2023    HGB 13.6 10/28/2023    HCT 40.4 10/28/2023    MCV 93.7 10/28/2023     10/28/2023           Medications:   amiodarone  200 mg Oral Daily    amLODIPine  10 mg Oral BID    ascorbic acid (vitamin C)  1,000 mg Oral Daily    aspirin  81 mg Oral Daily    atorvastatin  10 mg Oral Daily    buPROPion  150 mg Oral Daily    cefTRIAXone (ROCEPHIN) IVPB  1 g Intravenous Q24H    docusate sodium  100 mg Oral BID    folic acid  1,000 mcg Oral Daily    metoprolol succinate  50 mg Oral BID    multivitamin  1 tablet Oral Daily    rivaroxaban  15 mg Oral Daily    rOPINIRole  0.5 mg Oral Daily    sodium bicarbonate  650 mg Oral TID    vitamin E (dl, acetate)  400 Units Oral Daily      acetaminophen, dextrose 10%, dextrose 10%, glucagon (human recombinant), glucose, glucose, insulin aspart U-100, morphine, ondansetron, traMADoL     Assessment/Plan:    T12 vertebral body compression fracture   Impingement of L2-L4  nerve roots, severe bilateral neural foraminal narrowing  Multilevel spondylitic changes  Bilateral lower extremity weakness due to above   LORI on CKD  Metabolic acidosis stent possible UTI-POA    HX:  T2 dm, AFib on Xarelto, HTN, HLD, ACD    Plan:  -MRI reviewed.  Neurosurgery consulted.  Continue analgesics as needed.  PT when applicable   -nephrology following.  DC bicarb drip.  Continue p.o. bicarb.    -replace potassium  -switch to Eliquis.  We will review and renew other appropriate home medications  -continue ceftriaxone.  Follow urine cultures until finalized  -continue sliding scale insulin    Eliquis    Charlotte Andrews MD

## 2023-10-29 LAB
ALBUMIN SERPL-MCNC: 2.2 G/DL (ref 3.4–4.8)
ALBUMIN/GLOB SERPL: 0.6 RATIO (ref 1.1–2)
ALP SERPL-CCNC: 101 UNIT/L (ref 40–150)
ALT SERPL-CCNC: 15 UNIT/L (ref 0–55)
AST SERPL-CCNC: 22 UNIT/L (ref 5–34)
BASOPHILS # BLD AUTO: 0.05 X10(3)/MCL
BASOPHILS NFR BLD AUTO: 0.6 %
BILIRUB SERPL-MCNC: 0.5 MG/DL
BUN SERPL-MCNC: 105.5 MG/DL (ref 9.8–20.1)
CALCIUM SERPL-MCNC: 8.4 MG/DL (ref 8.4–10.2)
CHLORIDE SERPL-SCNC: 103 MMOL/L (ref 98–107)
CO2 SERPL-SCNC: 23 MMOL/L (ref 23–31)
CREAT SERPL-MCNC: 4.18 MG/DL (ref 0.55–1.02)
EOSINOPHIL # BLD AUTO: 0.43 X10(3)/MCL (ref 0–0.9)
EOSINOPHIL NFR BLD AUTO: 5.4 %
ERYTHROCYTE [DISTWIDTH] IN BLOOD BY AUTOMATED COUNT: 13.7 % (ref 11.5–17)
GFR SERPLBLD CREATININE-BSD FMLA CKD-EPI: 10 MLS/MIN/1.73/M2
GLOBULIN SER-MCNC: 3.5 GM/DL (ref 2.4–3.5)
GLUCOSE SERPL-MCNC: 100 MG/DL (ref 82–115)
HCT VFR BLD AUTO: 39.6 % (ref 37–47)
HGB BLD-MCNC: 13.7 G/DL (ref 12–16)
IMM GRANULOCYTES # BLD AUTO: 0.11 X10(3)/MCL (ref 0–0.04)
IMM GRANULOCYTES NFR BLD AUTO: 1.4 %
LYMPHOCYTES # BLD AUTO: 1.22 X10(3)/MCL (ref 0.6–4.6)
LYMPHOCYTES NFR BLD AUTO: 15.4 %
MAGNESIUM SERPL-MCNC: 2.3 MG/DL (ref 1.6–2.6)
MCH RBC QN AUTO: 32.5 PG (ref 27–31)
MCHC RBC AUTO-ENTMCNC: 34.6 G/DL (ref 33–36)
MCV RBC AUTO: 94.1 FL (ref 80–94)
MONOCYTES # BLD AUTO: 1.15 X10(3)/MCL (ref 0.1–1.3)
MONOCYTES NFR BLD AUTO: 14.5 %
NEUTROPHILS # BLD AUTO: 4.97 X10(3)/MCL (ref 2.1–9.2)
NEUTROPHILS NFR BLD AUTO: 62.7 %
NRBC BLD AUTO-RTO: 0 %
PHOSPHATE SERPL-MCNC: 4.5 MG/DL (ref 2.3–4.7)
PLATELET # BLD AUTO: 253 X10(3)/MCL (ref 130–400)
PMV BLD AUTO: 10.3 FL (ref 7.4–10.4)
POCT GLUCOSE: 116 MG/DL (ref 70–110)
POCT GLUCOSE: 135 MG/DL (ref 70–110)
POCT GLUCOSE: 156 MG/DL (ref 70–110)
POCT GLUCOSE: 183 MG/DL (ref 70–110)
POTASSIUM SERPL-SCNC: 3.4 MMOL/L (ref 3.5–5.1)
PROT SERPL-MCNC: 5.7 GM/DL (ref 5.8–7.6)
RBC # BLD AUTO: 4.21 X10(6)/MCL (ref 4.2–5.4)
SODIUM SERPL-SCNC: 141 MMOL/L (ref 136–145)
WBC # SPEC AUTO: 7.93 X10(3)/MCL (ref 4.5–11.5)

## 2023-10-29 PROCEDURE — 25000003 PHARM REV CODE 250: Performed by: EMERGENCY MEDICINE

## 2023-10-29 PROCEDURE — 80053 COMPREHEN METABOLIC PANEL: CPT | Performed by: INTERNAL MEDICINE

## 2023-10-29 PROCEDURE — 92610 EVALUATE SWALLOWING FUNCTION: CPT

## 2023-10-29 PROCEDURE — 63600175 PHARM REV CODE 636 W HCPCS: Performed by: EMERGENCY MEDICINE

## 2023-10-29 PROCEDURE — 85025 COMPLETE CBC W/AUTO DIFF WBC: CPT | Performed by: INTERNAL MEDICINE

## 2023-10-29 PROCEDURE — 25000003 PHARM REV CODE 250: Performed by: STUDENT IN AN ORGANIZED HEALTH CARE EDUCATION/TRAINING PROGRAM

## 2023-10-29 PROCEDURE — 25000003 PHARM REV CODE 250: Performed by: NURSE PRACTITIONER

## 2023-10-29 PROCEDURE — 21400001 HC TELEMETRY ROOM

## 2023-10-29 PROCEDURE — 25000003 PHARM REV CODE 250: Performed by: INTERNAL MEDICINE

## 2023-10-29 PROCEDURE — 83735 ASSAY OF MAGNESIUM: CPT | Performed by: INTERNAL MEDICINE

## 2023-10-29 PROCEDURE — 84100 ASSAY OF PHOSPHORUS: CPT | Performed by: INTERNAL MEDICINE

## 2023-10-29 RX ORDER — POTASSIUM CHLORIDE 20 MEQ/1
20 TABLET, EXTENDED RELEASE ORAL ONCE
Status: COMPLETED | OUTPATIENT
Start: 2023-10-29 | End: 2023-10-29

## 2023-10-29 RX ORDER — SODIUM CHLORIDE 9 MG/ML
INJECTION, SOLUTION INTRAVENOUS
Status: DISCONTINUED | OUTPATIENT
Start: 2023-10-29 | End: 2023-11-09 | Stop reason: HOSPADM

## 2023-10-29 RX ORDER — POTASSIUM CHLORIDE 20 MEQ/1
40 TABLET, EXTENDED RELEASE ORAL ONCE
Status: COMPLETED | OUTPATIENT
Start: 2023-10-29 | End: 2023-10-29

## 2023-10-29 RX ADMIN — TRAMADOL HYDROCHLORIDE 50 MG: 50 TABLET, COATED ORAL at 07:10

## 2023-10-29 RX ADMIN — SODIUM BICARBONATE 650 MG: 650 TABLET ORAL at 09:10

## 2023-10-29 RX ADMIN — Medication 400 UNITS: at 09:10

## 2023-10-29 RX ADMIN — TRAMADOL HYDROCHLORIDE 50 MG: 50 TABLET, COATED ORAL at 09:10

## 2023-10-29 RX ADMIN — BUPROPION HYDROCHLORIDE 150 MG: 150 TABLET, FILM COATED, EXTENDED RELEASE ORAL at 09:10

## 2023-10-29 RX ADMIN — GABAPENTIN 100 MG: 100 CAPSULE ORAL at 09:10

## 2023-10-29 RX ADMIN — APIXABAN 5 MG: 5 TABLET, FILM COATED ORAL at 09:10

## 2023-10-29 RX ADMIN — FOLIC ACID 1000 MCG: 1 TABLET ORAL at 09:10

## 2023-10-29 RX ADMIN — SODIUM CHLORIDE: 9 INJECTION, SOLUTION INTRAVENOUS at 03:10

## 2023-10-29 RX ADMIN — AMLODIPINE BESYLATE 10 MG: 5 TABLET ORAL at 08:10

## 2023-10-29 RX ADMIN — MULTIPLE VITAMINS W/ MINERALS TAB 1 TABLET: TAB at 09:10

## 2023-10-29 RX ADMIN — APIXABAN 5 MG: 5 TABLET, FILM COATED ORAL at 08:10

## 2023-10-29 RX ADMIN — Medication 1000 MG: at 09:10

## 2023-10-29 RX ADMIN — METOPROLOL SUCCINATE 50 MG: 50 TABLET, EXTENDED RELEASE ORAL at 09:10

## 2023-10-29 RX ADMIN — SODIUM CHLORIDE: 9 INJECTION, SOLUTION INTRAVENOUS at 09:10

## 2023-10-29 RX ADMIN — POTASSIUM CHLORIDE 40 MEQ: 1500 TABLET, EXTENDED RELEASE ORAL at 01:10

## 2023-10-29 RX ADMIN — FAMOTIDINE 20 MG: 20 TABLET, FILM COATED ORAL at 09:10

## 2023-10-29 RX ADMIN — AMIODARONE HYDROCHLORIDE 200 MG: 200 TABLET ORAL at 09:10

## 2023-10-29 RX ADMIN — POTASSIUM CHLORIDE 20 MEQ: 1500 TABLET, EXTENDED RELEASE ORAL at 09:10

## 2023-10-29 RX ADMIN — DOCUSATE SODIUM 100 MG: 100 CAPSULE, LIQUID FILLED ORAL at 08:10

## 2023-10-29 RX ADMIN — DOCUSATE SODIUM 100 MG: 100 CAPSULE, LIQUID FILLED ORAL at 09:10

## 2023-10-29 RX ADMIN — GABAPENTIN 100 MG: 100 CAPSULE ORAL at 08:10

## 2023-10-29 RX ADMIN — ROPINIROLE HYDROCHLORIDE 0.5 MG: 0.25 TABLET, FILM COATED ORAL at 04:10

## 2023-10-29 RX ADMIN — SODIUM BICARBONATE 650 MG: 650 TABLET ORAL at 04:10

## 2023-10-29 RX ADMIN — METOPROLOL SUCCINATE 50 MG: 50 TABLET, EXTENDED RELEASE ORAL at 08:10

## 2023-10-29 RX ADMIN — ATORVASTATIN CALCIUM 10 MG: 10 TABLET, FILM COATED ORAL at 09:10

## 2023-10-29 RX ADMIN — SODIUM BICARBONATE 650 MG: 650 TABLET ORAL at 08:10

## 2023-10-29 RX ADMIN — ASPIRIN 81 MG: 81 TABLET, COATED ORAL at 09:10

## 2023-10-29 RX ADMIN — SODIUM CHLORIDE: 9 INJECTION, SOLUTION INTRAVENOUS at 05:10

## 2023-10-29 RX ADMIN — CEFTRIAXONE SODIUM 1 G: 1 INJECTION, POWDER, FOR SOLUTION INTRAMUSCULAR; INTRAVENOUS at 03:10

## 2023-10-29 RX ADMIN — AMLODIPINE BESYLATE 10 MG: 5 TABLET ORAL at 09:10

## 2023-10-29 NOTE — PLAN OF CARE
Problem: Adult Inpatient Plan of Care  Goal: Plan of Care Review  Outcome: Ongoing, Progressing  Flowsheets (Taken 10/29/2023 0143)  Plan of Care Reviewed With: patient  Goal: Patient-Specific Goal (Individualized)  Outcome: Ongoing, Progressing  Goal: Absence of Hospital-Acquired Illness or Injury  Outcome: Ongoing, Progressing  Intervention: Identify and Manage Fall Risk  Flowsheets (Taken 10/29/2023 0146)  Safety Promotion/Fall Prevention:   assistive device/personal item within reach   Fall Risk reviewed with patient/family   Fall Risk signage in place   medications reviewed   nonskid shoes/socks when out of bed  Intervention: Prevent Skin Injury  Flowsheets (Taken 10/29/2023 0146)  Body Position:   position changed independently   position maintained   supine  Skin Protection:   adhesive use limited   transparent dressing maintained   incontinence pads utilized  Intervention: Prevent and Manage VTE (Venous Thromboembolism) Risk  Flowsheets (Taken 10/29/2023 0146)  Activity Management: Rolling - L1  VTE Prevention/Management:   ambulation promoted   bleeding risk assessed   ROM (passive) performed   ROM (active) performed   intravenous hydration   dorsiflexion/plantar flexion performed   fluids promoted  Range of Motion:   active ROM (range of motion) encouraged   ROM (range of motion) performed  Intervention: Prevent Infection  Flowsheets (Taken 10/29/2023 0146)  Infection Prevention:   rest/sleep promoted   single patient room provided  Goal: Optimal Comfort and Wellbeing  Outcome: Ongoing, Progressing  Intervention: Monitor Pain and Promote Comfort  Flowsheets (Taken 10/29/2023 0146)  Pain Management Interventions:   care clustered   pain management plan reviewed with patient/caregiver   medication offered  Intervention: Provide Person-Centered Care  Flowsheets (Taken 10/29/2023 0146)  Trust Relationship/Rapport:   questions encouraged   choices provided   reassurance provided   emotional support provided    thoughts/feelings acknowledged   empathic listening provided   questions answered   care explained  Goal: Readiness for Transition of Care  Outcome: Ongoing, Progressing

## 2023-10-29 NOTE — PT/OT/SLP EVAL
[facility]  Speech Language Pathology Department  Clinical Swallow Evaluation    Patient Name:  Debra Puentes   MRN:  [id number]    Recommendations:     General recommendations:  SLP intervention not indicated  Diet texture/consistency recommendations:  Regular solids (IDDSI 7) and thin liquids (IDDSI 0)  Medications: per patient preference  Swallow strategies/precautions: small bites/sips and slow rate  Precautions: Standard,      History:     Past Medical History:   Diagnosis Date    Cancer     Chronic kidney disease, unspecified     Diabetes mellitus, type 2     History of arteriovenostomy for renal dialysis     Hyperlipidemia     Hypertension      Past Surgical History:   Procedure Laterality Date    HYSTERECTOMY      KIDNEY STONE SURGERY      TONSILLECTOMY       Home diet texture/consistency: Regular and thin liquids      Imaging   No results found for this or any previous visit.    No results found for this or any previous visit.    No results found for this or any previous visit.    Subjective     Patient awake, alert, and cooperative.    Pain/Comfort: Pain Rating 1: 0/10      Objective:     ORAL MUSCULATURE  Dentition: own teeth  Secretion Management: adequate  Facial Movement: WFL  Buccal Strength & Mobility: WFL  Mandibular Strength & Mobility: WFL  Oral Labial Strength & Mobility: WFL  Lingual Strength & Mobility: WFL  Velar Elevation: WFL  Vocal Quality: adequate      Consistency Fed By Oral Symptoms Pharyngeal Symptoms   Thin liquid by straw Self None None   Puree Self None None   Chewable solid Self None None     Assessment:     No sings/symptoms of aspiration. ST to sign off.    Goals:     Multidisciplinary Problems       SLP Goals       Not on file                  Patient Education:     Patient provided with verbal education regarding POC.  Understanding was verbalized.    Plan:     SLP Follow-Up:  No   Patient to be seen:      Plan of Care expires:     Plan of Care  reviewed with:  patient      Time Tracking:     SLP Treatment Date:   10/29/23  Speech Start Time:  1100  Speech Stop Time:  1120     Speech Total Time (min):  20 min    Billable minutes:  Swallow and Oral Function Evaluation, 20 minutes     10/29/2023

## 2023-10-29 NOTE — PLAN OF CARE
Problem: Adult Inpatient Plan of Care  Goal: Plan of Care Review  Outcome: Ongoing, Progressing  Goal: Patient-Specific Goal (Individualized)  Outcome: Ongoing, Progressing  Goal: Absence of Hospital-Acquired Illness or Injury  Outcome: Ongoing, Progressing  Intervention: Prevent and Manage VTE (Venous Thromboembolism) Risk  Flowsheets (Taken 10/29/2023 1607)  Activity Management:   Heel slide - L1   Leg kicks - L2   Partial stand - L2  Goal: Optimal Comfort and Wellbeing  Outcome: Ongoing, Progressing  Goal: Readiness for Transition of Care  Outcome: Ongoing, Progressing     Problem: Diabetes Comorbidity  Goal: Blood Glucose Level Within Targeted Range  Outcome: Ongoing, Progressing  Intervention: Monitor and Manage Glycemia  Flowsheets (Taken 10/29/2023 1607)  Glycemic Management: blood glucose monitored     Problem: Fluid and Electrolyte Imbalance (Acute Kidney Injury/Impairment)  Goal: Fluid and Electrolyte Balance  Outcome: Ongoing, Progressing     Problem: Renal Function Impairment (Acute Kidney Injury/Impairment)  Goal: Effective Renal Function  Outcome: Ongoing, Progressing     Problem: Oral Intake Inadequate (Acute Kidney Injury/Impairment)  Goal: Optimal Nutrition Intake  Outcome: Ongoing, Progressing     Problem: Fall Injury Risk  Goal: Absence of Fall and Fall-Related Injury  Outcome: Ongoing, Progressing     Problem: Skin Injury Risk Increased  Goal: Skin Health and Integrity  Outcome: Ongoing, Progressing     Problem: Impaired Wound Healing  Goal: Optimal Wound Healing  Outcome: Ongoing, Progressing

## 2023-10-29 NOTE — PROGRESS NOTES
Nephrology consult follow up note    HPI:      Debra Puentes is a 77 y.o. female with back pain, and decreased p.o. intake.  Past medical history significant for CKD 4, diabetes, hypertension, and hyperlipidemia.  She presents with 1 week of worsening back pain.  She says that it has caused her to essentially not be able to move.  Due to this her appetite has decreased.  Water intake has also decreased, she estimates she was only drinking about 500 mL to 1 L daily over the last few days.  Of note patient was reportedly also started on antibiotics 2 weeks ago for a urinary tract infection by her urologist.  She states she has finished antibiotics.  She denies taking NSAIDs.  She only takes Tylenol, and Robaxin for back pain.  Of note she was seen in in the emergency department 2 days ago and found to have a T12 compression fracture.  She sees Dr. Smith in clinic.  Last seen in August at which time Farxiga was started.  Of note patient did require hemodialysis temporarily in 2020.  Her baseline creatinine appears to be around 2.6-3.0.  Creatinine at admission had worsened to 7.2 which improved to 6.6 at time of Nephrology consultation with IV fluids overnight.  She also has metabolic acidosis with bicarb of 14.  Urinalysis shows 30 protein, trace occult blood, large leukocytes, 21-50 WBC, 0-2 RBC.  She denies chest pain, shortness of breath, nausea, vomiting, or lower extremity edema.    Interval history:     10/29/23- renal indices continue to trend down. Mild hypokalemia  Appetite improved, she says she ate a good breakfast today finally for the first time in days, po fluid intake--still eating ice chips.     Review of Systems:     Comprehensive 10pt ROS negative except as noted per HPI.       Past medical, family, surgical, and social history reviewed and unchanged from initial consult note.     MAR reviewed. See bottom of note for current medications.    Objective:       VITAL SIGNS: 24 HR MIN & MAX LAST     Temp  Min: 97.5 °F (36.4 °C)  Max: 98 °F (36.7 °C)  98 °F (36.7 °C)        BP  Min: 121/67  Max: 142/86  130/72     Pulse  Min: 76  Max: 77  76     Resp  Min: 16  Max: 20  18    SpO2  Min: 93 %  Max: 95 %  95 %      GEN: Chronically ill appearing in NAD  CV: RRR without murmur  PULM: CTAB, unlabored  ABD: Soft, NT/ND abdomen with NABS  EXT: No cyanosis or edema  SKIN: Warm and dry  PSYCH: mood and affect appropriate  Vascular access: no HD access  24h I&O:      Intake/Output Summary (Last 24 hours) at 10/29/2023 1131  Last data filed at 10/29/2023 0725  Gross per 24 hour   Intake 1171.85 ml   Output 1400 ml   Net -228.15 ml            Component Value Date/Time     10/29/2023 0403     10/28/2023 0336     (L) 07/02/2020 0600     06/11/2020 2324    K 3.4 (L) 10/29/2023 0403    K 3.3 (L) 10/28/2023 0336    K 3.9 07/02/2020 0600    K 3.0 (L) 06/11/2020 2324    CL 92 (L) 07/02/2020 0600    CL 95 (L) 06/11/2020 2324    CO2 23 10/29/2023 0403    CO2 23 10/28/2023 0336    CO2 30 07/02/2020 0600    CO2 30 06/11/2020 2324    .5 (H) 10/29/2023 0403    .5 (H) 10/28/2023 0336    BUN 17 07/02/2020 0600    BUN 33 (H) 06/11/2020 2324    CREATININE 4.18 (H) 10/29/2023 0403    CREATININE 5.33 (H) 10/28/2023 0336    CREATININE 2.57 (H) 07/02/2020 0600    CREATININE 6.33 (H) 06/11/2020 2324    CALCIUM 8.4 10/29/2023 0403    CALCIUM 8.7 10/28/2023 0336    CALCIUM 7.6 (L) 07/02/2020 0600    CALCIUM 7.9 (L) 06/11/2020 2324    PHOS 4.5 10/29/2023 0403            Component Value Date/Time    WBC 7.93 10/29/2023 0403    WBC 8.01 10/28/2023 0336    WBC 6.4 06/01/2022 1205    HGB 13.7 10/29/2023 0403    HGB 13.6 10/28/2023 0336    HCT 39.6 10/29/2023 0403    HCT 40.4 10/28/2023 0336     10/29/2023 0403     10/28/2023 0336     Imaging reviewed      Assessment / Plan:       Active Hospital Problems    Diagnosis  POA    *Acute kidney injury superimposed on chronic kidney disease [N17.9, N18.9]   Yes    Compression fracture of T12 vertebra [S22.080A]  Yes    UTI (urinary tract infection) [N39.0]  Yes    HTN (hypertension) [I10]  Yes    HLD (hyperlipidemia) [E78.5]  Yes    T2DM (type 2 diabetes mellitus) [E11.9]  Yes    Paroxysmal A-fib [I48.0]  Yes      Resolved Hospital Problems   No resolved problems to display.     Acute kidney injury on CKD 4 - baseline creatinine 2.6-3.0.  Follows with Dr. Smith.  Metabolic acidosis  Back pain/T12 compression fracture  Hypokalemia--started on po KCl    Continue IVF. Recheck labs in am. Again encouraged po intake.     Kerry Calzada NP      Current Facility-Administered Medications   Medication Dose Route Frequency Provider Last Rate Last Admin    0.9%  NaCl infusion   Intravenous Continuous Derick Andrews MD 75 mL/hr at 10/29/23 0507 New Bag at 10/29/23 0507    0.9%  NaCl infusion   Intravenous PRN Abdirizak Rondon MD 5 mL/hr at 10/29/23 0725 Rate Verify at 10/29/23 0725    acetaminophen tablet 650 mg  650 mg Oral Q6H PRN Patricia Sevilla MD        amiodarone tablet 200 mg  200 mg Oral Daily Patricia Sevilla MD   200 mg at 10/29/23 0906    amLODIPine tablet 10 mg  10 mg Oral BID Patricia Sevilla MD   10 mg at 10/29/23 0906    apixaban tablet 5 mg  5 mg Oral BID Derick Andrews MD   5 mg at 10/29/23 0906    ascorbic acid (vitamin C) tablet 1,000 mg  1,000 mg Oral Daily Patricia Sevilla MD   1,000 mg at 10/29/23 0906    aspirin EC tablet 81 mg  81 mg Oral Daily Patricia Sevilla MD   81 mg at 10/29/23 0906    atorvastatin tablet 10 mg  10 mg Oral Daily Patricia Sevilla MD   10 mg at 10/29/23 0907    buPROPion TB24 tablet 150 mg  150 mg Oral Daily Patricia Sevilla MD   150 mg at 10/29/23 0906    cefTRIAXone (ROCEPHIN) 1 g in dextrose 5 % in water (D5W) 100 mL IVPB (MB+)  1 g Intravenous Q24H Dinorah Lay MD   Stopped at 10/29/23 0428    dextrose 10% bolus 125 mL 125 mL  12.5 g Intravenous PRN Patricia Sevilla MD         dextrose 10% bolus 250 mL 250 mL  25 g Intravenous PRN Patricia Sevilla MD        docusate sodium capsule 100 mg  100 mg Oral BID Patricia Sevilla MD   100 mg at 10/29/23 0906    famotidine tablet 20 mg  20 mg Oral Daily Derick Andrews MD   20 mg at 10/29/23 0906    folic acid tablet 1,000 mcg  1,000 mcg Oral Daily Patricia Sevilla MD   1,000 mcg at 10/29/23 0906    gabapentin capsule 100 mg  100 mg Oral BID Derick Andrews MD   100 mg at 10/29/23 0906    glucagon (human recombinant) injection 1 mg  1 mg Intramuscular PRN Patricia Sevilla MD        glucose chewable tablet 16 g  16 g Oral PRN Patricia Sevilla MD        glucose chewable tablet 24 g  24 g Oral PRN Patricia Sevilla MD        insulin aspart U-100 injection 0-10 Units  0-10 Units Subcutaneous QID (AC + HS) PRN Patricia Sevilla MD   2 Units at 10/28/23 1301    metoprolol succinate (TOPROL-XL) 24 hr tablet 50 mg  50 mg Oral BID Patricia Sevilla MD   50 mg at 10/29/23 0906    morphine injection 2 mg  2 mg Intravenous Q4H PRN Dinorah Lay MD   2 mg at 10/27/23 1503    multivitamin tablet  1 tablet Oral Daily Patricia Sevilla MD   1 tablet at 10/29/23 0906    ondansetron injection 4 mg  4 mg Intravenous Q6H PRN Patricia Sevilla MD   4 mg at 10/27/23 0827    rOPINIRole tablet 0.5 mg  0.5 mg Oral Daily Patricia Sevilla MD   0.5 mg at 10/28/23 1641    sodium bicarbonate tablet 650 mg  650 mg Oral TID Noah Jung DO   650 mg at 10/29/23 0906    traMADoL tablet 50 mg  50 mg Oral Q8H PRN Ladonna Finley FNP   50 mg at 10/29/23 0907    vitamin E (dl, acetate) capsule 400 Units  400 Units Oral Daily Patricia Sevilla MD   400 Units at 10/29/23 0906

## 2023-10-29 NOTE — PROGRESS NOTES
MRI done yesterday-Shows T12 fracture, no stenosis.  MOVES legs well.  Will do brace/therapy.  No surgical intervention at this time.  Will sign off-call if needed.

## 2023-10-29 NOTE — PROGRESS NOTES
[facility] Hospital Medicine Progress Note        Chief Complaint: Inpatient Follow-up for back pain    HPI:   69-year-old obese woman with history of AFib on Xarelto, T2 dm, CKD, hypertension, hyperlipidemia presented with worsening of lower back pain, generalized weakness.  Patient had a fall 2 days ago, diagnosed with T12 vertebral fracture.  She denied any loss of bladder or bowels.  At admission she was normotensive and hemodynamically stable.  Lab work revealed elevated BUN and serum creatinine, cloudy urine with multiple white cells.  Urine sample were sent for cultures.  Recent CT scan obtained on 10/23/2023 revealed atrophy of left kidney, nonobstructing nephrolithiasis in the lower pole of the left kidney, cholelithiasis, umbilical hernia, diverticulosis.    MRI spine spine revealed T12 vertebral body compression deformity with multilevel spondylitic changes.  Neurosurgery recommended brace and no surgical intervention.  Nephrology was consulted for LORI.  Bicarb drip was added admission due to metabolic acidosis and later transitioned to normal saline.  P.o. bicarb was continued.      Interval Hx:       Hemodynamically stable.  Except for the pain she has no new complaints or concerns.  Caregiver was at bedside.  She was alert, oriented, denies any new complaints or concerns.  Morning labs revealed mild hypokalemia, stable bicarb    Urine cultures revealing Proteus presumptively    Objective/physical exam:  Vitals:    10/28/23 2123 10/28/23 2213 10/29/23 0009 10/29/23 0359   BP: 126/76 126/76 121/67 (!) 142/86   Pulse: 76 76 76 77   Resp: 18  18 20   Temp: 97.5 °F (36.4 °C)  97.7 °F (36.5 °C) 97.8 °F (36.6 °C)   TempSrc: Oral  Oral Axillary   SpO2: (!) 94%  (!) 94% (!) 93%   Weight:       Height:         General: In no acute distress, afebrile  Respiratory: Clear to auscultation bilaterally  Cardiovascular: S1, S2, no appreciable murmur  Abdomen: Soft, nontender, BS +  MSK:  Warm, no lower extremity edema, no clubbing or cyanosis  Neurologic: Alert and oriented x4, bilateral lower extremities 1-2/5.  Upper extremities intact    Lab Results   Component Value Date     10/29/2023    K 3.4 (L) 10/29/2023    CL 92 (L) 07/02/2020    CO2 23 10/29/2023    .5 (H) 10/29/2023    CREATININE 4.18 (H) 10/29/2023    CALCIUM 8.4 10/29/2023    ANIONGAP 7 (L) 07/02/2020    ESTGFRAFRICA 22 07/02/2020    EGFRNONAA 17 07/21/2022      Lab Results   Component Value Date    ALT 15 10/29/2023    AST 22 10/29/2023    ALKPHOS 101 10/29/2023    BILITOT 0.5 10/29/2023      Lab Results   Component Value Date    WBC 7.93 10/29/2023    HGB 13.7 10/29/2023    HCT 39.6 10/29/2023    MCV 94.1 (H) 10/29/2023     10/29/2023           Medications:   amiodarone  200 mg Oral Daily    amLODIPine  10 mg Oral BID    apixaban  5 mg Oral BID    ascorbic acid (vitamin C)  1,000 mg Oral Daily    aspirin  81 mg Oral Daily    atorvastatin  10 mg Oral Daily    buPROPion  150 mg Oral Daily    cefTRIAXone (ROCEPHIN) IVPB  1 g Intravenous Q24H    docusate sodium  100 mg Oral BID    famotidine  20 mg Oral Daily    folic acid  1,000 mcg Oral Daily    gabapentin  100 mg Oral BID    metoprolol succinate  50 mg Oral BID    multivitamin  1 tablet Oral Daily    potassium chloride  20 mEq Oral Once    rOPINIRole  0.5 mg Oral Daily    sodium bicarbonate  650 mg Oral TID    vitamin E (dl, acetate)  400 Units Oral Daily      sodium chloride 0.9%, acetaminophen, dextrose 10%, dextrose 10%, glucagon (human recombinant), glucose, glucose, insulin aspart U-100, morphine, ondansetron, traMADoL     Assessment/Plan:    T12 vertebral body compression fracture   Impingement of L2-L4  nerve roots, severe bilateral neural foraminal narrowing  Multilevel spondylitic changes  Bilateral lower extremity weakness due to above   LORI on CKD-improving  Metabolic acidosis   Possibly Proteus UTI-POA    HX:  T2 dm, AFib, HTN, HLD,  ACD    Plan:  -neurosurgery recommendations noted.  Continue brace, therapy as tolerated.  Analgesics as needed  - Nephrology following.  Minimally improving LORI.  Replace potassium  -switched to Eliquis due to CKD.  Other home medications were reviewed  -continue ceftriaxone for 3 days.  Follow cultures until finalized  -continue current insulin regimen     Charlotte Andrews MD

## 2023-10-29 NOTE — NURSING
Nurses Note -- 4 Eyes      10/29/2023   6:29 AM      Skin assessed during: Admit      [] No Altered Skin Integrity Present    []Prevention Measures Documented      [x] Yes- Altered Skin Integrity Present or Discovered   [x] LDA Added if Not in Epic (Describe Wound)   [] New Altered Skin Integrity was Present on Admit and Documented in LDA   [x] Wound Image Taken    Wound Care Consulted? Yes    Attending Nurse:  Leonila Salas RN     Second RN/Staff Member:  Jagdeep Jeter CNA

## 2023-10-30 LAB
ALBUMIN SERPL-MCNC: 2.1 G/DL (ref 3.4–4.8)
ALBUMIN/GLOB SERPL: 0.8 RATIO (ref 1.1–2)
ALP SERPL-CCNC: 104 UNIT/L (ref 40–150)
ALT SERPL-CCNC: 18 UNIT/L (ref 0–55)
AST SERPL-CCNC: 19 UNIT/L (ref 5–34)
BASOPHILS # BLD AUTO: 0.04 X10(3)/MCL
BASOPHILS NFR BLD AUTO: 0.5 %
BILIRUB SERPL-MCNC: 0.4 MG/DL
BUN SERPL-MCNC: 93.7 MG/DL (ref 9.8–20.1)
CALCIUM SERPL-MCNC: 7.8 MG/DL (ref 8.4–10.2)
CHLORIDE SERPL-SCNC: 106 MMOL/L (ref 98–107)
CO2 SERPL-SCNC: 26 MMOL/L (ref 23–31)
CREAT SERPL-MCNC: 3.47 MG/DL (ref 0.55–1.02)
EOSINOPHIL # BLD AUTO: 0.42 X10(3)/MCL (ref 0–0.9)
EOSINOPHIL NFR BLD AUTO: 5.2 %
ERYTHROCYTE [DISTWIDTH] IN BLOOD BY AUTOMATED COUNT: 13.6 % (ref 11.5–17)
GFR SERPLBLD CREATININE-BSD FMLA CKD-EPI: 13 MLS/MIN/1.73/M2
GLOBULIN SER-MCNC: 2.6 GM/DL (ref 2.4–3.5)
GLUCOSE SERPL-MCNC: 98 MG/DL (ref 70–110)
GLUCOSE SERPL-MCNC: 98 MG/DL (ref 82–115)
HCT VFR BLD AUTO: 38.4 % (ref 37–47)
HGB BLD-MCNC: 12.7 G/DL (ref 12–16)
IMM GRANULOCYTES # BLD AUTO: 0.18 X10(3)/MCL (ref 0–0.04)
IMM GRANULOCYTES NFR BLD AUTO: 2.2 %
LYMPHOCYTES # BLD AUTO: 1.06 X10(3)/MCL (ref 0.6–4.6)
LYMPHOCYTES NFR BLD AUTO: 13.1 %
MAGNESIUM SERPL-MCNC: 1.9 MG/DL (ref 1.6–2.6)
MCH RBC QN AUTO: 31.5 PG (ref 27–31)
MCHC RBC AUTO-ENTMCNC: 33.1 G/DL (ref 33–36)
MCV RBC AUTO: 95.3 FL (ref 80–94)
MONOCYTES # BLD AUTO: 1.19 X10(3)/MCL (ref 0.1–1.3)
MONOCYTES NFR BLD AUTO: 14.7 %
NEUTROPHILS # BLD AUTO: 5.21 X10(3)/MCL (ref 2.1–9.2)
NEUTROPHILS NFR BLD AUTO: 64.3 %
NRBC BLD AUTO-RTO: 0 %
PHOSPHATE SERPL-MCNC: 4 MG/DL (ref 2.3–4.7)
PLATELET # BLD AUTO: 239 X10(3)/MCL (ref 130–400)
PMV BLD AUTO: 10.3 FL (ref 7.4–10.4)
POCT GLUCOSE: 162 MG/DL (ref 70–110)
POCT GLUCOSE: 170 MG/DL (ref 70–110)
POTASSIUM SERPL-SCNC: 3.7 MMOL/L (ref 3.5–5.1)
PROT SERPL-MCNC: 4.7 GM/DL (ref 5.8–7.6)
RBC # BLD AUTO: 4.03 X10(6)/MCL (ref 4.2–5.4)
SODIUM SERPL-SCNC: 141 MMOL/L (ref 136–145)
WBC # SPEC AUTO: 8.1 X10(3)/MCL (ref 4.5–11.5)

## 2023-10-30 PROCEDURE — 25000003 PHARM REV CODE 250: Performed by: NURSE PRACTITIONER

## 2023-10-30 PROCEDURE — 80053 COMPREHEN METABOLIC PANEL: CPT | Performed by: INTERNAL MEDICINE

## 2023-10-30 PROCEDURE — 25000003 PHARM REV CODE 250: Performed by: HOSPITALIST

## 2023-10-30 PROCEDURE — 25000003 PHARM REV CODE 250: Performed by: INTERNAL MEDICINE

## 2023-10-30 PROCEDURE — 83735 ASSAY OF MAGNESIUM: CPT | Performed by: INTERNAL MEDICINE

## 2023-10-30 PROCEDURE — 84100 ASSAY OF PHOSPHORUS: CPT | Performed by: INTERNAL MEDICINE

## 2023-10-30 PROCEDURE — 25000003 PHARM REV CODE 250: Performed by: EMERGENCY MEDICINE

## 2023-10-30 PROCEDURE — 97162 PT EVAL MOD COMPLEX 30 MIN: CPT

## 2023-10-30 PROCEDURE — 25000003 PHARM REV CODE 250: Performed by: STUDENT IN AN ORGANIZED HEALTH CARE EDUCATION/TRAINING PROGRAM

## 2023-10-30 PROCEDURE — 21400001 HC TELEMETRY ROOM

## 2023-10-30 PROCEDURE — 97166 OT EVAL MOD COMPLEX 45 MIN: CPT

## 2023-10-30 PROCEDURE — 85025 COMPLETE CBC W/AUTO DIFF WBC: CPT | Performed by: INTERNAL MEDICINE

## 2023-10-30 PROCEDURE — 63600175 PHARM REV CODE 636 W HCPCS: Performed by: EMERGENCY MEDICINE

## 2023-10-30 RX ADMIN — ATORVASTATIN CALCIUM 10 MG: 10 TABLET, FILM COATED ORAL at 08:10

## 2023-10-30 RX ADMIN — ASPIRIN 81 MG: 81 TABLET, COATED ORAL at 08:10

## 2023-10-30 RX ADMIN — FAMOTIDINE 20 MG: 20 TABLET, FILM COATED ORAL at 08:10

## 2023-10-30 RX ADMIN — DOCUSATE SODIUM 100 MG: 100 CAPSULE, LIQUID FILLED ORAL at 08:10

## 2023-10-30 RX ADMIN — APIXABAN 5 MG: 5 TABLET, FILM COATED ORAL at 08:10

## 2023-10-30 RX ADMIN — Medication 1000 MG: at 08:10

## 2023-10-30 RX ADMIN — SODIUM BICARBONATE 650 MG: 650 TABLET ORAL at 04:10

## 2023-10-30 RX ADMIN — AMIODARONE HYDROCHLORIDE 200 MG: 200 TABLET ORAL at 08:10

## 2023-10-30 RX ADMIN — SODIUM BICARBONATE 650 MG: 650 TABLET ORAL at 08:10

## 2023-10-30 RX ADMIN — AMLODIPINE BESYLATE 10 MG: 5 TABLET ORAL at 08:10

## 2023-10-30 RX ADMIN — FOLIC ACID 1000 MCG: 1 TABLET ORAL at 08:10

## 2023-10-30 RX ADMIN — GABAPENTIN 100 MG: 100 CAPSULE ORAL at 08:10

## 2023-10-30 RX ADMIN — BUPROPION HYDROCHLORIDE 150 MG: 150 TABLET, FILM COATED, EXTENDED RELEASE ORAL at 08:10

## 2023-10-30 RX ADMIN — Medication: at 04:10

## 2023-10-30 RX ADMIN — CEFTRIAXONE SODIUM 1 G: 1 INJECTION, POWDER, FOR SOLUTION INTRAMUSCULAR; INTRAVENOUS at 03:10

## 2023-10-30 RX ADMIN — Medication 400 UNITS: at 09:10

## 2023-10-30 RX ADMIN — METOPROLOL SUCCINATE 50 MG: 50 TABLET, EXTENDED RELEASE ORAL at 08:10

## 2023-10-30 RX ADMIN — MULTIPLE VITAMINS W/ MINERALS TAB 1 TABLET: TAB at 08:10

## 2023-10-30 RX ADMIN — TRAMADOL HYDROCHLORIDE 50 MG: 50 TABLET, COATED ORAL at 01:10

## 2023-10-30 RX ADMIN — ROPINIROLE HYDROCHLORIDE 0.5 MG: 0.25 TABLET, FILM COATED ORAL at 04:10

## 2023-10-30 NOTE — PT/OT/SLP EVAL
Occupational Therapy  Evaluation    Name: Debra Puentes  MRN: [id number]  Recent Surgery: * No surgery found *      Recommendations:     Discharge therapy intensity: Moderate Intensity Therapy   Discharge Equipment Recommendations:  bedside commode  Barriers to discharge:   (pain, decreased mobillity 2/2 pain and weakness)    Assessment:     Debra Puentes is a 77 y.o. female with a medical diagnosis of fall per previous notes, pt reports she did not fall and does not know what happened to cause her back to break.  Pt with T12 compression fracture, LORI and L3 hemangioma, UTI and impingement of L2-4. Prior to admission pt was living in an assisted living facility, would ambulate with cane or RW to avoid falling but was IND with ADLs. Pt tolerated session fairly with some pain. Pt required MOD A x 2 for bed mobility and transfers and MIN A x2 for ambulation She presents with the following performance deficits affecting function: weakness, impaired endurance, pain, impaired balance, gait instability, impaired functional mobility, impaired self care skills, orthopedic precautions.     Rehab Prognosis: Good; patient would benefit from acute skilled OT services to address these deficits and reach maximum level of function.       Plan:     Patient to be seen 4 x/week to address the above listed problems via self-care/home management, therapeutic exercises, therapeutic activities  Plan of Care Expires: 11/30/23  Plan of Care Reviewed with: patient    Subjective     Chief Complaint: Lower back pain 6/10 sitting and 8/10 during activity  Patient/Family Comments/goals: To be able to walk around again and be ind    Occupational Profile:  Living Environment: Pt lived in assisted living facility with no LEXI, walk in shower with a shower chair and grab bars.  Previous level of function: IND  Roles and Routines: Friend  Equipment Used at Home: walker, rolling, cane, straight shower chair  Assistance upon Discharge:  TBD    Pain/Comfort:  Pain Rating 1: 8/10  Location - Orientation 1: lower  Location 1: back  Pain Addressed 1: Distraction, Cessation of Activity  Pain Rating Post-Intervention 1: 6/10    Patients cultural, spiritual, Yazidi conflicts given the current situation: no    Objective:     OT communicated with RN prior to session. Co-eval performed with PT Mahin    Patient was found supine with PureWick, peripheral IV upon OT entry to room.    General Precautions: Standard, fall  Orthopedic Precautions: N/A  Braces: LSO    Vital Signs: Respiratory Status: on room air    Bed Mobility:    Patient completed Rolling/Turning to Left with  minimum assistance  Patient completed Rolling/Turning to Right with minimum assistance  Patient completed Scooting/Bridging with moderate assistance  Patient completed Supine to Sit with moderate assistance  Pt with inc pain during bed mobility.    Functional Mobility/Transfers:  Patient completed Sit <> Stand Transfer with moderate assistance  with  rolling walker   Functional Mobility: Pt able to perform sit stand with MOD A x 2 and ambulate ~6 feet forward and backward with some increase in pain, pt reports it is harder to step on L foot 2/2 pain in lower back, pt required MIN A x 2 while ambulating. A gait belt was used for all transfers and mobility.    Activities of Daily Living:  Lower Body Dressing: max assistance pt required MOD A for shoe placement on toes pt able to slide foot into shoe, required assist for heel placement as well.  Toileting: total assistance pt using purewick, will order BSC for future toileting       Functional Cognition:  Intact    Visual Perceptual Skills:  Intact    Upper Extremity Function:  Right Upper Extremity:   Range of Motion: WFL except pt reports pain with movement  Strength: Impaired. 2/2 pain pt with gross MMT 3+/5    Left Upper Extremity:  Range of Motion: WFL except shoulder flexion/abduction ~90 degrees AROM and WFL with PROM, pt with  increased pain with movement  Strength: Impaired. 2/2 pain gross MMT 3+/5 and 3-/5 shoulder flexion/abduction    Balance:   Intact    Therapeutic Positioning  Risk for acquired pressure injuries is increased due to impaired mobility.    OT interventions performed during the course of today's session:   Education was provided on benefits of and recommendations for therapeutic positioning    Skin assessment: all bony prominences were assessed    Findings: no redness or breakdown noted preventative pad placed on sacrum, no redness or breakdown noted    OT recommendations for therapeutic positioning throughout hospitalization:   Follow [facility] Pressure Injury Prevention Protocol      Patient Education:  Patient provided with verbal education education regarding OT role/goals/POC, fall prevention, and safety awareness.  Understanding was verbalized.     Patient left HOB elevated with all lines intact, call button in reach, and sitter present    GOALS:   Multidisciplinary Problems       Occupational Therapy Goals          Problem: Occupational Therapy    Goal Priority Disciplines Outcome Interventions   Occupational Therapy Goal     OT, PT/OT Ongoing, Progressing    Description: Goals to be met by: 11/30/2023    Patient will increase functional independence with ADLs by performing:    LE Dressing with MIN A.  Grooming while standing at sink with Modified [place].  Toileting from bedside commode with Stand-by Assistance for hygiene and clothing management.   Sitting at edge of bed x5 minutes with SBA with a MAX of 1 UE for stability.  Supine to sit/scooting and bridging with Modified [place].  Toilet transfer to bedside commode with Minimal Assistance.                         History:     Past Medical History:   Diagnosis Date    Cancer     Chronic kidney disease, unspecified     Diabetes mellitus, type 2     History of arteriovenostomy for renal dialysis     Hyperlipidemia     Hypertension          Past Surgical  History:   Procedure Laterality Date    HYSTERECTOMY      KIDNEY STONE SURGERY      TONSILLECTOMY         Time Tracking:     OT Date of Treatment:    OT Start Time: 1052  OT Stop Time: 1125  OT Total Time (min): 33 min    Billable Minutes:Evaluation MOD    10/30/2023

## 2023-10-30 NOTE — PLAN OF CARE
Problem: Adult Inpatient Plan of Care  Goal: Plan of Care Review  Outcome: Ongoing, Progressing  Flowsheets (Taken 10/29/2023 1925)  Plan of Care Reviewed With: patient  Goal: Patient-Specific Goal (Individualized)  Outcome: Ongoing, Progressing  Goal: Absence of Hospital-Acquired Illness or Injury  Outcome: Ongoing, Progressing  Intervention: Identify and Manage Fall Risk  Flowsheets (Taken 10/29/2023 1925)  Safety Promotion/Fall Prevention:   assistive device/personal item within reach   /camera at bedside   side rails raised x 3  Intervention: Prevent Skin Injury  Flowsheets (Taken 10/29/2023 1925)  Body Position:   position changed independently   weight shifting  Skin Protection: tubing/devices free from skin contact  Intervention: Prevent and Manage VTE (Venous Thromboembolism) Risk  Flowsheets (Taken 10/29/2023 1925)  VTE Prevention/Management:   dorsiflexion/plantar flexion performed   remove, assess skin, and reapply sequential compression device   ROM (active) performed   ROM (passive) performed  Range of Motion:   active ROM (range of motion) encouraged   ROM (range of motion) performed  Intervention: Prevent Infection  Flowsheets (Taken 10/29/2023 1925)  Infection Prevention:   environmental surveillance performed   single patient room provided   visitors restricted/screened   equipment surfaces disinfected   hand hygiene promoted   personal protective equipment utilized   rest/sleep promoted   cohorting utilized  Goal: Optimal Comfort and Wellbeing  Outcome: Ongoing, Progressing  Intervention: Monitor Pain and Promote Comfort  Flowsheets (Taken 10/29/2023 1925)  Pain Management Interventions:   care clustered   medication offered   relaxation techniques promoted   quiet environment facilitated   position adjusted  Intervention: Provide Person-Centered Care  Flowsheets (Taken 10/29/2023 1925)  Trust Relationship/Rapport:   care explained   reassurance provided   choices provided    thoughts/feelings acknowledged   emotional support provided   empathic listening provided   questions answered   questions encouraged  Goal: Readiness for Transition of Care  Outcome: Ongoing, Progressing     Problem: Diabetes Comorbidity  Goal: Blood Glucose Level Within Targeted Range  Outcome: Ongoing, Progressing  Intervention: Monitor and Manage Glycemia  Flowsheets (Taken 10/29/2023 1925)  Glycemic Management:   blood glucose monitored   supplemental insulin given     Problem: Fluid and Electrolyte Imbalance (Acute Kidney Injury/Impairment)  Goal: Fluid and Electrolyte Balance  Outcome: Ongoing, Progressing     Problem: Oral Intake Inadequate (Acute Kidney Injury/Impairment)  Goal: Optimal Nutrition Intake  Outcome: Ongoing, Progressing  Intervention: Promote and Optimize Nutrition  Flowsheets (Taken 10/29/2023 1925)  Oral Nutrition Promotion: safe use of adaptive equipment encouraged     Problem: Renal Function Impairment (Acute Kidney Injury/Impairment)  Goal: Effective Renal Function  Outcome: Ongoing, Progressing     Problem: Fall Injury Risk  Goal: Absence of Fall and Fall-Related Injury  Outcome: Ongoing, Progressing     Problem: Skin Injury Risk Increased  Goal: Skin Health and Integrity  Outcome: Ongoing, Progressing     Problem: Impaired Wound Healing  Goal: Optimal Wound Healing  Outcome: Ongoing, Progressing  Intervention: Promote Wound Healing  Flowsheets (Taken 10/29/2023 1925)  Oral Nutrition Promotion: safe use of adaptive equipment encouraged  Pain Management Interventions:   care clustered   medication offered   relaxation techniques promoted   quiet environment facilitated   position adjusted

## 2023-10-30 NOTE — PROGRESS NOTES
[facility] Hospital Medicine Progress Note        Chief Complaint: Inpatient Follow-up     HPI:   69-year-old obese woman with history of AFib on Xarelto, T2 dm, CKD, hypertension, hyperlipidemia presented with worsening of lower back pain, generalized weakness.  Patient had a fall 2 days ago, diagnosed with T12 vertebral fracture.  She denied any loss of bladder or bowels.  At admission she was normotensive and hemodynamically stable.  Lab work revealed elevated BUN and serum creatinine, cloudy urine with multiple white cells.  Urine sample were sent for cultures.  Recent CT scan obtained on 10/23/2023 revealed atrophy of left kidney, nonobstructing nephrolithiasis in the lower pole of the left kidney, cholelithiasis, umbilical hernia, diverticulosis.     MRI spine spine revealed T12 vertebral body compression deformity with multilevel spondylitic changes.  Neurosurgery recommended brace and no surgical intervention.  Nephrology was consulted for LORI.  Bicarb drip was added admission due to metabolic acidosis and later transitioned to normal saline.  P.o. bicarb was continued.    Interval Hx:  No changes overnight.  Patient was completed IV antibiotics.  Renal function stable.  No new complaints.    Objective/physical exam:  General: In no acute distress, afebrile  Chest: Clear to auscultation bilaterally  Heart: RRR, +S1, S2, no appreciable murmur  Abdomen: Soft, nontender, BS +  MSK: Warm, no lower extremity edema, no clubbing or cyanosis  Neurologic: Alert and oriented x4, Cranial nerve II-XII intact, Strength 5/5 in all 4 extremities    VITAL SIGNS: 24 HRS MIN & MAX LAST   Temp  Min: 97.2 °F (36.2 °C)  Max: 98 °F (36.7 °C) 97.4 °F (36.3 °C)   BP  Min: 101/62  Max: 130/72 122/75   Pulse  Min: 60  Max: 76  73   Resp  Min: 16  Max: 18 16   SpO2  Min: 94 %  Max: 96 % 96 %       Recent Labs   Lab 10/28/23  0336 10/29/23  0403 10/30/23  0319   WBC 8.01 7.93 8.10   RBC 4.31 4.21 4.03*    HGB 13.6 13.7 12.7   HCT 40.4 39.6 38.4   MCV 93.7 94.1* 95.3*   MCH 31.6* 32.5* 31.5*   MCHC 33.7 34.6 33.1   RDW 13.6 13.7 13.6    253 239   MPV 10.2 10.3 10.3       Recent Labs   Lab 10/26/23  1900 10/27/23  0557 10/28/23  0336 10/29/23  0403 10/30/23  0319      < > 138 141 141   K 4.0   < > 3.3* 3.4* 3.7   CO2 15*   < > 23 23 26   .8*   < > 123.5* 105.5* 93.7*   CREATININE 7.29*   < > 5.33* 4.18* 3.47*   CALCIUM 9.7   < > 8.7 8.4 7.8*   MG 2.70*  --   --  2.30 1.90   ALBUMIN 2.8*  --   --  2.2* 2.1*   ALKPHOS 136  --   --  101 104   ALT 15  --   --  15 18   AST 17  --   --  22 19   BILITOT 0.7  --   --  0.5 0.4    < > = values in this interval not displayed.          Microbiology Results (last 7 days)       Procedure Component Value Units Date/Time    Urine culture [[id number]]  (Abnormal) Collected: 10/26/23 2132    Order Status: Completed Specimen: Urine Updated: 10/29/23 0911     Urine Culture >/= 100,000 colonies/ml Presumptive proteus species             Radiology:  MRI Lumbar Spine Without Contrast  Narrative: EXAMINATION:  MRI LUMBAR SPINE WITHOUT CONTRAST    CLINICAL HISTORY:  Low back pain, no red flags, no prior management;    TECHNIQUE:  Multiplanar, multisequence MR images were acquired from the thoracolumbar junction to the sacrum without the administration of contrast.    COMPARISON:  Same    FINDINGS:  Five lumbar type vertebral bodies.  The alignment curvature lumbar spine is normal.  The vertebral heights of the lumbar spine are within normal limits with marrow signal pattern maintained.  Diffuse intervertebral disc space narrowing is identified.  Compression deformity without retropulsion of the T12 vertebral body is identified.  Scattered anterior osteophytes are identified.    The conus medullaris is at the level of L1.  Proximal spinal cord is normal caliber.  The distal nerve roots are normal.    T12-L1: Ligament flavum hypertrophy and degenerative change of facets  without evidence for central canal stenosis.  Moderate bilateral neural foraminal narrowing is identified.    L1-L2: Transverse spondylotic ridge with ligament flavum hypertrophy and degenerative change of facets.  Encroachment upon the right lateral recesses identified with impingement of the transiting L2 nerve root.  Severe right and minimal left neural foraminal narrowing.    L2-L3: Transverse spondylotic ridge with ligament flavum hypertrophy and degenerative change of facets.  Encroachment upon the thecal sac is esther with impingement of the transiting L3 nerve root secondary to involvement of bilateral lateral recesses.  Minimal bilateral neural foraminal narrowing.    L3-L4: Transverse spondylotic ridge with ligament flavum hypertrophy and degenerative change of facets.  Involvement of bilateral lateral recesses with impingement of the transiting L4 nerve roots greater on the right than left with moderate right and minimal left neural foraminal narrowing.    L4-5: Transverse spondylotic ridge with ligament flavum hypertrophy and degenerative change of facets causing minimal central canal stenosis.  Involvement of bilateral lateral recesses with impingement of the transiting L5 nerve roots.  Severe left and minimal right neural foraminal narrowing.    L5-S1: Broad-based disc bulge with ligament flavum hypertrophy and degenerative change of facets without evidence for central canal stenosis or neural foraminal narrowing.    The degenerative changes of bilateral SI joints.  Atretic appearance of the kidneys with likely changes of chronic uremia.  Gallbladder is hydropic with cholelithiasis.  Impression: 1. Redemonstration of the T12 vertebral body compression deformity.  2. Multilevel spondylotic changes lumbar spine with level by level discussion as above.    Electronically signed by: Kar Kunz MD  Date:    10/28/2023  Time:    10:06  MRI Thoracic Spine Without Contrast  Narrative: EXAMINATION:  MRI THORACIC  SPINE WITHOUT CONTRAST    CLINICAL HISTORY:  Mid-back pain;    TECHNIQUE:  Multiplanar, multisequence images were performed through the thoracic spine.  Contrast was not administered.    COMPARISON:  10/28/2023 and 10/23/2023    FINDINGS:  The alignment curvature of the thoracic spine is normal.  Redemonstration of the compression deformity the T12 vertebral body without significant retropulsion.  The remaining vertebral heights are maintained with preservation of the marrow signal pattern.  Diffuse intervertebral disc space narrowing is identified with disc desiccation.  Scattered T2 and T1 hyperintensities are identified throughout the thoracic spine suggestive of hemangioma.    The spinal cord is of normal caliber.  No evidence for signal abnormality.  No central canal stenosis    The visualized mediastinum as well as of the hollow and solid viscera of the upper abdomen are grossly normal.  Cholelithiasis is noted.  Impression: Redemonstration of compression deformity of the T12 vertebral body without significant retropulsion.  No evidence for epidural hematoma.    Electronically signed by: Kar Kunz MD  Date:    10/28/2023  Time:    09:12      Scheduled Med:   amiodarone  200 mg Oral Daily    amLODIPine  10 mg Oral BID    apixaban  5 mg Oral BID    ascorbic acid (vitamin C)  1,000 mg Oral Daily    aspirin  81 mg Oral Daily    atorvastatin  10 mg Oral Daily    buPROPion  150 mg Oral Daily    docusate sodium  100 mg Oral BID    famotidine  20 mg Oral Daily    folic acid  1,000 mcg Oral Daily    gabapentin  100 mg Oral BID    metoprolol succinate  50 mg Oral BID    multivitamin  1 tablet Oral Daily    rOPINIRole  0.5 mg Oral Daily    sodium bicarbonate  650 mg Oral TID    vitamin E (dl, acetate)  400 Units Oral Daily        Continuous Infusions:   sodium chloride 0.9% 50 mL/hr at 10/29/23 2102        PRN Meds:  sodium chloride 0.9%, acetaminophen, dextrose 10%, dextrose 10%, glucagon (human recombinant), glucose,  glucose, insulin aspart U-100, morphine, ondansetron, traMADoL       Assessment/Plan:   T12 vertebral body compression fracture   Impingement of L2-L4  nerve roots, severe bilateral neural foraminal narrowing  Multilevel spondylitic changes  Bilateral lower extremity weakness due to above   LORI on CKD-improving  Metabolic acidosis   Possibly Proteus UTI-POA     HX:  T2 dm, AFib, HTN, HLD, ACD    Patient was completed IV antibiotics for UTI.  Discontinue Rocephin this morning.  Renal function slowly improving.  Creatinine 3.4 this morning.  Continue with gentle IV hydration.  Avoid nephrotoxic meds.    Vital signs are stable.  Continue with Eliquis for history of atrial fibrillation.  Needs PT and OT evaluation.    No further recommendations from Neurosurgery.  Needs brace for when out of bed.  Suspect she will require some placement at discharge      Peter Horton MD   10/30/2023     All diagnosis and differential diagnosis have been reviewed; assessment and plan has been documented; I have personally reviewed the labs and test results that are presently available; I have reviewed the patients medication list; I have reviewed the consulting providers response and recommendations. I have reviewed or attempted to review medical records based upon their availability    All of the patient's questions have been  addressed and answered. Patient's is agreeable to the above stated plan. I will continue to monitor closely and make adjustments to medical management as needed.  _____________________________________________________________________

## 2023-10-30 NOTE — PLAN OF CARE
10/30/23 1349   Discharge Assessment   Assessment Type Discharge Planning Assessment   Confirmed/corrected address, phone number and insurance Yes   Confirmed Demographics Correct on Facesheet   Source of Information patient   When was your last doctors appointment? 09/15/23   Communicated FRAN with patient/caregiver Date not available/Unable to determine   Reason For Admission b/l lower extermites weakness   People in Home alone   Facility Arrived From: [facility]""   Do you expect to return to your current living situation? Yes   Do you have help at home or someone to help you manage your care at home? Yes   Who are your caregiver(s) and their phone number(s)? [facility] sit[facility] 8hr/day temp while her sister Betzy is out of town   Prior to hospitilization cognitive status: Alert/Oriented   Current cognitive status: Alert/Oriented   Walking or Climbing Stairs ambulation difficulty, requires equipment   Mobility Management rollator, straight cane   Dressing/Bathing bathing difficulty, requires equipment   Dressing/Bathing Management shower chair   Home Accessibility wheelchair accessible   Home Layout Able to live on 1st floor   Equipment Currently Used at Home cane, straight;shower chair;rollator   Readmission within 30 days? No   Do you currently have service(s) that help you manage your care at home? Yes   Name and Contact number of agency [facility]   Is the pt/caregiver preference to resume services with current agency Yes   Do you take prescription medications? Yes   Do you have any problems affording any of your prescribed medications? No   Is the patient taking medications as prescribed? yes   Who is going to help you get home at discharge? facility   How do you get to doctors appointments? car, drives self   Are you on dialysis? No   DME Needed Upon Discharge  none   Discharge Plan discussed with: Patient   Transition of Care Barriers None   Discharge Plan A Skilled Nursing  Facility   Discharge Plan B Skilled Nursing Facility   Financial Resource Strain   How hard is it for you to pay for the very basics like food, housing, medical care, and heating? Not very   Housing Stability   In the last 12 months, was there a time when you were not able to pay the mortgage or rent on time? N   In the last 12 months, was there a time when you did not have a steady place to sleep or slept in a shelter (including now)? N   Transportation Needs   In the past 12 months, has lack of transportation kept you from medical appointments or from getting medications? no   In the past 12 months, has lack of transportation kept you from meetings, work, or from getting things needed for daily living? No   Food Insecurity   Within the past 12 months, you worried that your food would run out before you got the money to buy more. Never true   Social Connections   Are you , , , , never , or living with a partner? Never marrie     Pt states she lives in [facility]isted Living at [facility]. She is never  and no children. Her sister Betzy Anaya is her POA. Pt states she would like to do skilled rehab at [facility]. I will message April [facility] to start referral.

## 2023-10-30 NOTE — PLAN OF CARE
Problem: Occupational Therapy  Goal: Occupational Therapy Goal  Description: Goals to be met by: 11/30/2023    Patient will increase functional independence with ADLs by performing:    LE Dressing with MIN A.  Grooming while standing at sink with Modified [place].  Toileting from bedside commode with Stand-by Assistance for hygiene and clothing management.   Sitting at edge of bed x5 minutes with SBA with a MAX of 1 UE for stability.  Supine to sit/scooting and bridging with Modified [place].  Toilet transfer to bedside commode with Minimal Assistance.    Outcome: Ongoing, Progressing

## 2023-10-30 NOTE — PROGRESS NOTES
Nephrology consult follow up note    HPI:      Debra Puentes is a 77 y.o. female with back pain, and decreased p.o. intake.  Past medical history significant for CKD 4, diabetes, hypertension, and hyperlipidemia.  She presents with 1 week of worsening back pain.  She says that it has caused her to essentially not be able to move.  Due to this her appetite has decreased.  Water intake has also decreased, she estimates she was only drinking about 500 mL to 1 L daily over the last few days.  Of note patient was reportedly also started on antibiotics 2 weeks ago for a urinary tract infection by her urologist.  She states she has finished antibiotics.  She denies taking NSAIDs.  She only takes Tylenol, and Robaxin for back pain.  Of note she was seen in in the emergency department 2 days ago and found to have a T12 compression fracture.  She sees Dr. Smith in clinic.  Last seen in August at which time Farxiga was started.  Patient did require hemodialysis temporarily in 2020.  Her baseline creatinine appears to be around 2.6-3.0.  Creatinine at admission had worsened to 7.2 which improved to 6.6 at time of Nephrology consultation with IV fluids overnight.  She also has metabolic acidosis with bicarb of 14.  Urinalysis shows 30 protein, trace occult blood, large leukocytes, 21-50 WBC, 0-2 RBC.  She denies chest pain, shortness of breath, nausea, vomiting, or lower extremity edema.    Interval history:     10/29/23 - renal indices continue to trend down. Mild hypokalemia  Appetite improved, she says she ate a good breakfast today finally for the first time in days, po fluid intake--still eating ice chips.    10/30/23 - kidney function improving nicely. Suspect inaccurate UOP documentation for the past 24 hours. She is eating a lot of ice to attempt to increase fluid intake along with NS at 50 mL/hr    Objective:       VITAL SIGNS: 24 HR MIN & MAX LAST    Temp  Min: 97.2 °F (36.2 °C)  Max: 97.9 °F (36.6 °C)  97.4 °F  (36.3 °C)        BP  Min: 101/62  Max: 150/82  123/74     Pulse  Min: 60  Max: 79  71     Resp  Min: 16  Max: 18  18    SpO2  Min: 94 %  Max: 96 %  96 %      GEN: Chronically ill appearing in NAD  CV: RRR without murmur  PULM: CTAB, unlabored  ABD: Soft, NT/ND abdomen with NABS  EXT:  1 + pitting to distal third of extremities   SKIN: Warm and dry  PSYCH: mood and affect appropriate  Vascular access: no HD access  24h I&O:      Intake/Output Summary (Last 24 hours) at 10/30/2023 1253  Last data filed at 10/30/2023 0212  Gross per 24 hour   Intake 2074.1 ml   Output 500 ml   Net 1574.1 ml           Component Value Date/Time     10/30/2023 0319     10/29/2023 0403     (L) 07/02/2020 0600     06/11/2020 2324    K 3.7 10/30/2023 0319    K 3.4 (L) 10/29/2023 0403    K 3.9 07/02/2020 0600    K 3.0 (L) 06/11/2020 2324    CL 92 (L) 07/02/2020 0600    CL 95 (L) 06/11/2020 2324    CO2 26 10/30/2023 0319    CO2 23 10/29/2023 0403    CO2 30 07/02/2020 0600    CO2 30 06/11/2020 2324    BUN 93.7 (H) 10/30/2023 0319    .5 (H) 10/29/2023 0403    BUN 17 07/02/2020 0600    BUN 33 (H) 06/11/2020 2324    CREATININE 3.47 (H) 10/30/2023 0319    CREATININE 4.18 (H) 10/29/2023 0403    CREATININE 2.57 (H) 07/02/2020 0600    CREATININE 6.33 (H) 06/11/2020 2324    CALCIUM 7.8 (L) 10/30/2023 0319    CALCIUM 8.4 10/29/2023 0403    CALCIUM 7.6 (L) 07/02/2020 0600    CALCIUM 7.9 (L) 06/11/2020 2324    PHOS 4.0 10/30/2023 0319            Component Value Date/Time    WBC 8.10 10/30/2023 0319    WBC 7.93 10/29/2023 0403    WBC 6.4 06/01/2022 1205    HGB 12.7 10/30/2023 0319    HGB 13.7 10/29/2023 0403    HCT 38.4 10/30/2023 0319    HCT 39.6 10/29/2023 0403     10/30/2023 0319     10/29/2023 0403     Assessment / Plan:   Acute kidney injury on CKD 4 - baseline creatinine 2.6-3.0.  Follows with Dr. Smith.  Metabolic acidosis  Back pain/T12 compression fracture  Hypokalemia resolved with replacement      Renal indices continue to improve. Encouraged to increase water intake.   Repeat lab tomorrow

## 2023-10-30 NOTE — PT/OT/SLP EVAL
Physical Therapy Evaluation    Patient Name:  Debra Puentes   MRN:  [id number]    Recommendations:     Discharge therapy intensity: moderate intensity   Discharge Equipment Recommendations:  (tbd)   Barriers to discharge: Impaired mobility    Assessment:     Debra Puentes is a 77 y.o. female admitted with a medical diagnosis of Acute kidney injury superimposed on chronic kidney disease.  She presents with the following impairments/functional limitations: weakness, impaired endurance, impaired self care skills, impaired functional mobility, impaired balance, gait instability, decreased upper extremity function, decreased lower extremity function, decreased safety awareness, pain . Pt should progress well once pain is under control. Currently she requires a lot of assist with her fxn'l mobility.    Rehab Prognosis: Good; patient would benefit from acute skilled PT services to address these deficits and reach maximum level of function.    Recent Surgery: * No surgery found *      Plan:     During this hospitalization, patient to be seen 5 x/week to address the identified rehab impairments via gait training, therapeutic activities, therapeutic exercises and progress toward the following goals:    Plan of Care Expires:       Subjective     Chief Complaint: back pain  Patient/Family Comments/goals:   Pain/Comfort:       Patients cultural, spiritual, Anabaptism conflicts given the current situation:      Living Environment:  Pt lives in assisted living  Prior to admission, patients level of function was pt stating that she did most things herself.  Equipment used at home: wheelchair, walker, rolling.  DME owned (not currently used):   Upon discharge, patient will have assistance from assisted [facility] staff and her sitter.    Objective:     Communicated with olayinka prior to session.  Patient found supine with PureWick, peripheral IV, telemetry  upon PT entry to room.    General Precautions: Standard,  fall  Orthopedic Precautions:spinal precautions   Braces: LSO  Respiratory Status: Room air  Blood Pressure:       Exams:  RLE ROM: WFL  RLE Strength: good except 3-/5 hip  LLE ROM: WFL  LLE Strength: good except 3-/5 hip  Skin integrity: Visible skin intact      Functional Mobility:  Bed Mobility:     Rolling Left:  moderate assistance  Rolling Right: moderate assistance  Supine to Sit: moderate assistance  Sit to Supine: moderate assistance  Transfers:     Sit to Stand:  moderate assistance with rolling walker  Bed to Chair: moderate assistance with  rolling walker  using  Stand Pivot  Gait: amb 5-6feet with mauricio with a rw      AM-PAC 6 CLICK MOBILITY  Total Score:        Treatment & Education:      Patient provided with verbal education education regarding POC.  Understanding was verbalized.     Patient left supine with all lines intact and call button in reach.    GOALS:   Multidisciplinary Problems       Physical Therapy Goals          Problem: Physical Therapy    Goal Priority Disciplines Outcome Goal Variances Interventions   Physical Therapy Goal     PT, PT/OT Ongoing, Progressing     Description: Pt will be seen for the following goals  1. Pt will be sba with all bed mobility  2. Pt will be mauricio with transfers with a rw   3. Pt will amb 100ft with a rw sba                       History:     Past Medical History:   Diagnosis Date    Cancer     Chronic kidney disease, unspecified     Diabetes mellitus, type 2     History of arteriovenostomy for renal dialysis     Hyperlipidemia     Hypertension        Past Surgical History:   Procedure Laterality Date    HYSTERECTOMY      KIDNEY STONE SURGERY      TONSILLECTOMY         Time Tracking:     PT Received On:    PT Start Time: 1056     PT Stop Time: 1120  PT Total Time (min): 24 min     Billable Minutes: Evaluation 24      10/30/2023

## 2023-10-30 NOTE — PROGRESS NOTES
Inpatient Nutrition Assessment    Admit Date: 10/26/2023   Total duration of encounter: 4 days     Nutrition Recommendation/Prescription     Continue current diet as tolerated  Encouraged adequate PO intake  Monitor appetite/PO intake, weight, and labs    Communication of Recommendations: reviewed with patient and reviewed with caregiver    Nutrition Assessment     Malnutrition Assessment/Nutrition-Focused Physical Exam    Malnutrition Context: other (see comments) (Does not meet criteria at this time) (10/30/23 1207)  Malnutrition Level: other (see comments) (Does not meet criteria at this time) (10/30/23 1207)  Energy Intake (Malnutrition): other (see comments) (Unable to assess) (10/30/23 1207)  Weight Loss (Malnutrition): other (see comments) (Does not meet criteria) (10/30/23 1207)  Subcutaneous Fat (Malnutrition): other (see comments) (Does not meet criteria) (10/30/23 1207)           Muscle Mass (Malnutrition): other (see comments) (Does not meet criteria) (10/30/23 1207)                          Fluid Accumulation (Malnutrition): other (see comments) (Does not meet criteria) (10/30/23 1207)        A minimum of two characteristics is recommended for diagnosis of either severe or non-severe malnutrition.    Chart Review    Reason Seen: continuous nutrition monitoring CKD    Malnutrition Screening Tool Results   Have you recently lost weight without trying?: No  Have you been eating poorly because of a decreased appetite?: No   MST Score: 0   Diagnosis:  T12 vertebral body compression fracture   Impingement of L2-L4  nerve roots, severe bilateral neural foraminal narrowing  Multilevel spondylitic changes  Bilateral lower extremity weakness due to above   LORI on CKD-improving  Metabolic acidosis   Possibly Proteus UTI-POA     HX:  T2 dm, AFib, HTN, HLD, ACD    Relevant Medical History:    Cancer      Chronic kidney disease, unspecified      Diabetes mellitus, type 2      History of arteriovenostomy for renal  "dialysis      Hyperlipidemia      Hypertension        Nutrition-Related Medications:   Scheduled Meds:   amiodarone  200 mg Oral Daily    amLODIPine  10 mg Oral BID    apixaban  5 mg Oral BID    ascorbic acid (vitamin C)  1,000 mg Oral Daily    aspirin  81 mg Oral Daily    atorvastatin  10 mg Oral Daily    buPROPion  150 mg Oral Daily    docusate sodium  100 mg Oral BID    famotidine  20 mg Oral Daily    folic acid  1,000 mcg Oral Daily    gabapentin  100 mg Oral BID    metoprolol succinate  50 mg Oral BID    multivitamin  1 tablet Oral Daily    rOPINIRole  0.5 mg Oral Daily    sodium bicarbonate  650 mg Oral TID    vitamin E (dl, acetate)  400 Units Oral Daily     Continuous Infusions:   sodium chloride 0.9% 50 mL/hr at 10/29/23 2102     PRN Meds:.sodium chloride 0.9%, acetaminophen, dextrose 10%, dextrose 10%, glucagon (human recombinant), glucose, glucose, insulin aspart U-100, morphine, ondansetron, traMADoL    Calorie Containing IV Medications: no significant kcals from medications at this time    Nutrition-Related Labs:  10/30/2023: BUN 93.7, Crea 3.47, Ca 7.8, total protein 4.7, Alb 2.1, Gluc  98    Diet/PN Order: Diet diabetic  Oral Supplement Order: none  Tube Feeding Order: none  Appetite/Oral Intake: good/% of meals  Factors Affecting Nutritional Intake: decreased appetite  Food/Samaritan/Cultural Preferences: none reported  Food Allergies:  lactose    Wound(s):   none noted    Last Bowel Movement: 10/26/23    Comments    10/30/2023: Pt reports poor appetite/PO intake ~3 days. Pt reports a good appetite/PO intake. No reported N/V and chew/swallowing difficulties. When asked about wt, pt reports "a few years ago in 1970", went and relayed this information to the nurse. Per EMR, pt weighed 102 kg. Encouraged adequate PO intake. Will monitor.    Anthropometrics    Height: 5' 8" (172.7 cm) Height Method: Measured  Last Weight: 104.3 kg (230 lb) (10/27/23 1710) Weight Method: Standard Scale  BMI " (Calculated): 35  BMI Classification: obese grade I (BMI 30-34.9)     Ideal Body Weight (IBW), Female: 140 lb     % Ideal Body Weight, Female (lb): 164.29 %                    Usual Body Weight (UBW), k kg  % Usual Body Weight: 102.5     Usual Weight Provided By: EMR weight history    Wt Readings from Last 5 Encounters:   10/27/23 104.3 kg (230 lb)   10/23/23 103 kg (227 lb 1.2 oz)   23 99.8 kg (220 lb)   22 94.3 kg (208 lb)   18 128.2 kg (282 lb 10.1 oz)     Weight Change(s) Since Admission:  Admit Weight: 104.3 kg (230 lb) (10/26/23 1830)  10/27/2023: 104.3 kg    Estimated Needs    Weight Used For Calorie Calculations: 104.3 kg (229 lb 15 oz)  Energy Calorie Requirements (kcal): 6 (20 kcal/kg)  Energy Need Method: Kcal/kg  Weight Used For Protein Calculations: 104.3 kg (229 lb 15 oz)  Protein Requirements:  (1.0-1.2 g/kg)  Fluid Requirements (mL): 500 + urine output  Temp (24hrs), Av.5 °F (36.4 °C), Min:97.2 °F (36.2 °C), Max:97.9 °F (36.6 °C)       Enteral Nutrition    Patient not receiving enteral nutrition at this time.    Parenteral Nutrition    Patient not receiving parenteral nutrition support at this time.    Evaluation of Received Nutrient Intake    Calories: meeting estimated needs  Protein: meeting estimated needs    Patient Education    Not applicable.    Nutrition Diagnosis     PES: Increased nutrient needs related to acute illness as evidenced by increased nutrient demand. (new)    Interventions/Goals     Intervention(s): general/healthful diet and commercial beverage  Goal: Meet greater than 75% of nutritional needs by follow-up. (new)    Monitoring & Evaluation     Dietitian will monitor food and beverage intake, weight, electrolyte/renal panel, glucose/endocrine profile, and gastrointestinal profile.  Nutrition Risk/Follow-Up: moderate (follow-up in 3-5 days)   Please consult if re-assessment needed sooner.

## 2023-10-30 NOTE — PLAN OF CARE
Problem: Physical Therapy  Goal: Physical Therapy Goal  Description: Pt will be seen for the following goals  1. Pt will be sba with all bed mobility  2. Pt will be mauricio with transfers with a rw   3. Pt will amb 100ft with a rw sba  Outcome: Ongoing, Progressing

## 2023-10-30 NOTE — PROGRESS NOTES
[facility] - Oncology Acute  Wound Care    Patient Name:  Debra Puentes   MRN:  [id number]  Date: 10/30/2023  Diagnosis: Acute kidney injury superimposed on chronic kidney disease    History:     Past Medical History:   Diagnosis Date    Cancer     Chronic kidney disease, unspecified     Diabetes mellitus, type 2     History of arteriovenostomy for renal dialysis     Hyperlipidemia     Hypertension        Social History     Socioeconomic History    Marital status: Unknown   Tobacco Use    Smoking status: Never     Passive exposure: Never    Smokeless tobacco: Never   Substance and Sexual Activity    Alcohol use: Never    Drug use: Never     Social Determinants of Health     Financial Resource Strain: Low Risk  (10/30/2023)    Overall Financial Resource Strain (CARDIA)     Difficulty of Paying Living Expenses: Not very hard   Food Insecurity: Unknown (10/30/2023)    Hunger Vital Sign     Worried About Running Out of Food in the Last Year: Never true   Transportation Needs: No Transportation Needs (10/30/2023)    PRAPARE - Transportation     Lack of Transportation (Medical): No     Lack of Transportation (Non-Medical): No   Social Connections: Unknown (10/30/2023)    Social Connection and Isolation Panel [NHANES]     Marital Status: Never    Housing Stability: Unknown (10/30/2023)    Housing Stability Vital Sign     Unable to Pay for Housing in the Last Year: No     Unstable Housing in the Last Year: No       Precautions:     Allergies as of 10/26/2023 - Reviewed 10/26/2023   Allergen Reaction Noted    Lactose Diarrhea 05/29/2020    Codeine Other (See Comments) and Nausea And Vomiting 05/28/2020       [facility] Assessment Details/Treatment        10/30/23 1207   WO Assessment   Visit Date 10/30/23   Visit Time 1207   Consult Type New   WO Speciality Wound   Intervention assessed;applied;chart review;orders   Teaching on-going   Skin Interventions   Device Skin Pressure Protection absorbent pad  utilized/changed        Altered Skin Integrity 10/27/23 1600 Buttocks   Date First Assessed/Time First Assessed: 10/27/23 1600   Altered Skin Integrity Present on Admission - Did Patient arrive to the hospital with altered skin?: yes  Location: Buttocks   Wound Image    Description of Altered Skin Integrity Partial thickness tissue loss. Shallow open ulcer with a red or pink wound bed, without slough. Intact or Open/Ruptured Serum-filled blister.   Dressing Appearance Open to air   Drainage Amount Scant   Drainage Characteristics/Odor Serous   Appearance Red;Moist   Tissue loss description Partial thickness   Red (%), Wound Tissue Color 100 %   Periwound Area Moist;Redness   Wound Edges Defined   Care Cleansed with:;Sterile normal saline   Dressing Applied     WOCN consulted for sacrum. Caretaker at bedside. Patient educated on the importance of turning frequently for pressure relief. She voices understanding. Treatment recommendations put into place.  Sacrum: Cleanse with NS. Apply Desitin, cover with ABD pad, secure with medipore tape. Change BID and PRN. Keep areas clean and dry, no adult briefs while in bed. Nursing to continue with turning every two hours, wedge, and floating heels.  Head of bed elevated, bed in lowest position, and call bell within reach. Patient is comfortable on current bed and doesn't want TIFFANIE mattress. Will follow up.  10/30/2023

## 2023-10-31 ENCOUNTER — HOSPITAL ENCOUNTER (OUTPATIENT)
Dept: RADIOLOGY | Facility: HOSPITAL | Age: 78
Discharge: HOME | End: 2023-10-31
Attending: INTERNAL MEDICINE
Payer: COMMERCIAL

## 2023-10-31 LAB
ANION GAP SERPL CALC-SCNC: 14 MEQ/L
BASOPHILS # BLD AUTO: 0.03 X10(3)/MCL
BASOPHILS NFR BLD AUTO: 0.3 %
BUN SERPL-MCNC: 82.9 MG/DL (ref 9.8–20.1)
CALCIUM SERPL-MCNC: 8.5 MG/DL (ref 8.4–10.2)
CHLORIDE SERPL-SCNC: 105 MMOL/L (ref 98–107)
CO2 SERPL-SCNC: 24 MMOL/L (ref 23–31)
CREAT SERPL-MCNC: 3.13 MG/DL (ref 0.55–1.02)
CREAT/UREA NIT SERPL: 26
EOSINOPHIL # BLD AUTO: 0.46 X10(3)/MCL (ref 0–0.9)
EOSINOPHIL NFR BLD AUTO: 4.7 %
ERYTHROCYTE [DISTWIDTH] IN BLOOD BY AUTOMATED COUNT: 13.7 % (ref 11.5–17)
GFR SERPLBLD CREATININE-BSD FMLA CKD-EPI: 15 MLS/MIN/1.73/M2
GLUCOSE SERPL-MCNC: 98 MG/DL (ref 70–110)
GLUCOSE SERPL-MCNC: 98 MG/DL (ref 82–115)
HCT VFR BLD AUTO: 39.1 % (ref 37–47)
HGB BLD-MCNC: 13.2 G/DL (ref 12–16)
IMM GRANULOCYTES # BLD AUTO: 0.27 X10(3)/MCL (ref 0–0.04)
IMM GRANULOCYTES NFR BLD AUTO: 2.8 %
LYMPHOCYTES # BLD AUTO: 1.57 X10(3)/MCL (ref 0.6–4.6)
LYMPHOCYTES NFR BLD AUTO: 16.1 %
MCH RBC QN AUTO: 31.5 PG (ref 27–31)
MCHC RBC AUTO-ENTMCNC: 33.8 G/DL (ref 33–36)
MCV RBC AUTO: 93.3 FL (ref 80–94)
MONOCYTES # BLD AUTO: 1.01 X10(3)/MCL (ref 0.1–1.3)
MONOCYTES NFR BLD AUTO: 10.4 %
NEUTROPHILS # BLD AUTO: 6.4 X10(3)/MCL (ref 2.1–9.2)
NEUTROPHILS NFR BLD AUTO: 65.7 %
NRBC BLD AUTO-RTO: 0 %
PLATELET # BLD AUTO: 258 X10(3)/MCL (ref 130–400)
PMV BLD AUTO: 10.2 FL (ref 7.4–10.4)
POCT GLUCOSE: 141 MG/DL (ref 70–110)
POTASSIUM SERPL-SCNC: 4.2 MMOL/L (ref 3.5–5.1)
RBC # BLD AUTO: 4.19 X10(6)/MCL (ref 4.2–5.4)
SODIUM SERPL-SCNC: 143 MMOL/L (ref 136–145)
WBC # SPEC AUTO: 9.74 X10(3)/MCL (ref 4.5–11.5)

## 2023-10-31 PROCEDURE — 70450 CT HEAD/BRAIN W/O DYE: CPT | Mod: TC

## 2023-10-31 PROCEDURE — 25000003 PHARM REV CODE 250: Performed by: INTERNAL MEDICINE

## 2023-10-31 PROCEDURE — 21400001 HC TELEMETRY ROOM

## 2023-10-31 PROCEDURE — 25000003 PHARM REV CODE 250: Performed by: STUDENT IN AN ORGANIZED HEALTH CARE EDUCATION/TRAINING PROGRAM

## 2023-10-31 PROCEDURE — 63600175 PHARM REV CODE 636 W HCPCS: Performed by: INTERNAL MEDICINE

## 2023-10-31 PROCEDURE — 85025 COMPLETE CBC W/AUTO DIFF WBC: CPT | Performed by: HOSPITALIST

## 2023-10-31 PROCEDURE — 97530 THERAPEUTIC ACTIVITIES: CPT | Mod: CO

## 2023-10-31 PROCEDURE — 80048 BASIC METABOLIC PNL TOTAL CA: CPT | Performed by: HOSPITALIST

## 2023-10-31 RX ORDER — SODIUM BICARBONATE 650 MG/1
650 TABLET ORAL DAILY
Status: DISCONTINUED | OUTPATIENT
Start: 2023-10-31 | End: 2023-11-09 | Stop reason: HOSPADM

## 2023-10-31 RX ORDER — ALPRAZOLAM 0.5 MG/1
0.5 TABLET ORAL ONCE
Status: COMPLETED | OUTPATIENT
Start: 2023-10-31 | End: 2023-10-31

## 2023-10-31 RX ADMIN — MULTIPLE VITAMINS W/ MINERALS TAB 1 TABLET: TAB at 09:10

## 2023-10-31 RX ADMIN — METOPROLOL SUCCINATE 50 MG: 50 TABLET, EXTENDED RELEASE ORAL at 09:10

## 2023-10-31 RX ADMIN — DOCUSATE SODIUM 100 MG: 100 CAPSULE, LIQUID FILLED ORAL at 09:10

## 2023-10-31 RX ADMIN — Medication: at 09:10

## 2023-10-31 RX ADMIN — ATORVASTATIN CALCIUM 10 MG: 10 TABLET, FILM COATED ORAL at 09:10

## 2023-10-31 RX ADMIN — APIXABAN 5 MG: 5 TABLET, FILM COATED ORAL at 09:10

## 2023-10-31 RX ADMIN — AMIODARONE HYDROCHLORIDE 200 MG: 200 TABLET ORAL at 09:10

## 2023-10-31 RX ADMIN — Medication 400 UNITS: at 09:10

## 2023-10-31 RX ADMIN — GABAPENTIN 100 MG: 100 CAPSULE ORAL at 09:10

## 2023-10-31 RX ADMIN — ASPIRIN 81 MG: 81 TABLET, COATED ORAL at 09:10

## 2023-10-31 RX ADMIN — ROPINIROLE HYDROCHLORIDE 0.5 MG: 0.25 TABLET, FILM COATED ORAL at 04:10

## 2023-10-31 RX ADMIN — AMLODIPINE BESYLATE 10 MG: 5 TABLET ORAL at 09:10

## 2023-10-31 RX ADMIN — SODIUM BICARBONATE 650 MG: 650 TABLET ORAL at 09:10

## 2023-10-31 RX ADMIN — BUPROPION HYDROCHLORIDE 150 MG: 150 TABLET, FILM COATED, EXTENDED RELEASE ORAL at 09:10

## 2023-10-31 RX ADMIN — FAMOTIDINE 20 MG: 20 TABLET, FILM COATED ORAL at 09:10

## 2023-10-31 RX ADMIN — ALPRAZOLAM 0.5 MG: 0.5 TABLET ORAL at 02:10

## 2023-10-31 RX ADMIN — SODIUM BICARBONATE 650 MG TABLET 650 MG: at 11:10

## 2023-10-31 RX ADMIN — Medication 1000 MG: at 09:10

## 2023-10-31 RX ADMIN — INSULIN ASPART 2 UNITS: 100 INJECTION, SOLUTION INTRAVENOUS; SUBCUTANEOUS at 12:10

## 2023-10-31 RX ADMIN — FOLIC ACID 1000 MCG: 1 TABLET ORAL at 09:10

## 2023-10-31 NOTE — PT/OT/SLP PROGRESS
Occupational Therapy   Treatment    Name: Debra Puentes  MRN: [id number]  Admitting Diagnosis:  Acute kidney injury superimposed on chronic kidney disease       Recommendations:     Recommended therapy intensity at discharge: Moderate Intensity Therapy   Discharge Equipment Recommendations:  bedside commode  Barriers to discharge:       Assessment:     Debra Puentes is a 77 y.o. female with a medical diagnosis of Acute kidney injury superimposed on chronic kidney disease.  She presents with improved balance and increased endurance. Anxious with mobility at times Performance deficits affecting function are weakness, impaired endurance, impaired self care skills, impaired functional mobility, impaired balance.     Rehab Prognosis:  Good; patient would benefit from acute skilled OT services to address these deficits and reach maximum level of function.       Plan:     Patient to be seen 4 x/week to address the above listed problems via self-care/home management, therapeutic activities, therapeutic exercises  Plan of Care Expires: 11/30/23  Plan of Care Reviewed with: patient    Subjective     Pain/Comfort:       Objective:     Communicated with: RN prior to session.  Patient found HOB elevated with   upon OT entry to room.    General Precautions: Standard, fall    Orthopedic Precautions:N/A  Braces: LSO  Respiratory Status: Room air       Occupational Performance:   (Bed Mobility-Max A) Log Roll  (Sitting balance- Min A progressing to SBA ) LSO donned and adjusted appropriately.   (Sit to stand- Mod-Min A)  Pt. Performing stand step t/f from EOB to BS chair with Min A using RW for UE support with balance. Pt. Left on geomatt with sitter and all needs within reach.      Therapeutic Positioning    OT interventions performed during the course of today's session in an effort to prevent and/or reduce acquired pressure injuries:   Education was provided on benefits of and recommendations for therapeutic  positioning        [facility] 6 Click ADL:      Patient Education:  Patient provided with verbal education education regarding fall prevention and safety awareness.  Additional teaching is warranted.      Patient left up in chair with all lines intact and call button in reach    GOALS:   Multidisciplinary Problems       Occupational Therapy Goals          Problem: Occupational Therapy    Goal Priority Disciplines Outcome Interventions   Occupational Therapy Goal     OT, PT/OT Ongoing, Progressing    Description: Goals to be met by: 11/30/2023    Patient will increase functional independence with ADLs by performing:    LE Dressing with MIN A.  Grooming while standing at sink with Modified Titus.  Toileting from bedside commode with Stand-by Assistance for hygiene and clothing management.   Sitting at edge of bed x5 minutes with SBA with a MAX of 1 UE for stability.  Supine to sit/scooting and bridging with Modified Titus.  Toilet transfer to bedside commode with Minimal Assistance.                         Time Tracking:     OT Date of Treatment: 10/31/23  OT Start Time: 0915  OT Stop Time: 0934  OT Total Time (min): 19 min    Billable Minutes:Therapeutic Activity 1    OT/SELENA: SELENA     Number of SELENA visits since last OT visit: 1    10/31/2023

## 2023-10-31 NOTE — PROGRESS NOTES
"10/31/2023  Debra Puentes   1945   [id number]            Psychiatry Inpatient Admission Note    Date of Admission: 10/26/2023  6:27 PM      Chief Complaint: "I broke my back".    SUBJECTIVE:   History of Present Illness:   Debra Puentes is a 77 y.o. female with history of AFib on Xarelto, T2 dm, CKD, hypertension, hyperlipidemia presented with worsening of lower back pain, generalized weakness.  S/P fall 2 days ago and diagnosed with T12 vertebral fracture. Psych consult for Level 2 placement. At time of visit, she was observed sitting in chair watching tv with a visitor. She is in a pleasant mood. She denies history of depression and anxiety. However, she is treated with Wellbutrin. She reports she was sleeping well until she had the fall. She reports she was having difficulty sleeping due to pain. She reports sleep is improving with decreased pain. At times she may have difficulty falling asleep but when she falls asleep she reports she "sleeps like a log". Appetite is improving s/p surgery. Energy is good. She denies AVH and paranoia.       Past Psychiatric History:   Previous Psychiatric Hospitalizations: she denies   Previous Medication Trials: she denies  Previous Suicide Attempts: she denies   Outpatient psychiatrist: she denies    Past Medical/Surgical History:   Past Medical History:   Diagnosis Date    Cancer     Chronic kidney disease, unspecified     Diabetes mellitus, type 2     History of arteriovenostomy for renal dialysis     Hyperlipidemia     Hypertension      Past Surgical History:   Procedure Laterality Date    HYSTERECTOMY      KIDNEY STONE SURGERY      TONSILLECTOMY       Family Psychiatric History:   She denies     Allergies:   Review of patient's allergies indicates:   Allergen Reactions    Lactose Diarrhea    Codeine Other (See Comments) and Nausea And Vomiting       Substance Abuse History:   Tobacco: She denies  Alcohol: She denies  Illicit Substances: She denies  Treatment: " She denies      Current Medications:   Home Psychiatric Meds: Wellbutrin    Scheduled Meds:    amiodarone  200 mg Oral Daily    amLODIPine  10 mg Oral BID    apixaban  5 mg Oral BID    ascorbic acid (vitamin C)  1,000 mg Oral Daily    aspirin  81 mg Oral Daily    atorvastatin  10 mg Oral Daily    buPROPion  150 mg Oral Daily    docusate sodium  100 mg Oral BID    famotidine  20 mg Oral Daily    folic acid  1,000 mcg Oral Daily    gabapentin  100 mg Oral BID    metoprolol succinate  50 mg Oral BID    multivitamin  1 tablet Oral Daily    rOPINIRole  0.5 mg Oral Daily    sodium bicarbonate  650 mg Oral Daily    vitamin E (dl, acetate)  400 Units Oral Daily    zinc oxide-cod liver oil   Topical (Top) BID      PRN Meds: sodium chloride 0.9%, acetaminophen, dextrose 10%, dextrose 10%, glucagon (human recombinant), glucose, glucose, insulin aspart U-100, morphine, ondansetron, traMADoL   Psychotherapeutics (From admission, onward)      Start     Stop Route Frequency Ordered    10/27/23 0900  buPROPion TB24 tablet 150 mg         -- Oral Daily 10/27/23 0346              Social History:  Housing Status: she resides at [facility]  Relationship Status/Sexual Orientation: heterosexual   Children: 0  Education: Masters +30 hours   Employment Status/Info: retired from [employer]    history: She denies  History of physical/sexual abuse: She denies   Access to gun: She denies       Legal History:   Past Charges/Incarcerations: She denies   Pending charges: She denies      OBJECTIVE:       Vitals   Vitals:    10/31/23 1120   BP: 123/75   Pulse: 81   Resp:    Temp: 98 °F (36.7 °C)        Labs/Imaging/Studies:   Recent Results (from the past 48 hour(s))   POCT glucose    Collection Time: 10/29/23  4:23 PM   Result Value Ref Range    POCT Glucose 156 (H) 70 - 110 mg/dL   POCT glucose    Collection Time: 10/29/23  7:50 PM   Result Value Ref Range    POCT Glucose 183 (H) 70 - 110 mg/dL   Comprehensive Metabolic  Panel    Collection Time: 10/30/23  3:19 AM   Result Value Ref Range    Sodium Level 141 136 - 145 mmol/L    Potassium Level 3.7 3.5 - 5.1 mmol/L    Chloride 106 98 - 107 mmol/L    Carbon Dioxide 26 23 - 31 mmol/L    Glucose Level 98 82 - 115 mg/dL    Blood Urea Nitrogen 93.7 (H) 9.8 - 20.1 mg/dL    Creatinine 3.47 (H) 0.55 - 1.02 mg/dL    Calcium Level Total 7.8 (L) 8.4 - 10.2 mg/dL    Protein Total 4.7 (L) 5.8 - 7.6 gm/dL    Albumin Level 2.1 (L) 3.4 - 4.8 g/dL    Globulin 2.6 2.4 - 3.5 gm/dL    Albumin/Globulin Ratio 0.8 (L) 1.1 - 2.0 ratio    Bilirubin Total 0.4 <=1.5 mg/dL    Alkaline Phosphatase 104 40 - 150 unit/L    Alanine Aminotransferase 18 0 - 55 unit/L    Aspartate Aminotransferase 19 5 - 34 unit/L    eGFR 13 mls/min/1.73/m2   CBC with Differential    Collection Time: 10/30/23  3:19 AM   Result Value Ref Range    WBC 8.10 4.50 - 11.50 x10(3)/mcL    RBC 4.03 (L) 4.20 - 5.40 x10(6)/mcL    Hgb 12.7 12.0 - 16.0 g/dL    Hct 38.4 37.0 - 47.0 %    MCV 95.3 (H) 80.0 - 94.0 fL    MCH 31.5 (H) 27.0 - 31.0 pg    MCHC 33.1 33.0 - 36.0 g/dL    RDW 13.6 11.5 - 17.0 %    Platelet 239 130 - 400 x10(3)/mcL    MPV 10.3 7.4 - 10.4 fL    Neut % 64.3 %    Lymph % 13.1 %    Mono % 14.7 %    Eos % 5.2 %    Basophil % 0.5 %    Lymph # 1.06 0.6 - 4.6 x10(3)/mcL    Neut # 5.21 2.1 - 9.2 x10(3)/mcL    Mono # 1.19 0.1 - 1.3 x10(3)/mcL    Eos # 0.42 0 - 0.9 x10(3)/mcL    Baso # 0.04 <=0.2 x10(3)/mcL    IG# 0.18 (H) 0 - 0.04 x10(3)/mcL    IG% 2.2 %    NRBC% 0.0 %   Magnesium    Collection Time: 10/30/23  3:19 AM   Result Value Ref Range    Magnesium Level 1.90 1.60 - 2.60 mg/dL   Phosphorus    Collection Time: 10/30/23  3:19 AM   Result Value Ref Range    Phosphorus Level 4.0 2.3 - 4.7 mg/dL   POCT Glucose, Hand-Held Device    Collection Time: 10/30/23  6:29 AM   Result Value Ref Range    POC Glucose 98 70 - 110 MG/DL   POCT glucose    Collection Time: 10/30/23 12:34 PM   Result Value Ref Range    POCT Glucose 170 (H) 70 - 110 mg/dL  "  POCT glucose    Collection Time: 10/30/23  8:12 PM   Result Value Ref Range    POCT Glucose 162 (H) 70 - 110 mg/dL   Basic Metabolic Panel    Collection Time: 10/31/23  3:29 AM   Result Value Ref Range    Sodium Level 143 136 - 145 mmol/L    Potassium Level 4.2 3.5 - 5.1 mmol/L    Chloride 105 98 - 107 mmol/L    Carbon Dioxide 24 23 - 31 mmol/L    Glucose Level 98 82 - 115 mg/dL    Blood Urea Nitrogen 82.9 (H) 9.8 - 20.1 mg/dL    Creatinine 3.13 (H) 0.55 - 1.02 mg/dL    BUN/Creatinine Ratio 26     Calcium Level Total 8.5 8.4 - 10.2 mg/dL    Anion Gap 14.0 mEq/L    eGFR 15 mls/min/1.73/m2   CBC with Differential    Collection Time: 10/31/23  3:29 AM   Result Value Ref Range    WBC 9.74 4.50 - 11.50 x10(3)/mcL    RBC 4.19 (L) 4.20 - 5.40 x10(6)/mcL    Hgb 13.2 12.0 - 16.0 g/dL    Hct 39.1 37.0 - 47.0 %    MCV 93.3 80.0 - 94.0 fL    MCH 31.5 (H) 27.0 - 31.0 pg    MCHC 33.8 33.0 - 36.0 g/dL    RDW 13.7 11.5 - 17.0 %    Platelet 258 130 - 400 x10(3)/mcL    MPV 10.2 7.4 - 10.4 fL    Neut % 65.7 %    Lymph % 16.1 %    Mono % 10.4 %    Eos % 4.7 %    Basophil % 0.3 %    Lymph # 1.57 0.6 - 4.6 x10(3)/mcL    Neut # 6.40 2.1 - 9.2 x10(3)/mcL    Mono # 1.01 0.1 - 1.3 x10(3)/mcL    Eos # 0.46 0 - 0.9 x10(3)/mcL    Baso # 0.03 <=0.2 x10(3)/mcL    IG# 0.27 (H) 0 - 0.04 x10(3)/mcL    IG% 2.8 %    NRBC% 0.0 %   POCT Glucose, Hand-Held Device    Collection Time: 10/31/23  6:25 AM   Result Value Ref Range    POC Glucose 98 70 - 110 MG/DL      No results found for: "PHENYTOIN", "PHENOBARB", "VALPROATE", "CBMZ"        Psychiatric Mental Status Exam:  General Appearance: appears stated age, dressed in hospital garb, in no acute distress, sitting in chair  Arousal: alert with clear sensorium  Behavior: normal; cooperative; reasonably friendly, pleasant, and polite; appropriate eye-contact; under good behavioral control  Movements and Motor Activity: no abnormal involuntary movements noted; no tics, no tremors, no akathisia, no dystonia, " no evidence of tardive dyskinesia; no psychomotor agitation or retardation  Orientation: oriented to person, place, time, and situation  Speech: normal rate, rhythm, volume, tone and pitch  Mood: Near euthymic  Affect: mood-incongruent  Thought Process: intact, linear, goal-directed, organized, logical  Associations: intact, no loosening of associations  Thought Content and Perceptions: no suicidal or homicidal ideation, no auditory or visual hallucinations, no paranoid ideation, no ideas of reference, no evidence of delusions or psychosis  Recent and Remote Memory: grossly intact; per interview/observation with patient  Attention and Concentration: grossly intact; per interview/observation with patient  Fund of Knowledge: grossly intact; based on history, vocabulary, fund of knowledge, syntax, grammar, and content  Insight: good; based on understanding of severity of illness and HPI  Judgment: good; based on patient's behavior and HPI      ASSESSMENT/PLAN:   Diagnoses:  MOOD DISORDERS; Mood Disorder NOS (F39)       Past Medical History:   Diagnosis Date    Cancer     Chronic kidney disease, unspecified     Diabetes mellitus, type 2     History of arteriovenostomy for renal dialysis     Hyperlipidemia     Hypertension           Problem lists and Management Plans:  Continue plan of care  Re-consult psych if needed    On this date, I have reviewed the medical history and Nursing Assessment, as well as records from referral source.  I have evaluated the mental status of the above named person and concur with the findings of all assessments.  I have provided medical direction for the development of the Treatment Plan.      Jennifer Underwood    none

## 2023-10-31 NOTE — PROGRESS NOTES
[facility] Hospital Medicine Progress Note        Chief Complaint: Inpatient Follow-up for T12 compression fracture    HPI:   69-year-old obese woman with history of AFib on Xarelto, T2 dm, CKD, hypertension, hyperlipidemia presented with worsening of lower back pain, generalized weakness.  Patient had a fall 2 days ago, diagnosed with T12 vertebral fracture.  She denied any loss of bladder or bowels.  At admission she was normotensive and hemodynamically stable.  Lab work revealed elevated BUN and serum creatinine, cloudy urine with multiple white cells.  Urine sample were sent for cultures.  Recent CT scan obtained on 10/23/2023 revealed atrophy of left kidney, nonobstructing nephrolithiasis in the lower pole of the left kidney, cholelithiasis, umbilical hernia, diverticulosis.     MRI spine spine revealed T12 vertebral body compression deformity with multilevel spondylitic changes.  Neurosurgery recommended brace and no surgical intervention.  Nephrology was consulted for LORI.  Bicarb drip was added admission due to metabolic acidosis and later transitioned to normal saline.  P.o. bicarb was continued.       Interval Hx:   Patient today awake and comfortable. Back pain well controlled. Sitter at bedside. Patient participating with OT/PT.       Objective/physical exam:  General: In no acute distress, obese   Chest: Clear to auscultation bilaterally  Heart: RRR, +S1, S2, no appreciable murmur  Abdomen: Soft, nontender, BS +  MSK: Warm, no lower extremity edema, no clubbing or cyanosis  Neurologic: Cranial nerve II-XII intact, Strength 5/5 in all 4 extremities    VITAL SIGNS: 24 HRS MIN & MAX LAST   Temp  Min: 97.4 °F (36.3 °C)  Max: 98.5 °F (36.9 °C) 97.9 °F (36.6 °C)   BP  Min: 114/75  Max: 147/76 123/71   Pulse  Min: 72  Max: 101  101   Resp  Min: 18  Max: 18 18   SpO2  Min: 93 %  Max: 99 % (!) 93 %     I have reviewed the following labs:  Recent Labs   Lab 10/29/23  5460  10/30/23  0319 10/31/23  0329   WBC 7.93 8.10 9.74   RBC 4.21 4.03* 4.19*   HGB 13.7 12.7 13.2   HCT 39.6 38.4 39.1   MCV 94.1* 95.3* 93.3   MCH 32.5* 31.5* 31.5*   MCHC 34.6 33.1 33.8   RDW 13.7 13.6 13.7    239 258   MPV 10.3 10.3 10.2     Recent Labs   Lab 10/26/23  1900 10/27/23  0557 10/29/23  0403 10/30/23  0319 10/31/23  0329      < > 141 141 143   K 4.0   < > 3.4* 3.7 4.2   CO2 15*   < > 23 26 24   .8*   < > 105.5* 93.7* 82.9*   CREATININE 7.29*   < > 4.18* 3.47* 3.13*   CALCIUM 9.7   < > 8.4 7.8* 8.5   MG 2.70*  --  2.30 1.90  --    ALBUMIN 2.8*  --  2.2* 2.1*  --    ALKPHOS 136  --  101 104  --    ALT 15  --  15 18  --    AST 17  --  22 19  --    BILITOT 0.7  --  0.5 0.4  --     < > = values in this interval not displayed.     Microbiology Results (last 7 days)       Procedure Component Value Units Date/Time    Urine culture [[id number]]  (Abnormal)  (Susceptibility) Collected: 10/26/23 2132    Order Status: Completed Specimen: Urine Updated: 10/31/23 0851     Urine Culture >/= 100,000 colonies/ml Enterobacter cloacae complex      >/= 100,000 colonies/ml Proteus mirabilis             See below for Radiology    Scheduled Med:   amiodarone  200 mg Oral Daily    amLODIPine  10 mg Oral BID    apixaban  5 mg Oral BID    ascorbic acid (vitamin C)  1,000 mg Oral Daily    aspirin  81 mg Oral Daily    atorvastatin  10 mg Oral Daily    buPROPion  150 mg Oral Daily    docusate sodium  100 mg Oral BID    famotidine  20 mg Oral Daily    folic acid  1,000 mcg Oral Daily    gabapentin  100 mg Oral BID    metoprolol succinate  50 mg Oral BID    multivitamin  1 tablet Oral Daily    rOPINIRole  0.5 mg Oral Daily    sodium bicarbonate  650 mg Oral Daily    vitamin E (dl, acetate)  400 Units Oral Daily    zinc oxide-cod liver oil   Topical (Top) BID      Continuous Infusions:     PRN Meds:  sodium chloride 0.9%, acetaminophen, dextrose 10%, dextrose 10%, glucagon (human recombinant), glucose, glucose,  insulin aspart U-100, morphine, ondansetron, traMADoL     Assessment/Plan:  T12 vertebral body compression fracture   Impingement of L2-L4  nerve roots, severe bilateral neural foraminal narrowing  Multilevel spondylitic changes  Bilateral lower extremity weakness due to above   LORI on CKD-improving  Metabolic acidosis   Possibly Proteus UTI-POA     HX:  T2 dm, AFib, HTN, HLD, ACD      Plan:  Patient now doing well and pain controlled  Renal functions improving   Cont po bicarb per renal team   Will closely monitor patients daily weight, urine out put, renal parameters and volume status    Continue strict aspiration, fall and decubitus precautions      Avoid NSAIDs     VTE prophylaxis: eliquis     Patient condition:  Fair    Anticipated discharge and Disposition:   Rehab/SNF      All diagnosis and differential diagnosis have been reviewed; assessment and plan has been documented; I have personally reviewed the labs and test results that are presently available; I have reviewed the patients medication list; I have reviewed the consulting providers response and recommendations. I have reviewed or attempted to review medical records based upon their availability    All of the patient's questions have been  addressed and answered. Patient's is agreeable to the above stated plan. I will continue to monitor closely and make adjustments to medical management as needed.  _____________________________________________________________________    Nutrition Status:    Radiology:  I have personally reviewed the following imaging and agree with the radiologist.     CT Head Without Contrast  Narrative: EXAMINATION:  CT HEAD WITHOUT CONTRAST    CLINICAL HISTORY:  triggered level 2 for [facility] SN[facility] placement;    TECHNIQUE:  Low dose axial images were obtained through the head.  Coronal and sagittal reformations were also performed. Contrast was not administered.    Automatic exposure control was utilized to reduce the patient's  radiation dose.    DLP= 1100    COMPARISON:  05/06/2020    FINDINGS:  No acute intracranial hemorrhage, edema or mass. No acute parenchymal abnormality.    Diffuse cerebral atrophy with concordant ventricular enlargement.    Scattered hypodensities throughout the deep periventricular white matter.    The osseous structures are normal.    The mastoid air cells are clear.    Debris within the bilateral auditory canals.    The globes and orbital contents are normal bilaterally.    The visualized maxillary, ethmoid and sphenoid sinuses are clear.  Impression: No acute intracranial abnormality identified.  Findings of chronic microvascular ischemic disease.    Electronically signed by: Jay Ernst  Date:    10/31/2023  Time:    15:41      Chas Francisco MD   10/31/2023

## 2023-10-31 NOTE — PROGRESS NOTES
Nephrology consult follow up note    HPI:      Debra Puentes is a 77 y.o. female with back pain, and decreased p.o. intake.  Past medical history significant for CKD 4, diabetes, hypertension, and hyperlipidemia.  She presents with 1 week of worsening back pain.  She says that it has caused her to essentially not be able to move.  Due to this her appetite has decreased.  Water intake has also decreased, she estimates she was only drinking about 500 mL to 1 L daily over the last few days.  Of note patient was reportedly also started on antibiotics 2 weeks ago for a urinary tract infection by her urologist.  She states she has finished antibiotics.  She denies taking NSAIDs.  She only takes Tylenol, and Robaxin for back pain.  Of note she was seen in in the emergency department 2 days ago and found to have a T12 compression fracture.  She sees Dr. Smith in clinic.  Last seen in August at which time Farxiga was started.  Patient did require hemodialysis temporarily in 2020.  Her baseline creatinine appears to be around 2.6-3.0.  Creatinine 7.2 at admission.  Nephrology consulted for acute kidney injury.    Interval history:     No acute events overnight.  Patient states she is eating and drinking better.  If no chest pain, shortness of breath, nausea, vomiting, or lower extremity edema.  She was able to get up to a chair this morning.     Review of Systems:       Past medical, family, surgical, and social history reviewed and unchanged from initial consult note.     Objective:       VITAL SIGNS: 24 HR MIN & MAX LAST    Temp  Min: 97.4 °F (36.3 °C)  Max: 98.5 °F (36.9 °C)  97.5 °F (36.4 °C)        BP  Min: 114/75  Max: 147/76  (!) 147/76     Pulse  Min: 63  Max: 83  72     Resp  Min: 18  Max: 18  18    SpO2  Min: 93 %  Max: 96 %  96 %      GEN: Chronically ill appearing WF in NAD  CV: RRR +S1,S2 without murmur  PULM: CTAB, unlabored  ABD: Soft, NT/ND abdomen with NABS  EXT: No cyanosis or edema  SKIN: Warm and  dry  PSYCH: Awake, alert and appropriately conversant.   Dialysis access:  No dialysis access            Component Value Date/Time     10/31/2023 0329     10/30/2023 0319     (L) 07/02/2020 0600     06/11/2020 2324    K 4.2 10/31/2023 0329    K 3.7 10/30/2023 0319    K 3.9 07/02/2020 0600    K 3.0 (L) 06/11/2020 2324    CHLORIDE 105 10/31/2023 0329    CHLORIDE 106 10/30/2023 0319    CO2 24 10/31/2023 0329    CO2 26 10/30/2023 0319    CO2 30 07/02/2020 0600    CO2 30 06/11/2020 2324    BUN 82.9 (H) 10/31/2023 0329    BUN 93.7 (H) 10/30/2023 0319    BUN 17 07/02/2020 0600    BUN 33 (H) 06/11/2020 2324    CREATININE 3.13 (H) 10/31/2023 0329    CREATININE 3.47 (H) 10/30/2023 0319    CREATININE 2.57 (H) 07/02/2020 0600    CREATININE 6.33 (H) 06/11/2020 2324    CALCIUM 8.5 10/31/2023 0329    CALCIUM 7.8 (L) 10/30/2023 0319    CALCIUM 7.6 (L) 07/02/2020 0600    CALCIUM 7.9 (L) 06/11/2020 2324    PHOS 4.0 10/30/2023 0319            Component Value Date/Time    WBC 9.74 10/31/2023 0329    WBC 8.10 10/30/2023 0319    WBC 6.4 06/01/2022 1205    HGB 13.2 10/31/2023 0329    HGB 12.7 10/30/2023 0319    HCT 39.1 10/31/2023 0329    HCT 38.4 10/30/2023 0319     10/31/2023 0329     10/30/2023 0319         Imaging reviewed      Assessment / Plan:       Active Hospital Problems    Diagnosis  POA    *Acute kidney injury superimposed on chronic kidney disease [N17.9, N18.9]  Yes    Compression fracture of T12 vertebra [S22.080A]  Yes    UTI (urinary tract infection) [N39.0]  Yes    HTN (hypertension) [I10]  Yes    HLD (hyperlipidemia) [E78.5]  Yes    T2DM (type 2 diabetes mellitus) [E11.9]  Yes    Paroxysmal A-fib [I48.0]  Yes      Resolved Hospital Problems   No resolved problems to display.       Acute kidney injury on CKD 4 - baseline creatinine 2.6-3.0.  Follows with Dr. Smith.  Metabolic acidosis - resolved  Back pain/T12 compression fracture  Hypokalemia resolved with replacement      Plan:  Renal function continues to improve, almost back to baseline.  Patient's p.o. intake has improved.  Continue to encourage p.o. water intake.  Decrease sodium bicarbonate to 650 mg p.o. daily.  Okay to discharge from a Nephrology standpoint whenever ready.      Noah Jung DO  Nephrology  [facility]  Clinic number: [phone]

## 2023-10-31 NOTE — PLAN OF CARE
Problem: Adult Inpatient Plan of Care  Goal: Plan of Care Review  Outcome: Ongoing, Progressing  Flowsheets (Taken 10/30/2023 2156)  Plan of Care Reviewed With: patient  Goal: Patient-Specific Goal (Individualized)  Outcome: Ongoing, Progressing  Goal: Absence of Hospital-Acquired Illness or Injury  Outcome: Ongoing, Progressing  Goal: Optimal Comfort and Wellbeing  Outcome: Ongoing, Progressing  Intervention: Monitor Pain and Promote Comfort  Flowsheets (Taken 10/30/2023 2156)  Pain Management Interventions:   care clustered   pain management plan reviewed with patient/caregiver   quiet environment facilitated   medication offered but refused   pillow support provided   relaxation techniques promoted  Intervention: Provide Person-Centered Care  Flowsheets (Taken 10/30/2023 2156)  Trust Relationship/Rapport:   care explained   reassurance provided   thoughts/feelings acknowledged   choices provided   emotional support provided   empathic listening provided   questions answered   questions encouraged  Goal: Readiness for Transition of Care  Outcome: Ongoing, Progressing     Problem: Diabetes Comorbidity  Goal: Blood Glucose Level Within Targeted Range  Outcome: Ongoing, Progressing  Intervention: Monitor and Manage Glycemia  Flowsheets (Taken 10/30/2023 2156)  Glycemic Management: blood glucose monitored     Problem: Fluid and Electrolyte Imbalance (Acute Kidney Injury/Impairment)  Goal: Fluid and Electrolyte Balance  Outcome: Ongoing, Progressing  Intervention: Monitor and Manage Fluid and Electrolyte Balance  Flowsheets (Taken 10/30/2023 2156)  Fluid/Electrolyte Management: fluids provided     Problem: Oral Intake Inadequate (Acute Kidney Injury/Impairment)  Goal: Optimal Nutrition Intake  Outcome: Ongoing, Progressing  Intervention: Promote and Optimize Nutrition  Flowsheets (Taken 10/30/2023 2156)  Oral Nutrition Promotion:   safe use of adaptive equipment encouraged   rest periods promoted     Problem: Renal  Function Impairment (Acute Kidney Injury/Impairment)  Goal: Effective Renal Function  Outcome: Ongoing, Progressing  Intervention: Monitor and Support Renal Function  Flowsheets (Taken 10/30/2023 2156)  Medication Review/Management: medications reviewed     Problem: Fall Injury Risk  Goal: Absence of Fall and Fall-Related Injury  Outcome: Ongoing, Progressing  Intervention: Identify and Manage Contributors  Flowsheets (Taken 10/30/2023 2156)  Self-Care Promotion:   independence encouraged   safe use of adaptive equipment encouraged  Medication Review/Management: medications reviewed     Problem: Skin Injury Risk Increased  Goal: Skin Health and Integrity  Outcome: Ongoing, Progressing     Problem: Impaired Wound Healing  Goal: Optimal Wound Healing  Outcome: Ongoing, Progressing

## 2023-10-31 NOTE — PLAN OF CARE
SSC noted to CM patients PASSAR triggered Level II this morning. We are awaiting Psych. Eval and CT scan or MRI of head.

## 2023-10-31 NOTE — PT/OT/SLP PROGRESS
Attempted to see pt for PT tx. Pt [facility] upon arrival. Pt declining tx session stating she already had therapy today and was hurting at this time. Therapist explained the difference between OT and PT. Pt understanding but still declining at this time. Pt states she will stay [facility] longer and let NSG know when she is ready to get B2B. PT to f/u as schedule allows for tx session. Time spent: 5301-4576.

## 2023-10-31 NOTE — PLAN OF CARE
Problem: Adult Inpatient Plan of Care  Goal: Plan of Care Review  Outcome: Ongoing, Progressing  Flowsheets (Taken 10/31/2023 1815)  Plan of Care Reviewed With: patient  Goal: Patient-Specific Goal (Individualized)  Outcome: Ongoing, Progressing  Goal: Absence of Hospital-Acquired Illness or Injury  Outcome: Ongoing, Progressing  Intervention: Identify and Manage Fall Risk  Flowsheets (Taken 10/31/2023 1815)  Safety Promotion/Fall Prevention:   assistive device/personal item within reach   medications reviewed   nonskid shoes/socks when out of bed   side rails raised x 2  Intervention: Prevent Skin Injury  Flowsheets (Taken 10/31/2023 1815)  Body Position: position changed independently  Skin Protection:   adhesive use limited   incontinence pads utilized   tubing/devices free from skin contact  Intervention: Prevent and Manage VTE (Venous Thromboembolism) Risk  Flowsheets (Taken 10/31/2023 1815)  Activity Management: Up in chair - L3  VTE Prevention/Management:   ambulation promoted   bleeding risk assessed   ROM (active) performed  Range of Motion:   active ROM (range of motion) encouraged   ROM (range of motion) performed  Intervention: Prevent Infection  Flowsheets (Taken 10/31/2023 1815)  Infection Prevention:   rest/sleep promoted   single patient room provided  Goal: Optimal Comfort and Wellbeing  Outcome: Ongoing, Progressing  Intervention: Monitor Pain and Promote Comfort  Flowsheets (Taken 10/31/2023 1815)  Pain Management Interventions:   care clustered   medication offered   pain management plan reviewed with patient/caregiver   pillow support provided   position adjusted  Intervention: Provide Person-Centered Care  Flowsheets (Taken 10/31/2023 1815)  Trust Relationship/Rapport: care explained  Goal: Readiness for Transition of Care  Outcome: Ongoing, Progressing     Problem: Diabetes Comorbidity  Goal: Blood Glucose Level Within Targeted Range  Outcome: Ongoing, Progressing  Intervention: Monitor and  Manage Glycemia  Flowsheets (Taken 10/31/2023 1815)  Glycemic Management: blood glucose monitored     Problem: Fluid and Electrolyte Imbalance (Acute Kidney Injury/Impairment)  Goal: Fluid and Electrolyte Balance  Outcome: Ongoing, Progressing     Problem: Oral Intake Inadequate (Acute Kidney Injury/Impairment)  Goal: Optimal Nutrition Intake  Outcome: Ongoing, Progressing  Intervention: Promote and Optimize Nutrition  Flowsheets (Taken 10/31/2023 1815)  Oral Nutrition Promotion: rest periods promoted     Problem: Renal Function Impairment (Acute Kidney Injury/Impairment)  Goal: Effective Renal Function  Outcome: Ongoing, Progressing  Intervention: Monitor and Support Renal Function  Flowsheets (Taken 10/31/2023 1815)  Medication Review/Management: medications reviewed     Problem: Fall Injury Risk  Goal: Absence of Fall and Fall-Related Injury  Outcome: Ongoing, Progressing  Intervention: Identify and Manage Contributors  Flowsheets (Taken 10/31/2023 1815)  Self-Care Promotion: independence encouraged  Medication Review/Management: medications reviewed  Intervention: Promote Injury-Free Environment  Flowsheets (Taken 10/31/2023 1815)  Safety Promotion/Fall Prevention:   assistive device/personal item within reach   medications reviewed   nonskid shoes/socks when out of bed   side rails raised x 2     Problem: Skin Injury Risk Increased  Goal: Skin Health and Integrity  Outcome: Ongoing, Progressing  Intervention: Optimize Skin Protection  Flowsheets (Taken 10/31/2023 1815)  Pressure Reduction Techniques:   frequent weight shift encouraged   weight shift assistance provided  Pressure Reduction Devices: positioning supports utilized  Skin Protection:   adhesive use limited   incontinence pads utilized   tubing/devices free from skin contact  Head of Bed (HOB) Positioning: HOB at 30-45 degrees  Intervention: Promote and Optimize Oral Intake  Flowsheets (Taken 10/31/2023 1815)  Oral Nutrition Promotion: rest periods  promoted     Problem: Impaired Wound Healing  Goal: Optimal Wound Healing  Outcome: Ongoing, Progressing  Intervention: Promote Wound Healing  Flowsheets (Taken 10/31/2023 1815)  Oral Nutrition Promotion: rest periods promoted  Sleep/Rest Enhancement: regular sleep/rest pattern promoted  Activity Management: Up in chair - L3  Pain Management Interventions:   care clustered   medication offered   pain management plan reviewed with patient/caregiver   pillow support provided   position adjusted

## 2023-11-01 LAB
ANION GAP SERPL CALC-SCNC: 13 MEQ/L
BACTERIA UR CULT: ABNORMAL
BUN SERPL-MCNC: 70.5 MG/DL (ref 9.8–20.1)
CALCIUM SERPL-MCNC: 9.2 MG/DL (ref 8.4–10.2)
CHLORIDE SERPL-SCNC: 101 MMOL/L (ref 98–107)
CO2 SERPL-SCNC: 24 MMOL/L (ref 23–31)
CREAT SERPL-MCNC: 3.14 MG/DL (ref 0.55–1.02)
CREAT/UREA NIT SERPL: 22
GFR SERPLBLD CREATININE-BSD FMLA CKD-EPI: 15 MLS/MIN/1.73/M2
GLUCOSE SERPL-MCNC: 200 MG/DL (ref 82–115)
POCT GLUCOSE: 100 MG/DL (ref 70–110)
POCT GLUCOSE: 129 MG/DL (ref 70–110)
POCT GLUCOSE: 131 MG/DL (ref 70–110)
POCT GLUCOSE: 158 MG/DL (ref 70–110)
POCT GLUCOSE: 197 MG/DL (ref 70–110)
POCT GLUCOSE: 99 MG/DL (ref 70–110)
POTASSIUM SERPL-SCNC: 4.1 MMOL/L (ref 3.5–5.1)
SODIUM SERPL-SCNC: 138 MMOL/L (ref 136–145)

## 2023-11-01 PROCEDURE — 25000003 PHARM REV CODE 250: Performed by: INTERNAL MEDICINE

## 2023-11-01 PROCEDURE — 25000003 PHARM REV CODE 250: Performed by: STUDENT IN AN ORGANIZED HEALTH CARE EDUCATION/TRAINING PROGRAM

## 2023-11-01 PROCEDURE — 21400001 HC TELEMETRY ROOM

## 2023-11-01 PROCEDURE — 97116 GAIT TRAINING THERAPY: CPT | Mod: CQ

## 2023-11-01 PROCEDURE — 97530 THERAPEUTIC ACTIVITIES: CPT | Mod: CQ

## 2023-11-01 PROCEDURE — 80048 BASIC METABOLIC PNL TOTAL CA: CPT | Performed by: STUDENT IN AN ORGANIZED HEALTH CARE EDUCATION/TRAINING PROGRAM

## 2023-11-01 PROCEDURE — 25000003 PHARM REV CODE 250: Performed by: NURSE PRACTITIONER

## 2023-11-01 RX ORDER — GABAPENTIN 100 MG/1
100 CAPSULE ORAL 2 TIMES DAILY
Qty: 60 CAPSULE | Refills: 11 | Status: SHIPPED | OUTPATIENT
Start: 2023-11-01 | End: 2023-11-09 | Stop reason: SDUPTHER

## 2023-11-01 RX ORDER — ACETAMINOPHEN 325 MG/1
650 TABLET ORAL EVERY 6 HOURS PRN
Refills: 0
Start: 2023-11-01

## 2023-11-01 RX ORDER — TRAMADOL HYDROCHLORIDE 50 MG/1
50 TABLET ORAL EVERY 8 HOURS PRN
Qty: 20 TABLET | Refills: 0 | Status: SHIPPED | OUTPATIENT
Start: 2023-11-01 | End: 2023-11-09 | Stop reason: SDUPTHER

## 2023-11-01 RX ADMIN — APIXABAN 5 MG: 5 TABLET, FILM COATED ORAL at 09:11

## 2023-11-01 RX ADMIN — Medication: at 10:11

## 2023-11-01 RX ADMIN — Medication 1000 MG: at 09:11

## 2023-11-01 RX ADMIN — Medication: at 09:11

## 2023-11-01 RX ADMIN — METOPROLOL SUCCINATE 50 MG: 50 TABLET, EXTENDED RELEASE ORAL at 09:11

## 2023-11-01 RX ADMIN — DOCUSATE SODIUM 100 MG: 100 CAPSULE, LIQUID FILLED ORAL at 09:11

## 2023-11-01 RX ADMIN — AMLODIPINE BESYLATE 10 MG: 5 TABLET ORAL at 09:11

## 2023-11-01 RX ADMIN — Medication 400 UNITS: at 09:11

## 2023-11-01 RX ADMIN — FAMOTIDINE 20 MG: 20 TABLET, FILM COATED ORAL at 10:11

## 2023-11-01 RX ADMIN — MULTIPLE VITAMINS W/ MINERALS TAB 1 TABLET: TAB at 09:11

## 2023-11-01 RX ADMIN — AMIODARONE HYDROCHLORIDE 200 MG: 200 TABLET ORAL at 09:11

## 2023-11-01 RX ADMIN — TRAMADOL HYDROCHLORIDE 50 MG: 50 TABLET, COATED ORAL at 11:11

## 2023-11-01 RX ADMIN — ROPINIROLE HYDROCHLORIDE 0.5 MG: 0.25 TABLET, FILM COATED ORAL at 04:11

## 2023-11-01 RX ADMIN — ATORVASTATIN CALCIUM 10 MG: 10 TABLET, FILM COATED ORAL at 09:11

## 2023-11-01 RX ADMIN — FOLIC ACID 1000 MCG: 1 TABLET ORAL at 09:11

## 2023-11-01 RX ADMIN — ASPIRIN 81 MG: 81 TABLET, COATED ORAL at 09:11

## 2023-11-01 RX ADMIN — GABAPENTIN 100 MG: 100 CAPSULE ORAL at 09:11

## 2023-11-01 RX ADMIN — BUPROPION HYDROCHLORIDE 150 MG: 150 TABLET, FILM COATED, EXTENDED RELEASE ORAL at 09:11

## 2023-11-01 RX ADMIN — SODIUM BICARBONATE 650 MG TABLET 650 MG: at 09:11

## 2023-11-01 NOTE — PROGRESS NOTES
[facility] Hospital Medicine Progress Note        Chief Complaint: Inpatient Follow-up for T12 compression fracture    HPI:   69-year-old obese woman with history of AFib on Xarelto, T2 dm, CKD, hypertension, hyperlipidemia presented with worsening of lower back pain, generalized weakness.  Patient had a fall 2 days ago, diagnosed with T12 vertebral fracture.  She denied any loss of bladder or bowels.  At admission she was normotensive and hemodynamically stable.  Lab work revealed elevated BUN and serum creatinine, cloudy urine with multiple white cells.  Urine sample were sent for cultures.  Recent CT scan obtained on 10/23/2023 revealed atrophy of left kidney, nonobstructing nephrolithiasis in the lower pole of the left kidney, cholelithiasis, umbilical hernia, diverticulosis.     MRI spine spine revealed T12 vertebral body compression deformity with multilevel spondylitic changes.  Neurosurgery recommended brace and no surgical intervention.  Nephrology was consulted for LORI.  Bicarb drip was added admission due to metabolic acidosis and later transitioned to normal saline.  P.o. bicarb was continued. PT was started and she was participating. Cm working on SNF placement.        Interval Hx:   Patient today awake and comfortable. No new issues. Pain well controlled.     Family at bedside, explained in detail about the patients condition, diagnosis, vitals, labs and treatment plan. They understand and agree with the plan. All their questions were answered.        Objective/physical exam:  General: In no acute distress, obese   Chest: Clear to auscultation bilaterally  Heart: RRR, +S1, S2, no appreciable murmur  Abdomen: Soft, nontender, BS +  MSK: Warm, no lower extremity edema, no clubbing or cyanosis  Neurologic: Cranial nerve II-XII intact, Strength 5/5 in all 4 extremities    VITAL SIGNS: 24 HRS MIN & MAX LAST   Temp  Min: 97.5 °F (36.4 °C)  Max: 98.2 °F (36.8 °C) 97.5 °F (36.4  °C)   BP  Min: 123/79  Max: 144/83 132/84   Pulse  Min: 73  Max: 101  90   Resp  Min: 16  Max: 18 18   SpO2  Min: 93 %  Max: 98 % 97 %     I have reviewed the following labs:  Recent Labs   Lab 10/29/23  0403 10/30/23  0319 10/31/23  0329   WBC 7.93 8.10 9.74   RBC 4.21 4.03* 4.19*   HGB 13.7 12.7 13.2   HCT 39.6 38.4 39.1   MCV 94.1* 95.3* 93.3   MCH 32.5* 31.5* 31.5*   MCHC 34.6 33.1 33.8   RDW 13.7 13.6 13.7    239 258   MPV 10.3 10.3 10.2     Recent Labs   Lab 10/26/23  1900 10/27/23  0557 10/29/23  0403 10/30/23  0319 10/31/23  0329      < > 141 141 143   K 4.0   < > 3.4* 3.7 4.2   CO2 15*   < > 23 26 24   .8*   < > 105.5* 93.7* 82.9*   CREATININE 7.29*   < > 4.18* 3.47* 3.13*   CALCIUM 9.7   < > 8.4 7.8* 8.5   MG 2.70*  --  2.30 1.90  --    ALBUMIN 2.8*  --  2.2* 2.1*  --    ALKPHOS 136  --  101 104  --    ALT 15  --  15 18  --    AST 17  --  22 19  --    BILITOT 0.7  --  0.5 0.4  --     < > = values in this interval not displayed.     Microbiology Results (last 7 days)       Procedure Component Value Units Date/Time    Urine culture [[id number]]  (Abnormal)  (Susceptibility) Collected: 10/26/23 2132    Order Status: Completed Specimen: Urine Updated: 11/01/23 1116     Urine Culture >/= 100,000 colonies/ml Enterobacter cloacae complex      >/= 100,000 colonies/ml Proteus mirabilis      >/= 100,000 colonies/ml Enterobacter cloacae complex             See below for Radiology    Scheduled Med:   amiodarone  200 mg Oral Daily    amLODIPine  10 mg Oral BID    apixaban  5 mg Oral BID    ascorbic acid (vitamin C)  1,000 mg Oral Daily    aspirin  81 mg Oral Daily    atorvastatin  10 mg Oral Daily    buPROPion  150 mg Oral Daily    docusate sodium  100 mg Oral BID    famotidine  20 mg Oral Daily    folic acid  1,000 mcg Oral Daily    gabapentin  100 mg Oral BID    metoprolol succinate  50 mg Oral BID    multivitamin  1 tablet Oral Daily    rOPINIRole  0.5 mg Oral Daily    sodium bicarbonate  650 mg  Oral Daily    vitamin E (dl, acetate)  400 Units Oral Daily    zinc oxide-cod liver oil   Topical (Top) BID      Continuous Infusions:     PRN Meds:  sodium chloride 0.9%, acetaminophen, dextrose 10%, dextrose 10%, glucagon (human recombinant), glucose, glucose, insulin aspart U-100, morphine, ondansetron, traMADoL     Assessment/Plan:  T12 vertebral body compression fracture   Impingement of L2-L4  nerve roots, severe bilateral neural foraminal narrowing  Multilevel spondylitic changes  Bilateral lower extremity weakness due to above   LORI on CKD-improving  Metabolic acidosis   Possibly Proteus UTI-POA     HX:  T2 dm, AFib, HTN, HLD, ACD      Plan:  Patient continues to do well and has no new issues.   Pain well controlled   Renal functions improving   Cont po bicarb per renal team   Will closely monitor patients daily weight, urine out put, renal parameters and volume status    Continue strict aspiration, fall and decubitus precautions      Avoid NSAIDs     VTE prophylaxis: eliquis     Patient condition:  Fair    Anticipated discharge and Disposition:   Rehab/SNF      All diagnosis and differential diagnosis have been reviewed; assessment and plan has been documented; I have personally reviewed the labs and test results that are presently available; I have reviewed the patients medication list; I have reviewed the consulting providers response and recommendations. I have reviewed or attempted to review medical records based upon their availability    All of the patient's questions have been  addressed and answered. Patient's is agreeable to the above stated plan. I will continue to monitor closely and make adjustments to medical management as needed.  _____________________________________________________________________    Nutrition Status:    Radiology:  I have personally reviewed the following imaging and agree with the radiologist.     CT Head Without Contrast  Narrative: EXAMINATION:  CT HEAD WITHOUT  CONTRAST    CLINICAL HISTORY:  triggered level 2 for [facility] SNF placement;    TECHNIQUE:  Low dose axial images were obtained through the head.  Coronal and sagittal reformations were also performed. Contrast was not administered.    Automatic exposure control was utilized to reduce the patient's radiation dose.    DLP= 1100    COMPARISON:  05/06/2020    FINDINGS:  No acute intracranial hemorrhage, edema or mass. No acute parenchymal abnormality.    Diffuse cerebral atrophy with concordant ventricular enlargement.    Scattered hypodensities throughout the deep periventricular white matter.    The osseous structures are normal.    The mastoid air cells are clear.    Debris within the bilateral auditory canals.    The globes and orbital contents are normal bilaterally.    The visualized maxillary, ethmoid and sphenoid sinuses are clear.  Impression: No acute intracranial abnormality identified.  Findings of chronic microvascular ischemic disease.    Electronically signed by: Jay Ernst  Date:    10/31/2023  Time:    15:41      Chas Francisco MD   11/01/2023

## 2023-11-01 NOTE — PLAN OF CARE
[facility] [place] clinicals to [facility] via [employer]. Level II information was sent to [facility] via fax, process can take 7-10 days. We are awaiting 142 for SNF appvl.

## 2023-11-01 NOTE — PT/OT/SLP PROGRESS
Physical Therapy Treatment    Patient Name:  Debra Puentes   MRN:  [id number]    Recommendations:     Discharge therapy intensity: moderate intensity   Discharge Equipment Recommendations: to be determined by next level of care  Barriers to discharge: Impaired mobility    Assessment:     Debra Puentes is a 77 y.o. female admitted with a medical diagnosis of Acute kidney injury superimposed on chronic kidney disease.  She presents with the following impairments/functional limitations: weakness, impaired endurance, impaired self care skills, impaired functional mobility, impaired balance, gait instability, decreased upper extremity function, decreased lower extremity function, decreased safety awareness, pain .    Pt limited by pain. Required heavy encouragement to attempt amb trial.     Rehab Prognosis: Good; patient would benefit from acute skilled PT services to address these deficits and reach maximum level of function.    Recent Surgery: * No surgery found *      Plan:     During this hospitalization, patient to be seen 5 x/week to address the identified rehab impairments via gait training, therapeutic activities, therapeutic exercises and progress toward the following goals:    Plan of Care Expires:       Subjective     Chief Complaint: back pain  Patient/Family Comments/goals:   Pain/Comfort:  Location 1: back  Pain Addressed 1: Reposition, Distraction, Cessation of Activity      Objective:     Communicated with NSG prior to session.  Patient found HOB elevated with PureWick upon PT entry to room.     General Precautions: Standard, fall  Orthopedic Precautions: spinal precautions  Braces: LSO  Respiratory Status: Room air  Blood Pressure:   Skin Integrity: Visible skin intact      Functional Mobility:  Bed Mobility:     Scooting: moderate assistance and of 2 persons  Supine to Sit: moderate assistance  Transfers:     Sit to Stand:  minimum assistance and moderate assistance with rolling walker and 1  trial from EOB and 1 trial from bedside chair. Pt varied min-modA.   Bed to Chair: minimum assistance with  rolling walker  using  Step Transfer  Gait: pt amb 10ft with RW Mauricio. Pt with step-to gait pattern. Limited by pain and required max coaxing.         Patient left up in chair with all lines intact and call button in reach..    GOALS:   Multidisciplinary Problems       Physical Therapy Goals          Problem: Physical Therapy    Goal Priority Disciplines Outcome Goal Variances Interventions   Physical Therapy Goal     PT, PT/OT Ongoing, Progressing     Description: Pt will be seen for the following goals  1. Pt will be sba with all bed mobility  2. Pt will be mauricio with transfers with a rw   3. Pt will amb 100ft with a rw sba                       Time Tracking:     PT Received On: 11/01/23  PT Start Time: 1056     PT Stop Time: 1119  PT Total Time (min): 23 min     Billable Minutes: Gait Training 8 and Therapeutic Activity 15    Treatment Type: Treatment  PT/PTA: PTA     Number of PTA visits since last PT visit: 1     11/01/2023

## 2023-11-01 NOTE — PROGRESS NOTES
Nephrology consult follow up note    HPI:      Debra Puentes is a 77 y.o. female with back pain, and decreased p.o. intake.  Past medical history significant for CKD 4, diabetes, hypertension, and hyperlipidemia.  She presents with 1 week of worsening back pain.  She says that it has caused her to essentially not be able to move.  Due to this her appetite has decreased.  Water intake has also decreased, she estimates she was only drinking about 500 mL to 1 L daily over the last few days.  Of note patient was reportedly also started on antibiotics 2 weeks ago for a urinary tract infection by her urologist.  She states she has finished antibiotics.  She denies taking NSAIDs.  She only takes Tylenol, and Robaxin for back pain.  Of note she was seen in in the emergency department 2 days ago and found to have a T12 compression fracture.  She sees Dr. Smith in clinic.  Last seen in August at which time Farxiga was started.  Patient did require hemodialysis temporarily in 2020.  Her baseline creatinine appears to be around 2.6-3.0.  Creatinine 7.2 at admission.  Nephrology consulted for acute kidney injury.    Interval history:     No acute events overnight.  No new complaints. She is eating and drinking better. Good UOP. Working with therapy. No CP, SOB, abd pain, N/V.     Review of Systems:       Past medical, family, surgical, and social history reviewed and unchanged from initial consult note.     Objective:       VITAL SIGNS: 24 HR MIN & MAX LAST    Temp  Min: 97.5 °F (36.4 °C)  Max: 98.2 °F (36.8 °C)  97.5 °F (36.4 °C)        BP  Min: 106/59  Max: 144/83  (!) 106/59     Pulse  Min: 73  Max: 101  93     Resp  Min: 16  Max: 18  18    SpO2  Min: 93 %  Max: 98 %  97 %      GEN: Chronically ill appearing WF in NAD  CV: RRR +S1,S2 without murmur  PULM: CTAB, unlabored  ABD: Soft, NT/ND abdomen with NABS  EXT: No cyanosis or edema  SKIN: Warm and dry  PSYCH: Awake, alert and appropriately conversant.   Dialysis  access:  No dialysis access            Component Value Date/Time     10/31/2023 0329     10/30/2023 0319     (L) 07/02/2020 0600     06/11/2020 2324    K 4.2 10/31/2023 0329    K 3.7 10/30/2023 0319    K 3.9 07/02/2020 0600    K 3.0 (L) 06/11/2020 2324    CHLORIDE 105 10/31/2023 0329    CHLORIDE 106 10/30/2023 0319    CO2 24 10/31/2023 0329    CO2 26 10/30/2023 0319    CO2 30 07/02/2020 0600    CO2 30 06/11/2020 2324    BUN 82.9 (H) 10/31/2023 0329    BUN 93.7 (H) 10/30/2023 0319    BUN 17 07/02/2020 0600    BUN 33 (H) 06/11/2020 2324    CREATININE 3.13 (H) 10/31/2023 0329    CREATININE 3.47 (H) 10/30/2023 0319    CREATININE 2.57 (H) 07/02/2020 0600    CREATININE 6.33 (H) 06/11/2020 2324    CALCIUM 8.5 10/31/2023 0329    CALCIUM 7.8 (L) 10/30/2023 0319    CALCIUM 7.6 (L) 07/02/2020 0600    CALCIUM 7.9 (L) 06/11/2020 2324    PHOS 4.0 10/30/2023 0319            Component Value Date/Time    WBC 9.74 10/31/2023 0329    WBC 8.10 10/30/2023 0319    WBC 6.4 06/01/2022 1205    HGB 13.2 10/31/2023 0329    HGB 12.7 10/30/2023 0319    HCT 39.1 10/31/2023 0329    HCT 38.4 10/30/2023 0319     10/31/2023 0329     10/30/2023 0319         Imaging reviewed      Assessment / Plan:       Active Hospital Problems    Diagnosis  POA    *Acute kidney injury superimposed on chronic kidney disease [N17.9, N18.9]  Yes    Compression fracture of T12 vertebra [S22.080A]  Yes    UTI (urinary tract infection) [N39.0]  Yes    HTN (hypertension) [I10]  Yes    HLD (hyperlipidemia) [E78.5]  Yes    T2DM (type 2 diabetes mellitus) [E11.9]  Yes    Paroxysmal A-fib [I48.0]  Yes      Resolved Hospital Problems   No resolved problems to display.       Acute kidney injury on CKD 4 - baseline creatinine 2.6-3.0.  Follows with Dr. Smith.  Metabolic acidosis - resolved  Back pain/T12 compression fracture  Hypokalemia resolved with replacement     Plan:  Awaiting labs from today. As long as there are no severe  electrolyte abnormalities, or renal function worsened, I am ok with her discharging from a nephrology standpoint. Encourage PO intake.       Noah Jung DO  Nephrology  [facility]  Clinic number: [phone]

## 2023-11-01 NOTE — PLAN OF CARE
Problem: Adult Inpatient Plan of Care  Goal: Plan of Care Review  Outcome: Ongoing, Progressing  Flowsheets (Taken 11/1/2023 1635)  Plan of Care Reviewed With: patient  Goal: Patient-Specific Goal (Individualized)  Outcome: Ongoing, Progressing  Goal: Absence of Hospital-Acquired Illness or Injury  Outcome: Ongoing, Progressing  Intervention: Identify and Manage Fall Risk  Flowsheets (Taken 11/1/2023 1635)  Safety Promotion/Fall Prevention:   assistive device/personal item within reach   medications reviewed   nonskid shoes/socks when out of bed   side rails raised x 2  Intervention: Prevent Skin Injury  Flowsheets (Taken 11/1/2023 1635)  Body Position: turned  Skin Protection:   adhesive use limited   incontinence pads utilized   tubing/devices free from skin contact  Intervention: Prevent and Manage VTE (Venous Thromboembolism) Risk  Flowsheets (Taken 11/1/2023 1635)  Activity Management: Up in chair - L3  VTE Prevention/Management:   ambulation promoted   bleeding risk assessed   ROM (active) performed  Range of Motion:   active ROM (range of motion) encouraged   ROM (range of motion) performed  Intervention: Prevent Infection  Flowsheets (Taken 11/1/2023 1635)  Infection Prevention:   rest/sleep promoted   single patient room provided  Goal: Optimal Comfort and Wellbeing  Outcome: Ongoing, Progressing  Intervention: Monitor Pain and Promote Comfort  Flowsheets (Taken 11/1/2023 1635)  Pain Management Interventions:   care clustered   medication offered   pain management plan reviewed with patient/caregiver   pillow support provided   position adjusted  Intervention: Provide Person-Centered Care  Flowsheets (Taken 11/1/2023 1635)  Trust Relationship/Rapport: care explained  Goal: Readiness for Transition of Care  Outcome: Ongoing, Progressing     Problem: Diabetes Comorbidity  Goal: Blood Glucose Level Within Targeted Range  Outcome: Ongoing, Progressing  Intervention: Monitor and Manage Glycemia  Flowsheets  (Taken 11/1/2023 1635)  Glycemic Management: blood glucose monitored     Problem: Fluid and Electrolyte Imbalance (Acute Kidney Injury/Impairment)  Goal: Fluid and Electrolyte Balance  Outcome: Ongoing, Progressing     Problem: Oral Intake Inadequate (Acute Kidney Injury/Impairment)  Goal: Optimal Nutrition Intake  Outcome: Ongoing, Progressing  Intervention: Promote and Optimize Nutrition  Flowsheets (Taken 11/1/2023 1635)  Oral Nutrition Promotion: rest periods promoted     Problem: Renal Function Impairment (Acute Kidney Injury/Impairment)  Goal: Effective Renal Function  Outcome: Ongoing, Progressing  Intervention: Monitor and Support Renal Function  Flowsheets (Taken 11/1/2023 1635)  Medication Review/Management: medications reviewed     Problem: Fall Injury Risk  Goal: Absence of Fall and Fall-Related Injury  Outcome: Ongoing, Progressing  Intervention: Identify and Manage Contributors  Flowsheets (Taken 11/1/2023 1635)  Self-Care Promotion: independence encouraged  Medication Review/Management: medications reviewed  Intervention: Promote Injury-Free Environment  Flowsheets (Taken 11/1/2023 1635)  Safety Promotion/Fall Prevention:   assistive device/personal item within reach   medications reviewed   nonskid shoes/socks when out of bed   side rails raised x 2     Problem: Skin Injury Risk Increased  Goal: Skin Health and Integrity  Outcome: Ongoing, Progressing  Intervention: Optimize Skin Protection  Flowsheets (Taken 11/1/2023 1635)  Pressure Reduction Techniques:   frequent weight shift encouraged   weight shift assistance provided  Pressure Reduction Devices: positioning supports utilized  Skin Protection:   adhesive use limited   incontinence pads utilized   tubing/devices free from skin contact  Head of Bed (HOB) Positioning: HOB at 30-45 degrees  Intervention: Promote and Optimize Oral Intake  Flowsheets (Taken 11/1/2023 1635)  Oral Nutrition Promotion: rest periods promoted     Problem: Impaired Wound  Healing  Goal: Optimal Wound Healing  Outcome: Ongoing, Progressing  Intervention: Promote Wound Healing  Flowsheets (Taken 11/1/2023 1635)  Oral Nutrition Promotion: rest periods promoted  Activity Management: Up in chair - L3  Pain Management Interventions:   care clustered   medication offered   pain management plan reviewed with patient/caregiver   pillow support provided   position adjusted

## 2023-11-02 LAB
ALBUMIN SERPL-MCNC: 2.3 G/DL (ref 3.4–4.8)
BUN SERPL-MCNC: 64.9 MG/DL (ref 9.8–20.1)
CALCIUM SERPL-MCNC: 8.9 MG/DL (ref 8.4–10.2)
CHLORIDE SERPL-SCNC: 102 MMOL/L (ref 98–107)
CO2 SERPL-SCNC: 25 MMOL/L (ref 23–31)
CREAT SERPL-MCNC: 3.05 MG/DL (ref 0.55–1.02)
GFR SERPLBLD CREATININE-BSD FMLA CKD-EPI: 15 MLS/MIN/1.73/M2
GLUCOSE SERPL-MCNC: 102 MG/DL (ref 82–115)
PHOSPHATE SERPL-MCNC: 4.2 MG/DL (ref 2.3–4.7)
POCT GLUCOSE: 132 MG/DL (ref 70–110)
POCT GLUCOSE: 182 MG/DL (ref 70–110)
POCT GLUCOSE: 191 MG/DL (ref 70–110)
POTASSIUM SERPL-SCNC: 3.9 MMOL/L (ref 3.5–5.1)
SODIUM SERPL-SCNC: 140 MMOL/L (ref 136–145)

## 2023-11-02 PROCEDURE — 80069 RENAL FUNCTION PANEL: CPT | Performed by: STUDENT IN AN ORGANIZED HEALTH CARE EDUCATION/TRAINING PROGRAM

## 2023-11-02 PROCEDURE — 97530 THERAPEUTIC ACTIVITIES: CPT | Mod: CO

## 2023-11-02 PROCEDURE — 25000003 PHARM REV CODE 250: Performed by: INTERNAL MEDICINE

## 2023-11-02 PROCEDURE — 21400001 HC TELEMETRY ROOM

## 2023-11-02 PROCEDURE — 25000003 PHARM REV CODE 250: Performed by: STUDENT IN AN ORGANIZED HEALTH CARE EDUCATION/TRAINING PROGRAM

## 2023-11-02 PROCEDURE — 97535 SELF CARE MNGMENT TRAINING: CPT | Mod: CO

## 2023-11-02 PROCEDURE — 25000003 PHARM REV CODE 250: Performed by: NURSE PRACTITIONER

## 2023-11-02 PROCEDURE — 97116 GAIT TRAINING THERAPY: CPT | Mod: CQ

## 2023-11-02 PROCEDURE — 97530 THERAPEUTIC ACTIVITIES: CPT | Mod: CQ

## 2023-11-02 RX ADMIN — DOCUSATE SODIUM 100 MG: 100 CAPSULE, LIQUID FILLED ORAL at 08:11

## 2023-11-02 RX ADMIN — ATORVASTATIN CALCIUM 10 MG: 10 TABLET, FILM COATED ORAL at 10:11

## 2023-11-02 RX ADMIN — AMIODARONE HYDROCHLORIDE 200 MG: 200 TABLET ORAL at 10:11

## 2023-11-02 RX ADMIN — DOCUSATE SODIUM 100 MG: 100 CAPSULE, LIQUID FILLED ORAL at 10:11

## 2023-11-02 RX ADMIN — APIXABAN 5 MG: 5 TABLET, FILM COATED ORAL at 08:11

## 2023-11-02 RX ADMIN — FOLIC ACID 1000 MCG: 1 TABLET ORAL at 10:11

## 2023-11-02 RX ADMIN — METOPROLOL SUCCINATE 50 MG: 50 TABLET, EXTENDED RELEASE ORAL at 08:11

## 2023-11-02 RX ADMIN — TRAMADOL HYDROCHLORIDE 50 MG: 50 TABLET, COATED ORAL at 10:11

## 2023-11-02 RX ADMIN — AMLODIPINE BESYLATE 10 MG: 5 TABLET ORAL at 08:11

## 2023-11-02 RX ADMIN — FAMOTIDINE 20 MG: 20 TABLET, FILM COATED ORAL at 10:11

## 2023-11-02 RX ADMIN — GABAPENTIN 100 MG: 100 CAPSULE ORAL at 08:11

## 2023-11-02 RX ADMIN — SODIUM BICARBONATE 650 MG TABLET 650 MG: at 10:11

## 2023-11-02 RX ADMIN — ASPIRIN 81 MG: 81 TABLET, COATED ORAL at 10:11

## 2023-11-02 RX ADMIN — AMLODIPINE BESYLATE 10 MG: 5 TABLET ORAL at 10:11

## 2023-11-02 RX ADMIN — MULTIPLE VITAMINS W/ MINERALS TAB 1 TABLET: TAB at 10:11

## 2023-11-02 RX ADMIN — GABAPENTIN 100 MG: 100 CAPSULE ORAL at 10:11

## 2023-11-02 RX ADMIN — METOPROLOL SUCCINATE 50 MG: 50 TABLET, EXTENDED RELEASE ORAL at 10:11

## 2023-11-02 RX ADMIN — Medication 1000 MG: at 10:11

## 2023-11-02 RX ADMIN — BUPROPION HYDROCHLORIDE 150 MG: 150 TABLET, FILM COATED, EXTENDED RELEASE ORAL at 10:11

## 2023-11-02 RX ADMIN — Medication: at 09:11

## 2023-11-02 RX ADMIN — APIXABAN 5 MG: 5 TABLET, FILM COATED ORAL at 10:11

## 2023-11-02 RX ADMIN — ROPINIROLE HYDROCHLORIDE 0.5 MG: 0.25 TABLET, FILM COATED ORAL at 05:11

## 2023-11-02 NOTE — PT/OT/SLP PROGRESS
Physical Therapy Treatment    Patient Name:  Debra Puentes   MRN:  [id number]    Recommendations:     Discharge therapy intensity: moderate intensity   Discharge Equipment Recommendations: to be determined by next level of care  Barriers to discharge: Impaired mobility    Assessment:     Debra Puentes is a 77 y.o. female admitted with a medical diagnosis of Acute kidney injury superimposed on chronic kidney disease.  She presents with the following impairments/functional limitations: weakness, impaired endurance, impaired self care skills, impaired functional mobility, impaired balance, gait instability, decreased upper extremity function, decreased lower extremity function, decreased safety awareness, pain .        Rehab Prognosis: Good; patient would benefit from acute skilled PT services to address these deficits and reach maximum level of function.    Recent Surgery: * No surgery found *      Plan:     During this hospitalization, patient to be seen 5 x/week to address the identified rehab impairments via gait training, therapeutic activities, therapeutic exercises and progress toward the following goals:    Plan of Care Expires:       Subjective     Chief Complaint: back pain  Patient/Family Comments/goals:   Pain/Comfort:  Location 1: back  Pain Addressed 1: Reposition, Distraction      Objective:     Communicated with NSG prior to session.  Patient found HOB elevated with PureWick upon PT entry to room.     General Precautions: Standard, fall  Orthopedic Precautions: spinal precautions  Braces: LSO  Respiratory Status: Room air  Blood Pressure:   Skin Integrity: Visible skin intact      Functional Mobility:  Bed Mobility:     Scooting: moderate assistance  Supine to Sit: moderate assistance  Transfers:     Sit to Stand:  minimum assistance with rolling walker and 1 trial from EOB and 1 trial from bedside chair.    Bed to Chair: minimum assistance with  rolling walker  using  Step Transfer  Gait: pt  amb 12ft with RW Mauricio. Pt with step-to gait pattern.          Patient left up in chair with all lines intact and call button in reach..    GOALS:   Multidisciplinary Problems       Physical Therapy Goals          Problem: Physical Therapy    Goal Priority Disciplines Outcome Goal Variances Interventions   Physical Therapy Goal     PT, PT/OT Ongoing, Progressing     Description: Pt will be seen for the following goals  1. Pt will be sba with all bed mobility  2. Pt will be mauricio with transfers with a rw   3. Pt will amb 100ft with a rw sba                       Time Tracking:     PT Received On: 11/02/23  PT Start Time: 0911     PT Stop Time: 0934  PT Total Time (min): 23 min     Billable Minutes: Gait Training 10 and Therapeutic Activity 13    Treatment Type: Treatment  PT/PTA: PTA     Number of PTA visits since last PT visit: 2     11/02/2023

## 2023-11-02 NOTE — PROGRESS NOTES
[facility] Hospital Medicine Progress Note        Chief Complaint: Inpatient Follow-up for T12 compression fracture    HPI:   69-year-old obese woman with history of AFib on Xarelto, T2 dm, CKD, hypertension, hyperlipidemia presented with worsening of lower back pain, generalized weakness.  Patient had a fall 2 days ago, diagnosed with T12 vertebral fracture.  She denied any loss of bladder or bowels.  At admission she was normotensive and hemodynamically stable.  Lab work revealed elevated BUN and serum creatinine, cloudy urine with multiple white cells.  Urine sample were sent for cultures.  Recent CT scan obtained on 10/23/2023 revealed atrophy of left kidney, nonobstructing nephrolithiasis in the lower pole of the left kidney, cholelithiasis, umbilical hernia, diverticulosis.     MRI spine spine revealed T12 vertebral body compression deformity with multilevel spondylitic changes.  Neurosurgery recommended brace and no surgical intervention.  Nephrology was consulted for LORI.  Bicarb drip was added admission due to metabolic acidosis and later transitioned to normal saline.  P.o. bicarb was continued. PT was started and she was participating. Cm working on SNF placement.        Interval Hx:   Patient today awake and comfortable. Sitting up in a chair. In a good mood. Has been afebrile. Eating well.     Family at bedside, explained in detail about the patients condition, diagnosis, vitals, labs and treatment plan. They understand and agree with the plan. All their questions were answered.        Objective/physical exam:  General: In no acute distress, obese   Chest: Clear to auscultation bilaterally  Heart: RRR, +S1, S2, no appreciable murmur  Abdomen: Soft, nontender, BS +  MSK: Warm, no lower extremity edema, no clubbing or cyanosis  Neurologic: Cranial nerve II-XII intact, Strength 5/5 in all 4 extremities    VITAL SIGNS: 24 HRS MIN & MAX LAST   Temp  Min: 97.6 °F (36.4 °C)  Max:  98.3 °F (36.8 °C) 98 °F (36.7 °C)   BP  Min: 118/82  Max: 132/69 123/76   Pulse  Min: 72  Max: 90  80   Resp  Min: 17  Max: 18 18   SpO2  Min: 94 %  Max: 95 % 95 %     I have reviewed the following labs:  Recent Labs   Lab 10/29/23  0403 10/30/23  0319 10/31/23  0329   WBC 7.93 8.10 9.74   RBC 4.21 4.03* 4.19*   HGB 13.7 12.7 13.2   HCT 39.6 38.4 39.1   MCV 94.1* 95.3* 93.3   MCH 32.5* 31.5* 31.5*   MCHC 34.6 33.1 33.8   RDW 13.7 13.6 13.7    239 258   MPV 10.3 10.3 10.2     Recent Labs   Lab 10/26/23  1900 10/27/23  0557 10/29/23  0403 10/30/23  0319 10/31/23  0329 11/01/23  1253 11/02/23  0333      < > 141 141 143 138 140   K 4.0   < > 3.4* 3.7 4.2 4.1 3.9   CO2 15*   < > 23 26 24 24 25   .8*   < > 105.5* 93.7* 82.9* 70.5* 64.9*   CREATININE 7.29*   < > 4.18* 3.47* 3.13* 3.14* 3.05*   CALCIUM 9.7   < > 8.4 7.8* 8.5 9.2 8.9   MG 2.70*  --  2.30 1.90  --   --   --    ALBUMIN 2.8*  --  2.2* 2.1*  --   --  2.3*   ALKPHOS 136  --  101 104  --   --   --    ALT 15  --  15 18  --   --   --    AST 17  --  22 19  --   --   --    BILITOT 0.7  --  0.5 0.4  --   --   --     < > = values in this interval not displayed.     Microbiology Results (last 7 days)       Procedure Component Value Units Date/Time    Urine culture [[id number]]  (Abnormal)  (Susceptibility) Collected: 10/26/23 3566    Order Status: Completed Specimen: Urine Updated: 11/01/23 1116     Urine Culture >/= 100,000 colonies/ml Enterobacter cloacae complex      >/= 100,000 colonies/ml Proteus mirabilis      >/= 100,000 colonies/ml Enterobacter cloacae complex             See below for Radiology    Scheduled Med:   amiodarone  200 mg Oral Daily    amLODIPine  10 mg Oral BID    apixaban  5 mg Oral BID    ascorbic acid (vitamin C)  1,000 mg Oral Daily    aspirin  81 mg Oral Daily    atorvastatin  10 mg Oral Daily    buPROPion  150 mg Oral Daily    docusate sodium  100 mg Oral BID    famotidine  20 mg Oral Daily    folic acid  1,000 mcg Oral  Daily    gabapentin  100 mg Oral BID    metoprolol succinate  50 mg Oral BID    multivitamin  1 tablet Oral Daily    rOPINIRole  0.5 mg Oral Daily    sodium bicarbonate  650 mg Oral Daily    vitamin E (dl, acetate)  400 Units Oral Daily    zinc oxide-cod liver oil   Topical (Top) BID      Continuous Infusions:     PRN Meds:  sodium chloride 0.9%, acetaminophen, dextrose 10%, dextrose 10%, glucagon (human recombinant), glucose, glucose, insulin aspart U-100, morphine, ondansetron, traMADoL     Assessment/Plan:  T12 vertebral body compression fracture   Impingement of L2-L4  nerve roots, severe bilateral neural foraminal narrowing  Multilevel spondylitic changes  Bilateral lower extremity weakness due to above   LORI on CKD-improving  Metabolic acidosis   Possibly Proteus UTI-POA     HX:  T2 dm, AFib, HTN, HLD, ACD      Plan:  Patient clinically improving and has been participating with PT  Pain well controlled   Renal functions improving   Cont po bicarb per renal team   Will closely monitor patients daily weight, urine out put, renal parameters and volume status    Continue strict aspiration, fall and decubitus precautions    BUN 64, Crt 3    Avoid NSAIDs     VTE prophylaxis: eliquis     Patient condition:  Fair    Anticipated discharge and Disposition:   Rehab/SNF      All diagnosis and differential diagnosis have been reviewed; assessment and plan has been documented; I have personally reviewed the labs and test results that are presently available; I have reviewed the patients medication list; I have reviewed the consulting providers response and recommendations. I have reviewed or attempted to review medical records based upon their availability    All of the patient's questions have been  addressed and answered. Patient's is agreeable to the above stated plan. I will continue to monitor closely and make adjustments to medical management as  needed.  _____________________________________________________________________    Nutrition Status:    Radiology:  I have personally reviewed the following imaging and agree with the radiologist.     CT Head Without Contrast  Narrative: EXAMINATION:  CT HEAD WITHOUT CONTRAST    CLINICAL HISTORY:  triggered level 2 for [facility] SNF placement;    TECHNIQUE:  Low dose axial images were obtained through the head.  Coronal and sagittal reformations were also performed. Contrast was not administered.    Automatic exposure control was utilized to reduce the patient's radiation dose.    DLP= 1100    COMPARISON:  05/06/2020    FINDINGS:  No acute intracranial hemorrhage, edema or mass. No acute parenchymal abnormality.    Diffuse cerebral atrophy with concordant ventricular enlargement.    Scattered hypodensities throughout the deep periventricular white matter.    The osseous structures are normal.    The mastoid air cells are clear.    Debris within the bilateral auditory canals.    The globes and orbital contents are normal bilaterally.    The visualized maxillary, ethmoid and sphenoid sinuses are clear.  Impression: No acute intracranial abnormality identified.  Findings of chronic microvascular ischemic disease.    Electronically signed by: Jay Ernst  Date:    10/31/2023  Time:    15:41      Chas Francisco MD   11/02/2023

## 2023-11-02 NOTE — PROGRESS NOTES
Nephrology consult follow up note    HPI:      Debra Puentes is a 77 y.o. female with back pain, and decreased p.o. intake.  Past medical history significant for CKD 4, diabetes, hypertension, and hyperlipidemia.  She presents with 1 week of worsening back pain.  She says that it has caused her to essentially not be able to move.  Due to this her appetite has decreased.  Water intake has also decreased, she estimates she was only drinking about 500 mL to 1 L daily over the last few days.  Of note patient was reportedly also started on antibiotics 2 weeks ago for a urinary tract infection by her urologist.  She states she has finished antibiotics.  She denies taking NSAIDs.  She only takes Tylenol, and Robaxin for back pain.  Of note she was seen in in the emergency department 2 days ago and found to have a T12 compression fracture.  She sees Dr. Smith in clinic.  Last seen in August at which time Farxiga was started.  Patient did require hemodialysis temporarily in 2020.  Her baseline creatinine appears to be around 2.6-3.0.  Creatinine 7.2 at admission.  Nephrology consulted for acute kidney injury.    Interval history:     No acute events overnight.  Eating and drinking without problem.  No new complaints.  No chest pain, shortness of breath, abdominal pain, vomiting, or lower extremity edema.  She estimates he drank about water yesterday.     Review of Systems:       Past medical, family, surgical, and social history reviewed and unchanged from initial consult note.     Objective:       VITAL SIGNS: 24 HR MIN & MAX LAST    Temp  Min: 97.5 °F (36.4 °C)  Max: 98.3 °F (36.8 °C)  98 °F (36.7 °C)        BP  Min: 106/59  Max: 132/69  123/76     Pulse  Min: 72  Max: 93  80     Resp  Min: 17  Max: 18  18    SpO2  Min: 94 %  Max: 97 %  95 %      GEN: Chronically ill appearing WF in NAD  CV: RRR +S1,S2 without murmur  PULM: CTAB, unlabored  ABD: Soft, NT/ND abdomen with NABS  EXT: No cyanosis or edema  SKIN: Warm  and dry  PSYCH: Awake, alert and appropriately conversant.   Dialysis access:  No dialysis access            Component Value Date/Time     11/02/2023 0333     11/01/2023 1253     (L) 07/02/2020 0600     06/11/2020 2324    K 3.9 11/02/2023 0333    K 4.1 11/01/2023 1253    K 3.9 07/02/2020 0600    K 3.0 (L) 06/11/2020 2324    CHLORIDE 102 11/02/2023 0333    CHLORIDE 101 11/01/2023 1253    CO2 25 11/02/2023 0333    CO2 24 11/01/2023 1253    CO2 30 07/02/2020 0600    CO2 30 06/11/2020 2324    BUN 64.9 (H) 11/02/2023 0333    BUN 70.5 (H) 11/01/2023 1253    BUN 17 07/02/2020 0600    BUN 33 (H) 06/11/2020 2324    CREATININE 3.05 (H) 11/02/2023 0333    CREATININE 3.14 (H) 11/01/2023 1253    CREATININE 2.57 (H) 07/02/2020 0600    CREATININE 6.33 (H) 06/11/2020 2324    CALCIUM 8.9 11/02/2023 0333    CALCIUM 9.2 11/01/2023 1253    CALCIUM 7.6 (L) 07/02/2020 0600    CALCIUM 7.9 (L) 06/11/2020 2324    PHOS 4.2 11/02/2023 0333            Component Value Date/Time    WBC 9.74 10/31/2023 0329    WBC 8.10 10/30/2023 0319    WBC 6.4 06/01/2022 1205    HGB 13.2 10/31/2023 0329    HGB 12.7 10/30/2023 0319    HCT 39.1 10/31/2023 0329    HCT 38.4 10/30/2023 0319     10/31/2023 0329     10/30/2023 0319         Imaging reviewed      Assessment / Plan:       Active Hospital Problems    Diagnosis  POA    *Acute kidney injury superimposed on chronic kidney disease [N17.9, N18.9]  Yes    Compression fracture of T12 vertebra [S22.080A]  Yes    UTI (urinary tract infection) [N39.0]  Yes    HTN (hypertension) [I10]  Yes    HLD (hyperlipidemia) [E78.5]  Yes    T2DM (type 2 diabetes mellitus) [E11.9]  Yes    Paroxysmal A-fib [I48.0]  Yes      Resolved Hospital Problems   No resolved problems to display.       Acute kidney injury on CKD 4 - baseline creatinine 2.6-3.0.  Follows with Dr. Smith.  Metabolic acidosis - resolved  Back pain/T12 compression fracture  Hypokalemia resolved with replacement      Plan:  Renal function improved, now at baseline.  Continue to encourage p.o. intake.  No further recommendations from a Nephrology standpoint.  We will sign off at this time.  Please call if we can be of further assistance.  Follow up with Dr. Smith in a few weeks.      Noah Jung DO  Nephrology  [facility]  Clinic number: [phone]

## 2023-11-02 NOTE — PT/OT/SLP PROGRESS
Occupational Therapy   Treatment    Name: Debra Puentes  MRN: [id number]  Admitting Diagnosis:  Acute kidney injury superimposed on chronic kidney disease       Recommendations:     Recommended therapy intensity at discharge: Moderate Intensity Therapy   Discharge Equipment Recommendations:  to be determined by next level of care  Barriers to discharge:       Assessment:     Debra Puentes is a 77 y.o. female with a medical diagnosis of Acute kidney injury superimposed on chronic kidney disease.  She presents with increased pain. Performance deficits affecting function are weakness, impaired balance, impaired endurance, decreased safety awareness, pain, impaired self care skills, impaired functional mobility.     Rehab Prognosis:  Good; patient would benefit from acute skilled OT services to address these deficits and reach maximum level of function.       Plan:     Patient to be seen 4 x/week to address the above listed problems via self-care/home management, therapeutic activities, therapeutic exercises  Plan of Care Expires: 11/30/23  Plan of Care Reviewed with: patient, caregiver    Subjective     Pain/Comfort:  Reports 9/10 pain in lower back    Objective:     Communicated with: nurse prior to and following session.  Patient found HOB elevated with PureWick upon OT entry to room.    General Precautions: Standard, fall    Orthopedic Precautions:spinal precautions  Braces: LSO  Respiratory Status: Room air     Occupational Performance:     Bed Mobility:    Supine to sit 2 attempts, but unable to complete secondary to pain, education on spinal precautions and bed mobility following precautions, care giver training    Therapeutic Positioning    OT interventions performed during the course of today's session in an effort to prevent and/or reduce acquired pressure injuries:   Therapeutic positioning was provided at the conclusion of session to offload all bony prominences for the prevention and/or reduction of  pressure injuries    [facility] 6 Click ADL: 14    Patient Education:  Patient provided with verbal education and demonstrations education regarding post op precautions and safety awareness.  Understanding was verbalized, however additional teaching warranted.      Patient left HOB elevated with all lines intact, call button in reach, and caregiver present    GOALS:   Multidisciplinary Problems       Occupational Therapy Goals          Problem: Occupational Therapy    Goal Priority Disciplines Outcome Interventions   Occupational Therapy Goal     OT, PT/OT Ongoing, Progressing    Description: Goals to be met by: 11/30/2023    Patient will increase functional independence with ADLs by performing:    LE Dressing with MIN A.  Grooming while standing at sink with Modified [place].  Toileting from bedside commode with Stand-by Assistance for hygiene and clothing management.   Sitting at edge of bed x5 minutes with SBA with a MAX of 1 UE for stability.  Supine to sit/scooting and bridging with Modified [place].  Toilet transfer to bedside commode with Minimal Assistance.                         Time Tracking:     OT Date of Treatment: 11/02/23  OT Start Time: 1521  OT Stop Time: 1550  OT Total Time (min): 29 min    Billable Minutes:Self Care/Home Management 29    OT/SELENA: SELENA     Number of SELENA visits since last OT visit: 2    11/2/2023

## 2023-11-03 LAB
POCT GLUCOSE: 102 MG/DL (ref 70–110)
POCT GLUCOSE: 104 MG/DL (ref 70–110)
POCT GLUCOSE: 138 MG/DL (ref 70–110)
POCT GLUCOSE: 148 MG/DL (ref 70–110)

## 2023-11-03 PROCEDURE — 21400001 HC TELEMETRY ROOM

## 2023-11-03 PROCEDURE — 25000003 PHARM REV CODE 250: Performed by: INTERNAL MEDICINE

## 2023-11-03 PROCEDURE — 97530 THERAPEUTIC ACTIVITIES: CPT | Mod: CQ

## 2023-11-03 PROCEDURE — 25000003 PHARM REV CODE 250: Performed by: NURSE PRACTITIONER

## 2023-11-03 PROCEDURE — 97116 GAIT TRAINING THERAPY: CPT | Mod: CQ

## 2023-11-03 PROCEDURE — 25000003 PHARM REV CODE 250: Performed by: STUDENT IN AN ORGANIZED HEALTH CARE EDUCATION/TRAINING PROGRAM

## 2023-11-03 RX ADMIN — DOCUSATE SODIUM 100 MG: 100 CAPSULE, LIQUID FILLED ORAL at 09:11

## 2023-11-03 RX ADMIN — ROPINIROLE HYDROCHLORIDE 0.5 MG: 0.25 TABLET, FILM COATED ORAL at 05:11

## 2023-11-03 RX ADMIN — MULTIPLE VITAMINS W/ MINERALS TAB 1 TABLET: TAB at 09:11

## 2023-11-03 RX ADMIN — ASPIRIN 81 MG: 81 TABLET, COATED ORAL at 09:11

## 2023-11-03 RX ADMIN — FOLIC ACID 1000 MCG: 1 TABLET ORAL at 09:11

## 2023-11-03 RX ADMIN — BUPROPION HYDROCHLORIDE 150 MG: 150 TABLET, FILM COATED, EXTENDED RELEASE ORAL at 09:11

## 2023-11-03 RX ADMIN — GABAPENTIN 100 MG: 100 CAPSULE ORAL at 09:11

## 2023-11-03 RX ADMIN — AMLODIPINE BESYLATE 10 MG: 5 TABLET ORAL at 09:11

## 2023-11-03 RX ADMIN — Medication: at 09:11

## 2023-11-03 RX ADMIN — APIXABAN 5 MG: 5 TABLET, FILM COATED ORAL at 09:11

## 2023-11-03 RX ADMIN — SODIUM BICARBONATE 650 MG TABLET 650 MG: at 09:11

## 2023-11-03 RX ADMIN — Medication 400 UNITS: at 09:11

## 2023-11-03 RX ADMIN — AMIODARONE HYDROCHLORIDE 200 MG: 200 TABLET ORAL at 09:11

## 2023-11-03 RX ADMIN — ATORVASTATIN CALCIUM 10 MG: 10 TABLET, FILM COATED ORAL at 09:11

## 2023-11-03 RX ADMIN — Medication 1000 MG: at 09:11

## 2023-11-03 RX ADMIN — FAMOTIDINE 20 MG: 20 TABLET, FILM COATED ORAL at 09:11

## 2023-11-03 RX ADMIN — METOPROLOL SUCCINATE 50 MG: 50 TABLET, EXTENDED RELEASE ORAL at 09:11

## 2023-11-03 RX ADMIN — TRAMADOL HYDROCHLORIDE 50 MG: 50 TABLET, COATED ORAL at 11:11

## 2023-11-03 RX ADMIN — TRAMADOL HYDROCHLORIDE 50 MG: 50 TABLET, COATED ORAL at 04:11

## 2023-11-03 NOTE — PLAN OF CARE
Problem: Adult Inpatient Plan of Care  Goal: Plan of Care Review  Outcome: Ongoing, Progressing  Goal: Patient-Specific Goal (Individualized)  Outcome: Ongoing, Progressing  Goal: Absence of Hospital-Acquired Illness or Injury  Outcome: Ongoing, Progressing  Intervention: Prevent Skin Injury  Flowsheets (Taken 11/3/2023 1638)  Body Position:   turned   30 degrees  Skin Protection:   adhesive use limited   tubing/devices free from skin contact  Goal: Optimal Comfort and Wellbeing  Outcome: Ongoing, Progressing  Intervention: Provide Person-Centered Care  Flowsheets (Taken 11/3/2023 1638)  Trust Relationship/Rapport:   choices provided   care explained   emotional support provided   empathic listening provided   questions answered   questions encouraged   reassurance provided   thoughts/feelings acknowledged  Goal: Readiness for Transition of Care  Outcome: Ongoing, Progressing     Problem: Fluid and Electrolyte Imbalance (Acute Kidney Injury/Impairment)  Goal: Fluid and Electrolyte Balance  Outcome: Ongoing, Progressing     Problem: Diabetes Comorbidity  Goal: Blood Glucose Level Within Targeted Range  Outcome: Ongoing, Progressing     Problem: Oral Intake Inadequate (Acute Kidney Injury/Impairment)  Goal: Optimal Nutrition Intake  Outcome: Ongoing, Progressing     Problem: Renal Function Impairment (Acute Kidney Injury/Impairment)  Goal: Effective Renal Function  Outcome: Ongoing, Progressing     Problem: Skin Injury Risk Increased  Goal: Skin Health and Integrity  Outcome: Ongoing, Progressing     Problem: Impaired Wound Healing  Goal: Optimal Wound Healing  Outcome: Ongoing, Progressing

## 2023-11-03 NOTE — PT/OT/SLP PROGRESS
Physical Therapy Treatment    Patient Name:  Debra Puentes   MRN:  [id number]    Recommendations:     Discharge therapy intensity: moderate intensity   Discharge Equipment Recommendations: to be determined by next level of care  Barriers to discharge: Impaired mobility    Assessment:     Debra Puentes is a 77 y.o. female admitted with a medical diagnosis of Acute kidney injury superimposed on chronic kidney disease.  She presents with the following impairments/functional limitations: weakness, impaired endurance, impaired self care skills, impaired functional mobility, impaired balance, gait instability, decreased upper extremity function, decreased lower extremity function, decreased safety awareness, pain .        Rehab Prognosis: Good; patient would benefit from acute skilled PT services to address these deficits and reach maximum level of function.    Recent Surgery: * No surgery found *      Plan:     During this hospitalization, patient to be seen 5 x/week to address the identified rehab impairments via gait training, therapeutic activities, therapeutic exercises and progress toward the following goals:    Plan of Care Expires:       Subjective     Chief Complaint: back pain  Patient/Family Comments/goals:   Pain/Comfort:  Location 1: back  Pain Addressed 1: Reposition, Distraction      Objective:     Communicated with NSG prior to session.  Patient found HOB elevated with PureWick upon PT entry to room.     General Precautions: Standard, fall  Orthopedic Precautions: spinal precautions  Braces: LSO  Respiratory Status: Room air  Blood Pressure:   Skin Integrity: Visible skin intact      Functional Mobility:  Bed Mobility:     Scooting: moderate assistance  Supine to Sit: moderate assistance  Transfers:     Sit to Stand:  minimum assistance with rolling walker and 1 trial from EOB and 1 trial from bedside chair.    Bed to Chair: minimum assistance with  rolling walker  using  Step Transfer  Gait: pt  amb 18ft with RW Mauricio. Pt with step-to gait pattern.    Stat sitting: pt with L lateral lean and unable to correct on her own despite max vcs/tcs. Pt min-modA        Patient left up in chair with all lines intact and call button in reach..    GOALS:   Multidisciplinary Problems       Physical Therapy Goals          Problem: Physical Therapy    Goal Priority Disciplines Outcome Goal Variances Interventions   Physical Therapy Goal     PT, PT/OT Ongoing, Progressing     Description: Pt will be seen for the following goals  1. Pt will be sba with all bed mobility  2. Pt will be mauricio with transfers with a rw   3. Pt will amb 100ft with a rw sba                       Time Tracking:     PT Received On: 11/03/23  PT Start Time: 1112     PT Stop Time: 1135  PT Total Time (min): 23 min     Billable Minutes: Gait Training 10 and Therapeutic Activity 13    Treatment Type: Treatment  PT/PTA: PTA     Number of PTA visits since last PT visit: 3     11/03/2023

## 2023-11-03 NOTE — PROGRESS NOTES
Inpatient Nutrition Assessment    Admit Date: 10/26/2023   Total duration of encounter: 8 days     Nutrition Recommendation/Prescription     Continue current diet as tolerated  Encouraged adequate PO intake  Monitor appetite/PO intake, weight, and labs    Communication of Recommendations: reviewed with patient and reviewed with caregiver    Nutrition Assessment     Malnutrition Assessment/Nutrition-Focused Physical Exam    Malnutrition Context: other (see comments) (Does not meet criteria at this time) (10/30/23 1207)  Malnutrition Level: other (see comments) (Does not meet criteria at this time) (10/30/23 1207)  Energy Intake (Malnutrition): other (see comments) (does not meet criteria) (11/03/23 1402)  Weight Loss (Malnutrition): other (see comments) (Does not meet criteria) (10/30/23 1207)  Subcutaneous Fat (Malnutrition): other (see comments) (Does not meet criteria) (10/30/23 1207)           Muscle Mass (Malnutrition): other (see comments) (Does not meet criteria) (10/30/23 1207)                          Fluid Accumulation (Malnutrition): other (see comments) (Does not meet criteria) (10/30/23 1207)        A minimum of two characteristics is recommended for diagnosis of either severe or non-severe malnutrition.    Chart Review    Reason Seen: continuous nutrition monitoring and follow-up CKD    Malnutrition Screening Tool Results   Have you recently lost weight without trying?: No  Have you been eating poorly because of a decreased appetite?: No   MST Score: 0   Diagnosis:  T12 vertebral body compression fracture   Impingement of L2-L4  nerve roots, severe bilateral neural foraminal narrowing  Multilevel spondylitic changes  Bilateral lower extremity weakness due to above   LORI on CKD-improving  Metabolic acidosis   Possibly Proteus UTI-POA     HX:  T2 dm, AFib, HTN, HLD, ACD    Relevant Medical History:    Cancer      Chronic kidney disease, unspecified      Diabetes mellitus, type 2      History of  "arteriovenostomy for renal dialysis      Hyperlipidemia      Hypertension        Nutrition-Related Medications:   Scheduled Meds:   amiodarone  200 mg Oral Daily    amLODIPine  10 mg Oral BID    apixaban  5 mg Oral BID    ascorbic acid (vitamin C)  1,000 mg Oral Daily    aspirin  81 mg Oral Daily    atorvastatin  10 mg Oral Daily    buPROPion  150 mg Oral Daily    docusate sodium  100 mg Oral BID    famotidine  20 mg Oral Daily    folic acid  1,000 mcg Oral Daily    gabapentin  100 mg Oral BID    metoprolol succinate  50 mg Oral BID    multivitamin  1 tablet Oral Daily    rOPINIRole  0.5 mg Oral Daily    sodium bicarbonate  650 mg Oral Daily    vitamin E (dl, acetate)  400 Units Oral Daily    zinc oxide-cod liver oil   Topical (Top) BID     Continuous Infusions:      PRN Meds:.sodium chloride 0.9%, acetaminophen, dextrose 10%, dextrose 10%, glucagon (human recombinant), glucose, glucose, insulin aspart U-100, morphine, ondansetron, traMADoL    Calorie Containing IV Medications: no significant kcals from medications at this time    Nutrition-Related Labs:  10/30/2023: BUN 93.7, Crea 3.47, Ca 7.8, total protein 4.7, Alb 2.1, Gluc  98  11/2: Bun-64.9, Crea-3.05, GFR-15    Diet/PN Order: Diet diabetic  Oral Supplement Order: none  Tube Feeding Order: none  Appetite/Oral Intake: good/% of meals  Factors Affecting Nutritional Intake: decreased appetite  Food/Adventist/Cultural Preferences: none reported  Food Allergies:  lactose    Wound(s):      Altered Skin Integrity 10/27/23 1600 Buttocks-Tissue loss description: Partial thickness noted    Last Bowel Movement: 11/01/23    Comments    10/30/2023: Pt reports poor appetite/PO intake ~3 days. Pt reports a good appetite/PO intake. No reported N/V and chew/swallowing difficulties. When asked about wt, pt reports "a few years ago in 1970", went and relayed this information to the nurse. Per EMR, pt weighed 102 kg. Encouraged adequate PO intake. Will monitor.    11/3: Pt " "stated appetite has been good. States she eats a good breakfast and lighter on other meals. No new prefs at this time.    Anthropometrics    Height: 5' 8" (172.7 cm) Height Method: Measured  Last Weight: 104.3 kg (230 lb) (10/27/23 1710) Weight Method: Standard Scale  BMI (Calculated): 35  BMI Classification: obese grade I (BMI 30-34.9)     Ideal Body Weight (IBW), Female: 140 lb     % Ideal Body Weight, Female (lb): 164.29 %                    Usual Body Weight (UBW), k kg  % Usual Body Weight: 102.5     Usual Weight Provided By: EMR weight history    Wt Readings from Last 5 Encounters:   10/27/23 104.3 kg (230 lb)   10/23/23 103 kg (227 lb 1.2 oz)   23 99.8 kg (220 lb)   22 94.3 kg (208 lb)   18 128.2 kg (282 lb 10.1 oz)     Weight Change(s) Since Admission:  Admit Weight: 104.3 kg (230 lb) (10/26/23 1830)  10/27/2023: 104.3 kg  11/3: no new wt    Estimated Needs    Weight Used For Calorie Calculations: 104.3 kg (229 lb 15 oz)  Energy Calorie Requirements (kcal): 2086 (20 kcal/kg)  Energy Need Method: Kcal/kg  Weight Used For Protein Calculations: 104.3 kg (229 lb 15 oz)  Protein Requirements:  (1.0-1.2 g/kg)  Fluid Requirements (mL): 500 + urine output  Temp (24hrs), Av °F (36.7 °C), Min:97.3 °F (36.3 °C), Max:98.2 °F (36.8 °C)       Enteral Nutrition    Patient not receiving enteral nutrition at this time.    Parenteral Nutrition    Patient not receiving parenteral nutrition support at this time.    Evaluation of Received Nutrient Intake    Calories: meeting estimated needs  Protein: meeting estimated needs    Patient Education    Not applicable.    Nutrition Diagnosis     PES: Increased nutrient needs related to acute illness as evidenced by increased nutrient demand. (active)    Interventions/Goals     Intervention(s): general/healthful diet and commercial beverage  Goal: Meet greater than 75% of nutritional needs by follow-up. (goal progressing)    Monitoring & Evaluation "     Dietitian will monitor food and beverage intake, weight, electrolyte/renal panel, glucose/endocrine profile, and gastrointestinal profile.  Nutrition Risk/Follow-Up: moderate (follow-up in 3-5 days)   Please consult if re-assessment needed sooner.

## 2023-11-04 LAB
POCT GLUCOSE: 153 MG/DL (ref 70–110)
POCT GLUCOSE: 165 MG/DL (ref 70–110)
POCT GLUCOSE: 262 MG/DL (ref 70–110)
POCT GLUCOSE: 91 MG/DL (ref 70–110)

## 2023-11-04 PROCEDURE — 21400001 HC TELEMETRY ROOM

## 2023-11-04 PROCEDURE — 25000003 PHARM REV CODE 250: Performed by: STUDENT IN AN ORGANIZED HEALTH CARE EDUCATION/TRAINING PROGRAM

## 2023-11-04 PROCEDURE — 25000003 PHARM REV CODE 250: Performed by: INTERNAL MEDICINE

## 2023-11-04 PROCEDURE — 63600175 PHARM REV CODE 636 W HCPCS: Performed by: INTERNAL MEDICINE

## 2023-11-04 RX ADMIN — GABAPENTIN 100 MG: 100 CAPSULE ORAL at 08:11

## 2023-11-04 RX ADMIN — MULTIPLE VITAMINS W/ MINERALS TAB 1 TABLET: TAB at 09:11

## 2023-11-04 RX ADMIN — GABAPENTIN 100 MG: 100 CAPSULE ORAL at 09:11

## 2023-11-04 RX ADMIN — ATORVASTATIN CALCIUM 10 MG: 10 TABLET, FILM COATED ORAL at 09:11

## 2023-11-04 RX ADMIN — ASPIRIN 81 MG: 81 TABLET, COATED ORAL at 09:11

## 2023-11-04 RX ADMIN — Medication: at 09:11

## 2023-11-04 RX ADMIN — INSULIN ASPART 6 UNITS: 100 INJECTION, SOLUTION INTRAVENOUS; SUBCUTANEOUS at 11:11

## 2023-11-04 RX ADMIN — AMLODIPINE BESYLATE 10 MG: 5 TABLET ORAL at 08:11

## 2023-11-04 RX ADMIN — METOPROLOL SUCCINATE 50 MG: 50 TABLET, EXTENDED RELEASE ORAL at 09:11

## 2023-11-04 RX ADMIN — APIXABAN 5 MG: 5 TABLET, FILM COATED ORAL at 09:11

## 2023-11-04 RX ADMIN — ROPINIROLE HYDROCHLORIDE 0.5 MG: 0.25 TABLET, FILM COATED ORAL at 04:11

## 2023-11-04 RX ADMIN — FOLIC ACID 1000 MCG: 1 TABLET ORAL at 09:11

## 2023-11-04 RX ADMIN — INSULIN ASPART 2 UNITS: 100 INJECTION, SOLUTION INTRAVENOUS; SUBCUTANEOUS at 04:11

## 2023-11-04 RX ADMIN — DOCUSATE SODIUM 100 MG: 100 CAPSULE, LIQUID FILLED ORAL at 09:11

## 2023-11-04 RX ADMIN — AMIODARONE HYDROCHLORIDE 200 MG: 200 TABLET ORAL at 09:11

## 2023-11-04 RX ADMIN — BUPROPION HYDROCHLORIDE 150 MG: 150 TABLET, FILM COATED, EXTENDED RELEASE ORAL at 09:11

## 2023-11-04 RX ADMIN — METOPROLOL SUCCINATE 50 MG: 50 TABLET, EXTENDED RELEASE ORAL at 08:11

## 2023-11-04 RX ADMIN — Medication 1000 MG: at 09:11

## 2023-11-04 RX ADMIN — DOCUSATE SODIUM 100 MG: 100 CAPSULE, LIQUID FILLED ORAL at 08:11

## 2023-11-04 RX ADMIN — Medication 400 UNITS: at 09:11

## 2023-11-04 RX ADMIN — AMLODIPINE BESYLATE 10 MG: 5 TABLET ORAL at 09:11

## 2023-11-04 RX ADMIN — FAMOTIDINE 20 MG: 20 TABLET, FILM COATED ORAL at 09:11

## 2023-11-04 RX ADMIN — SODIUM BICARBONATE 650 MG TABLET 650 MG: at 09:11

## 2023-11-04 RX ADMIN — APIXABAN 5 MG: 5 TABLET, FILM COATED ORAL at 08:11

## 2023-11-04 NOTE — PLAN OF CARE
Problem: Adult Inpatient Plan of Care  Goal: Plan of Care Review  Outcome: Ongoing, Progressing  Flowsheets (Taken 11/3/2023 1926)  Plan of Care Reviewed With:   patient   significant other  Goal: Patient-Specific Goal (Individualized)  Outcome: Ongoing, Progressing  Flowsheets (Taken 11/3/2023 1926)  Individualized Care Needs: assist with adls, monitor labs., fall precaution  Goal: Absence of Hospital-Acquired Illness or Injury  Outcome: Ongoing, Progressing  Intervention: Identify and Manage Fall Risk  Flowsheets (Taken 11/3/2023 1926)  Safety Promotion/Fall Prevention:   assistive device/personal item within reach   Fall Risk reviewed with patient/family   Fall Risk signage in place   instructed to call staff for mobility   nonskid shoes/socks when out of bed  Intervention: Prevent Skin Injury  Flowsheets (Taken 11/3/2023 1926)  Body Position:   turned   weight shifting  Skin Protection: adhesive use limited  Intervention: Prevent and Manage VTE (Venous Thromboembolism) Risk  Flowsheets (Taken 11/3/2023 1926)  VTE Prevention/Management: (apixaban, aspirin) other (see comments)  Range of Motion: active ROM (range of motion) encouraged  Intervention: Prevent Infection  Flowsheets (Taken 11/3/2023 1926)  Infection Prevention:   rest/sleep promoted   single patient room provided   hand hygiene promoted  Goal: Optimal Comfort and Wellbeing  Outcome: Ongoing, Progressing  Intervention: Monitor Pain and Promote Comfort  Flowsheets (Taken 11/3/2023 1926)  Pain Management Interventions:   care clustered   quiet environment facilitated  Intervention: Provide Person-Centered Care  Flowsheets (Taken 11/3/2023 1926)  Trust Relationship/Rapport:   care explained   questions encouraged   choices provided   questions answered   thoughts/feelings acknowledged   reassurance provided   emotional support provided   empathic listening provided

## 2023-11-04 NOTE — PROGRESS NOTES
[facility] Hospital Medicine Progress Note        Chief Complaint: Inpatient Follow-up for     HPI:   69-year-old obese woman with history of AFib on Xarelto, T2 dm, CKD, hypertension, hyperlipidemia presented with worsening of lower back pain, generalized weakness.  Patient had a fall 2 days ago, diagnosed with T12 vertebral fracture.  She denied any loss of bladder or bowels.  At admission she was normotensive and hemodynamically stable.  Lab work revealed elevated BUN and serum creatinine, cloudy urine with multiple white cells.  Urine sample were sent for cultures.  Recent CT scan obtained on 10/23/2023 revealed atrophy of left kidney, nonobstructing nephrolithiasis in the lower pole of the left kidney, cholelithiasis, umbilical hernia, diverticulosis.     MRI spine spine revealed T12 vertebral body compression deformity with multilevel spondylitic changes.  Neurosurgery recommended brace and no surgical intervention.  Nephrology was consulted for LORI.  Bicarb drip was added admission due to metabolic acidosis and later transitioned to normal saline.  P.o. bicarb was continued.  Renal function has improved back to baseline after IV hydration.  PT was started and she was participating.  Ceftriaxone was added for abdominal unit admission.  Urine cultures resulted as Proteus, Enterobacter cloace. she is currently awaiting SNF placement    Interval Hx:   Hemodynamically stable.  Comfortably resting.  Caregiver at bedside preceded tolerating p.o., moving bowels.    Objective/physical exam:  Vitals:    11/03/23 1947 11/03/23 2301 11/04/23 0349 11/04/23 0738   BP: 121/65 109/65 102/65 135/67   BP Location:       Patient Position:       Pulse: 74 78 75 75   Resp: 18 18 18    Temp: 98 °F (36.7 °C) 97.7 °F (36.5 °C) 97.9 °F (36.6 °C) 97.7 °F (36.5 °C)   TempSrc: Oral Oral Oral Oral   SpO2: 95% 97% (!) 93% 97%   Weight:       Height:         General: In no acute distress,  afebrile  Respiratory: Clear to auscultation bilaterally  Cardiovascular: S1, S2, no appreciable murmur  Abdomen: Soft, nontender, BS +  MSK: Warm, no lower extremity edema, no clubbing or cyanosis  Neurologic: Alert and oriented x4, moving all extremities with good strength     Lab Results   Component Value Date     11/02/2023    K 3.9 11/02/2023    CL 92 (L) 07/02/2020    CO2 25 11/02/2023    BUN 64.9 (H) 11/02/2023    CREATININE 3.05 (H) 11/02/2023    CALCIUM 8.9 11/02/2023    ANIONGAP 7 (L) 07/02/2020    ESTGFRAFRICA 22 07/02/2020    EGFRNONAA 17 07/21/2022      Lab Results   Component Value Date    ALT 18 10/30/2023    AST 19 10/30/2023    ALKPHOS 104 10/30/2023    BILITOT 0.4 10/30/2023      Lab Results   Component Value Date    WBC 9.74 10/31/2023    HGB 13.2 10/31/2023    HCT 39.1 10/31/2023    MCV 93.3 10/31/2023     10/31/2023           Medications:   amiodarone  200 mg Oral Daily    amLODIPine  10 mg Oral BID    apixaban  5 mg Oral BID    ascorbic acid (vitamin C)  1,000 mg Oral Daily    aspirin  81 mg Oral Daily    atorvastatin  10 mg Oral Daily    buPROPion  150 mg Oral Daily    docusate sodium  100 mg Oral BID    famotidine  20 mg Oral Daily    folic acid  1,000 mcg Oral Daily    gabapentin  100 mg Oral BID    metoprolol succinate  50 mg Oral BID    multivitamin  1 tablet Oral Daily    rOPINIRole  0.5 mg Oral Daily    sodium bicarbonate  650 mg Oral Daily    vitamin E (dl, acetate)  400 Units Oral Daily    zinc oxide-cod liver oil   Topical (Top) BID      sodium chloride 0.9%, acetaminophen, dextrose 10%, dextrose 10%, glucagon (human recombinant), glucose, glucose, insulin aspart U-100, morphine, ondansetron, traMADoL     Assessment/Plan:    T12 vertebral body compression fracture   Impingement of L2-L4  nerve roots, severe bilateral neural foraminal narrowing  Multilevel spondylitic changes  Bilateral lower extremity weakness due to above   LORI on CKD-improved  Metabolic acidosis -  resolving  Proteus, Enterobacter UTI-POA s/p Rx     HX:  T2 dm, AFib, HTN, HLD, ACD    Plan:  -continue therapy as tolerated.  Analgesics as needed.  Awaiting SNF placement.  Neurosurgery recommended brace  -renal back to baseline.  Try to avoid nephrotoxic medications if possible.  Needs outpatient follow up after SNF  -home medications were reviewed.  Anticoagulation switch to Eliquis due to CKD  -other home meds were reviewed    Charlotte Andrews MD

## 2023-11-05 LAB
POCT GLUCOSE: 103 MG/DL (ref 70–110)
POCT GLUCOSE: 152 MG/DL (ref 70–110)
POCT GLUCOSE: 165 MG/DL (ref 70–110)

## 2023-11-05 PROCEDURE — 63600175 PHARM REV CODE 636 W HCPCS: Performed by: INTERNAL MEDICINE

## 2023-11-05 PROCEDURE — 25000003 PHARM REV CODE 250: Performed by: INTERNAL MEDICINE

## 2023-11-05 PROCEDURE — 21400001 HC TELEMETRY ROOM

## 2023-11-05 PROCEDURE — 25000003 PHARM REV CODE 250: Performed by: STUDENT IN AN ORGANIZED HEALTH CARE EDUCATION/TRAINING PROGRAM

## 2023-11-05 RX ORDER — BISACODYL 10 MG
10 SUPPOSITORY, RECTAL RECTAL ONCE AS NEEDED
Status: COMPLETED | OUTPATIENT
Start: 2023-11-05 | End: 2023-11-05

## 2023-11-05 RX ADMIN — METOPROLOL SUCCINATE 50 MG: 50 TABLET, EXTENDED RELEASE ORAL at 08:11

## 2023-11-05 RX ADMIN — AMIODARONE HYDROCHLORIDE 200 MG: 200 TABLET ORAL at 09:11

## 2023-11-05 RX ADMIN — AMLODIPINE BESYLATE 10 MG: 5 TABLET ORAL at 09:11

## 2023-11-05 RX ADMIN — GABAPENTIN 100 MG: 100 CAPSULE ORAL at 08:11

## 2023-11-05 RX ADMIN — Medication 1000 MG: at 09:11

## 2023-11-05 RX ADMIN — ROPINIROLE HYDROCHLORIDE 0.5 MG: 0.25 TABLET, FILM COATED ORAL at 04:11

## 2023-11-05 RX ADMIN — FAMOTIDINE 20 MG: 20 TABLET, FILM COATED ORAL at 09:11

## 2023-11-05 RX ADMIN — Medication: at 08:11

## 2023-11-05 RX ADMIN — BISACODYL 10 MG: 10 SUPPOSITORY RECTAL at 03:11

## 2023-11-05 RX ADMIN — Medication 400 UNITS: at 09:11

## 2023-11-05 RX ADMIN — FOLIC ACID 1000 MCG: 1 TABLET ORAL at 09:11

## 2023-11-05 RX ADMIN — INSULIN ASPART 2 UNITS: 100 INJECTION, SOLUTION INTRAVENOUS; SUBCUTANEOUS at 05:11

## 2023-11-05 RX ADMIN — DOCUSATE SODIUM 100 MG: 100 CAPSULE, LIQUID FILLED ORAL at 09:11

## 2023-11-05 RX ADMIN — DOCUSATE SODIUM 100 MG: 100 CAPSULE, LIQUID FILLED ORAL at 08:11

## 2023-11-05 RX ADMIN — SODIUM BICARBONATE 650 MG TABLET 650 MG: at 09:11

## 2023-11-05 RX ADMIN — BUPROPION HYDROCHLORIDE 150 MG: 150 TABLET, FILM COATED, EXTENDED RELEASE ORAL at 09:11

## 2023-11-05 RX ADMIN — GABAPENTIN 100 MG: 100 CAPSULE ORAL at 09:11

## 2023-11-05 RX ADMIN — APIXABAN 5 MG: 5 TABLET, FILM COATED ORAL at 09:11

## 2023-11-05 RX ADMIN — MULTIPLE VITAMINS W/ MINERALS TAB 1 TABLET: TAB at 09:11

## 2023-11-05 RX ADMIN — APIXABAN 5 MG: 5 TABLET, FILM COATED ORAL at 08:11

## 2023-11-05 RX ADMIN — METOPROLOL SUCCINATE 50 MG: 50 TABLET, EXTENDED RELEASE ORAL at 09:11

## 2023-11-05 RX ADMIN — Medication: at 09:11

## 2023-11-05 RX ADMIN — ASPIRIN 81 MG: 81 TABLET, COATED ORAL at 09:11

## 2023-11-05 RX ADMIN — ATORVASTATIN CALCIUM 10 MG: 10 TABLET, FILM COATED ORAL at 09:11

## 2023-11-05 NOTE — PLAN OF CARE
Problem: Adult Inpatient Plan of Care  Goal: Plan of Care Review  Outcome: Ongoing, Progressing  Flowsheets (Taken 11/5/2023 1317)  Plan of Care Reviewed With: patient  Goal: Patient-Specific Goal (Individualized)  Outcome: Ongoing, Progressing  Goal: Absence of Hospital-Acquired Illness or Injury  Outcome: Ongoing, Progressing  Intervention: Identify and Manage Fall Risk  Flowsheets (Taken 11/5/2023 1317)  Safety Promotion/Fall Prevention:   assistive device/personal item within reach   medications reviewed   nonskid shoes/socks when out of bed   side rails raised x 2  Intervention: Prevent Skin Injury  Flowsheets (Taken 11/5/2023 1317)  Body Position: turned  Skin Protection:   adhesive use limited   incontinence pads utilized   tubing/devices free from skin contact  Intervention: Prevent and Manage VTE (Venous Thromboembolism) Risk  Flowsheets (Taken 11/5/2023 1317)  Activity Management: Rolling - L1  VTE Prevention/Management:   ambulation promoted   bleeding risk assessed   ROM (active) performed  Range of Motion:   active ROM (range of motion) encouraged   ROM (range of motion) performed  Intervention: Prevent Infection  Flowsheets (Taken 11/5/2023 1317)  Infection Prevention:   rest/sleep promoted   single patient room provided  Goal: Optimal Comfort and Wellbeing  Outcome: Ongoing, Progressing  Intervention: Monitor Pain and Promote Comfort  Flowsheets (Taken 11/5/2023 1317)  Pain Management Interventions:   care clustered   medication offered   pain management plan reviewed with patient/caregiver   pillow support provided   position adjusted  Intervention: Provide Person-Centered Care  Flowsheets (Taken 11/5/2023 1317)  Trust Relationship/Rapport: care explained  Goal: Readiness for Transition of Care  Outcome: Ongoing, Progressing     Problem: Diabetes Comorbidity  Goal: Blood Glucose Level Within Targeted Range  Outcome: Ongoing, Progressing  Intervention: Monitor and Manage Glycemia  Flowsheets (Taken  11/5/2023 1317)  Glycemic Management: blood glucose monitored     Problem: Fluid and Electrolyte Imbalance (Acute Kidney Injury/Impairment)  Goal: Fluid and Electrolyte Balance  Outcome: Ongoing, Progressing     Problem: Oral Intake Inadequate (Acute Kidney Injury/Impairment)  Goal: Optimal Nutrition Intake  Outcome: Ongoing, Progressing  Intervention: Promote and Optimize Nutrition  Flowsheets (Taken 11/5/2023 1317)  Oral Nutrition Promotion: rest periods promoted     Problem: Renal Function Impairment (Acute Kidney Injury/Impairment)  Goal: Effective Renal Function  Outcome: Ongoing, Progressing  Intervention: Monitor and Support Renal Function  Flowsheets (Taken 11/5/2023 1317)  Medication Review/Management: medications reviewed     Problem: Fall Injury Risk  Goal: Absence of Fall and Fall-Related Injury  Outcome: Ongoing, Progressing  Intervention: Identify and Manage Contributors  Flowsheets (Taken 11/5/2023 1317)  Self-Care Promotion: independence encouraged  Medication Review/Management: medications reviewed  Intervention: Promote Injury-Free Environment  Flowsheets (Taken 11/5/2023 1317)  Safety Promotion/Fall Prevention:   assistive device/personal item within reach   medications reviewed   nonskid shoes/socks when out of bed   side rails raised x 2     Problem: Skin Injury Risk Increased  Goal: Skin Health and Integrity  Outcome: Ongoing, Progressing  Intervention: Optimize Skin Protection  Flowsheets (Taken 11/5/2023 1317)  Pressure Reduction Techniques:   frequent weight shift encouraged   weight shift assistance provided  Pressure Reduction Devices: positioning supports utilized  Skin Protection:   adhesive use limited   incontinence pads utilized   tubing/devices free from skin contact  Head of Bed (HOB) Positioning: HOB at 20-30 degrees  Intervention: Promote and Optimize Oral Intake  Flowsheets (Taken 11/5/2023 1317)  Oral Nutrition Promotion: rest periods promoted     Problem: Impaired Wound  Healing  Goal: Optimal Wound Healing  Outcome: Ongoing, Progressing  Intervention: Promote Wound Healing  Flowsheets (Taken 11/5/2023 1317)  Oral Nutrition Promotion: rest periods promoted  Activity Management: Rolling - L1  Pain Management Interventions:   care clustered   medication offered   pain management plan reviewed with patient/caregiver   pillow support provided   position adjusted

## 2023-11-05 NOTE — PROGRESS NOTES
[facility] Hospital Medicine Progress Note        Chief Complaint: Inpatient Follow-up for     HPI:   69-year-old obese woman with history of AFib on Xarelto, T2 dm, CKD, hypertension, hyperlipidemia presented with worsening of lower back pain, generalized weakness.  Patient had a fall 2 days ago, diagnosed with T12 vertebral fracture.  She denied any loss of bladder or bowels.  At admission she was normotensive and hemodynamically stable.  Lab work revealed elevated BUN and serum creatinine, cloudy urine with multiple white cells.  Urine sample were sent for cultures.  Recent CT scan obtained on 10/23/2023 revealed atrophy of left kidney, nonobstructing nephrolithiasis in the lower pole of the left kidney, cholelithiasis, umbilical hernia, diverticulosis.     MRI spine spine revealed T12 vertebral body compression deformity with multilevel spondylitic changes.  Neurosurgery recommended brace and no surgical intervention.  Nephrology was consulted for LORI.  Bicarb drip was added admission due to metabolic acidosis and later transitioned to normal saline.  P.o. bicarb was continued.  Renal function has improved back to baseline after IV hydration.  PT was started and she was participating.  Ceftriaxone was added for abdominal unit admission.  Urine cultures resulted as Proteus, Enterobacter cloace. she is currently awaiting SNF placement    Interval Hx:   There were no acute events overnight.  She was alert, comfortably.  Pain is controlled.  Motivated for therapy.  Awaiting placement     Objective/physical exam:  Vitals:    11/04/23 1545 11/04/23 2055 11/04/23 2352 11/05/23 0402   BP: 103/65 121/69 136/75 132/68   Pulse: 75 65 67 68   Resp: 16 18 16 16   Temp: 97.2 °F (36.2 °C) 98 °F (36.7 °C) 97.8 °F (36.6 °C) 97.7 °F (36.5 °C)   TempSrc: Oral Oral Oral Oral   SpO2: (!) 92% (!) 92% (!) 94% (!) 92%   Weight:       Height:         General: In no acute distress, afebrile  Respiratory:  Clear to auscultation bilaterally  Cardiovascular: S1, S2, no appreciable murmur  Abdomen: Soft, nontender, BS +  MSK: Warm, no lower extremity edema, no clubbing or cyanosis  Neurologic: Alert and oriented x4, moving all extremities with good strength     Lab Results   Component Value Date     11/02/2023    K 3.9 11/02/2023    CL 92 (L) 07/02/2020    CO2 25 11/02/2023    BUN 64.9 (H) 11/02/2023    CREATININE 3.05 (H) 11/02/2023    CALCIUM 8.9 11/02/2023    ANIONGAP 7 (L) 07/02/2020    ESTGFRAFRICA 22 07/02/2020    EGFRNONAA 17 07/21/2022      Lab Results   Component Value Date    ALT 18 10/30/2023    AST 19 10/30/2023    ALKPHOS 104 10/30/2023    BILITOT 0.4 10/30/2023      Lab Results   Component Value Date    WBC 9.74 10/31/2023    HGB 13.2 10/31/2023    HCT 39.1 10/31/2023    MCV 93.3 10/31/2023     10/31/2023           Medications:   amiodarone  200 mg Oral Daily    amLODIPine  10 mg Oral BID    apixaban  5 mg Oral BID    ascorbic acid (vitamin C)  1,000 mg Oral Daily    aspirin  81 mg Oral Daily    atorvastatin  10 mg Oral Daily    buPROPion  150 mg Oral Daily    docusate sodium  100 mg Oral BID    famotidine  20 mg Oral Daily    folic acid  1,000 mcg Oral Daily    gabapentin  100 mg Oral BID    metoprolol succinate  50 mg Oral BID    multivitamin  1 tablet Oral Daily    rOPINIRole  0.5 mg Oral Daily    sodium bicarbonate  650 mg Oral Daily    vitamin E (dl, acetate)  400 Units Oral Daily    zinc oxide-cod liver oil   Topical (Top) BID      sodium chloride 0.9%, acetaminophen, dextrose 10%, dextrose 10%, glucagon (human recombinant), glucose, glucose, insulin aspart U-100, morphine, ondansetron, traMADoL     Assessment/Plan:    T12 vertebral body compression fracture   Impingement of L2-L4  nerve roots, severe bilateral neural foraminal narrowing  Multilevel spondylitic changes  Bilateral lower extremity weakness due to above   LORI on CKD-improved  Metabolic acidosis - resolving  Proteus,  Enterobacter UTI-POA s/p Rx     HX:  T2 dm, AFib, HTN, HLD, ACD    Plan:  -continue therapy as tolerated.  Analgesics as needed.  Awaiting SNF placement.  Neurosurgery recommended brace  -renal back to baseline.  Try to avoid nephrotoxic medications if possible.  Needs outpatient follow up after SNF  -home medications were reviewed.  Anticoagulation switch to Eliquis due to CKD  -other home meds were reviewed    Charlotte Andrews MD

## 2023-11-06 LAB
POCT GLUCOSE: 113 MG/DL (ref 70–110)
POCT GLUCOSE: 124 MG/DL (ref 70–110)
POCT GLUCOSE: 133 MG/DL (ref 70–110)
POCT GLUCOSE: 187 MG/DL (ref 70–110)

## 2023-11-06 PROCEDURE — 25000003 PHARM REV CODE 250: Performed by: INTERNAL MEDICINE

## 2023-11-06 PROCEDURE — 21400001 HC TELEMETRY ROOM

## 2023-11-06 PROCEDURE — 97116 GAIT TRAINING THERAPY: CPT | Mod: CQ

## 2023-11-06 PROCEDURE — 25000003 PHARM REV CODE 250: Performed by: STUDENT IN AN ORGANIZED HEALTH CARE EDUCATION/TRAINING PROGRAM

## 2023-11-06 PROCEDURE — 97530 THERAPEUTIC ACTIVITIES: CPT | Mod: CQ

## 2023-11-06 PROCEDURE — 25000003 PHARM REV CODE 250: Performed by: NURSE PRACTITIONER

## 2023-11-06 PROCEDURE — 63600175 PHARM REV CODE 636 W HCPCS: Performed by: INTERNAL MEDICINE

## 2023-11-06 RX ADMIN — AMIODARONE HYDROCHLORIDE 200 MG: 200 TABLET ORAL at 09:11

## 2023-11-06 RX ADMIN — AMLODIPINE BESYLATE 10 MG: 5 TABLET ORAL at 09:11

## 2023-11-06 RX ADMIN — GABAPENTIN 100 MG: 100 CAPSULE ORAL at 09:11

## 2023-11-06 RX ADMIN — TRAMADOL HYDROCHLORIDE 50 MG: 50 TABLET, COATED ORAL at 02:11

## 2023-11-06 RX ADMIN — SODIUM BICARBONATE 650 MG TABLET 650 MG: at 09:11

## 2023-11-06 RX ADMIN — METOPROLOL SUCCINATE 50 MG: 50 TABLET, EXTENDED RELEASE ORAL at 09:11

## 2023-11-06 RX ADMIN — ASPIRIN 81 MG: 81 TABLET, COATED ORAL at 09:11

## 2023-11-06 RX ADMIN — ROPINIROLE HYDROCHLORIDE 0.5 MG: 0.25 TABLET, FILM COATED ORAL at 04:11

## 2023-11-06 RX ADMIN — ATORVASTATIN CALCIUM 10 MG: 10 TABLET, FILM COATED ORAL at 09:11

## 2023-11-06 RX ADMIN — Medication: at 09:11

## 2023-11-06 RX ADMIN — Medication 1000 MG: at 09:11

## 2023-11-06 RX ADMIN — APIXABAN 5 MG: 5 TABLET, FILM COATED ORAL at 09:11

## 2023-11-06 RX ADMIN — Medication 400 UNITS: at 09:11

## 2023-11-06 RX ADMIN — DOCUSATE SODIUM 100 MG: 100 CAPSULE, LIQUID FILLED ORAL at 09:11

## 2023-11-06 RX ADMIN — BUPROPION HYDROCHLORIDE 150 MG: 150 TABLET, FILM COATED, EXTENDED RELEASE ORAL at 09:11

## 2023-11-06 RX ADMIN — INSULIN ASPART 2 UNITS: 100 INJECTION, SOLUTION INTRAVENOUS; SUBCUTANEOUS at 04:11

## 2023-11-06 RX ADMIN — FOLIC ACID 1000 MCG: 1 TABLET ORAL at 09:11

## 2023-11-06 RX ADMIN — FAMOTIDINE 20 MG: 20 TABLET, FILM COATED ORAL at 09:11

## 2023-11-06 RX ADMIN — MULTIPLE VITAMINS W/ MINERALS TAB 1 TABLET: TAB at 09:11

## 2023-11-06 NOTE — PLAN OF CARE
CM sent updated clinicals to [facility] Phone: [phone] via [employer]. CM added we are still awaiting pt's 142 at this time. CM reviewed pt's chart and is trying to understand why or how pt triggered a level 2.

## 2023-11-06 NOTE — PT/OT/SLP PROGRESS
Physical Therapy Treatment    Patient Name:  Debra Puentes   MRN:  [id number]    Recommendations:     Discharge therapy intensity: moderate intensity   Discharge Equipment Recommendations: to be determined by next level of care  Barriers to discharge: Impaired mobility    Assessment:     Debra Puentes is a 77 y.o. female admitted with a medical diagnosis of Acute kidney injury superimposed on chronic kidney disease.  She presents with the following impairments/functional limitations: weakness, impaired endurance, impaired self care skills, impaired functional mobility, impaired balance, gait instability, decreased upper extremity function, decreased lower extremity function, decreased safety awareness, pain .    Pt stating her back pain is much better today than it has been.      Rehab Prognosis: Good; patient would benefit from acute skilled PT services to address these deficits and reach maximum level of function.    Recent Surgery: * No surgery found *      Plan:     During this hospitalization, patient to be seen 5 x/week to address the identified rehab impairments via gait training, therapeutic activities, therapeutic exercises and progress toward the following goals:    Plan of Care Expires:       Subjective     Chief Complaint: back pain  Patient/Family Comments/goals:   Pain/Comfort:  Location 1: back  Pain Addressed 1: Reposition, Distraction      Objective:     Communicated with NSG prior to session.  Patient found HOB elevated with PureWick upon PT entry to room.     General Precautions: Standard, fall  Orthopedic Precautions: spinal precautions  Braces: LSO  Respiratory Status: Room air  Blood Pressure:   Skin Integrity: Visible skin intact      Functional Mobility:  Bed Mobility:     Scooting: minimum assistance and 4 scooting attempts towards EOB. Max vcs/demonstration given. Pt required increased time.   Supine to Sit: minimum assistance and required increased time and vcs for correct form.    Transfers:     Sit to Stand:  minimum assistance and moderate assistance with rolling walker and 1 trial from EOB (Mauricio) and 1 trial from bedside chair (modA).    Gait: pt amb 14ft/24ft with RW Mauricio. Pt with step-to gait pattern. Required steadying assist throughout.    Stat sitting: pt able to sit EOB with CGA-SBA.         Patient left up in chair with all lines intact and call button in reach..    GOALS:   Multidisciplinary Problems       Physical Therapy Goals          Problem: Physical Therapy    Goal Priority Disciplines Outcome Goal Variances Interventions   Physical Therapy Goal     PT, PT/OT Ongoing, Progressing     Description: Pt will be seen for the following goals  1. Pt will be sba with all bed mobility  2. Pt will be mauricio with transfers with a rw   3. Pt will amb 100ft with a rw sba                       Time Tracking:     PT Received On: 11/06/23  PT Start Time: 1417     PT Stop Time: 1440  PT Total Time (min): 23 min     Billable Minutes: Gait Training 13 and Therapeutic Activity 10    Treatment Type: Treatment  PT/PTA: PTA     Number of PTA visits since last PT visit: 4     11/06/2023

## 2023-11-06 NOTE — PLAN OF CARE
Problem: Adult Inpatient Plan of Care  Goal: Plan of Care Review  Outcome: Ongoing, Progressing  Flowsheets (Taken 11/6/2023 0128)  Plan of Care Reviewed With: patient  Goal: Patient-Specific Goal (Individualized)  Outcome: Ongoing, Progressing  Goal: Absence of Hospital-Acquired Illness or Injury  Outcome: Ongoing, Progressing  Intervention: Identify and Manage Fall Risk  Flowsheets (Taken 11/6/2023 0128)  Safety Promotion/Fall Prevention:   assistive device/personal item within reach   Fall Risk reviewed with patient/family   Fall Risk signage in place   medications reviewed  Intervention: Prevent Skin Injury  Flowsheets (Taken 11/6/2023 0128)  Body Position:   position changed independently   supine  Skin Protection:   adhesive use limited   skin sealant/moisture barrier applied  Intervention: Prevent and Manage VTE (Venous Thromboembolism) Risk  Flowsheets (Taken 11/6/2023 0128)  Activity Management: Rolling - L1  VTE Prevention/Management:   ambulation promoted   bleeding risk assessed   dorsiflexion/plantar flexion performed   fluids promoted   ROM (active) performed   ROM (passive) performed  Range of Motion:   active ROM (range of motion) encouraged   ROM (range of motion) performed  Intervention: Prevent Infection  Flowsheets (Taken 11/6/2023 0128)  Infection Prevention:   single patient room provided   hand hygiene promoted   rest/sleep promoted  Goal: Optimal Comfort and Wellbeing  Outcome: Ongoing, Progressing  Intervention: Monitor Pain and Promote Comfort  Flowsheets (Taken 11/6/2023 0128)  Pain Management Interventions:   care clustered   medication offered but refused   pain management plan reviewed with patient/caregiver   pillow support provided  Intervention: Provide Person-Centered Care  Flowsheets (Taken 11/6/2023 0128)  Trust Relationship/Rapport:   questions encouraged   care explained   choices provided   reassurance provided   emotional support provided   thoughts/feelings acknowledged    empathic listening provided   questions answered  Goal: Readiness for Transition of Care  Outcome: Ongoing, Progressing

## 2023-11-06 NOTE — PROGRESS NOTES
[facility] Hospital Medicine Progress Note        Chief Complaint: Inpatient Follow-up for     HPI:   69-year-old obese woman with history of AFib on Xarelto, T2 dm, CKD, hypertension, hyperlipidemia presented with worsening of lower back pain, generalized weakness.  Patient had a fall 2 days ago, diagnosed with T12 vertebral fracture.  She denied any loss of bladder or bowels.  At admission she was normotensive and hemodynamically stable.  Lab work revealed elevated BUN and serum creatinine, cloudy urine with multiple white cells.  Urine sample were sent for cultures.  Recent CT scan obtained on 10/23/2023 revealed atrophy of left kidney, nonobstructing nephrolithiasis in the lower pole of the left kidney, cholelithiasis, umbilical hernia, diverticulosis.     MRI spine spine revealed T12 vertebral body compression deformity with multilevel spondylitic changes.  Neurosurgery recommended brace and no surgical intervention.  Nephrology was consulted for LORI.  Bicarb drip was added admission due to metabolic acidosis and later transitioned to normal saline.  P.o. bicarb was continued.  Renal function has improved back to baseline after IV hydration.  PT was started and she was participating.  Ceftriaxone was added for abdominal unit admission.  Urine cultures resulted as Proteus, Enterobacter cloace. she is currently awaiting SNF placement    Interval Hx:   Did well overnight.  Had a bowel movement with the help of suppository.  Comfortably resting.  Wear pain is well controlled.  Currently awaiting placement    Objective/physical exam:  Vitals:    11/05/23 2055 11/05/23 2300 11/06/23 0300 11/06/23 0710   BP: 107/66 124/68 131/69 124/66   Pulse: 68 70 66 72   Resp:  18 18 18   Temp:  98.7 °F (37.1 °C) 97.6 °F (36.4 °C) 97.6 °F (36.4 °C)   TempSrc:    Oral   SpO2:  (!) 94% (!) 94% (!) 94%   Weight:       Height:         General: In no acute distress, afebrile  Respiratory: Clear to  auscultation bilaterally  Cardiovascular: S1, S2, no appreciable murmur  Abdomen: Soft, nontender, BS +  MSK: Warm, no lower extremity edema, no clubbing or cyanosis  Neurologic: Alert and oriented x4, moving all extremities with good strength     Lab Results   Component Value Date     11/02/2023    K 3.9 11/02/2023    CL 92 (L) 07/02/2020    CO2 25 11/02/2023    BUN 64.9 (H) 11/02/2023    CREATININE 3.05 (H) 11/02/2023    CALCIUM 8.9 11/02/2023    ANIONGAP 7 (L) 07/02/2020    ESTGFRAFRICA 22 07/02/2020    EGFRNONAA 17 07/21/2022      Lab Results   Component Value Date    ALT 18 10/30/2023    AST 19 10/30/2023    ALKPHOS 104 10/30/2023    BILITOT 0.4 10/30/2023      Lab Results   Component Value Date    WBC 9.74 10/31/2023    HGB 13.2 10/31/2023    HCT 39.1 10/31/2023    MCV 93.3 10/31/2023     10/31/2023           Medications:   amiodarone  200 mg Oral Daily    amLODIPine  10 mg Oral BID    apixaban  5 mg Oral BID    ascorbic acid (vitamin C)  1,000 mg Oral Daily    aspirin  81 mg Oral Daily    atorvastatin  10 mg Oral Daily    buPROPion  150 mg Oral Daily    docusate sodium  100 mg Oral BID    famotidine  20 mg Oral Daily    folic acid  1,000 mcg Oral Daily    gabapentin  100 mg Oral BID    metoprolol succinate  50 mg Oral BID    multivitamin  1 tablet Oral Daily    rOPINIRole  0.5 mg Oral Daily    sodium bicarbonate  650 mg Oral Daily    vitamin E (dl, acetate)  400 Units Oral Daily    zinc oxide-cod liver oil   Topical (Top) BID      sodium chloride 0.9%, acetaminophen, dextrose 10%, dextrose 10%, glucagon (human recombinant), glucose, glucose, insulin aspart U-100, morphine, ondansetron, traMADoL     Assessment/Plan:    T12 vertebral body compression fracture   Impingement of L2-L4  nerve roots, severe bilateral neural foraminal narrowing  Multilevel spondylitic changes  Bilateral lower extremity weakness due to above   LORI on CKD-improved  Metabolic acidosis - resolving  Proteus, Enterobacter  UTI-POA s/p Rx     HX:  T2 dm, AFib, HTN, HLD, ACD    Plan:  -continue therapy as tolerated.  Analgesics as needed.  Awaiting SNF placement.  Neurosurgery recommended brace  -renal back to baseline.  Try to avoid nephrotoxic medications if possible.  Needs outpatient follow up after SNF  -home medications were reviewed.  Anticoagulation switch to Eliquis due to CKD  -continue bowel regimen  -other home meds were reviewed    Charlotte Andrews MD

## 2023-11-07 LAB
POCT GLUCOSE: 118 MG/DL (ref 70–110)
POCT GLUCOSE: 123 MG/DL (ref 70–110)
POCT GLUCOSE: 171 MG/DL (ref 70–110)
POCT GLUCOSE: 97 MG/DL (ref 70–110)

## 2023-11-07 PROCEDURE — 25000003 PHARM REV CODE 250: Performed by: INTERNAL MEDICINE

## 2023-11-07 PROCEDURE — 25000003 PHARM REV CODE 250: Performed by: STUDENT IN AN ORGANIZED HEALTH CARE EDUCATION/TRAINING PROGRAM

## 2023-11-07 PROCEDURE — 21400001 HC TELEMETRY ROOM

## 2023-11-07 PROCEDURE — 97530 THERAPEUTIC ACTIVITIES: CPT | Mod: CQ

## 2023-11-07 PROCEDURE — 63600175 PHARM REV CODE 636 W HCPCS: Performed by: INTERNAL MEDICINE

## 2023-11-07 PROCEDURE — 97535 SELF CARE MNGMENT TRAINING: CPT | Mod: CO

## 2023-11-07 PROCEDURE — 25000003 PHARM REV CODE 250: Performed by: HOSPITALIST

## 2023-11-07 RX ADMIN — AMLODIPINE BESYLATE 10 MG: 5 TABLET ORAL at 09:11

## 2023-11-07 RX ADMIN — DOCUSATE SODIUM 100 MG: 100 CAPSULE, LIQUID FILLED ORAL at 09:11

## 2023-11-07 RX ADMIN — ASPIRIN 81 MG: 81 TABLET, COATED ORAL at 09:11

## 2023-11-07 RX ADMIN — BUPROPION HYDROCHLORIDE 150 MG: 150 TABLET, FILM COATED, EXTENDED RELEASE ORAL at 09:11

## 2023-11-07 RX ADMIN — MULTIPLE VITAMINS W/ MINERALS TAB 1 TABLET: TAB at 09:11

## 2023-11-07 RX ADMIN — GABAPENTIN 100 MG: 100 CAPSULE ORAL at 09:11

## 2023-11-07 RX ADMIN — APIXABAN 5 MG: 5 TABLET, FILM COATED ORAL at 09:11

## 2023-11-07 RX ADMIN — FOLIC ACID 1000 MCG: 1 TABLET ORAL at 09:11

## 2023-11-07 RX ADMIN — SODIUM BICARBONATE 650 MG TABLET 650 MG: at 09:11

## 2023-11-07 RX ADMIN — FAMOTIDINE 20 MG: 20 TABLET, FILM COATED ORAL at 09:11

## 2023-11-07 RX ADMIN — ROPINIROLE HYDROCHLORIDE 0.5 MG: 0.25 TABLET, FILM COATED ORAL at 05:11

## 2023-11-07 RX ADMIN — INSULIN ASPART 2 UNITS: 100 INJECTION, SOLUTION INTRAVENOUS; SUBCUTANEOUS at 12:11

## 2023-11-07 RX ADMIN — METOPROLOL SUCCINATE 50 MG: 50 TABLET, EXTENDED RELEASE ORAL at 09:11

## 2023-11-07 RX ADMIN — Medication: at 09:11

## 2023-11-07 RX ADMIN — ATORVASTATIN CALCIUM 10 MG: 10 TABLET, FILM COATED ORAL at 09:11

## 2023-11-07 RX ADMIN — AMIODARONE HYDROCHLORIDE 200 MG: 200 TABLET ORAL at 09:11

## 2023-11-07 RX ADMIN — Medication 1000 MG: at 09:11

## 2023-11-07 RX ADMIN — Medication 400 UNITS: at 09:11

## 2023-11-07 NOTE — PLAN OF CARE
Problem: Adult Inpatient Plan of Care  Goal: Plan of Care Review  Outcome: Ongoing, Progressing  Flowsheets (Taken 11/7/2023 0130)  Plan of Care Reviewed With: patient  Goal: Patient-Specific Goal (Individualized)  Outcome: Ongoing, Progressing  Goal: Absence of Hospital-Acquired Illness or Injury  Outcome: Ongoing, Progressing  Intervention: Identify and Manage Fall Risk  Flowsheets (Taken 11/7/2023 0130)  Safety Promotion/Fall Prevention:   assistive device/personal item within reach   nonskid shoes/socks when out of bed   medications reviewed   Fall Risk reviewed with patient/family   Fall Risk signage in place  Intervention: Prevent Skin Injury  Flowsheets (Taken 11/7/2023 0130)  Body Position:   position changed independently   supine  Skin Protection: adhesive use limited  Intervention: Prevent and Manage VTE (Venous Thromboembolism) Risk  Flowsheets (Taken 11/7/2023 0130)  Activity Management:   Arm raise - L1   Rolling - L1  VTE Prevention/Management:   ambulation promoted   bleeding risk assessed   ROM (passive) performed   ROM (active) performed   fluids promoted   dorsiflexion/plantar flexion performed  Range of Motion:   active ROM (range of motion) encouraged   ROM (range of motion) performed  Intervention: Prevent Infection  Flowsheets (Taken 11/7/2023 0130)  Infection Prevention:   rest/sleep promoted   single patient room provided   hand hygiene promoted  Goal: Optimal Comfort and Wellbeing  Outcome: Ongoing, Progressing  Intervention: Monitor Pain and Promote Comfort  Flowsheets (Taken 11/7/2023 0130)  Pain Management Interventions:   care clustered   pain management plan reviewed with patient/caregiver   quiet environment facilitated  Intervention: Provide Person-Centered Care  Flowsheets (Taken 11/7/2023 0130)  Trust Relationship/Rapport:   questions encouraged   care explained   choices provided   reassurance provided   emotional support provided   thoughts/feelings acknowledged   empathic  listening provided   questions answered  Goal: Readiness for Transition of Care  Outcome: Ongoing, Progressing

## 2023-11-07 NOTE — PT/OT/SLP PROGRESS
Occupational Therapy   Treatment    Name: Debra Puentes  MRN: [id number]  Admitting Diagnosis:  Acute kidney injury superimposed on chronic kidney disease       Recommendations:     Recommended therapy intensity at discharge: Moderate Intensity Therapy   Discharge Equipment Recommendations:  to be determined by next level of care  Barriers to discharge:       Assessment:     Debra Puentes is a 77 y.o. female with a medical diagnosis of Acute kidney injury superimposed on chronic kidney disease. Performance deficits affecting function are weakness, gait instability, impaired endurance, impaired balance, impaired self care skills, impaired functional mobility. Tolerated session well and motivated to participate.     Rehab Prognosis:  Good; patient would benefit from acute skilled OT services to address these deficits and reach maximum level of function.       Plan:     Patient to be seen 4 x/week to address the above listed problems via self-care/home management, therapeutic activities, therapeutic exercises  Plan of Care Expires: 11/30/23  Plan of Care Reviewed with: patient, caregiver    Subjective     Pain/Comfort:  Pain Rating 1: 0/10    Objective:     Communicated with: RN prior to session.  Patient found supine with PureWick (LSO) upon OT entry to room.    General Precautions: Standard, fall    Orthopedic Precautions:spinal precautions  Braces: LSO  Respiratory Status: Room air     Occupational Performance:     Bed Mobility:    Patient completed Supine to Sit with minimum assistance and cues for log roll technique.     Functional Mobility/Transfers:  Patient completed Sit <> Stand Transfer with moderate assistance  with  rolling walker   Functional Mobility: walked throughout room with Min A and RW. No LOB noted.     Activities of Daily Living:  Toileting: performed BSC t/f with Min A. Needed max A for posterior pericare in standing following +BM.   LE dressing: Max A to don shoes.     Therapeutic  Positioning    OT interventions performed during the course of today's session in an effort to prevent and/or reduce acquired pressure injuries:   Therapeutic positioning was provided at the conclusion of session to offload all bony prominences for the prevention and/or reduction of pressure injuries    Skin assessment: all bony prominences were assessed    Findings:  redness on buttocks/ sacrum    [facility] 6 Click ADL:      Patient Education:  Patient provided with verbal education education regarding OT role/goals/POC and safety awareness.  Understanding was verbalized.      Patient left up in chair on geomat with all lines intact, call button in reach, and caregiver present    GOALS:   Multidisciplinary Problems       Occupational Therapy Goals          Problem: Occupational Therapy    Goal Priority Disciplines Outcome Interventions   Occupational Therapy Goal     OT, PT/OT Ongoing, Progressing    Description: Goals to be met by: 11/30/2023    Patient will increase functional independence with ADLs by performing:    LE Dressing with MIN A.  Grooming while standing at sink with Modified [place].  Toileting from bedside commode with Stand-by Assistance for hygiene and clothing management.   Sitting at edge of bed x5 minutes with SBA with a MAX of 1 UE for stability.  Supine to sit/scooting and bridging with Modified [place].  Toilet transfer to bedside commode with Minimal Assistance.                         Time Tracking:     OT Date of Treatment: 11/07/23  OT Start Time: 1020  OT Stop Time: 1050  OT Total Time (min): 30 min    Billable Minutes:Self Care/Home Management 30    OT/SELENA: SELENA     Number of SELENA visits since last OT visit: 3    11/7/2023

## 2023-11-07 NOTE — PT/OT/SLP PROGRESS
Physical Therapy Treatment    Patient Name:  Debra Puentes   MRN:  [id number]    Recommendations:     Discharge therapy intensity: moderate intensity   Discharge Equipment Recommendations: to be determined by next level of care  Barriers to discharge: Impaired mobility    Assessment:     Debra Puentes is a 77 y.o. female admitted with a medical diagnosis of Acute kidney injury superimposed on chronic kidney disease.  She presents with the following impairments/functional limitations: weakness, impaired endurance, impaired self care skills, impaired functional mobility, impaired balance, gait instability, decreased upper extremity function, decreased lower extremity function, decreased safety awareness, pain .    Pt T/Fing B2B with CNA upon therapist entering room. Therapist took over for CNA.      Rehab Prognosis: Good; patient would benefit from acute skilled PT services to address these deficits and reach maximum level of function.    Recent Surgery: * No surgery found *      Plan:     During this hospitalization, patient to be seen 5 x/week to address the identified rehab impairments via gait training, therapeutic activities, therapeutic exercises and progress toward the following goals:    Plan of Care Expires:       Subjective     Chief Complaint: back pain  Patient/Family Comments/goals:   Pain/Comfort:  Pain Rating 1: 0/10      Objective:     Communicated with NSG prior to session.  Patient found T/Fing B2B with CNA with PureWick upon PT entry to room.     General Precautions: Standard, fall  Orthopedic Precautions: spinal precautions  Braces: LSO  Respiratory Status: Room air  Blood Pressure:   Skin Integrity: Visible skin intact      Functional Mobility:  Bed Mobility:     Scooting: contact guard assistance and 2 scooting trials towards HOB  Bridging: contact guard assistance and 3 trials to slide pt LSO down.   Sit to Supine: minimum assistance and required increased time    Pt declining further  therapy tx at this time 2/2 very fatigued from sitting UIC for hours.        Patient left HOB elevated with all lines intact and call button in reach..    GOALS:   Multidisciplinary Problems       Physical Therapy Goals          Problem: Physical Therapy    Goal Priority Disciplines Outcome Goal Variances Interventions   Physical Therapy Goal     PT, PT/OT Ongoing, Progressing     Description: Pt will be seen for the following goals  1. Pt will be sba with all bed mobility  2. Pt will be mauricio with transfers with a rw   3. Pt will amb 100ft with a rw sba                       Time Tracking:     PT Received On: 11/07/23  PT Start Time: 1335     PT Stop Time: 1343  PT Total Time (min): 8 min     Billable Minutes: Therapeutic Activity 8    Treatment Type: Treatment  PT/PTA: PTA     Number of PTA visits since last PT visit: 5     11/07/2023

## 2023-11-07 NOTE — PLAN OF CARE
SSC talked to Trisha at [facility], she stated that patient's Level II PASSAR is still under review. She stated the medication Wellbutrin is what triggered the level II.

## 2023-11-07 NOTE — PROGRESS NOTES
[facility] Hospital Medicine Progress Note        Chief Complaint: Inpatient Follow-up for     HPI:   69-year-old obese woman with history of AFib on Xarelto, T2 dm, CKD, hypertension, hyperlipidemia presented with worsening of lower back pain, generalized weakness.  Patient had a fall 2 days ago, diagnosed with T12 vertebral fracture.  She denied any loss of bladder or bowels.  At admission she was normotensive and hemodynamically stable.  Lab work revealed elevated BUN and serum creatinine, cloudy urine with multiple white cells.  Urine sample were sent for cultures.  Recent CT scan obtained on 10/23/2023 revealed atrophy of left kidney, nonobstructing nephrolithiasis in the lower pole of the left kidney, cholelithiasis, umbilical hernia, diverticulosis.     MRI spine spine revealed T12 vertebral body compression deformity with multilevel spondylitic changes.  Neurosurgery recommended brace and no surgical intervention.  Nephrology was consulted for LORI.  Bicarb drip was added admission due to metabolic acidosis and later transitioned to normal saline.  P.o. bicarb was continued.  Renal function has improved back to baseline after IV hydration.  PT was started and she was participating.  Ceftriaxone was added for abdominal unit admission.  Urine cultures resulted as Proteus, Enterobacter cloace. she is currently awaiting SNF placement    Interval Hx:   No acute events overnight.  Comfortably resting.  Pain is well controlled.  Reports doing better with physical therapy .  Caregiver was at bedside.  Tolerating diet and moving bowels.  Currently awaiting placement.        Objective/physical exam:  Vitals:    11/06/23 1534 11/06/23 1929 11/06/23 2120 11/06/23 2327   BP: 110/67 117/66 117/66 122/65   Pulse: 65 67 67 98   Resp: 20 20  20   Temp: 97.7 °F (36.5 °C) 98.2 °F (36.8 °C)  97.5 °F (36.4 °C)   TempSrc: Oral Oral  Oral   SpO2: 95% 95%  (!) 94%   Weight:       Height:          General: In no acute distress, afebrile  Respiratory: Clear to auscultation bilaterally  Cardiovascular: S1, S2, no appreciable murmur  Abdomen: Soft, nontender, BS +  MSK: Warm, no lower extremity edema, no clubbing or cyanosis  Neurologic: Alert and oriented x4, moving all extremities with good strength     Lab Results   Component Value Date     11/02/2023    K 3.9 11/02/2023    CL 92 (L) 07/02/2020    CO2 25 11/02/2023    BUN 64.9 (H) 11/02/2023    CREATININE 3.05 (H) 11/02/2023    CALCIUM 8.9 11/02/2023    ANIONGAP 7 (L) 07/02/2020    ESTGFRAFRICA 22 07/02/2020    EGFRNONAA 17 07/21/2022      Lab Results   Component Value Date    ALT 18 10/30/2023    AST 19 10/30/2023    ALKPHOS 104 10/30/2023    BILITOT 0.4 10/30/2023      Lab Results   Component Value Date    WBC 9.74 10/31/2023    HGB 13.2 10/31/2023    HCT 39.1 10/31/2023    MCV 93.3 10/31/2023     10/31/2023           Medications:   amiodarone  200 mg Oral Daily    amLODIPine  10 mg Oral BID    apixaban  5 mg Oral BID    ascorbic acid (vitamin C)  1,000 mg Oral Daily    aspirin  81 mg Oral Daily    atorvastatin  10 mg Oral Daily    buPROPion  150 mg Oral Daily    docusate sodium  100 mg Oral BID    famotidine  20 mg Oral Daily    folic acid  1,000 mcg Oral Daily    gabapentin  100 mg Oral BID    metoprolol succinate  50 mg Oral BID    multivitamin  1 tablet Oral Daily    rOPINIRole  0.5 mg Oral Daily    sodium bicarbonate  650 mg Oral Daily    vitamin E (dl, acetate)  400 Units Oral Daily    zinc oxide-cod liver oil   Topical (Top) BID      sodium chloride 0.9%, acetaminophen, dextrose 10%, dextrose 10%, glucagon (human recombinant), glucose, glucose, insulin aspart U-100, morphine, ondansetron, traMADoL     Assessment/Plan:    T12 vertebral body compression fracture   Impingement of L2-L4  nerve roots, severe bilateral neural foraminal narrowing  Multilevel spondylitic changes  Bilateral lower extremity weakness due to above   LORI on  CKD-improved  Metabolic acidosis - resolving  Proteus, Enterobacter UTI-POA s/p Rx     HX:  T2 dm, AFib, HTN, HLD, ACD    Plan:  -continue therapy as tolerated.  Analgesics as needed.  Awaiting SNF placement.  Neurosurgery recommended brace  -renal back to baseline.  Try to avoid nephrotoxic medications if possible.  Needs outpatient follow up after SNF  -home medications were reviewed.  Anticoagulation switch to Eliquis due to CKD  -continue bowel regimen  -other home meds were reviewed    Charlotte Andrews MD

## 2023-11-07 NOTE — PROGRESS NOTES
[facility] - Oncology Acute  Wound Care    Patient Name:  Debra Puentes   MRN:  [id number]  Date: 11/7/2023  Diagnosis: Acute kidney injury superimposed on chronic kidney disease    History:     Past Medical History:   Diagnosis Date    Cancer     Chronic kidney disease, unspecified     Diabetes mellitus, type 2     History of arteriovenostomy for renal dialysis     Hyperlipidemia     Hypertension        Social History     Socioeconomic History    Marital status: Unknown   Tobacco Use    Smoking status: Never     Passive exposure: Never    Smokeless tobacco: Never   Substance and Sexual Activity    Alcohol use: Never    Drug use: Never     Social Determinants of Health     Financial Resource Strain: Low Risk  (10/30/2023)    Overall Financial Resource Strain (CARDIA)     Difficulty of Paying Living Expenses: Not very hard   Food Insecurity: Unknown (10/30/2023)    Hunger Vital Sign     Worried About Running Out of Food in the Last Year: Never true   Transportation Needs: No Transportation Needs (10/30/2023)    PRAPARE - Transportation     Lack of Transportation (Medical): No     Lack of Transportation (Non-Medical): No   Social Connections: Unknown (10/30/2023)    Social Connection and Isolation Panel [NHANES]     Marital Status: Never    Housing Stability: Unknown (10/30/2023)    Housing Stability Vital Sign     Unable to Pay for Housing in the Last Year: No     Unstable Housing in the Last Year: No       Precautions:     Allergies as of 10/26/2023 - Reviewed 10/26/2023   Allergen Reaction Noted    Lactose Diarrhea 05/29/2020    Codeine Other (See Comments) and Nausea And Vomiting 05/28/2020       [facility] Assessment Details/Treatment        11/07/23 0938   [facility] Assessment   Visit Date 11/07/23   Visit Time 0938   Consult Type Follow Up   Intervention assessed   Teaching on-going   Skin Interventions   Device Skin Pressure Protection absorbent pad utilized/changed        Altered Skin Integrity  10/27/23 1600 Buttocks   Date First Assessed/Time First Assessed: 10/27/23 1600   Altered Skin Integrity Present on Admission - Did Patient arrive to the hospital with altered skin?: yes  Location: Buttocks   Wound Image    Description of Altered Skin Integrity Intact skin with non-blanchable redness of localized area   Dressing Appearance Dry;Clean;Intact   Drainage Amount None   Appearance Intact   Tissue loss description Not applicable   Periwound Area Moist   Care Cleansed with:;Sterile normal saline   Dressing Applied     WOCN follow up for sacrum. Caretaker at bedside. Improvement noted. Patient educated on the importance of turning frequently for pressure relief. She voices understanding. Treatment recommendations put into place.  Sacrum: Cleanse with NS. Apply Desitin, cover with ABD pad, secure with medipore tape. Change BID and PRN. Keep areas clean and dry, no adult briefs while in bed. Nursing to continue with turning every two hours, wedge, and floating heels.  Head of bed elevated, bed in lowest position, and call bell within reach. Patient is comfortable on current bed and doesn't want TIFFANIE mattress. Will follow up.    11/07/2023

## 2023-11-08 LAB
POCT GLUCOSE: 124 MG/DL (ref 70–110)
POCT GLUCOSE: 146 MG/DL (ref 70–110)
POCT GLUCOSE: 167 MG/DL (ref 70–110)
POCT GLUCOSE: 95 MG/DL (ref 70–110)

## 2023-11-08 PROCEDURE — 63600175 PHARM REV CODE 636 W HCPCS: Performed by: INTERNAL MEDICINE

## 2023-11-08 PROCEDURE — 25000003 PHARM REV CODE 250: Performed by: STUDENT IN AN ORGANIZED HEALTH CARE EDUCATION/TRAINING PROGRAM

## 2023-11-08 PROCEDURE — 25000003 PHARM REV CODE 250: Performed by: INTERNAL MEDICINE

## 2023-11-08 PROCEDURE — 21400001 HC TELEMETRY ROOM

## 2023-11-08 PROCEDURE — 97164 PT RE-EVAL EST PLAN CARE: CPT

## 2023-11-08 RX ADMIN — Medication: at 09:11

## 2023-11-08 RX ADMIN — APIXABAN 5 MG: 5 TABLET, FILM COATED ORAL at 09:11

## 2023-11-08 RX ADMIN — ROPINIROLE HYDROCHLORIDE 0.5 MG: 0.25 TABLET, FILM COATED ORAL at 04:11

## 2023-11-08 RX ADMIN — FOLIC ACID 1000 MCG: 1 TABLET ORAL at 10:11

## 2023-11-08 RX ADMIN — ATORVASTATIN CALCIUM 10 MG: 10 TABLET, FILM COATED ORAL at 10:11

## 2023-11-08 RX ADMIN — ASPIRIN 81 MG: 81 TABLET, COATED ORAL at 10:11

## 2023-11-08 RX ADMIN — GABAPENTIN 100 MG: 100 CAPSULE ORAL at 09:11

## 2023-11-08 RX ADMIN — Medication 1000 MG: at 10:11

## 2023-11-08 RX ADMIN — DOCUSATE SODIUM 100 MG: 100 CAPSULE, LIQUID FILLED ORAL at 10:11

## 2023-11-08 RX ADMIN — DOCUSATE SODIUM 100 MG: 100 CAPSULE, LIQUID FILLED ORAL at 09:11

## 2023-11-08 RX ADMIN — AMLODIPINE BESYLATE 10 MG: 5 TABLET ORAL at 10:11

## 2023-11-08 RX ADMIN — SODIUM BICARBONATE 650 MG TABLET 650 MG: at 10:11

## 2023-11-08 RX ADMIN — GABAPENTIN 100 MG: 100 CAPSULE ORAL at 10:11

## 2023-11-08 RX ADMIN — METOPROLOL SUCCINATE 50 MG: 50 TABLET, EXTENDED RELEASE ORAL at 10:11

## 2023-11-08 RX ADMIN — AMLODIPINE BESYLATE 10 MG: 5 TABLET ORAL at 09:11

## 2023-11-08 RX ADMIN — INSULIN ASPART 2 UNITS: 100 INJECTION, SOLUTION INTRAVENOUS; SUBCUTANEOUS at 01:11

## 2023-11-08 RX ADMIN — METOPROLOL SUCCINATE 50 MG: 50 TABLET, EXTENDED RELEASE ORAL at 09:11

## 2023-11-08 RX ADMIN — APIXABAN 5 MG: 5 TABLET, FILM COATED ORAL at 10:11

## 2023-11-08 RX ADMIN — MULTIPLE VITAMINS W/ MINERALS TAB 1 TABLET: TAB at 10:11

## 2023-11-08 RX ADMIN — FAMOTIDINE 20 MG: 20 TABLET, FILM COATED ORAL at 10:11

## 2023-11-08 RX ADMIN — Medication: at 10:11

## 2023-11-08 RX ADMIN — BUPROPION HYDROCHLORIDE 150 MG: 150 TABLET, FILM COATED, EXTENDED RELEASE ORAL at 10:11

## 2023-11-08 RX ADMIN — Medication 400 UNITS: at 10:11

## 2023-11-08 RX ADMIN — AMIODARONE HYDROCHLORIDE 200 MG: 200 TABLET ORAL at 10:11

## 2023-11-08 NOTE — PT/OT/SLP EVAL
Physical Therapy Re-Evaluation    Patient Name:  Debra Puentes   MRN:  [id number]    Recommendations:     Discharge therapy intensity: moderate intensity   Discharge Equipment Recommendations: to be determined by next level of care   Barriers to discharge: Impaired mobility and Ongoing medical needs    Assessment:     Debra Puentes is a 77 y.o. female admitted with a medical diagnosis of T12 compression fracture, L3 hemangioma, Acute kidney injury superimposed on chronic kidney disease.  She presents with the following impairments/functional limitations: weakness, impaired endurance, pain, gait instability, impaired functional mobility, decreased lower extremity function. Pt was motivated and tolerated PT session well; caregiver present in room. Pt attempted SBA bed mobility but needed Min A supine <> seated EOB. Pt ambulated for a total of 30 steps but needed several seated rest breaks due to fatigue and pain in LLE. Pt is eager to d/c to moderate intensity therapy to continue to return to PLOF.     Rehab Prognosis: Good; patient would benefit from acute skilled PT services to address these deficits and reach maximum level of function.    Recent Surgery: * No surgery found *      Plan:     During this hospitalization, patient to be seen 5 x/week to address the identified rehab impairments via gait training, therapeutic activities, therapeutic exercises, neuromuscular re-education and progress toward the following goals:    Plan of Care Expires:  12/08/23    Subjective     Chief Complaint: pain in LLE and lumbar spine when standing   Patient/Family Comments/goals: to return to assisted living   Pain/Comfort:  Pain Rating 1: other (see comments)  Location - Side 1: Left  Location 1: leg  Pain Addressed 1: Distraction    Patients cultural, spiritual, Confucianism conflicts given the current situation: no    Objective:     Communicated with nurse prior to session.  Patient found supine with PureWick  upon PT  entry to room.    General Precautions: Standard, fall  Orthopedic Precautions:spinal precautions   Braces: LSO  Respiratory Status: Room air    Exams:  RLE ROM: WFL  RLE Strength: WFL except hip flexion strength 3/5  LLE ROM: WFL  LLE Strength: WFL except hip flexion strength 3-/5  Skin integrity: Visible skin intact      Functional Mobility:  Bed Mobility:     Rolling Left:  stand by assistance while maintaining spinal precautions  Scooting to EOB: moderate assistance   Supine to Sit: minimum assistance  Transfers:     Sit to Stand: moderate assistance with rolling walker x3 trials   Cues needed to push with UE from arm rest, lean trunk forward  Gait: Pt ambulated 7 steps CGA with RW ~ seated rest break ~ 15 steps ~ seated rest break ~ 6 steps then fatigued. Pt with short step length and limited distance due to fatigue on LLE pain noted.       AM-PAC 6 CLICK MOBILITY  Total Score:16       Treatment & Education:   LSO brace readjusted once seated EOB.     Patient and caregiver were provided with verbal education regarding PT POC.  Understanding was verbalized.     Patient left up in chair with all lines intact, call button in reach, nurse notified, and caregiver present.    GOALS:   Multidisciplinary Problems       Physical Therapy Goals          Problem: Physical Therapy    Goal Priority Disciplines Outcome Goal Variances Interventions   Physical Therapy Goal     PT, PT/OT Ongoing, Progressing     Description: Pt will be seen for the following goals  1. Pt will be sba with all bed mobility  2. Pt will be mauricio with transfers with a rw   3. Pt will amb 100ft with a rw sba                       History:     Past Medical History:   Diagnosis Date    Cancer     Chronic kidney disease, unspecified     Diabetes mellitus, type 2     History of arteriovenostomy for renal dialysis     Hyperlipidemia     Hypertension        Past Surgical History:   Procedure Laterality Date    HYSTERECTOMY      KIDNEY STONE SURGERY       TONSILLECTOMY         Time Tracking:     PT Received On: 11/08/23  PT Start Time: 1402     PT Stop Time: 1431  PT Total Time (min): 29 min     Billable Minutes: Ashely 29 11/08/2023

## 2023-11-08 NOTE — PROGRESS NOTES
Inpatient Nutrition Assessment    Admit Date: 10/26/2023   Total duration of encounter: 13 days     Nutrition Recommendation/Prescription     Continue current diet as tolerated  Encouraged adequate PO intake  Monitor appetite/PO intake, weight, and labs    Communication of Recommendations: reviewed with patient and reviewed with caregiver    Nutrition Assessment     Malnutrition Assessment/Nutrition-Focused Physical Exam    Malnutrition Context: other (see comments) (Does not meet criteria at this time) (10/30/23 1207)  Malnutrition Level: other (see comments) (Does not meet criteria at this time) (10/30/23 1207)  Energy Intake (Malnutrition): other (see comments) (does not meet criteria) (11/03/23 1402)  Weight Loss (Malnutrition): other (see comments) (Does not meet criteria) (10/30/23 1207)  Subcutaneous Fat (Malnutrition): other (see comments) (Does not meet criteria) (10/30/23 1207)           Muscle Mass (Malnutrition): other (see comments) (Does not meet criteria) (10/30/23 1207)                          Fluid Accumulation (Malnutrition): other (see comments) (Does not meet criteria) (10/30/23 1207)        A minimum of two characteristics is recommended for diagnosis of either severe or non-severe malnutrition.    Chart Review    Reason Seen: continuous nutrition monitoring and follow-up CKD    Malnutrition Screening Tool Results   Have you recently lost weight without trying?: No  Have you been eating poorly because of a decreased appetite?: No   MST Score: 0   Diagnosis:  T12 vertebral body compression fracture   Impingement of L2-L4  nerve roots, severe bilateral neural foraminal narrowing  Multilevel spondylitic changes  Bilateral lower extremity weakness due to above   LORI on CKD-improving  Metabolic acidosis   Possibly Proteus UTI-POA     HX:  T2 dm, AFib, HTN, HLD, ACD    Relevant Medical History:    Cancer      Chronic kidney disease, unspecified      Diabetes mellitus, type 2      History of  "arteriovenostomy for renal dialysis      Hyperlipidemia      Hypertension        Nutrition-Related Medications:   Scheduled Meds:   amiodarone  200 mg Oral Daily    amLODIPine  10 mg Oral BID    apixaban  5 mg Oral BID    ascorbic acid (vitamin C)  1,000 mg Oral Daily    aspirin  81 mg Oral Daily    atorvastatin  10 mg Oral Daily    buPROPion  150 mg Oral Daily    docusate sodium  100 mg Oral BID    famotidine  20 mg Oral Daily    folic acid  1,000 mcg Oral Daily    gabapentin  100 mg Oral BID    metoprolol succinate  50 mg Oral BID    multivitamin  1 tablet Oral Daily    rOPINIRole  0.5 mg Oral Daily    sodium bicarbonate  650 mg Oral Daily    vitamin E (dl, acetate)  400 Units Oral Daily    zinc oxide-cod liver oil   Topical (Top) BID     Continuous Infusions:      PRN Meds:.sodium chloride 0.9%, acetaminophen, dextrose 10%, dextrose 10%, glucagon (human recombinant), glucose, glucose, insulin aspart U-100, morphine, ondansetron, traMADoL    Calorie Containing IV Medications: no significant kcals from medications at this time    Nutrition-Related Labs:  10/30/2023: BUN 93.7, Crea 3.47, Ca 7.8, total protein 4.7, Alb 2.1, Gluc  98  11/2: Bun-64.9, Crea-3.05, GFR-15  11/8/2023: POCT Gluc 167    Diet/PN Order: Diet diabetic  Oral Supplement Order: none  Tube Feeding Order: none  Appetite/Oral Intake: good/% of meals  Factors Affecting Nutritional Intake: decreased appetite  Food/Anabaptism/Cultural Preferences: none reported  Food Allergies:  lactose    Wound(s):      Altered Skin Integrity 10/27/23 1600 Buttocks-Tissue loss description: Not applicable noted    Last Bowel Movement: 11/07/23    Comments    10/30/2023: Pt reports poor appetite/PO intake ~3 days. Pt reports a good appetite/PO intake. No reported N/V and chew/swallowing difficulties. When asked about wt, pt reports "a few years ago in 1970", went and relayed this information to the nurse. Per EMR, pt weighed 102 kg. Encouraged adequate PO intake. " "Will monitor.    11/3: Pt stated appetite has been good. States she eats a good breakfast and lighter on other meals. No new prefs at this time.    2023: Pt reports good appetite/PO intake and denies N/V/D/C. Last BM noted. Encouraged adequate PO intake. Will monitor.    Anthropometrics    Height: 5' 8" (172.7 cm) Height Method: Measured  Last Weight: 104.3 kg (230 lb) (10/27/23 1710) Weight Method: Standard Scale  BMI (Calculated): 35  BMI Classification: obese grade I (BMI 30-34.9)     Ideal Body Weight (IBW), Female: 140 lb     % Ideal Body Weight, Female (lb): 164.29 %                    Usual Body Weight (UBW), k kg  % Usual Body Weight: 102.5     Usual Weight Provided By: EMR weight history    Wt Readings from Last 5 Encounters:   10/27/23 104.3 kg (230 lb)   10/23/23 103 kg (227 lb 1.2 oz)   23 99.8 kg (220 lb)   22 94.3 kg (208 lb)   18 128.2 kg (282 lb 10.1 oz)     Weight Change(s) Since Admission:  Admit Weight: 104.3 kg (230 lb) (10/26/23 1830)  10/27/2023: 104.3 kg  11/3: no new wt  2023: no new wts    Estimated Needs    Weight Used For Calorie Calculations: 104.3 kg (229 lb 15 oz)  Energy Calorie Requirements (kcal): 6 (20 kcal/kg)  Energy Need Method: Kcal/kg  Weight Used For Protein Calculations: 104.3 kg (229 lb 15 oz)  Protein Requirements:  (1.0-1.2 g/kg)  Fluid Requirements (mL): 500 + urine output  Temp (24hrs), Av °F (36.7 °C), Min:97.5 °F (36.4 °C), Max:98.7 °F (37.1 °C)       Enteral Nutrition    Patient not receiving enteral nutrition at this time.    Parenteral Nutrition    Patient not receiving parenteral nutrition support at this time.    Evaluation of Received Nutrient Intake    Calories: meeting estimated needs  Protein: meeting estimated needs    Patient Education    Not applicable.    Nutrition Diagnosis     PES: Increased nutrient needs related to acute illness as evidenced by increased nutrient demand. (active)    Interventions/Goals "     Intervention(s): general/healthful diet and commercial beverage  Goal: Meet greater than 75% of nutritional needs by follow-up. (goal progressing)    Monitoring & Evaluation     Dietitian will monitor food and beverage intake, weight, electrolyte/renal panel, glucose/endocrine profile, and gastrointestinal profile.  Nutrition Risk/Follow-Up: moderate (follow-up in 3-5 days)   Please consult if re-assessment needed sooner.

## 2023-11-08 NOTE — PLAN OF CARE
Problem: Adult Inpatient Plan of Care  Goal: Plan of Care Review  Outcome: Ongoing, Progressing  Flowsheets (Taken 11/8/2023 1116)  Plan of Care Reviewed With: patient  Goal: Patient-Specific Goal (Individualized)  Outcome: Ongoing, Progressing  Goal: Absence of Hospital-Acquired Illness or Injury  Outcome: Ongoing, Progressing  Intervention: Identify and Manage Fall Risk  Flowsheets (Taken 11/8/2023 1116)  Safety Promotion/Fall Prevention:   assistive device/personal item within reach   medications reviewed   nonskid shoes/socks when out of bed   side rails raised x 3  Intervention: Prevent Skin Injury  Flowsheets (Taken 11/8/2023 1116)  Body Position: turned  Skin Protection:   adhesive use limited   incontinence pads utilized   tubing/devices free from skin contact  Intervention: Prevent and Manage VTE (Venous Thromboembolism) Risk  Flowsheets (Taken 11/8/2023 1116)  Activity Management: Rolling - L1  VTE Prevention/Management:   ambulation promoted   bleeding risk assessed   ROM (active) performed  Range of Motion:   active ROM (range of motion) encouraged   ROM (range of motion) performed  Intervention: Prevent Infection  Flowsheets (Taken 11/8/2023 1116)  Infection Prevention:   rest/sleep promoted   single patient room provided  Goal: Optimal Comfort and Wellbeing  Outcome: Ongoing, Progressing  Intervention: Monitor Pain and Promote Comfort  Flowsheets (Taken 11/8/2023 1116)  Pain Management Interventions:   care clustered   medication offered   pain management plan reviewed with patient/caregiver   pillow support provided   position adjusted  Intervention: Provide Person-Centered Care  Flowsheets (Taken 11/8/2023 1116)  Trust Relationship/Rapport: care explained  Goal: Readiness for Transition of Care  Outcome: Ongoing, Progressing     Problem: Diabetes Comorbidity  Goal: Blood Glucose Level Within Targeted Range  Outcome: Ongoing, Progressing  Intervention: Monitor and Manage Glycemia  Flowsheets (Taken  11/8/2023 1116)  Glycemic Management: blood glucose monitored     Problem: Fluid and Electrolyte Imbalance (Acute Kidney Injury/Impairment)  Goal: Fluid and Electrolyte Balance  Outcome: Ongoing, Progressing     Problem: Oral Intake Inadequate (Acute Kidney Injury/Impairment)  Goal: Optimal Nutrition Intake  Outcome: Ongoing, Progressing  Intervention: Promote and Optimize Nutrition  Flowsheets (Taken 11/8/2023 1116)  Oral Nutrition Promotion: rest periods promoted     Problem: Renal Function Impairment (Acute Kidney Injury/Impairment)  Goal: Effective Renal Function  Outcome: Ongoing, Progressing  Intervention: Monitor and Support Renal Function  Flowsheets (Taken 11/8/2023 1116)  Medication Review/Management: medications reviewed     Problem: Fall Injury Risk  Goal: Absence of Fall and Fall-Related Injury  Outcome: Ongoing, Progressing  Intervention: Identify and Manage Contributors  Flowsheets (Taken 11/8/2023 1116)  Self-Care Promotion: independence encouraged  Medication Review/Management: medications reviewed  Intervention: Promote Injury-Free Environment  Flowsheets (Taken 11/8/2023 1116)  Safety Promotion/Fall Prevention:   assistive device/personal item within reach   medications reviewed   nonskid shoes/socks when out of bed   side rails raised x 3     Problem: Skin Injury Risk Increased  Goal: Skin Health and Integrity  Outcome: Ongoing, Progressing  Intervention: Optimize Skin Protection  Flowsheets (Taken 11/8/2023 1116)  Pressure Reduction Techniques:   frequent weight shift encouraged   weight shift assistance provided  Pressure Reduction Devices: positioning supports utilized  Skin Protection:   adhesive use limited   incontinence pads utilized   tubing/devices free from skin contact  Head of Bed (HOB) Positioning: HOB at 30-45 degrees  Intervention: Promote and Optimize Oral Intake  Flowsheets (Taken 11/8/2023 1116)  Oral Nutrition Promotion: rest periods promoted     Problem: Impaired Wound  Healing  Goal: Optimal Wound Healing  Outcome: Ongoing, Progressing  Intervention: Promote Wound Healing  Flowsheets (Taken 11/8/2023 1116)  Oral Nutrition Promotion: rest periods promoted  Activity Management: Rolling - L1  Pain Management Interventions:   care clustered   medication offered   pain management plan reviewed with patient/caregiver   pillow support provided   position adjusted

## 2023-11-08 NOTE — PLAN OF CARE
RICHARD contacted Trisha with OB regarding patient's  SNF approval/denial. RICHARD was told pt's case review was completed yesterday and patient did not meet the criteria for Level 2. RICHARD was also made aware that any patient that has a behavioral health medication on their chart is expected to undergo a review by [facility]. Following, Trisha faxed RICHARD's pt's 142. SSC, April is updating Forrest with Mirza at this time. RICHARD will plan for discharge tomorrow (11/9/23).

## 2023-11-08 NOTE — PLAN OF CARE
Problem: Adult Inpatient Plan of Care  Goal: Plan of Care Review  Outcome: Ongoing, Progressing  Flowsheets (Taken 11/7/2023 1914)  Plan of Care Reviewed With: patient  Goal: Patient-Specific Goal (Individualized)  Outcome: Ongoing, Progressing  Flowsheets (Taken 11/7/2023 1914)  Individualized Care Needs: assist with adls, fall precaution  Goal: Absence of Hospital-Acquired Illness or Injury  Outcome: Ongoing, Progressing  Intervention: Identify and Manage Fall Risk  Flowsheets (Taken 11/7/2023 1914)  Safety Promotion/Fall Prevention:   assistive device/personal item within reach   side rails raised x 3   Fall Risk reviewed with patient/family   Fall Risk signage in place   instructed to call staff for mobility   nonskid shoes/socks when out of bed  Intervention: Prevent Skin Injury  Flowsheets (Taken 11/7/2023 1914)  Body Position:   position changed independently   weight shifting  Skin Protection: adhesive use limited  Intervention: Prevent and Manage VTE (Venous Thromboembolism) Risk  Flowsheets (Taken 11/7/2023 1914)  Activity Management: Rolling - L1  VTE Prevention/Management: (aspirin, apixaban) other (see comments)  Range of Motion: active ROM (range of motion) encouraged  Intervention: Prevent Infection  Flowsheets (Taken 11/7/2023 1914)  Infection Prevention:   rest/sleep promoted   single patient room provided   hand hygiene promoted  Goal: Optimal Comfort and Wellbeing  Outcome: Ongoing, Progressing  Intervention: Monitor Pain and Promote Comfort  Flowsheets (Taken 11/7/2023 1914)  Pain Management Interventions:   care clustered   medication offered   quiet environment facilitated  Intervention: Provide Person-Centered Care  Flowsheets (Taken 11/7/2023 1914)  Trust Relationship/Rapport:   care explained   questions encouraged   choices provided   questions answered   thoughts/feelings acknowledged

## 2023-11-08 NOTE — PROGRESS NOTES
[facility] Hospital Medicine Progress Note        Chief Complaint: Inpatient Follow-up for     HPI:   69-year-old obese woman with history of AFib on Xarelto, T2 dm, CKD, hypertension, hyperlipidemia presented with worsening of lower back pain, generalized weakness.  Patient had a fall 2 days ago, diagnosed with T12 vertebral fracture.  She denied any loss of bladder or bowels.  At admission she was normotensive and hemodynamically stable.  Lab work revealed elevated BUN and serum creatinine, cloudy urine with multiple white cells.  Urine sample were sent for cultures.  Recent CT scan obtained on 10/23/2023 revealed atrophy of left kidney, nonobstructing nephrolithiasis in the lower pole of the left kidney, cholelithiasis, umbilical hernia, diverticulosis.     MRI spine spine revealed T12 vertebral body compression deformity with multilevel spondylitic changes.  Neurosurgery recommended brace and no surgical intervention.  Nephrology was consulted for LORI.  Bicarb drip was added on admission due to metabolic acidosis and later transitioned to  P.o. bicarb .  Renal function has improved, back to baseline after IV hydration.  PT was started and she was participating.  Ceftriaxone was added on admission, Urine cultures resulted as Proteus, Enterobacter cloace. she is currently awaiting SNF placement. Pt triggered a level 2 since she is on wellbutrin.    Interval Hx:   11/7/23-No acute events overnight.  Comfortably resting.  Pain is well controlled.  Reports doing better with physical therapy .  Caregiver was at bedside.  Tolerating diet and moving bowels.  Currently awaiting placement.      11/8/23 dr moser -looks well. Awaiting placement tomorrow at [facility]         Objective/physical exam:  Vitals:    11/07/23 2311 11/08/23 0340 11/08/23 0800 11/08/23 1105   BP: 122/71 125/79 134/67 133/70   Pulse: 67 66 61 63   Resp: 18 18 18 18   Temp: 98.7 °F (37.1 °C) 97.9 °F (36.6 °C)  98.1 °F (36.7 °C) 98 °F (36.7 °C)   TempSrc: Oral Oral Oral Oral   SpO2: (!) 94% (!) 93% (!) 94% 98%   Weight:       Height:         General: In no acute distress, afebrile  Respiratory: Clear to auscultation bilaterally  Cardiovascular: S1, S2, no appreciable murmur  Abdomen: Soft, nontender, BS +  MSK: Warm, no lower extremity edema, no clubbing or cyanosis  Neurologic: Alert and oriented x4, moving all extremities with good strength     Lab Results   Component Value Date     11/02/2023    K 3.9 11/02/2023    CL 92 (L) 07/02/2020    CO2 25 11/02/2023    BUN 64.9 (H) 11/02/2023    CREATININE 3.05 (H) 11/02/2023    CALCIUM 8.9 11/02/2023    ANIONGAP 7 (L) 07/02/2020    ESTGFRAFRICA 22 07/02/2020    EGFRNONAA 17 07/21/2022      Lab Results   Component Value Date    ALT 18 10/30/2023    AST 19 10/30/2023    ALKPHOS 104 10/30/2023    BILITOT 0.4 10/30/2023      Lab Results   Component Value Date    WBC 9.74 10/31/2023    HGB 13.2 10/31/2023    HCT 39.1 10/31/2023    MCV 93.3 10/31/2023     10/31/2023           Medications:   amiodarone  200 mg Oral Daily    amLODIPine  10 mg Oral BID    apixaban  5 mg Oral BID    ascorbic acid (vitamin C)  1,000 mg Oral Daily    aspirin  81 mg Oral Daily    atorvastatin  10 mg Oral Daily    buPROPion  150 mg Oral Daily    docusate sodium  100 mg Oral BID    famotidine  20 mg Oral Daily    folic acid  1,000 mcg Oral Daily    gabapentin  100 mg Oral BID    metoprolol succinate  50 mg Oral BID    multivitamin  1 tablet Oral Daily    rOPINIRole  0.5 mg Oral Daily    sodium bicarbonate  650 mg Oral Daily    vitamin E (dl, acetate)  400 Units Oral Daily    zinc oxide-cod liver oil   Topical (Top) BID      sodium chloride 0.9%, acetaminophen, dextrose 10%, dextrose 10%, glucagon (human recombinant), glucose, glucose, insulin aspart U-100, morphine, ondansetron, traMADoL     Assessment/Plan:    T12 vertebral body compression fracture   -lso brace  -snf- [facility]      Impingement of L2-L4  nerve roots, severe bilateral neural foraminal narrowing    Multilevel spondylitic changes    Bilateral lower extremity weakness due to above     LORI on CKD-improved    Metabolic acidosis - resolved    Proteus, Enterobacter UTI-POA s/p Rx w rocephin completed      HX:  T2 dm, AFib, HTN, HLD, ACD    Plan:  -continue therapy as tolerated w lso while up .  Analgesics as needed.  Awaiting SNF placement.  Neurosurgery recommended brace  -renal back to baseline.  avoid nephrotoxic medications .  Needs outpatient follow up after SNF- [facility] tomorrow.   -home medications were reviewed.  Anticoagulation switch to Eliquis due to CKD  -continue bowel regimen  -other home meds were reviewed    Charlotte Camacho MD  HIM

## 2023-11-09 VITALS
SYSTOLIC BLOOD PRESSURE: 141 MMHG | OXYGEN SATURATION: 95 % | TEMPERATURE: 98 F | BODY MASS INDEX: 34.86 KG/M2 | WEIGHT: 230 LBS | RESPIRATION RATE: 18 BRPM | DIASTOLIC BLOOD PRESSURE: 79 MMHG | HEIGHT: 68 IN | HEART RATE: 64 BPM

## 2023-11-09 PROBLEM — N18.9 ACUTE KIDNEY INJURY SUPERIMPOSED ON CHRONIC KIDNEY DISEASE: Status: RESOLVED | Noted: 2023-10-27 | Resolved: 2023-11-09

## 2023-11-09 PROBLEM — N39.0 UTI (URINARY TRACT INFECTION): Status: RESOLVED | Noted: 2023-10-27 | Resolved: 2023-11-09

## 2023-11-09 PROBLEM — N17.9 ACUTE KIDNEY INJURY SUPERIMPOSED ON CHRONIC KIDNEY DISEASE: Status: RESOLVED | Noted: 2023-10-27 | Resolved: 2023-11-09

## 2023-11-09 LAB
ANION GAP SERPL CALC-SCNC: 11 MEQ/L
BUN SERPL-MCNC: 55.3 MG/DL (ref 9.8–20.1)
CALCIUM SERPL-MCNC: 8.7 MG/DL (ref 8.4–10.2)
CHLORIDE SERPL-SCNC: 105 MMOL/L (ref 98–107)
CO2 SERPL-SCNC: 23 MMOL/L (ref 23–31)
CREAT SERPL-MCNC: 3.44 MG/DL (ref 0.55–1.02)
CREAT/UREA NIT SERPL: 16
GFR SERPLBLD CREATININE-BSD FMLA CKD-EPI: 13 MLS/MIN/1.73/M2
GLUCOSE SERPL-MCNC: 79 MG/DL (ref 82–115)
POTASSIUM SERPL-SCNC: 3.6 MMOL/L (ref 3.5–5.1)
SODIUM SERPL-SCNC: 139 MMOL/L (ref 136–145)

## 2023-11-09 PROCEDURE — 25000003 PHARM REV CODE 250: Performed by: INTERNAL MEDICINE

## 2023-11-09 PROCEDURE — 25000003 PHARM REV CODE 250: Performed by: NURSE PRACTITIONER

## 2023-11-09 PROCEDURE — 80048 BASIC METABOLIC PNL TOTAL CA: CPT | Performed by: INTERNAL MEDICINE

## 2023-11-09 PROCEDURE — 25000003 PHARM REV CODE 250: Performed by: STUDENT IN AN ORGANIZED HEALTH CARE EDUCATION/TRAINING PROGRAM

## 2023-11-09 RX ORDER — GABAPENTIN 100 MG/1
100 CAPSULE ORAL 2 TIMES DAILY
Qty: 60 CAPSULE | Refills: 11 | Status: SHIPPED | OUTPATIENT
Start: 2023-11-09 | End: 2024-11-08

## 2023-11-09 RX ORDER — TRAMADOL HYDROCHLORIDE 50 MG/1
50 TABLET ORAL EVERY 8 HOURS PRN
Qty: 20 TABLET | Refills: 0 | Status: SHIPPED | OUTPATIENT
Start: 2023-11-09 | End: 2023-11-16

## 2023-11-09 RX ADMIN — FAMOTIDINE 20 MG: 20 TABLET, FILM COATED ORAL at 09:11

## 2023-11-09 RX ADMIN — SODIUM BICARBONATE 650 MG TABLET 650 MG: at 09:11

## 2023-11-09 RX ADMIN — FOLIC ACID 1000 MCG: 1 TABLET ORAL at 09:11

## 2023-11-09 RX ADMIN — AMIODARONE HYDROCHLORIDE 200 MG: 200 TABLET ORAL at 09:11

## 2023-11-09 RX ADMIN — AMLODIPINE BESYLATE 10 MG: 5 TABLET ORAL at 09:11

## 2023-11-09 RX ADMIN — ASPIRIN 81 MG: 81 TABLET, COATED ORAL at 09:11

## 2023-11-09 RX ADMIN — METOPROLOL SUCCINATE 50 MG: 50 TABLET, EXTENDED RELEASE ORAL at 09:11

## 2023-11-09 RX ADMIN — MULTIPLE VITAMINS W/ MINERALS TAB 1 TABLET: TAB at 09:11

## 2023-11-09 RX ADMIN — BUPROPION HYDROCHLORIDE 150 MG: 150 TABLET, FILM COATED, EXTENDED RELEASE ORAL at 09:11

## 2023-11-09 RX ADMIN — GABAPENTIN 100 MG: 100 CAPSULE ORAL at 09:11

## 2023-11-09 RX ADMIN — Medication 400 UNITS: at 09:11

## 2023-11-09 RX ADMIN — Medication 1000 MG: at 09:11

## 2023-11-09 RX ADMIN — ATORVASTATIN CALCIUM 10 MG: 10 TABLET, FILM COATED ORAL at 09:11

## 2023-11-09 RX ADMIN — TRAMADOL HYDROCHLORIDE 50 MG: 50 TABLET, COATED ORAL at 11:11

## 2023-11-09 RX ADMIN — DOCUSATE SODIUM 100 MG: 100 CAPSULE, LIQUID FILLED ORAL at 09:11

## 2023-11-09 RX ADMIN — APIXABAN 5 MG: 5 TABLET, FILM COATED ORAL at 09:11

## 2023-11-09 RX ADMIN — Medication: at 09:11

## 2023-11-09 NOTE — DISCHARGE SUMMARY
[facility] Hospital Medicine Discharge Summary    Admit Date: 10/26/2023  Discharge Date and Time: 11/9/20238:35 AM  Admitting Physician:  Team  Discharging Physician: Peter Avila MD.  Primary Care Physician: Logan Smiht MD  Consults: Nephrology and Neurosurgery    Discharge Diagnoses:  T12 vertebral body compression fracture   -lso brace  -snf- [facility]          Impingement of L2-L4  nerve roots, severe bilateral neural foraminal narrowing    Multilevel spondylitic changes    Bilateral lower extremity weakness due to above     LORI on CKD-improved    Metabolic acidosis - resolved    Proteus, Enterobacter UTI-POA s/p Rx w rocephin completed      nonobstructing nephrolithiasis     cholelithiasis, umbilical hernia, diverticulosis.     HX:  T2 dm, AFib, HTN, HLD, ACD        Hospital Course:   69-year-old obese woman with history of AFib on Xarelto, T2 dm, CKD, hypertension, hyperlipidemia presented with worsening of lower back pain, generalized weakness.  Patient had a fall 2 days ago, diagnosed with T12 vertebral fracture.  She denied any loss of bladder or bowels.  At admission she was normotensive and hemodynamically stable.  Lab work revealed elevated BUN and serum creatinine, cloudy urine with multiple white cells.  Urine sample were sent for cultures.  Recent CT scan obtained on 10/23/2023 revealed atrophy of left kidney, nonobstructing nephrolithiasis in the lower pole of the left kidney, cholelithiasis, umbilical hernia, diverticulosis.         MRI spine spine revealed T12 vertebral body compression deformity with multilevel spondylitic changes.  Neurosurgery recommended brace and no surgical intervention.  Nephrology was consulted for LORI/ckd.  Bicarb drip was added on admission due to metabolic acidosis and later transitioned to  P.o. bicarb .  Renal function has improved, back to baseline after IV hydration.  PT was started and she was participating.   "Ceftriaxone was added on admission, Urine cultures resulted as Proteus, Enterobacter cloace. she is currently awaiting SNF placement. Pt triggered a level 2 since she is on wellbutrin.    Comfortably resting.  Pain is well controlled.  Reports doing better with physical therapy .  Caregiver was at bedside.  Tolerating diet and moving bowels.  Currently awaiting placement.Wearing LSO brace. Xarelto discontinued and pt was transferred to [facility] secondary to ckd.    Pt was seen and examined on the day of discharge  Vitals:  VITAL SIGNS: 24 HRS MIN & MAX LAST   Temp  Min: 97.4 °F (36.3 °C)  Max: 98.3 °F (36.8 °C) 98.3 °F (36.8 °C)   BP  Min: 105/63  Max: 151/75 (!) 151/75   Pulse  Min: 61  Max: 70  63   Resp  Min: 18  Max: 18 18   SpO2  Min: 92 %  Max: 98 % 97 %       Physical Exam:  General: In no acute distress, afebrile    Respiratory: Clear to auscultation bilaterally    Cardiovascular: S1, S2, no appreciable murmur    Abdomen: Soft, nontender, BS +    MSK: Warm, no lower extremity edema, no clubbing or cyanosis    Neurologic: Alert and oriented x4, moving all extremities with good strength     Procedures Performed: No admission procedures for hospital encounter.     Significant Diagnostic Studies: See Full reports for all details    No results for input(s): "WBC", "RBC", "HGB", "HCT", "MCV", "MCH", "MCHC", "RDW", "PLT", "MPV", "GRAN", "LYMPH", "MONO", "BASO", "NRBC" in the last 168 hours.    Recent Labs   Lab 11/09/23  0333      K 3.6   CO2 23   BUN 55.3*   CREATININE 3.44*   CALCIUM 8.7        Microbiology Results (last 7 days)       ** No results found for the last 168 hours. **             CT Head Without Contrast  Narrative: EXAMINATION:  CT HEAD WITHOUT CONTRAST    CLINICAL HISTORY:  triggered level 2 for [facility]SR SNF placement;    TECHNIQUE:  Low dose axial images were obtained through the head.  Coronal and sagittal reformations were also performed. Contrast was not administered.    Automatic exposure " control was utilized to reduce the patient's radiation dose.    DLP= 1100    COMPARISON:  05/06/2020    FINDINGS:  No acute intracranial hemorrhage, edema or mass. No acute parenchymal abnormality.    Diffuse cerebral atrophy with concordant ventricular enlargement.    Scattered hypodensities throughout the deep periventricular white matter.    The osseous structures are normal.    The mastoid air cells are clear.    Debris within the bilateral auditory canals.    The globes and orbital contents are normal bilaterally.    The visualized maxillary, ethmoid and sphenoid sinuses are clear.  Impression: No acute intracranial abnormality identified.  Findings of chronic microvascular ischemic disease.    Electronically signed by: Jay Ernst  Date:    10/31/2023  Time:    15:41         Medication List        START taking these medications      acetaminophen 325 MG tablet  Commonly known as: TYLENOL  Take 2 tablets (650 mg total) by mouth every 6 (six) hours as needed for Pain.     apixaban 5 mg Tab  Commonly known as: ELIQUIS  Take 1 tablet (5 mg total) by mouth 2 (two) times daily.     gabapentin 100 MG capsule  Commonly known as: NEURONTIN  Take 1 capsule (100 mg total) by mouth 2 (two) times daily.            CONTINUE taking these medications      amiodarone 200 MG Tab  Commonly known as: PACERONE     amLODIPine 5 MG tablet  Commonly known as: NORVASC     ascorbic acid (vitamin C) 1000 MG tablet  Commonly known as: VITAMIN C     aspirin 81 MG EC tablet  Commonly known as: ECOTRIN     buPROPion 150 MG TB24 tablet  Commonly known as: WELLBUTRIN XL     cholecalciferol (vitamin D3) 50 mcg (2,000 unit) Cap capsule  Commonly known as: VITAMIN D3     DAILY-GEETA (WITH FOLIC ACID) 400 mcg Tab  Generic drug: multivitamin with folic acid     docusate sodium 100 MG capsule  Commonly known as: COLACE     folic acid 1 MG tablet  Commonly known as: FOLVITE     metoprolol succinate 50 MG 24 hr tablet  Commonly known as:  TOPROL-XL     rOPINIRole 0.5 MG tablet  Commonly known as: REQUIP     rosuvastatin 10 MG tablet  Commonly known as: CRESTOR     sodium bicarbonate 650 MG tablet     vitamin E 400 UNIT capsule            STOP taking these medications      AURYXIA 210 mg iron Tab  Generic drug: ferric citrate     cephalexin 250 mg Tab     CO Q-10 100 mg capsule  Generic drug: coenzyme Q10     co Q10-red yeast rice  mg Cap     doxercalciferoL 0.5 MCG capsule  Commonly known as: HECTOROL     doxycycline 100 MG Cap  Commonly known as: VIBRAMYCIN     estradioL 0.01 % (0.1 mg/gram) vaginal cream  Commonly known as: ESTRACE     HYDROcodone-acetaminophen 7.5-325 mg per tablet  Commonly known as: NORCO     methocarbamoL 500 MG Tab  Commonly known as: ROBAXIN     metoprolol tartrate 25 MG tablet  Commonly known as: LOPRESSOR     NON FORMULARY MEDICATION     VICTOZA 2-MERON 0.6 mg/0.1 mL (18 mg/3 mL) Pnij pen  Generic drug: liraglutide 0.6 mg/0.1 mL (18 mg/3 mL) subq PNIJ     XARELTO 15 mg Tab  Generic drug: rivaroxaban            ASK your doctor about these medications      traMADoL 50 mg tablet  Commonly known as: ULTRAM  Take 1 tablet (50 mg total) by mouth every 8 (eight) hours as needed for Pain.  Ask about: Should I take this medication?               Where to Get Your Medications        These medications were sent to [facility]/pharmacy #[id number] [place] [facility] [address] [facility] [place]  [address] [place] [address]      Hours: 24-hours Phone: [phone]   gabapentin 100 MG capsule       You can get these medications from any pharmacy    Bring a paper prescription for each of these medications  traMADoL 50 mg tablet       Information about where to get these medications is not yet available    Ask your nurse or doctor about these medications  acetaminophen 325 MG tablet  apixaban 5 mg Tab          Explained in detail to the patient about the discharge plan, medications, and follow-up visits. Pt  understands and agrees with the treatment plan  Discharge Disposition: [facility]/ [facility]   Discharged Condition: stable  Diet- renal/ cardiac  Dietary Orders (From admission, onward)       Start     Ordered    10/27/23 0329  Diet diabetic  Diet effective now         10/27/23 0328                   Medications Per DC med rec  Activities as tolerated   Follow-up Information       Logan Smith MD Follow up in 3 week(s).    Specialty: Nephrology  Why: Repeat renal function panel and CBC  Contact information:  [address]  [facility] [address] [phone]                           For further questions contact hospitalist office    Discharge time 33 minutes    For worsening symptoms, chest pain, shortness of breath, increased abdominal pain, high grade fever, stroke or stroke like symptoms, immediately go to the nearest Emergency Room or call 911 as soon as possible.      Peter Wood M.D, on 11/9/2023. at 8:35 AM.

## 2023-11-09 NOTE — PLAN OF CARE
Problem: Adult Inpatient Plan of Care  Goal: Plan of Care Review  Outcome: Ongoing, Progressing  Flowsheets (Taken 11/8/2023 1905)  Plan of Care Reviewed With: patient  Goal: Patient-Specific Goal (Individualized)  Outcome: Ongoing, Progressing  Flowsheets (Taken 11/8/2023 1905)  Individualized Care Needs: assist with adls, fall precaution  Goal: Absence of Hospital-Acquired Illness or Injury  Outcome: Ongoing, Progressing  Intervention: Identify and Manage Fall Risk  Flowsheets (Taken 11/8/2023 1905)  Safety Promotion/Fall Prevention:   assistive device/personal item within reach   side rails raised x 3   Fall Risk reviewed with patient/family   Fall Risk signage in place   instructed to call staff for mobility  Intervention: Prevent Skin Injury  Flowsheets (Taken 11/8/2023 1905)  Body Position:   weight shifting   position changed independently  Skin Protection: adhesive use limited  Intervention: Prevent and Manage VTE (Venous Thromboembolism) Risk  Flowsheets (Taken 11/8/2023 1905)  Activity Management: Rolling - L1  VTE Prevention/Management: (apixaban, aspirin) other (see comments)  Range of Motion: active ROM (range of motion) encouraged  Intervention: Prevent Infection  Flowsheets (Taken 11/8/2023 1905)  Infection Prevention:   hand hygiene promoted   single patient room provided   rest/sleep promoted  Goal: Optimal Comfort and Wellbeing  Outcome: Ongoing, Progressing  Intervention: Monitor Pain and Promote Comfort  Flowsheets (Taken 11/8/2023 1905)  Pain Management Interventions:   quiet environment facilitated   care clustered  Intervention: Provide Person-Centered Care  Flowsheets (Taken 11/8/2023 1905)  Trust Relationship/Rapport:   care explained   questions encouraged   choices provided   questions answered   thoughts/feelings acknowledged

## 2023-11-10 ENCOUNTER — LAB REQUISITION (OUTPATIENT)
Dept: LAB | Facility: HOSPITAL | Age: 78
End: 2023-11-10
Payer: COMMERCIAL

## 2023-11-10 DIAGNOSIS — E03.9 HYPOTHYROIDISM, UNSPECIFIED: ICD-10-CM

## 2023-11-10 DIAGNOSIS — R79.9 ABNORMAL FINDING OF BLOOD CHEMISTRY, UNSPECIFIED: ICD-10-CM

## 2023-11-10 DIAGNOSIS — E11.8 TYPE 2 DIABETES MELLITUS WITH UNSPECIFIED COMPLICATIONS: ICD-10-CM

## 2023-11-10 LAB
ALBUMIN SERPL-MCNC: 2.5 G/DL (ref 3.4–4.8)
ALBUMIN/GLOB SERPL: 0.7 RATIO (ref 1.1–2)
ALP SERPL-CCNC: 98 UNIT/L (ref 40–150)
ALT SERPL-CCNC: 15 UNIT/L (ref 0–55)
AST SERPL-CCNC: 19 UNIT/L (ref 5–34)
BASOPHILS # BLD AUTO: 0.04 X10(3)/MCL
BASOPHILS NFR BLD AUTO: 0.6 %
BILIRUB SERPL-MCNC: 0.6 MG/DL
BUN SERPL-MCNC: 57.1 MG/DL (ref 9.8–20.1)
CALCIUM SERPL-MCNC: 9.8 MG/DL (ref 8.4–10.2)
CHLORIDE SERPL-SCNC: 107 MMOL/L (ref 98–107)
CHOLEST SERPL-MCNC: 119 MG/DL
CHOLEST/HDLC SERPL: 3 {RATIO} (ref 0–5)
CO2 SERPL-SCNC: 23 MMOL/L (ref 23–31)
CREAT SERPL-MCNC: 3.6 MG/DL (ref 0.55–1.02)
DEPRECATED CALCIDIOL+CALCIFEROL SERPL-MC: 52.5 NG/ML (ref 30–80)
EOSINOPHIL # BLD AUTO: 0.37 X10(3)/MCL (ref 0–0.9)
EOSINOPHIL NFR BLD AUTO: 5.7 %
ERYTHROCYTE [DISTWIDTH] IN BLOOD BY AUTOMATED COUNT: 13.2 % (ref 11.5–17)
EST. AVERAGE GLUCOSE BLD GHB EST-MCNC: 114 MG/DL
GFR SERPLBLD CREATININE-BSD FMLA CKD-EPI: 12 MLS/MIN/1.73/M2
GLOBULIN SER-MCNC: 3.7 GM/DL (ref 2.4–3.5)
GLUCOSE SERPL-MCNC: 77 MG/DL (ref 82–115)
HBA1C MFR BLD: 5.6 %
HCT VFR BLD AUTO: 37.9 % (ref 37–47)
HDLC SERPL-MCNC: 39 MG/DL (ref 35–60)
HGB BLD-MCNC: 12.5 G/DL (ref 12–16)
IMM GRANULOCYTES # BLD AUTO: 0.02 X10(3)/MCL (ref 0–0.04)
IMM GRANULOCYTES NFR BLD AUTO: 0.3 %
LDLC SERPL CALC-MCNC: 65 MG/DL (ref 50–140)
LYMPHOCYTES # BLD AUTO: 1.34 X10(3)/MCL (ref 0.6–4.6)
LYMPHOCYTES NFR BLD AUTO: 20.5 %
MCH RBC QN AUTO: 32.2 PG (ref 27–31)
MCHC RBC AUTO-ENTMCNC: 33 G/DL (ref 33–36)
MCV RBC AUTO: 97.7 FL (ref 80–94)
MONOCYTES # BLD AUTO: 0.78 X10(3)/MCL (ref 0.1–1.3)
MONOCYTES NFR BLD AUTO: 11.9 %
NEUTROPHILS # BLD AUTO: 3.99 X10(3)/MCL (ref 2.1–9.2)
NEUTROPHILS NFR BLD AUTO: 61 %
NRBC BLD AUTO-RTO: 0 %
PLATELET # BLD AUTO: 198 X10(3)/MCL (ref 130–400)
PMV BLD AUTO: 11 FL (ref 7.4–10.4)
POTASSIUM SERPL-SCNC: 3.5 MMOL/L (ref 3.5–5.1)
PREALB SERPL-MCNC: 17.6 MG/DL (ref 14–37)
PROT SERPL-MCNC: 6.2 GM/DL (ref 5.8–7.6)
RBC # BLD AUTO: 3.88 X10(6)/MCL (ref 4.2–5.4)
SODIUM SERPL-SCNC: 143 MMOL/L (ref 136–145)
T4 FREE SERPL-MCNC: 2.69 NG/DL (ref 0.7–1.48)
TRIGL SERPL-MCNC: 73 MG/DL (ref 37–140)
TSH SERPL-ACNC: <0.008 UIU/ML (ref 0.35–4.94)
VLDLC SERPL CALC-MCNC: 15 MG/DL
WBC # SPEC AUTO: 6.54 X10(3)/MCL (ref 4.5–11.5)

## 2023-11-10 PROCEDURE — 84134 ASSAY OF PREALBUMIN: CPT | Performed by: NURSE PRACTITIONER

## 2023-11-10 PROCEDURE — 83036 HEMOGLOBIN GLYCOSYLATED A1C: CPT | Performed by: NURSE PRACTITIONER

## 2023-11-10 PROCEDURE — 84443 ASSAY THYROID STIM HORMONE: CPT | Performed by: NURSE PRACTITIONER

## 2023-11-10 PROCEDURE — 84439 ASSAY OF FREE THYROXINE: CPT | Performed by: NURSE PRACTITIONER

## 2023-11-10 PROCEDURE — 85025 COMPLETE CBC W/AUTO DIFF WBC: CPT | Performed by: NURSE PRACTITIONER

## 2023-11-10 PROCEDURE — 80053 COMPREHEN METABOLIC PANEL: CPT | Performed by: NURSE PRACTITIONER

## 2023-11-10 PROCEDURE — 82306 VITAMIN D 25 HYDROXY: CPT | Performed by: NURSE PRACTITIONER

## 2023-11-10 PROCEDURE — 80061 LIPID PANEL: CPT | Performed by: NURSE PRACTITIONER

## 2023-11-28 ENCOUNTER — LAB REQUISITION (OUTPATIENT)
Dept: LAB | Facility: HOSPITAL | Age: 78
End: 2023-11-28
Payer: COMMERCIAL

## 2023-11-28 DIAGNOSIS — R79.9 ABNORMAL FINDING OF BLOOD CHEMISTRY, UNSPECIFIED: ICD-10-CM

## 2023-11-28 LAB
ANION GAP SERPL CALC-SCNC: 10 MEQ/L
BASOPHILS # BLD AUTO: 0.03 X10(3)/MCL
BASOPHILS NFR BLD AUTO: 0.3 %
BUN SERPL-MCNC: 44.5 MG/DL (ref 9.8–20.1)
CALCIUM SERPL-MCNC: 8.9 MG/DL (ref 8.4–10.2)
CHLORIDE SERPL-SCNC: 109 MMOL/L (ref 98–107)
CO2 SERPL-SCNC: 22 MMOL/L (ref 23–31)
CREAT SERPL-MCNC: 2.7 MG/DL (ref 0.55–1.02)
CREAT/UREA NIT SERPL: 16
EOSINOPHIL # BLD AUTO: 0.43 X10(3)/MCL (ref 0–0.9)
EOSINOPHIL NFR BLD AUTO: 4.8 %
ERYTHROCYTE [DISTWIDTH] IN BLOOD BY AUTOMATED COUNT: 12.7 % (ref 11.5–17)
GFR SERPLBLD CREATININE-BSD FMLA CKD-EPI: 17 MLS/MIN/1.73/M2
GLUCOSE SERPL-MCNC: 84 MG/DL (ref 82–115)
HCT VFR BLD AUTO: 35.2 % (ref 37–47)
HGB BLD-MCNC: 11.4 G/DL (ref 12–16)
IMM GRANULOCYTES # BLD AUTO: 0.04 X10(3)/MCL (ref 0–0.04)
IMM GRANULOCYTES NFR BLD AUTO: 0.4 %
LYMPHOCYTES # BLD AUTO: 1.67 X10(3)/MCL (ref 0.6–4.6)
LYMPHOCYTES NFR BLD AUTO: 18.8 %
MCH RBC QN AUTO: 31.9 PG (ref 27–31)
MCHC RBC AUTO-ENTMCNC: 32.4 G/DL (ref 33–36)
MCV RBC AUTO: 98.6 FL (ref 80–94)
MONOCYTES # BLD AUTO: 0.96 X10(3)/MCL (ref 0.1–1.3)
MONOCYTES NFR BLD AUTO: 10.8 %
NEUTROPHILS # BLD AUTO: 5.77 X10(3)/MCL (ref 2.1–9.2)
NEUTROPHILS NFR BLD AUTO: 64.9 %
NRBC BLD AUTO-RTO: 0 %
PLATELET # BLD AUTO: 189 X10(3)/MCL (ref 130–400)
PMV BLD AUTO: 11.1 FL (ref 7.4–10.4)
POTASSIUM SERPL-SCNC: 3.6 MMOL/L (ref 3.5–5.1)
RBC # BLD AUTO: 3.57 X10(6)/MCL (ref 4.2–5.4)
SODIUM SERPL-SCNC: 141 MMOL/L (ref 136–145)
WBC # SPEC AUTO: 8.9 X10(3)/MCL (ref 4.5–11.5)

## 2023-11-28 PROCEDURE — 85025 COMPLETE CBC W/AUTO DIFF WBC: CPT | Performed by: NURSE PRACTITIONER

## 2023-11-28 PROCEDURE — 80048 BASIC METABOLIC PNL TOTAL CA: CPT | Performed by: NURSE PRACTITIONER

## 2023-12-14 ENCOUNTER — LAB REQUISITION (OUTPATIENT)
Dept: LAB | Facility: HOSPITAL | Age: 78
End: 2023-12-14
Payer: COMMERCIAL

## 2023-12-14 DIAGNOSIS — R79.9 ABNORMAL FINDING OF BLOOD CHEMISTRY, UNSPECIFIED: ICD-10-CM

## 2023-12-14 DIAGNOSIS — E03.9 HYPOTHYROIDISM, UNSPECIFIED: ICD-10-CM

## 2023-12-14 LAB
ALBUMIN SERPL-MCNC: 2.5 G/DL (ref 3.4–4.8)
ALBUMIN/GLOB SERPL: 0.8 RATIO (ref 1.1–2)
ALP SERPL-CCNC: 104 UNIT/L (ref 40–150)
ALT SERPL-CCNC: 12 UNIT/L (ref 0–55)
AST SERPL-CCNC: 14 UNIT/L (ref 5–34)
BASOPHILS # BLD AUTO: 0.01 X10(3)/MCL
BASOPHILS NFR BLD AUTO: 0.2 %
BILIRUB SERPL-MCNC: 0.6 MG/DL
BUN SERPL-MCNC: 46.4 MG/DL (ref 9.8–20.1)
CALCIUM SERPL-MCNC: 8.9 MG/DL (ref 8.4–10.2)
CHLORIDE SERPL-SCNC: 108 MMOL/L (ref 98–107)
CO2 SERPL-SCNC: 22 MMOL/L (ref 23–31)
CREAT SERPL-MCNC: 2.59 MG/DL (ref 0.55–1.02)
DEPRECATED CALCIDIOL+CALCIFEROL SERPL-MC: 52.7 NG/ML (ref 30–80)
EOSINOPHIL # BLD AUTO: 0.51 X10(3)/MCL (ref 0–0.9)
EOSINOPHIL NFR BLD AUTO: 8 %
ERYTHROCYTE [DISTWIDTH] IN BLOOD BY AUTOMATED COUNT: 12.5 % (ref 11.5–17)
FERRITIN SERPL-MCNC: 1046.87 NG/ML (ref 4.63–204)
GFR SERPLBLD CREATININE-BSD FMLA CKD-EPI: 18 MLS/MIN/1.73/M2
GLOBULIN SER-MCNC: 3 GM/DL (ref 2.4–3.5)
GLUCOSE SERPL-MCNC: 82 MG/DL (ref 82–115)
HCT VFR BLD AUTO: 32.1 % (ref 37–47)
HGB BLD-MCNC: 10.4 G/DL (ref 12–16)
IMM GRANULOCYTES # BLD AUTO: 0.02 X10(3)/MCL (ref 0–0.04)
IMM GRANULOCYTES NFR BLD AUTO: 0.3 %
IRON SERPL-MCNC: 60 UG/DL (ref 50–170)
LYMPHOCYTES # BLD AUTO: 1.33 X10(3)/MCL (ref 0.6–4.6)
LYMPHOCYTES NFR BLD AUTO: 20.9 %
MAGNESIUM SERPL-MCNC: 1.7 MG/DL (ref 1.6–2.6)
MCH RBC QN AUTO: 31.5 PG (ref 27–31)
MCHC RBC AUTO-ENTMCNC: 32.4 G/DL (ref 33–36)
MCV RBC AUTO: 97.3 FL (ref 80–94)
MONOCYTES # BLD AUTO: 0.71 X10(3)/MCL (ref 0.1–1.3)
MONOCYTES NFR BLD AUTO: 11.1 %
NEUTROPHILS # BLD AUTO: 3.79 X10(3)/MCL (ref 2.1–9.2)
NEUTROPHILS NFR BLD AUTO: 59.5 %
NRBC BLD AUTO-RTO: 0 %
PHOSPHATE SERPL-MCNC: 4.1 MG/DL (ref 2.3–4.7)
PLATELET # BLD AUTO: 170 X10(3)/MCL (ref 130–400)
PMV BLD AUTO: 11.6 FL (ref 7.4–10.4)
POTASSIUM SERPL-SCNC: 3.9 MMOL/L (ref 3.5–5.1)
PROT SERPL-MCNC: 5.5 GM/DL (ref 5.8–7.6)
PTH-INTACT SERPL-MCNC: 125.8 PG/ML (ref 8.7–77)
RBC # BLD AUTO: 3.3 X10(6)/MCL (ref 4.2–5.4)
SODIUM SERPL-SCNC: 142 MMOL/L (ref 136–145)
WBC # SPEC AUTO: 6.37 X10(3)/MCL (ref 4.5–11.5)

## 2023-12-14 PROCEDURE — 83540 ASSAY OF IRON: CPT | Performed by: NURSE PRACTITIONER

## 2023-12-14 PROCEDURE — 83970 ASSAY OF PARATHORMONE: CPT | Performed by: NURSE PRACTITIONER

## 2023-12-14 PROCEDURE — 84100 ASSAY OF PHOSPHORUS: CPT | Performed by: NURSE PRACTITIONER

## 2023-12-14 PROCEDURE — 85025 COMPLETE CBC W/AUTO DIFF WBC: CPT | Performed by: NURSE PRACTITIONER

## 2023-12-14 PROCEDURE — 82306 VITAMIN D 25 HYDROXY: CPT | Performed by: NURSE PRACTITIONER

## 2023-12-14 PROCEDURE — 83735 ASSAY OF MAGNESIUM: CPT | Performed by: NURSE PRACTITIONER

## 2023-12-14 PROCEDURE — 80053 COMPREHEN METABOLIC PANEL: CPT | Performed by: NURSE PRACTITIONER

## 2023-12-14 PROCEDURE — 82728 ASSAY OF FERRITIN: CPT | Performed by: NURSE PRACTITIONER

## 2023-12-15 ENCOUNTER — LAB REQUISITION (OUTPATIENT)
Dept: LAB | Facility: HOSPITAL | Age: 78
End: 2023-12-15
Payer: COMMERCIAL

## 2023-12-15 DIAGNOSIS — R79.9 ABNORMAL FINDING OF BLOOD CHEMISTRY, UNSPECIFIED: ICD-10-CM

## 2023-12-15 LAB
CREAT UR-MCNC: 88.9 MG/DL (ref 45–106)
PROT UR STRIP-MCNC: 27.8 MG/DL
URINE PROTEIN/CREATININE RATIO (OHS): 0.3

## 2023-12-15 PROCEDURE — 82570 ASSAY OF URINE CREATININE: CPT | Performed by: NURSE PRACTITIONER

## 2023-12-18 ENCOUNTER — LAB REQUISITION (OUTPATIENT)
Dept: LAB | Facility: HOSPITAL | Age: 78
End: 2023-12-18
Payer: COMMERCIAL

## 2023-12-18 DIAGNOSIS — R79.9 ABNORMAL FINDING OF BLOOD CHEMISTRY, UNSPECIFIED: ICD-10-CM

## 2023-12-18 LAB
ALBUMIN SERPL-MCNC: 2.6 G/DL (ref 3.4–4.8)
ALBUMIN/GLOB SERPL: 0.8 RATIO (ref 1.1–2)
ALP SERPL-CCNC: 115 UNIT/L (ref 40–150)
ALT SERPL-CCNC: 13 UNIT/L (ref 0–55)
AST SERPL-CCNC: 19 UNIT/L (ref 5–34)
BASOPHILS # BLD AUTO: 0.03 X10(3)/MCL
BASOPHILS NFR BLD AUTO: 0.6 %
BILIRUB SERPL-MCNC: 0.4 MG/DL
BUN SERPL-MCNC: 38.5 MG/DL (ref 9.8–20.1)
CALCIUM SERPL-MCNC: 9.1 MG/DL (ref 8.4–10.2)
CHLORIDE SERPL-SCNC: 110 MMOL/L (ref 98–107)
CO2 SERPL-SCNC: 22 MMOL/L (ref 23–31)
CREAT SERPL-MCNC: 2.53 MG/DL (ref 0.55–1.02)
DEPRECATED CALCIDIOL+CALCIFEROL SERPL-MC: 49.1 NG/ML (ref 30–80)
EOSINOPHIL # BLD AUTO: 0.51 X10(3)/MCL (ref 0–0.9)
EOSINOPHIL NFR BLD AUTO: 9.8 %
ERYTHROCYTE [DISTWIDTH] IN BLOOD BY AUTOMATED COUNT: 12.4 % (ref 11.5–17)
FERRITIN SERPL-MCNC: 1260.7 NG/ML (ref 4.63–204)
GFR SERPLBLD CREATININE-BSD FMLA CKD-EPI: 19 MLS/MIN/1.73/M2
GLOBULIN SER-MCNC: 3.1 GM/DL (ref 2.4–3.5)
GLUCOSE SERPL-MCNC: 84 MG/DL (ref 82–115)
HCT VFR BLD AUTO: 32.7 % (ref 37–47)
HGB BLD-MCNC: 10.6 G/DL (ref 12–16)
IMM GRANULOCYTES # BLD AUTO: 0.02 X10(3)/MCL (ref 0–0.04)
IMM GRANULOCYTES NFR BLD AUTO: 0.4 %
IRON SERPL-MCNC: 57 UG/DL (ref 50–170)
LYMPHOCYTES # BLD AUTO: 1.02 X10(3)/MCL (ref 0.6–4.6)
LYMPHOCYTES NFR BLD AUTO: 19.5 %
MAGNESIUM SERPL-MCNC: 1.8 MG/DL (ref 1.6–2.6)
MCH RBC QN AUTO: 32.4 PG (ref 27–31)
MCHC RBC AUTO-ENTMCNC: 32.4 G/DL (ref 33–36)
MCV RBC AUTO: 100 FL (ref 80–94)
MONOCYTES # BLD AUTO: 0.68 X10(3)/MCL (ref 0.1–1.3)
MONOCYTES NFR BLD AUTO: 13 %
NEUTROPHILS # BLD AUTO: 2.97 X10(3)/MCL (ref 2.1–9.2)
NEUTROPHILS NFR BLD AUTO: 56.7 %
NRBC BLD AUTO-RTO: 0 %
PHOSPHATE SERPL-MCNC: 3.8 MG/DL (ref 2.3–4.7)
PLATELET # BLD AUTO: 156 X10(3)/MCL (ref 130–400)
PMV BLD AUTO: 11.5 FL (ref 7.4–10.4)
POTASSIUM SERPL-SCNC: 4.2 MMOL/L (ref 3.5–5.1)
PROT SERPL-MCNC: 5.7 GM/DL (ref 5.8–7.6)
PTH-INTACT SERPL-MCNC: 112.8 PG/ML (ref 8.7–77)
RBC # BLD AUTO: 3.27 X10(6)/MCL (ref 4.2–5.4)
SODIUM SERPL-SCNC: 143 MMOL/L (ref 136–145)
WBC # SPEC AUTO: 5.23 X10(3)/MCL (ref 4.5–11.5)

## 2023-12-18 PROCEDURE — 83735 ASSAY OF MAGNESIUM: CPT | Performed by: NURSE PRACTITIONER

## 2023-12-18 PROCEDURE — 82306 VITAMIN D 25 HYDROXY: CPT | Performed by: NURSE PRACTITIONER

## 2023-12-18 PROCEDURE — 85025 COMPLETE CBC W/AUTO DIFF WBC: CPT | Performed by: NURSE PRACTITIONER

## 2023-12-18 PROCEDURE — 83540 ASSAY OF IRON: CPT | Performed by: NURSE PRACTITIONER

## 2023-12-18 PROCEDURE — 80053 COMPREHEN METABOLIC PANEL: CPT | Performed by: NURSE PRACTITIONER

## 2023-12-18 PROCEDURE — 84100 ASSAY OF PHOSPHORUS: CPT | Performed by: NURSE PRACTITIONER

## 2023-12-18 PROCEDURE — 82728 ASSAY OF FERRITIN: CPT | Performed by: NURSE PRACTITIONER

## 2023-12-18 PROCEDURE — 83970 ASSAY OF PARATHORMONE: CPT | Performed by: NURSE PRACTITIONER

## 2024-01-05 ENCOUNTER — HOSPITAL ENCOUNTER (INPATIENT)
Facility: HOSPITAL | Age: 79
LOS: 11 days | Discharge: HOME | DRG: 683 | End: 2024-01-16
Attending: EMERGENCY MEDICINE | Admitting: INTERNAL MEDICINE
Payer: COMMERCIAL

## 2024-01-05 DIAGNOSIS — N17.9 ACUTE RENAL FAILURE, UNSPECIFIED ACUTE RENAL FAILURE TYPE: Primary | ICD-10-CM

## 2024-01-05 DIAGNOSIS — N17.9 ACUTE KIDNEY INJURY SUPERIMPOSED ON CKD: ICD-10-CM

## 2024-01-05 DIAGNOSIS — R07.9 CHEST PAIN: ICD-10-CM

## 2024-01-05 DIAGNOSIS — E86.0 DEHYDRATION: ICD-10-CM

## 2024-01-05 DIAGNOSIS — N18.9 ACUTE KIDNEY INJURY SUPERIMPOSED ON CKD: ICD-10-CM

## 2024-01-05 LAB
ALBUMIN SERPL-MCNC: 3 G/DL (ref 3.4–4.8)
ALBUMIN/GLOB SERPL: 0.9 RATIO (ref 1.1–2)
ALP SERPL-CCNC: 126 UNIT/L (ref 40–150)
ALT SERPL-CCNC: 20 UNIT/L (ref 0–55)
ANION GAP SERPL CALC-SCNC: 14 MEQ/L
AST SERPL-CCNC: 22 UNIT/L (ref 5–34)
BASOPHILS # BLD AUTO: 0.02 X10(3)/MCL
BASOPHILS NFR BLD AUTO: 0.1 %
BILIRUB SERPL-MCNC: 0.5 MG/DL
BUN SERPL-MCNC: 90.4 MG/DL (ref 9.8–20.1)
BUN SERPL-MCNC: 90.7 MG/DL (ref 9.8–20.1)
CALCIUM SERPL-MCNC: 8.7 MG/DL (ref 8.4–10.2)
CALCIUM SERPL-MCNC: 9.4 MG/DL (ref 8.4–10.2)
CHLORIDE SERPL-SCNC: 107 MMOL/L (ref 98–107)
CHLORIDE SERPL-SCNC: 110 MMOL/L (ref 98–107)
CK SERPL-CCNC: 67 U/L (ref 29–168)
CO2 SERPL-SCNC: 16 MMOL/L (ref 23–31)
CO2 SERPL-SCNC: 17 MMOL/L (ref 23–31)
CREAT SERPL-MCNC: 5.18 MG/DL (ref 0.55–1.02)
CREAT SERPL-MCNC: 5.59 MG/DL (ref 0.55–1.02)
CREAT/UREA NIT SERPL: 18
CRP SERPL-MCNC: 69.1 MG/L
DEPRECATED CALCIDIOL+CALCIFEROL SERPL-MC: 50.1 NG/ML (ref 30–80)
EOSINOPHIL # BLD AUTO: 0.05 X10(3)/MCL (ref 0–0.9)
EOSINOPHIL NFR BLD AUTO: 0.3 %
ERYTHROCYTE [DISTWIDTH] IN BLOOD BY AUTOMATED COUNT: 13.2 % (ref 11.5–17)
ERYTHROCYTE [SEDIMENTATION RATE] IN BLOOD: 33 MM/HR (ref 0–20)
EST. AVERAGE GLUCOSE BLD GHB EST-MCNC: 114 MG/DL
GFR SERPLBLD CREATININE-BSD FMLA CKD-EPI: 7 MLS/MIN/1.73/M2
GFR SERPLBLD CREATININE-BSD FMLA CKD-EPI: 8 MLS/MIN/1.73/M2
GLOBULIN SER-MCNC: 3.4 GM/DL (ref 2.4–3.5)
GLUCOSE SERPL-MCNC: 143 MG/DL (ref 82–115)
GLUCOSE SERPL-MCNC: 147 MG/DL (ref 82–115)
HBA1C MFR BLD: 5.6 %
HCT VFR BLD AUTO: 33.8 % (ref 37–47)
HGB BLD-MCNC: 11.2 G/DL (ref 12–16)
IMM GRANULOCYTES # BLD AUTO: 0.09 X10(3)/MCL (ref 0–0.04)
IMM GRANULOCYTES NFR BLD AUTO: 0.6 %
IRON SATN MFR SERPL: 27 % (ref 20–50)
IRON SERPL-MCNC: 30 UG/DL (ref 50–170)
LYMPHOCYTES # BLD AUTO: 0.24 X10(3)/MCL (ref 0.6–4.6)
LYMPHOCYTES NFR BLD AUTO: 1.5 %
MAGNESIUM SERPL-MCNC: 2.1 MG/DL (ref 1.6–2.6)
MCH RBC QN AUTO: 31.5 PG (ref 27–31)
MCHC RBC AUTO-ENTMCNC: 33.1 G/DL (ref 33–36)
MCV RBC AUTO: 94.9 FL (ref 80–94)
MONOCYTES # BLD AUTO: 0.6 X10(3)/MCL (ref 0.1–1.3)
MONOCYTES NFR BLD AUTO: 3.8 %
NEUTROPHILS # BLD AUTO: 14.75 X10(3)/MCL (ref 2.1–9.2)
NEUTROPHILS NFR BLD AUTO: 93.7 %
NRBC BLD AUTO-RTO: 0 %
PLATELET # BLD AUTO: 285 X10(3)/MCL (ref 130–400)
PMV BLD AUTO: 10.4 FL (ref 7.4–10.4)
POTASSIUM SERPL-SCNC: 3.4 MMOL/L (ref 3.5–5.1)
POTASSIUM SERPL-SCNC: 3.8 MMOL/L (ref 3.5–5.1)
PROT SERPL-MCNC: 6.4 GM/DL (ref 5.8–7.6)
PTH-INTACT SERPL-MCNC: 145.9 PG/ML (ref 8.7–77)
RBC # BLD AUTO: 3.56 X10(6)/MCL (ref 4.2–5.4)
SODIUM SERPL-SCNC: 141 MMOL/L (ref 136–145)
SODIUM SERPL-SCNC: 141 MMOL/L (ref 136–145)
TIBC SERPL-MCNC: 113 UG/DL (ref 250–450)
TIBC SERPL-MCNC: 83 UG/DL (ref 70–310)
TRANSFERRIN SERPL-MCNC: 107 MG/DL (ref 173–360)
URATE SERPL-MCNC: 11.5 MG/DL (ref 2.6–6)
VIT B12 SERPL-MCNC: 1297 PG/ML (ref 213–816)
WBC # SPEC AUTO: 15.75 X10(3)/MCL (ref 4.5–11.5)

## 2024-01-05 PROCEDURE — 11000001 HC ACUTE MED/SURG PRIVATE ROOM

## 2024-01-05 PROCEDURE — 85652 RBC SED RATE AUTOMATED: CPT | Performed by: INTERNAL MEDICINE

## 2024-01-05 PROCEDURE — 83970 ASSAY OF PARATHORMONE: CPT | Performed by: INTERNAL MEDICINE

## 2024-01-05 PROCEDURE — 82306 VITAMIN D 25 HYDROXY: CPT | Performed by: INTERNAL MEDICINE

## 2024-01-05 PROCEDURE — 99285 EMERGENCY DEPT VISIT HI MDM: CPT | Mod: 25

## 2024-01-05 PROCEDURE — 82728 ASSAY OF FERRITIN: CPT | Performed by: INTERNAL MEDICINE

## 2024-01-05 PROCEDURE — 84520 ASSAY OF UREA NITROGEN: CPT | Performed by: INTERNAL MEDICINE

## 2024-01-05 PROCEDURE — 25000003 PHARM REV CODE 250: Performed by: PHYSICIAN ASSISTANT

## 2024-01-05 PROCEDURE — 82746 ASSAY OF FOLIC ACID SERUM: CPT | Performed by: INTERNAL MEDICINE

## 2024-01-05 PROCEDURE — 82550 ASSAY OF CK (CPK): CPT | Performed by: INTERNAL MEDICINE

## 2024-01-05 PROCEDURE — 63600175 PHARM REV CODE 636 W HCPCS: Performed by: INTERNAL MEDICINE

## 2024-01-05 PROCEDURE — 85025 COMPLETE CBC W/AUTO DIFF WBC: CPT | Performed by: NURSE PRACTITIONER

## 2024-01-05 PROCEDURE — 83735 ASSAY OF MAGNESIUM: CPT | Performed by: NURSE PRACTITIONER

## 2024-01-05 PROCEDURE — 83880 ASSAY OF NATRIURETIC PEPTIDE: CPT | Performed by: INTERNAL MEDICINE

## 2024-01-05 PROCEDURE — 84550 ASSAY OF BLOOD/URIC ACID: CPT | Performed by: INTERNAL MEDICINE

## 2024-01-05 PROCEDURE — 25000003 PHARM REV CODE 250: Performed by: EMERGENCY MEDICINE

## 2024-01-05 PROCEDURE — 82607 VITAMIN B-12: CPT | Performed by: INTERNAL MEDICINE

## 2024-01-05 PROCEDURE — 83036 HEMOGLOBIN GLYCOSYLATED A1C: CPT | Performed by: INTERNAL MEDICINE

## 2024-01-05 PROCEDURE — 83540 ASSAY OF IRON: CPT | Performed by: INTERNAL MEDICINE

## 2024-01-05 PROCEDURE — 84300 ASSAY OF URINE SODIUM: CPT | Performed by: INTERNAL MEDICINE

## 2024-01-05 PROCEDURE — 21400001 HC TELEMETRY ROOM

## 2024-01-05 PROCEDURE — 25000003 PHARM REV CODE 250: Performed by: INTERNAL MEDICINE

## 2024-01-05 PROCEDURE — 82570 ASSAY OF URINE CREATININE: CPT | Performed by: INTERNAL MEDICINE

## 2024-01-05 PROCEDURE — 86140 C-REACTIVE PROTEIN: CPT | Performed by: INTERNAL MEDICINE

## 2024-01-05 PROCEDURE — 80053 COMPREHEN METABOLIC PANEL: CPT | Performed by: NURSE PRACTITIONER

## 2024-01-05 RX ORDER — ACETAMINOPHEN 500 MG
1000 TABLET ORAL EVERY 6 HOURS PRN
Status: DISCONTINUED | OUTPATIENT
Start: 2024-01-05 | End: 2024-01-05

## 2024-01-05 RX ORDER — FOLIC ACID 1 MG/1
1000 TABLET ORAL DAILY
Status: DISCONTINUED | OUTPATIENT
Start: 2024-01-06 | End: 2024-01-16 | Stop reason: HOSPADM

## 2024-01-05 RX ORDER — ONDANSETRON HYDROCHLORIDE 2 MG/ML
4 INJECTION, SOLUTION INTRAVENOUS EVERY 6 HOURS PRN
Status: DISCONTINUED | OUTPATIENT
Start: 2024-01-05 | End: 2024-01-05

## 2024-01-05 RX ORDER — METOPROLOL SUCCINATE 50 MG/1
50 TABLET, EXTENDED RELEASE ORAL 2 TIMES DAILY
Status: DISCONTINUED | OUTPATIENT
Start: 2024-01-06 | End: 2024-01-12

## 2024-01-05 RX ORDER — AMIODARONE HYDROCHLORIDE 200 MG/1
200 TABLET ORAL DAILY
Status: DISCONTINUED | OUTPATIENT
Start: 2024-01-06 | End: 2024-01-16 | Stop reason: HOSPADM

## 2024-01-05 RX ORDER — SODIUM CHLORIDE 9 MG/ML
INJECTION, SOLUTION INTRAVENOUS CONTINUOUS
Status: DISCONTINUED | OUTPATIENT
Start: 2024-01-05 | End: 2024-01-06

## 2024-01-05 RX ORDER — SODIUM CHLORIDE 9 MG/ML
1000 INJECTION, SOLUTION INTRAVENOUS
Status: DISCONTINUED | OUTPATIENT
Start: 2024-01-05 | End: 2024-01-05

## 2024-01-05 RX ORDER — IBUPROFEN 200 MG
24 TABLET ORAL
Status: DISCONTINUED | OUTPATIENT
Start: 2024-01-05 | End: 2024-01-16 | Stop reason: HOSPADM

## 2024-01-05 RX ORDER — DOCUSATE SODIUM 100 MG/1
100 CAPSULE, LIQUID FILLED ORAL 2 TIMES DAILY
Status: DISCONTINUED | OUTPATIENT
Start: 2024-01-06 | End: 2024-01-16 | Stop reason: HOSPADM

## 2024-01-05 RX ORDER — ACETAMINOPHEN 325 MG/1
650 TABLET ORAL EVERY 4 HOURS PRN
Status: DISCONTINUED | OUTPATIENT
Start: 2024-01-05 | End: 2024-01-05

## 2024-01-05 RX ORDER — PROCHLORPERAZINE EDISYLATE 5 MG/ML
5 INJECTION INTRAMUSCULAR; INTRAVENOUS EVERY 6 HOURS PRN
Status: DISCONTINUED | OUTPATIENT
Start: 2024-01-05 | End: 2024-01-16 | Stop reason: HOSPADM

## 2024-01-05 RX ORDER — BUMETANIDE 0.25 MG/ML
2 INJECTION INTRAMUSCULAR; INTRAVENOUS ONCE
Status: COMPLETED | OUTPATIENT
Start: 2024-01-05 | End: 2024-01-05

## 2024-01-05 RX ORDER — ATORVASTATIN CALCIUM 10 MG/1
20 TABLET, FILM COATED ORAL DAILY
Status: DISCONTINUED | OUTPATIENT
Start: 2024-01-06 | End: 2024-01-16 | Stop reason: HOSPADM

## 2024-01-05 RX ORDER — SODIUM BICARBONATE 650 MG/1
1300 TABLET ORAL EVERY 8 HOURS
Status: DISCONTINUED | OUTPATIENT
Start: 2024-01-05 | End: 2024-01-16 | Stop reason: HOSPADM

## 2024-01-05 RX ORDER — IBUPROFEN 200 MG
16 TABLET ORAL
Status: DISCONTINUED | OUTPATIENT
Start: 2024-01-05 | End: 2024-01-16 | Stop reason: HOSPADM

## 2024-01-05 RX ORDER — AMOXICILLIN 250 MG
2 CAPSULE ORAL 2 TIMES DAILY PRN
Status: DISCONTINUED | OUTPATIENT
Start: 2024-01-05 | End: 2024-01-16 | Stop reason: HOSPADM

## 2024-01-05 RX ORDER — BUPROPION HYDROCHLORIDE 150 MG/1
150 TABLET ORAL DAILY
Status: DISCONTINUED | OUTPATIENT
Start: 2024-01-06 | End: 2024-01-16 | Stop reason: HOSPADM

## 2024-01-05 RX ORDER — GLUCAGON 1 MG
1 KIT INJECTION
Status: DISCONTINUED | OUTPATIENT
Start: 2024-01-05 | End: 2024-01-16 | Stop reason: HOSPADM

## 2024-01-05 RX ORDER — ONDANSETRON HYDROCHLORIDE 2 MG/ML
4 INJECTION, SOLUTION INTRAVENOUS EVERY 4 HOURS PRN
Status: DISCONTINUED | OUTPATIENT
Start: 2024-01-05 | End: 2024-01-16 | Stop reason: HOSPADM

## 2024-01-05 RX ORDER — GABAPENTIN 100 MG/1
100 CAPSULE ORAL 2 TIMES DAILY
Status: DISCONTINUED | OUTPATIENT
Start: 2024-01-05 | End: 2024-01-16 | Stop reason: HOSPADM

## 2024-01-05 RX ORDER — ALLOPURINOL 100 MG/1
100 TABLET ORAL DAILY
Status: DISCONTINUED | OUTPATIENT
Start: 2024-01-06 | End: 2024-01-16 | Stop reason: HOSPADM

## 2024-01-05 RX ORDER — HYDRALAZINE HYDROCHLORIDE 20 MG/ML
10 INJECTION INTRAMUSCULAR; INTRAVENOUS
Status: DISCONTINUED | OUTPATIENT
Start: 2024-01-05 | End: 2024-01-16 | Stop reason: HOSPADM

## 2024-01-05 RX ORDER — ACETAMINOPHEN 325 MG/1
650 TABLET ORAL EVERY 4 HOURS PRN
Status: DISCONTINUED | OUTPATIENT
Start: 2024-01-05 | End: 2024-01-16 | Stop reason: HOSPADM

## 2024-01-05 RX ORDER — POLYETHYLENE GLYCOL 3350 17 G/17G
17 POWDER, FOR SOLUTION ORAL 2 TIMES DAILY PRN
Status: DISCONTINUED | OUTPATIENT
Start: 2024-01-05 | End: 2024-01-16 | Stop reason: HOSPADM

## 2024-01-05 RX ORDER — INSULIN ASPART 100 [IU]/ML
0-5 INJECTION, SOLUTION INTRAVENOUS; SUBCUTANEOUS
Status: DISCONTINUED | OUTPATIENT
Start: 2024-01-05 | End: 2024-01-16 | Stop reason: HOSPADM

## 2024-01-05 RX ORDER — ALUMINUM HYDROXIDE, MAGNESIUM HYDROXIDE, AND SIMETHICONE 1200; 120; 1200 MG/30ML; MG/30ML; MG/30ML
30 SUSPENSION ORAL 4 TIMES DAILY PRN
Status: DISCONTINUED | OUTPATIENT
Start: 2024-01-05 | End: 2024-01-16 | Stop reason: HOSPADM

## 2024-01-05 RX ORDER — SODIUM CHLORIDE 9 MG/ML
INJECTION, SOLUTION INTRAVENOUS CONTINUOUS
Status: DISCONTINUED | OUTPATIENT
Start: 2024-01-05 | End: 2024-01-05

## 2024-01-05 RX ORDER — TALC
6 POWDER (GRAM) TOPICAL NIGHTLY PRN
Status: DISCONTINUED | OUTPATIENT
Start: 2024-01-05 | End: 2024-01-16 | Stop reason: HOSPADM

## 2024-01-05 RX ORDER — ASPIRIN 81 MG/1
81 TABLET ORAL DAILY
Status: DISCONTINUED | OUTPATIENT
Start: 2024-01-06 | End: 2024-01-16 | Stop reason: HOSPADM

## 2024-01-05 RX ORDER — SODIUM CHLORIDE 0.9 % (FLUSH) 0.9 %
10 SYRINGE (ML) INJECTION
Status: DISCONTINUED | OUTPATIENT
Start: 2024-01-05 | End: 2024-01-16 | Stop reason: HOSPADM

## 2024-01-05 RX ADMIN — SODIUM CHLORIDE 500 ML: 9 INJECTION, SOLUTION INTRAVENOUS at 06:01

## 2024-01-05 RX ADMIN — SODIUM CHLORIDE: 9 INJECTION, SOLUTION INTRAVENOUS at 11:01

## 2024-01-05 RX ADMIN — CEFEPIME 1 G: 1 INJECTION, POWDER, FOR SOLUTION INTRAMUSCULAR; INTRAVENOUS at 11:01

## 2024-01-05 RX ADMIN — SODIUM BICARBONATE 650 MG TABLET 1300 MG: at 11:01

## 2024-01-05 RX ADMIN — APIXABAN 5 MG: 5 TABLET, FILM COATED ORAL at 11:01

## 2024-01-05 RX ADMIN — BUMETANIDE 2 MG: 0.25 INJECTION INTRAMUSCULAR; INTRAVENOUS at 10:01

## 2024-01-05 RX ADMIN — POTASSIUM BICARBONATE 25 MEQ: 977.5 TABLET, EFFERVESCENT ORAL at 10:01

## 2024-01-05 RX ADMIN — SODIUM CHLORIDE: 9 INJECTION, SOLUTION INTRAVENOUS at 08:01

## 2024-01-05 RX ADMIN — GABAPENTIN 100 MG: 100 CAPSULE ORAL at 11:01

## 2024-01-05 NOTE — FIRST PROVIDER EVALUATION
Medical screening examination initiated.  I have conducted a focused provider triage encounter, findings are as follows:    Brief history of present illness:  Patient was sent to the ED from [facility] due to abnormal labs.     Vitals:    01/05/24 1445   BP: 116/70   Pulse: 97   Resp: 16   Temp: 97.9 °F (36.6 °C)   TempSrc: Temporal   SpO2: 99%   Weight: 90.7 kg (200 lb)       Pertinent physical exam:  Awake, alert     Brief workup plan:  Labs    Preliminary workup initiated; this workup will be continued and followed by the physician or advanced practice provider that is assigned to the patient when roomed.

## 2024-01-05 NOTE — Clinical Note
Diagnosis: Acute renal failure, unspecified acute renal failure type [[id number]]   Future Attending Provider: DEL MEDINA [[id number]]   Admitting Provider:: DEL MEDINA [[id number]]   Admit to which facility:: [facility] [[id number]]   Reason for IP Medical Treatment  (Clinical interventions that can only be accomplished in the IP setting? ) :: Nephrology consult   I certify that Inpatient services for greater than or equal to 2 midnights are medically necessary:: Yes   Plans for Post-Acute care--if anticipated (pick the single best option):: I. Nursing Home (long-term)   Special Needs:: Fall Risk [15]

## 2024-01-05 NOTE — ED PROVIDER NOTES
Encounter Date: 1/5/2024       History     Chief Complaint   Patient presents with    abnormal labs     Patient arrives via ems with abnormal labs from nursing home. Hx of chronic renal failure not on dialysis. Patient denies any complaints. AAOx4.      Patient is a 78-year-old female transferred from nursing home secondary to abnormal labs.  It was reported to me that patient had an elevated BNP.  Patient states she has had increased peripheral edema to the lower extremities x1 week.  Otherwise patient denies any complaints.  No shortness breath or chest pain.  No fever chills.  Patient states couple of years ago she was in renal failure and she had dialysis x1 year.  Patient has dialysis access to left upper extremity.      Review of patient's allergies indicates:   Allergen Reactions    Lactose Diarrhea    Codeine Other (See Comments) and Nausea And Vomiting     Past Medical History:   Diagnosis Date    Cancer     Chronic kidney disease, unspecified     Diabetes mellitus, type 2     History of arteriovenostomy for renal dialysis     Hyperlipidemia     Hypertension      Past Surgical History:   Procedure Laterality Date    HYSTERECTOMY      KIDNEY STONE SURGERY      TONSILLECTOMY       Family History   Problem Relation Age of Onset    No Known Problems Mother     No Known Problems Father     No Known Problems Sister     No Known Problems Brother      Social History     Tobacco Use    Smoking status: Never     Passive exposure: Never    Smokeless tobacco: Never   Substance Use Topics    Alcohol use: Never    Drug use: Never     Review of Systems   Constitutional: Negative.    HENT: Negative.     Respiratory: Negative.     Cardiovascular:  Positive for leg swelling. Negative for chest pain and palpitations.   Gastrointestinal: Negative.    Genitourinary: Negative.    Musculoskeletal: Negative.    Neurological: Negative.    Psychiatric/Behavioral: Negative.         Physical Exam     Initial Vitals [01/05/24 1445]    BP Pulse Resp Temp SpO2   116/70 97 16 97.9 °F (36.6 °C) 99 %      MAP       --         Physical Exam    Nursing note and vitals reviewed.  Constitutional: She appears well-developed and well-nourished.   HENT:   Head: Normocephalic.   Neck: Neck supple.   Normal range of motion.  Cardiovascular:  Normal rate, regular rhythm and normal heart sounds.            Patient has AV fistula to the left upper extremity that has no pulse or thrill.  Patient has 2+ bilateral lower extremity pitting edema   Pulmonary/Chest: Breath sounds normal.   Abdominal: Abdomen is soft. She exhibits no distension. There is no guarding.   Musculoskeletal:         General: Normal range of motion.      Cervical back: Normal range of motion and neck supple.     Neurological: She is alert and oriented to person, place, and time. She has normal strength.   Psychiatric: She has a normal mood and affect. Thought content normal.         ED Course   Procedures  Labs Reviewed   COMPREHENSIVE METABOLIC PANEL - Abnormal; Notable for the following components:       Result Value    Potassium Level 3.4 (*)     Carbon Dioxide 16 (*)     Glucose Level 147 (*)     Blood Urea Nitrogen 90.4 (*)     Creatinine 5.59 (*)     Albumin Level 3.0 (*)     Albumin/Globulin Ratio 0.9 (*)     All other components within normal limits   CBC WITH DIFFERENTIAL - Abnormal; Notable for the following components:    WBC 15.75 (*)     RBC 3.56 (*)     Hgb 11.2 (*)     Hct 33.8 (*)     MCV 94.9 (*)     MCH 31.5 (*)     Lymph # 0.24 (*)     Neut # 14.75 (*)     IG# 0.09 (*)     All other components within normal limits   MAGNESIUM - Normal   CBC W/ AUTO DIFFERENTIAL    Narrative:     The following orders were created for panel order CBC Auto Differential.  Procedure                               Abnormality         Status                     ---------                               -----------         ------                     CBC with Differential[[id number]]       Abnormal             Final result                 Please view results for these tests on the individual orders.   URINALYSIS, REFLEX TO URINE CULTURE          Imaging Results              X-Ray Chest AP Portable (Final result)  Result time 01/05/24 17:06:41      Final result by Grayson Carrillo MD (01/05/24 17:06:41)                   Impression:      Central pulmonary vascular congestion.      Electronically signed by: Grayson Carrillo  Date:    01/05/2024  Time:    17:06               Narrative:    EXAMINATION:  XR CHEST AP PORTABLE    CLINICAL HISTORY:  Peripheral edema;    TECHNIQUE:  Frontal view(s) of the chest.    COMPARISON:  Radiography 08/13/2020    FINDINGS:  Normal cardiac silhouette.  The lungs are well-inflated.  Central pulmonary vascular congestion.  No confluent consolidation appreciated.  Similar small area of scarring within the mid right lung.  No significant pleural effusion or discernible pneumothorax.                                       Medications   sodium chloride 0.9% bolus 500 mL 500 mL (500 mLs Intravenous New Bag 1/5/24 1813)   0.9%  NaCl infusion (has no administration in time range)   0.9%  NaCl infusion (has no administration in time range)   hydrALAZINE injection 10 mg (has no administration in time range)   ondansetron injection 4 mg (has no administration in time range)   acetaminophen tablet 650 mg (has no administration in time range)     Medical Decision Making  Differential diagnosis: Chronic renal insufficiency, acute on chronic renal failure, dehydration, peripheral edema    Amount and/or Complexity of Data Reviewed  Labs: ordered.     Details: Abnormal labs include creatinine of 5.59, earlier today was 4.76.  Creatinine on 12/18/2023 was 2.53.  White blood cell count is elevated at [address], H&H is slightly low at 11 and 33, bicarb is 16, BUN is 90.  Radiology: ordered and independent interpretation performed.  Discussion of management or test interpretation with external provider(s):  Patient with elevated creatinine.  Patient does have peripheral edema.  Lungs are clear this time.  O2 sat is 99% on room air.  Will give 500 cc bolus here in the ER and start on normal saline at 75 cc/hour.  Nephrology was consulted.  Hospitalist team to admit    Risk  Prescription drug management.               ED Course as of 01/05/24 1816 Fri Jan 05, 2024 1807 Dr. Smith was paged.  PAYAM Keith recommends 500 cc normal saline bolus and maintenance fluids of 75 cc/hour [KG]   1812 Bonita PAYAM, OK to admit [KG]   1812 Vital signs are stable, patient remains in no apparent distress. [KG]      ED Course User Index  [KG] Johann Duffy MD                           Clinical Impression:  Final diagnoses:  [N17.9] Acute renal failure, unspecified acute renal failure type (Primary)  [E86.0] Dehydration          ED Disposition Condition    Admit Stable                Johann Duffy MD  01/05/24 1816

## 2024-01-06 LAB
ALBUMIN SERPL-MCNC: 2.6 G/DL (ref 3.4–4.8)
ALBUMIN/GLOB SERPL: 0.9 RATIO (ref 1.1–2)
ALP SERPL-CCNC: 101 UNIT/L (ref 40–150)
ALT SERPL-CCNC: 16 UNIT/L (ref 0–55)
AST SERPL-CCNC: 17 UNIT/L (ref 5–34)
BILIRUB SERPL-MCNC: 0.5 MG/DL
BNP BLD-MCNC: 104.2 PG/ML
BUN SERPL-MCNC: 85.3 MG/DL (ref 9.8–20.1)
CALCIUM SERPL-MCNC: 8.7 MG/DL (ref 8.4–10.2)
CHLORIDE SERPL-SCNC: 111 MMOL/L (ref 98–107)
CO2 SERPL-SCNC: 18 MMOL/L (ref 23–31)
CREAT SERPL-MCNC: 4.71 MG/DL (ref 0.55–1.02)
CREAT UR-MCNC: 86.7 MG/DL (ref 45–106)
ERYTHROCYTE [DISTWIDTH] IN BLOOD BY AUTOMATED COUNT: 13.2 % (ref 11.5–17)
FERRITIN SERPL-MCNC: 1906.26 NG/ML (ref 4.63–204)
FOLATE SERPL-MCNC: 16.1 NG/ML (ref 7–31.4)
GFR SERPLBLD CREATININE-BSD FMLA CKD-EPI: 9 MLS/MIN/1.73/M2
GLOBULIN SER-MCNC: 2.8 GM/DL (ref 2.4–3.5)
GLUCOSE SERPL-MCNC: 79 MG/DL (ref 82–115)
HCT VFR BLD AUTO: 33.2 % (ref 37–47)
HGB BLD-MCNC: 10.5 G/DL (ref 12–16)
MCH RBC QN AUTO: 31.3 PG (ref 27–31)
MCHC RBC AUTO-ENTMCNC: 31.6 G/DL (ref 33–36)
MCV RBC AUTO: 99.1 FL (ref 80–94)
NRBC BLD AUTO-RTO: 0 %
PHOSPHATE SERPL-MCNC: 5.2 MG/DL (ref 2.3–4.7)
PLATELET # BLD AUTO: 220 X10(3)/MCL (ref 130–400)
PMV BLD AUTO: 10.2 FL (ref 7.4–10.4)
POCT GLUCOSE: 107 MG/DL (ref 70–110)
POCT GLUCOSE: 115 MG/DL (ref 70–110)
POCT GLUCOSE: 138 MG/DL (ref 70–110)
POCT GLUCOSE: 89 MG/DL (ref 70–110)
POTASSIUM SERPL-SCNC: 3.6 MMOL/L (ref 3.5–5.1)
PROT SERPL-MCNC: 5.4 GM/DL (ref 5.8–7.6)
PROT UR STRIP-MCNC: 11.8 MG/DL
RBC # BLD AUTO: 3.35 X10(6)/MCL (ref 4.2–5.4)
SODIUM SERPL-SCNC: 142 MMOL/L (ref 136–145)
SODIUM UR-SCNC: 42 MMOL/L
URINE PROTEIN/CREATININE RATIO (OLG): 0.1
UUN UR-MCNC: 444 MG/DL
WBC # SPEC AUTO: 9.01 X10(3)/MCL (ref 4.5–11.5)

## 2024-01-06 PROCEDURE — 25000003 PHARM REV CODE 250: Performed by: INTERNAL MEDICINE

## 2024-01-06 PROCEDURE — 63600175 PHARM REV CODE 636 W HCPCS: Performed by: INTERNAL MEDICINE

## 2024-01-06 PROCEDURE — 11000001 HC ACUTE MED/SURG PRIVATE ROOM

## 2024-01-06 PROCEDURE — 84100 ASSAY OF PHOSPHORUS: CPT | Performed by: INTERNAL MEDICINE

## 2024-01-06 PROCEDURE — 85027 COMPLETE CBC AUTOMATED: CPT | Performed by: INTERNAL MEDICINE

## 2024-01-06 PROCEDURE — 21400001 HC TELEMETRY ROOM

## 2024-01-06 PROCEDURE — 25000003 PHARM REV CODE 250: Performed by: NURSE PRACTITIONER

## 2024-01-06 PROCEDURE — 80053 COMPREHEN METABOLIC PANEL: CPT | Performed by: INTERNAL MEDICINE

## 2024-01-06 RX ADMIN — SODIUM BICARBONATE 650 MG TABLET 1300 MG: at 05:01

## 2024-01-06 RX ADMIN — ATORVASTATIN CALCIUM 20 MG: 10 TABLET, FILM COATED ORAL at 09:01

## 2024-01-06 RX ADMIN — BUPROPION 150 MG: 150 TABLET, EXTENDED RELEASE ORAL at 09:01

## 2024-01-06 RX ADMIN — AMIODARONE HYDROCHLORIDE 200 MG: 200 TABLET ORAL at 09:01

## 2024-01-06 RX ADMIN — SODIUM BICARBONATE: 84 INJECTION, SOLUTION INTRAVENOUS at 03:01

## 2024-01-06 RX ADMIN — DOCUSATE SODIUM 100 MG: 100 CAPSULE, LIQUID FILLED ORAL at 09:01

## 2024-01-06 RX ADMIN — CEFEPIME 1 G: 1 INJECTION, POWDER, FOR SOLUTION INTRAMUSCULAR; INTRAVENOUS at 10:01

## 2024-01-06 RX ADMIN — FOLIC ACID 1000 MCG: 1 TABLET ORAL at 09:01

## 2024-01-06 RX ADMIN — GABAPENTIN 100 MG: 100 CAPSULE ORAL at 09:01

## 2024-01-06 RX ADMIN — SODIUM BICARBONATE 650 MG TABLET 1300 MG: at 09:01

## 2024-01-06 RX ADMIN — SODIUM CHLORIDE: 9 INJECTION, SOLUTION INTRAVENOUS at 06:01

## 2024-01-06 RX ADMIN — APIXABAN 5 MG: 5 TABLET, FILM COATED ORAL at 09:01

## 2024-01-06 RX ADMIN — ASPIRIN 81 MG: 81 TABLET, COATED ORAL at 09:01

## 2024-01-06 RX ADMIN — ALLOPURINOL 100 MG: 100 TABLET ORAL at 09:01

## 2024-01-06 RX ADMIN — METOPROLOL SUCCINATE 50 MG: 50 TABLET, EXTENDED RELEASE ORAL at 09:01

## 2024-01-06 RX ADMIN — POTASSIUM BICARBONATE 25 MEQ: 977.5 TABLET, EFFERVESCENT ORAL at 09:01

## 2024-01-06 RX ADMIN — SODIUM BICARBONATE 650 MG TABLET 1300 MG: at 03:01

## 2024-01-06 NOTE — PT/OT/SLP PROGRESS
Physical Therapy      Patient Name:  Debra Puentes   MRN:  [id number]    Patient not seen today secondary to  pt refusal due to pain. Attempted mobilization, pt getting to log rolling position in L SL; however, pt then stating she is in too much pain and unwilling to try further. Will follow-up as appropriate.

## 2024-01-06 NOTE — NURSING
Nurses Note -- 4 Eyes      1/6/2024   12:41 AM      Skin assessed during: Admit      [] No Altered Skin Integrity Present    []Prevention Measures Documented      [x] Yes- Altered Skin Integrity Present or Discovered   [] LDA Added if Not in Epic (Describe Wound)   [x] New Altered Skin Integrity was Present on Admit and Documented in LDA-R. Heel blanchable red   [] Wound Image Taken    Wound Care Consulted? No    Attending Nurse:  Kimmie James LPN    Second RN/Staff Member:   Kris Huff RN

## 2024-01-06 NOTE — CONSULTS
NEPHROLOGY CONSULT     Consultant Attending:     Dr. Logan Smith    Reason for Consult:     LORI/ CKD IIIb-IV    Subjective:      History of Present Illness:  Debra Puentes is a 78 y.o. well known to our service with a history of chronic kidney disease stage IIIB to 4 or so.  She is normally followed in the office by Dr. Logan Smith.  Her baseline creatinine is around 2.6-2.8 or so.  She apparently had lab work drawn in the nursing home and was sent to the emergency department yesterday for abnormal lab.  She was found to have some degree of acute kidney injury.  BUN and creatinine on admission were 90.7 and 5.18 respectively.  BUN and creatinine today 01/06/2024 are 85.3 and 4.71 respectively.  She was on hemodialysis previously and actually has an access in her left upper extremity.  Clinically she denies any shortness of breath or chest pain.  Main complaint is back issues that limit her mobility.  She does not desire to restart hemodialysis if at all possible.  She was started on IV fluid at admission.  From our standpoint we will continue IV fluid but change to a bicarb drip if facility allows.  Urinalysis does represent infectious process with Gram-negative rods noted.  Cefepime has been started empirically.  We will await final culture.  Urine studies are not impressive for proteinuria and renal ultrasound does not show any obstructive process.  Currently vital signs are stable.  No family is at the bedside on exam.  I will get Physical therapy to evaluate and hopefully assist her to get more mobile while she is here.  Other pertinent medical history includes renal stones, cancer of unknown type, type 2 diabetes, hypertension, hyperlipidemia, osteoporosis and anemia secondary to chronic kidney disease.          Past Medical History:  Past Medical History:   Diagnosis Date    Cancer     Chronic kidney disease, unspecified     Diabetes mellitus, type 2     History of arteriovenostomy for renal  dialysis     Hyperlipidemia     Hypertension        Past Surgical History:  Past Surgical History:   Procedure Laterality Date    HYSTERECTOMY      KIDNEY STONE SURGERY      TONSILLECTOMY         Allergies:  Review of patient's allergies indicates:   Allergen Reactions    Lactose Diarrhea    Codeine Other (See Comments) and Nausea And Vomiting       Medications:   In-Hospital Scheduled Medications:   allopurinoL  100 mg Oral Daily    amiodarone  200 mg Oral Daily    apixaban  5 mg Oral BID    aspirin  81 mg Oral Daily    atorvastatin  20 mg Oral Daily    buPROPion  150 mg Oral Daily    ceFEPime (MAXIPIME) IVPB  1 g Intravenous Q24H    docusate sodium  100 mg Oral BID    folic acid  1,000 mcg Oral Daily    gabapentin  100 mg Oral BID    metoprolol succinate  50 mg Oral BID    sodium bicarbonate  1,300 mg Oral Q8H      In-Hospital PRN Medications:  acetaminophen, aluminum-magnesium hydroxide-simethicone, dextrose 10%, dextrose 10%, glucagon (human recombinant), glucose, glucose, hydrALAZINE, insulin aspart U-100, melatonin, ondansetron, polyethylene glycol, prochlorperazine, senna-docusate 8.6-50 mg, sodium chloride 0.9%   In-Hospital IV Infusion Medications:   sodium chloride 0.9% 100 mL/hr at 01/06/24 0656      Home Medications:  Prior to Admission medications    Medication Sig Start Date End Date Taking? Authorizing Provider   amiodarone (PACERONE) 200 MG Tab Take 200 mg by mouth once daily. 8/20/22  Yes Provider, Historical   amLODIPine (NORVASC) 5 MG tablet Take 10 mg by mouth 2 (two) times daily. 8/20/22  Yes Provider, Historical   ascorbic acid, vitamin C, (VITAMIN C) 1000 MG tablet Take 1,000 mg by mouth.   Yes Provider, Historical   aspirin (ECOTRIN) 81 MG EC tablet Take 81 mg by mouth once daily. 8/20/22  Yes Provider, Historical   buPROPion (WELLBUTRIN XL) 150 MG TB24 tablet Take 150 mg by mouth daily as needed. 8/20/22  Yes Provider, Historical   cholecalciferol, vitamin D3, (VITAMIN D3) 50 mcg (2,000  unit) Cap capsule Take 2,000 Units by mouth once daily. 22  Yes Provider, Historical   DAILY-GEETA, WITH FOLIC ACID, 400 mcg Tab Take by mouth. 22  Yes Provider, Historical   folic acid (FOLVITE) 1 MG tablet Take 1,000 mcg by mouth once daily. 22  Yes Provider, Historical   rOPINIRole (REQUIP) 0.5 MG tablet Take 0.5 mg by mouth Daily.   Yes Provider, Historical   rosuvastatin (CRESTOR) 10 MG tablet Take 10 mg by mouth every evening. 22  Yes Provider, Historical   sodium bicarbonate 650 MG tablet Take 650 mg by mouth every other day. 22  Yes Provider, Historical   vitamin E 400 UNIT capsule Take 400 Units by mouth every Mon, Wed, Fri. 10/10/23  Yes Provider, Historical   acetaminophen (TYLENOL) 325 MG tablet Take 2 tablets (650 mg total) by mouth every 6 (six) hours as needed for Pain. 23   Chas Francisco MD   apixaban (ELIQUIS) 5 mg Tab Take 1 tablet (5 mg total) by mouth 2 (two) times daily. 23   Peter Avila MD   docusate sodium (COLACE) 100 MG capsule Take 1 capsule by mouth 2 (two) times daily.    Provider, Historical   gabapentin (NEURONTIN) 100 MG capsule Take 1 capsule (100 mg total) by mouth 2 (two) times daily. 23  Peter Avila MD   metoprolol succinate (TOPROL-XL) 50 MG 24 hr tablet Take 50 mg by mouth 2 (two) times daily. 23   Provider, Historical       Family History:  Family History   Problem Relation Age of Onset    No Known Problems Mother     No Known Problems Father     No Known Problems Sister     No Known Problems Brother        Social History:  Social History     Tobacco Use    Smoking status: Never     Passive exposure: Never    Smokeless tobacco: Never   Substance Use Topics    Alcohol use: Never    Drug use: Never       Review of Systems:  Complains of poor mobility and back pain at times         Objective:   Last 24 Hour Vital Signs:  BP  Min: 116/70  Max: 149/71  Temp  Av.6 °F (36.4 °C)  Min: 97.2 °F (36.2 °C)  Max: 98  °F (36.7 °C)  Pulse  Av.6  Min: 84  Max: 107  Resp  Av.3  Min: 16  Max: 20  SpO2  Av.3 %  Min: 92 %  Max: 99 %  Weight  Av.7 kg (200 lb)  Min: 90.7 kg (200 lb)  Max: 90.7 kg (200 lb)  I/O last 3 completed shifts:  In: 705.8 [P.O.:480; I.V.:209.7; IV Piggyback:16.1]  Out: 500 [Urine:500]    Physical Examination:    /77   Pulse 88   Temp 97.6 °F (36.4 °C) (Oral)   Resp 20   Wt 90.7 kg (200 lb)   SpO2 97%   BMI 30.41 kg/m²     General Appearance:    Alert, cooperative, no distress, appears stated age   Head:    Normocephalic, without obvious abnormality, atraumatic   Eyes:    PERRL, conjunctiva/corneas clear, EOM's intact, fundi     benign, both eyes   Ears:    Normal TM's and external ear canals, both ears   Nose:   Nares normal, septum midline, mucosa normal, no drainage    or sinus tenderness   Throat:   Lips, mucosa, and tongue normal; teeth and gums normal   Neck:   Supple, symmetrical, trachea midline, no adenopathy;     thyroid:  no enlargement/tenderness/nodules; no carotid    bruit or JVD   Back:     Symmetric, no curvature, ROM normal, no CVA tenderness   Lungs:     Clear to auscultation bilaterally, respirations unlabored   Chest Wall:    No tenderness or deformity    Heart:    Regular rate and rhythm, S1 and S2 normal, no murmur, rub   or gallop       Abdomen:     Soft, non-tender, bowel sounds active all four quadrants,     no masses, no organomegaly           Extremities:   Extremities normal, atraumatic, no cyanosis or edema   Pulses:   2+ and symmetric all extremities   Skin:   Skin color, texture, turgor normal, no rashes or lesions       Neurologic:   CNII-XII intact, normal strength, sensation and reflexes     throughout         Laboratory Results:    Trended Lab Data:  Recent Labs   Lab 24  0500 24  1648 24  2242 24  0507   WBC 9.26 15.75*  --  9.01   HGB 11.6* 11.2*  --  10.5*   HCT 35.9* 33.8*  --  33.2*    285  --  220   MCV 99.2*  94.9*  --  99.1*   RDW 13.1 13.2  --  13.2    141 141 142   K 4.1 3.4* 3.8 3.6   CO2 17* 16* 17* 18*   BUN 88.4* 90.4* 90.7* 85.3*   ALBUMIN  --  3.0*  --  2.6*   BILITOT  --  0.5  --  0.5   AST  --  22  --  17   ALKPHOS  --  126  --  101   ALT  --  20  --  16           Microbiology Data:      Other Laboratory Data:      Other Results:      Radiology:  US Retroperitoneal Complete    Result Date: 1/5/2024  EXAMINATION: US RETROPERITONEAL COMPLETE CLINICAL HISTORY: Elevated BUN/creatinine; COMPARISON: None. FINDINGS: Grayscale and color Doppler sonographic evaluation of the kidneys and urinary bladder. The right kidney measures 8 cm. The left kidney measures 7 cm.   No hydronephrosis.  There is heterogeneous renal parenchymal echogenicity. Urinary bladder unremarkable.     1. No hydronephrosis. 2. Suspect medical renal disease. Electronically signed by: Bienvenido Loco Date:    01/05/2024 Time:    20:02    X-Ray Chest AP Portable    Result Date: 1/5/2024  EXAMINATION: XR CHEST AP PORTABLE CLINICAL HISTORY: Peripheral edema; TECHNIQUE: Frontal view(s) of the chest. COMPARISON: Radiography 08/13/2020 FINDINGS: Normal cardiac silhouette.  The lungs are well-inflated.  Central pulmonary vascular congestion.  No confluent consolidation appreciated.  Similar small area of scarring within the mid right lung.  No significant pleural effusion or discernible pneumothorax.     Central pulmonary vascular congestion. Electronically signed by: Grayson Carrillo Date:    01/05/2024 Time:    17:06           Assessment/ Plan               A/P--NE 01/06    1---LORI/ Intravascular Depletion/ CKD IIIb-IV---Cont IVF--Indices better  Baseline Creat 2.6-2.8 or so  Was on HD previously---She does not want to restart  2---Metabolic Acidosis---Start Bicarb gtt if possible  3---Gm (-) Bobo UTI--Cont Cefepime/ Await Final Cx  4---A Fib--Cont Toprol XL/ Eliquis  5---Anemia secondary to Chronic Disease--Fe Studies reviewed---Follow  H&H  6---Deconditioning---PT Eval    UA--POSITIVE  URINE STUDIES--NEGATIVE  RENAL US--NEGATIVE    IV--NS at 100cc/hr    ORDERS:  Change IVF to Bicarb gtt  PT Eval  CMP/Mg in am    Known to Luis                  Agree with above.  Patient examined. Orders reviewed.  No complaints.  CTAB  No edema    Initiate LORI evaluation.  Renal status elevated from previous.  Increase bicarb supplementation and continue IV hydration.  Continue abx for UTI.  Monitor for renal improvement.  Continue supportive care.  Will follow.  Thanks for this consult!

## 2024-01-06 NOTE — H&P
[facility] Hospital Medicine - H&P Note    Patient Name: Debra Puentes  MRN: [id number]  PCP: Logan Smith MD  Admitting Physician: Jack Razo MD  Admission Class: IP- Inpatient   Date of Service: 01/05/2024  Code status: Full    Chief Complaint   Sent from nursing home for abnormal labs, worsening renal function    History of Present Illness   This is a 78-year-old female with medical history of CKD stage 4, previously was on temporary dialysis in 2020 through left upper extremity AV fistula subsequently stabilized at CKD stage 4 with baseline creatinine around 3.0, anemia of chronic disease, T2DM, HTN, HLD, atrial fibrillation on Eliquis, DDD and vertebral compression fracture/T12 recently on TLSO brace.    Present to the ED due to abnormal labs done at the nursing home showing worsening renal function.  Patient report noting decreased urination over the past week and bilateral lower extremity swelling.  Report she still having back pain and bilateral lower extremity weakness and mostly using wheelchair since her vertebral fracture.  Denies shortness of breath, orthopnea or PND.    On arrival to ED he was afebrile and hemodynamically stable.  Labs notable for WBC 15.75, hemoglobin 11.2, platelets 285, sodium 141, potassium 3.4, chloride 107, CO2 16, creatinine 5.59, BUN 90.4.  Retroperitoneal ultrasound showed no hydronephrosis, bladder unremarkable and not distended.  Chest x-ray show pulmonary vascular congestion.  Urinalysis suggestive of UTI.    She was given 500 mL normal saline bolus and started at 75 mL/hour and referred to hospital medicine service for further evaluation and management.    Upon my evaluation patient did not make any significant urine output.    ROS   Except as documented, all other systems reviewed and negative     Past Medical History   CKD stage 4, has AV fistula  Anemia of chronic disease  T2DM  HTN  HLD  AFIB on OAC  OA/DDD/T12 compression  fracture    Past Surgical History     Past Surgical History:   Procedure Laterality Date    HYSTERECTOMY      KIDNEY STONE SURGERY      TONSILLECTOMY         Social History     Social History     Tobacco Use    Smoking status: Never     Passive exposure: Never    Smokeless tobacco: Never   Substance Use Topics    Alcohol use: Never        Family History   Reviewed and negative    Allergies   Lactose and Codeine    Home Medications     Prior to Admission medications    Medication Sig Start Date End Date Taking? Authorizing Provider   amiodarone (PACERONE) 200 MG Tab Take 200 mg by mouth once daily. 8/20/22  Yes Provider, Historical   amLODIPine (NORVASC) 5 MG tablet Take 10 mg by mouth 2 (two) times daily. 8/20/22  Yes Provider, Historical   ascorbic acid, vitamin C, (VITAMIN C) 1000 MG tablet Take 1,000 mg by mouth.   Yes Provider, Historical   aspirin (ECOTRIN) 81 MG EC tablet Take 81 mg by mouth once daily. 8/20/22  Yes Provider, Historical   buPROPion (WELLBUTRIN XL) 150 MG TB24 tablet Take 150 mg by mouth daily as needed. 8/20/22  Yes Provider, Historical   cholecalciferol, vitamin D3, (VITAMIN D3) 50 mcg (2,000 unit) Cap capsule Take 2,000 Units by mouth once daily. 8/20/22  Yes Provider, Historical   DAILY-GEETA, WITH FOLIC ACID, 400 mcg Tab Take by mouth. 8/20/22  Yes Provider, Historical   folic acid (FOLVITE) 1 MG tablet Take 1,000 mcg by mouth once daily. 8/20/22  Yes Provider, Historical   rOPINIRole (REQUIP) 0.5 MG tablet Take 0.5 mg by mouth Daily.   Yes Provider, Historical   rosuvastatin (CRESTOR) 10 MG tablet Take 10 mg by mouth every evening. 8/20/22  Yes Provider, Historical   sodium bicarbonate 650 MG tablet Take 650 mg by mouth every other day. 8/20/22  Yes Provider, Historical   vitamin E 400 UNIT capsule Take 400 Units by mouth every Mon, Wed, Fri. 10/10/23  Yes Provider, Historical   acetaminophen (TYLENOL) 325 MG tablet Take 2 tablets (650 mg total) by mouth every 6 (six) hours as needed for  Pain. 11/1/23   Chas Francisco MD   apixaban (ELIQUIS) 5 mg Tab Take 1 tablet (5 mg total) by mouth 2 (two) times daily. 11/9/23   Peter Avila MD   docusate sodium (COLACE) 100 MG capsule Take 1 capsule by mouth 2 (two) times daily.    Provider, Historical   gabapentin (NEURONTIN) 100 MG capsule Take 1 capsule (100 mg total) by mouth 2 (two) times daily. 11/9/23 11/8/24  Peter Avila MD   metoprolol succinate (TOPROL-XL) 50 MG 24 hr tablet Take 50 mg by mouth 2 (two) times daily. 6/12/23   Provider, Historical        Physical Exam   Vital Signs  Temp:  [97.9 °F (36.6 °C)]   Pulse:  []   Resp:  [16-19]   BP: (116-127)/(69-81)   SpO2:  [95 %-99 %]    General: Appears comfortable  HEENT: NC/AT  Neck:  No JVD  Chest: CTABL  CVS: Regular rhythm. Normal S1/S2.  2+ pedal edema  Abdomen: nondistended, normoactive BS, soft and non-tender.  MSK: No obvious deformity or joint swelling  Skin: Warm and dry, LUE/arm AV fistula NO thrill or bruit appreciated.  Neuro: AAOx3, no focal neurological deficit  Psych: Cooperative    Labs     Recent Labs     01/05/24  0500 01/05/24  1648   WBC 9.26 15.75*   RBC 3.62* 3.56*   HGB 11.6* 11.2*   HCT 35.9* 33.8*   MCV 99.2* 94.9*   MCH 32.0* 31.5*   MCHC 32.3* 33.1   RDW 13.1 13.2    285     Recent Labs     01/05/24  2242   FERRITIN 1,906.26*   IRON 30*   TRANS 107*   TIBC 113*   LABIRON 27   [id number] 1,297*   FOLATE 16.1      Recent Labs     01/05/24  0500 01/05/24  1648    141   K 4.1 3.4*   CHLORIDE 107 107   CO2 17* 16*   BUN 88.4* 90.4*   CREATININE 4.76* 5.59*   EGFRNORACEVR 9 7   GLUCOSE 123* 147*   CALCIUM 9.7 9.4   MG  --  2.10   ALBUMIN  --  3.0*   GLOBULIN  --  3.4   ALKPHOS  --  126   ALT  --  20   AST  --  22   BILITOT  --  0.5     Recent Labs     01/05/24  2242   CPK 67          Imaging     US Retroperitoneal Complete   Final Result      1. No hydronephrosis.   2. Suspect medical renal disease.         Electronically signed by: Bienvenido Loco    Date:    01/05/2024   Time:    20:02      X-Ray Chest AP Portable   Final Result      Central pulmonary vascular congestion.         Electronically signed by: Grayson Carrillo   Date:    01/05/2024   Time:    17:06          Assessment   LORI superimposed on CKD stage 4  Renal related volume overload  Acute UTI versus asymptomatic bacteriuria  Anemia of CKD  Hyperuricemia  Decreased mobility/due to vertebral fracture  Paroxysmal AFib-currently NSR    History of hypertension, hyperlipidemia, T2DM    Plan   Increase normal saline to 100 mL/hour  Bumex 2 mg IV x1  Check BNP, PTH, vitamin-D  Accurate urine output assessment  Potassium bicarbonate 25 mEq p.o. x1  Start Sodium bicarb 1300 mg Q8H  Urine culture, cefepime 1 g IV daily  Start allopurinol 100 mg daily  Home medication reviewed and resumed  VTE Prophylaxis:  Continue Merlene Razo MD  Internal Medicine

## 2024-01-06 NOTE — PROGRESS NOTES
[facility]  Hospital Medicine Progress Note      Chief Complaint:  abnormal labs done at the nursing home showing worsening renal function.    HPI: (personally reviewed by me and is documented from initial H&P)   78-year-old female with medical history of CKD stage 4, previously was on temporary dialysis in 2020 through left upper extremity AV fistula subsequently stabilized at CKD stage 4 with baseline creatinine around 3.0, anemia of chronic disease, T2DM, HTN, HLD, atrial fibrillation on Eliquis, DDD and vertebral compression fracture/T12 recently on TLSO brace.     Present to the ED due to abnormal labs done at the nursing home showing worsening renal function.  Patient report noting decreased urination over the past week and bilateral lower extremity swelling.  Report she still having back pain and bilateral lower extremity weakness and mostly using wheelchair since her vertebral fracture.  Denies shortness of breath, orthopnea or PND.     On arrival to ED he was afebrile and hemodynamically stable.  Labs notable for WBC 15.75, hemoglobin 11.2, platelets 285, sodium 141, potassium 3.4, chloride 107, CO2 16, creatinine 5.59, BUN 90.4.  Retroperitoneal ultrasound showed no hydronephrosis, bladder unremarkable and not distended.  Chest x-ray show pulmonary vascular congestion.  Urinalysis suggestive of UTI.     She was given 500 mL normal saline bolus and started at 75 mL/hour and referred to hospital medicine service for further evaluation and management.    Interval Hx:   No overnight events.  No new complaints.    __________________________________________________________________________________________________________________________________    Objective/physical exam:  Vital signs have been personally reviewed by me   Resting comfortably in chair outside of the bed  General: Appears comfortable, no acute distress.    Integumentary: Warm, dry, intact.  Musculoskeletal: Purposeful  movement noted.     Respiratory: No accessory muscle use. Breath sounds are equal.  Cardiovascular: Regular rate.       VITAL SIGNS: 24 HRS MIN & MAX LAST   Temp  Min: 97.2 °F (36.2 °C)  Max: 98 °F (36.7 °C) 97.6 °F (36.4 °C)   BP  Min: 116/70  Max: 149/71 128/77   Pulse  Min: 84  Max: 107  88   Resp  Min: 16  Max: 20 20   SpO2  Min: 92 %  Max: 99 % 97 %     US Retroperitoneal Complete  Narrative: EXAMINATION:  US RETROPERITONEAL COMPLETE    CLINICAL HISTORY:  Elevated BUN/creatinine;    COMPARISON:  None.    FINDINGS:  Grayscale and color Doppler sonographic evaluation of the kidneys and urinary bladder.    The right kidney measures 8 cm. The left kidney measures 7 cm.   No hydronephrosis.  There is heterogeneous renal parenchymal echogenicity.    Urinary bladder unremarkable.  Impression: 1. No hydronephrosis.  2. Suspect medical renal disease.    Electronically signed by: Bienvenido Loco  Date:    01/05/2024  Time:    20:02  X-Ray Chest AP Portable  Narrative: EXAMINATION:  XR CHEST AP PORTABLE    CLINICAL HISTORY:  Peripheral edema;    TECHNIQUE:  Frontal view(s) of the chest.    COMPARISON:  Radiography 08/13/2020    FINDINGS:  Normal cardiac silhouette.  The lungs are well-inflated.  Central pulmonary vascular congestion.  No confluent consolidation appreciated.  Similar small area of scarring within the mid right lung.  No significant pleural effusion or discernible pneumothorax.  Impression: Central pulmonary vascular congestion.    Electronically signed by: Grayson Carrillo  Date:    01/05/2024  Time:    17:06    Recent Labs   Lab 01/05/24  0500 01/05/24 1648 01/06/24  0507   WBC 9.26 15.75* 9.01   RBC 3.62* 3.56* 3.35*   HGB 11.6* 11.2* 10.5*   HCT 35.9* 33.8* 33.2*   MCV 99.2* 94.9* 99.1*   MCH 32.0* 31.5* 31.3*   MCHC 32.3* 33.1 31.6*   RDW 13.1 13.2 13.2    285 220   MPV 10.8* 10.4 10.2       Recent Labs   Lab 01/05/24  1648 01/05/24  2242 01/06/24  0507    141 142   K 3.4* 3.8 3.6   CO2 16* 17*  18*   BUN 90.4* 90.7* 85.3*   CREATININE 5.59* 5.18* 4.71*   CALCIUM 9.4 8.7 8.7   MG 2.10  --   --    ALBUMIN 3.0*  --  2.6*   ALKPHOS 126  --  101   ALT 20  --  16   AST 22  --  17   BILITOT 0.5  --  0.5          Microbiology Results (last 7 days)       ** No results found for the last 168 hours. **             See below for Radiology    Scheduled Med:   allopurinoL  100 mg Oral Daily    amiodarone  200 mg Oral Daily    apixaban  5 mg Oral BID    aspirin  81 mg Oral Daily    atorvastatin  20 mg Oral Daily    buPROPion  150 mg Oral Daily    ceFEPime (MAXIPIME) IVPB  1 g Intravenous Q24H    docusate sodium  100 mg Oral BID    folic acid  1,000 mcg Oral Daily    gabapentin  100 mg Oral BID    metoprolol succinate  50 mg Oral BID    potassium bicarbonate  25 mEq Oral Once    sodium bicarbonate  1,300 mg Oral Q8H        Continuous Infusions:   sodium chloride 0.9% 100 mL/hr at 01/06/24 0656        PRN Meds:  acetaminophen, aluminum-magnesium hydroxide-simethicone, dextrose 10%, dextrose 10%, glucagon (human recombinant), glucose, glucose, hydrALAZINE, insulin aspart U-100, melatonin, ondansetron, polyethylene glycol, prochlorperazine, senna-docusate 8.6-50 mg, sodium chloride 0.9%       __________________________________________________________________________________________________________________________________    Assessment/Plan:  LORI superimposed on CKD stage 4  Renal related volume overload  Acute UTI versus asymptomatic bacteriuria  Anemia of CKD  Hyperuricemia  Decreased mobility/due to vertebral fracture  Paroxysmal AFib-currently NSR  Hypertension  Hyperlipidemia  T2DM    Above present on admission     _______________________________________________________________________________________________________________________________  presents for nursing home for decrease in her output noted to be in acute renal failure.  Known history of CKD stage 4.    Nephrologist consulted will likely need dialysis.     Continue to monitor renal indices, blood chemistry and volume status.    Continue antibiotic therapy for suspected UTI, follow-up on urine culture/sensitivity report.    Continue supportive care  Continue checking vital signs q4hrs.   Nurse notified to page me if any changes occur     DVT prophylaxis initiated   Nutrition Status:     Consults:  Nephrologists    I have personally reviewed the specialist documentation and/or have spoken to the specialist with regard to the care of this patient; recommendations are noted above.     I have spent >30 minutes on the day of the visit; time spent includes face to face time and non-face to face time preparing to see the patient (eg, review of tests), independently reviewing and interpreting medical records, both past and current; documenting clinical information in the electronic or other health record, and communicating results to the patient/family/caregiver and care coordinator and nursing team.      Anticipated discharge and Disposition when medically stable:  Pending.     All diagnosis and differential diagnosis have been reviewed,  interpreted and communicated appropriately to care team. assessment and plan has been documented; I have personally reviewed the labs and test results that are presently available and pertinent to this hospital course; I have reviewed medical records based upon their availability.    All of the patient's questions have been  addressed and answered. Patient's is agreeable to the above stated plan.   I will continue to monitor closely and make adjustments to medical management as needed.    Lalita Gunn, DO   01/06/2024        This note was created with the assistance of Dragon voice recognition software. There may be transcription errors as a result of using this technology however minimal. Effort has been made to assure accuracy of transcription but any obvious errors or omissions should be clarified with the author of the document.

## 2024-01-07 LAB
ALBUMIN SERPL-MCNC: 2.3 G/DL (ref 3.4–4.8)
ALBUMIN/GLOB SERPL: 0.9 RATIO (ref 1.1–2)
ALP SERPL-CCNC: 100 UNIT/L (ref 40–150)
ALT SERPL-CCNC: 15 UNIT/L (ref 0–55)
AST SERPL-CCNC: 18 UNIT/L (ref 5–34)
BILIRUB SERPL-MCNC: 0.5 MG/DL
BUN SERPL-MCNC: 70.3 MG/DL (ref 9.8–20.1)
CALCIUM SERPL-MCNC: 8.2 MG/DL (ref 8.4–10.2)
CHLORIDE SERPL-SCNC: 108 MMOL/L (ref 98–107)
CO2 SERPL-SCNC: 23 MMOL/L (ref 23–31)
CREAT SERPL-MCNC: 3.65 MG/DL (ref 0.55–1.02)
GFR SERPLBLD CREATININE-BSD FMLA CKD-EPI: 12 MLS/MIN/1.73/M2
GLOBULIN SER-MCNC: 2.5 GM/DL (ref 2.4–3.5)
GLUCOSE SERPL-MCNC: 79 MG/DL (ref 82–115)
MAGNESIUM SERPL-MCNC: 1.7 MG/DL (ref 1.6–2.6)
POCT GLUCOSE: 106 MG/DL (ref 70–110)
POCT GLUCOSE: 178 MG/DL (ref 70–110)
POCT GLUCOSE: 216 MG/DL (ref 70–110)
POCT GLUCOSE: 83 MG/DL (ref 70–110)
POTASSIUM SERPL-SCNC: 3.9 MMOL/L (ref 3.5–5.1)
PROT SERPL-MCNC: 4.8 GM/DL (ref 5.8–7.6)
SODIUM SERPL-SCNC: 143 MMOL/L (ref 136–145)

## 2024-01-07 PROCEDURE — P9045 ALBUMIN (HUMAN), 5%, 250 ML: HCPCS | Mod: JZ,JG | Performed by: NURSE PRACTITIONER

## 2024-01-07 PROCEDURE — 63600175 PHARM REV CODE 636 W HCPCS: Performed by: INTERNAL MEDICINE

## 2024-01-07 PROCEDURE — 83735 ASSAY OF MAGNESIUM: CPT | Performed by: NURSE PRACTITIONER

## 2024-01-07 PROCEDURE — 80053 COMPREHEN METABOLIC PANEL: CPT | Performed by: NURSE PRACTITIONER

## 2024-01-07 PROCEDURE — 11000001 HC ACUTE MED/SURG PRIVATE ROOM

## 2024-01-07 PROCEDURE — 97162 PT EVAL MOD COMPLEX 30 MIN: CPT

## 2024-01-07 PROCEDURE — 21400001 HC TELEMETRY ROOM

## 2024-01-07 PROCEDURE — 25000003 PHARM REV CODE 250: Performed by: INTERNAL MEDICINE

## 2024-01-07 PROCEDURE — 63600175 PHARM REV CODE 636 W HCPCS: Mod: JZ,JG | Performed by: NURSE PRACTITIONER

## 2024-01-07 PROCEDURE — 25000003 PHARM REV CODE 250: Performed by: NURSE PRACTITIONER

## 2024-01-07 RX ORDER — ALBUMIN HUMAN 50 G/1000ML
12.5 SOLUTION INTRAVENOUS ONCE
Status: COMPLETED | OUTPATIENT
Start: 2024-01-07 | End: 2024-01-07

## 2024-01-07 RX ORDER — SODIUM CHLORIDE 9 MG/ML
INJECTION, SOLUTION INTRAVENOUS CONTINUOUS
Status: DISCONTINUED | OUTPATIENT
Start: 2024-01-07 | End: 2024-01-08

## 2024-01-07 RX ADMIN — GABAPENTIN 100 MG: 100 CAPSULE ORAL at 09:01

## 2024-01-07 RX ADMIN — ATORVASTATIN CALCIUM 20 MG: 10 TABLET, FILM COATED ORAL at 09:01

## 2024-01-07 RX ADMIN — ALBUMIN (HUMAN) 12.5 G: 12.5 SOLUTION INTRAVENOUS at 11:01

## 2024-01-07 RX ADMIN — APIXABAN 5 MG: 5 TABLET, FILM COATED ORAL at 09:01

## 2024-01-07 RX ADMIN — ASPIRIN 81 MG: 81 TABLET, COATED ORAL at 09:01

## 2024-01-07 RX ADMIN — CEFEPIME 1 G: 1 INJECTION, POWDER, FOR SOLUTION INTRAMUSCULAR; INTRAVENOUS at 10:01

## 2024-01-07 RX ADMIN — DOCUSATE SODIUM 100 MG: 100 CAPSULE, LIQUID FILLED ORAL at 09:01

## 2024-01-07 RX ADMIN — SODIUM CHLORIDE: 9 INJECTION, SOLUTION INTRAVENOUS at 01:01

## 2024-01-07 RX ADMIN — ALLOPURINOL 100 MG: 100 TABLET ORAL at 09:01

## 2024-01-07 RX ADMIN — SODIUM BICARBONATE 650 MG TABLET 1300 MG: at 05:01

## 2024-01-07 RX ADMIN — BUPROPION 150 MG: 150 TABLET, EXTENDED RELEASE ORAL at 09:01

## 2024-01-07 RX ADMIN — METOPROLOL SUCCINATE 50 MG: 50 TABLET, EXTENDED RELEASE ORAL at 09:01

## 2024-01-07 RX ADMIN — SODIUM BICARBONATE: 84 INJECTION, SOLUTION INTRAVENOUS at 04:01

## 2024-01-07 RX ADMIN — FOLIC ACID 1000 MCG: 1 TABLET ORAL at 09:01

## 2024-01-07 RX ADMIN — SODIUM BICARBONATE 650 MG TABLET 1300 MG: at 02:01

## 2024-01-07 RX ADMIN — INSULIN ASPART 2 UNITS: 100 INJECTION, SOLUTION INTRAVENOUS; SUBCUTANEOUS at 06:01

## 2024-01-07 RX ADMIN — AMIODARONE HYDROCHLORIDE 200 MG: 200 TABLET ORAL at 09:01

## 2024-01-07 RX ADMIN — SODIUM BICARBONATE 650 MG TABLET 1300 MG: at 09:01

## 2024-01-07 NOTE — PROGRESS NOTES
[facility]  Hospital Medicine Progress Note      Chief Complaint:  abnormal labs done at the nursing home showing worsening renal function.    HPI: (personally reviewed by me and is documented from initial H&P)   78-year-old female with medical history of CKD stage 4, previously was on temporary dialysis in 2020 through left upper extremity AV fistula subsequently stabilized at CKD stage 4 with baseline creatinine around 3.0, anemia of chronic disease, T2DM, HTN, HLD, atrial fibrillation on Eliquis, DDD and vertebral compression fracture/T12 recently on TLSO brace.     Present to the ED due to abnormal labs done at the nursing home showing worsening renal function.  Patient report noting decreased urination over the past week and bilateral lower extremity swelling.  Report she still having back pain and bilateral lower extremity weakness and mostly using wheelchair since her vertebral fracture.  Denies shortness of breath, orthopnea or PND.     On arrival to ED he was afebrile and hemodynamically stable.  Labs notable for WBC 15.75, hemoglobin 11.2, platelets 285, sodium 141, potassium 3.4, chloride 107, CO2 16, creatinine 5.59, BUN 90.4.  Retroperitoneal ultrasound showed no hydronephrosis, bladder unremarkable and not distended.  Chest x-ray show pulmonary vascular congestion.  Urinalysis suggestive of UTI.     She was given 500 mL normal saline bolus and started at 75 mL/hour and referred to hospital medicine service for further evaluation and management.    Interval Hx:   No overnight events.  No new complaints.  Patient is currently being treated for urinary tract infection and acute kidney injury.    She tells me she was at a skilled nursing care facility and plans to return to assisted care living facility post continue rehabilitation.    PT/OT has been consulted to evaluate patient.   __________________________________________________________________________________________________________________________________    Objective/physical exam:  Vital signs have been personally reviewed by me   General: Appears comfortable, no acute distress.    Integumentary: Warm, dry, intact.  Musculoskeletal: Purposeful movement noted.     Respiratory: No accessory muscle use. Breath sounds are equal.  Cardiovascular: Regular rate.       VITAL SIGNS: 24 HRS MIN & MAX LAST   Temp  Min: 97.9 °F (36.6 °C)  Max: 98.5 °F (36.9 °C) 98.3 °F (36.8 °C)   BP  Min: 117/70  Max: 134/61 132/76   Pulse  Min: 78  Max: 89  78   Resp  Min: 18  Max: 20 18   SpO2  Min: 95 %  Max: 98 % 96 %     US Retroperitoneal Complete  Narrative: EXAMINATION:  US RETROPERITONEAL COMPLETE    CLINICAL HISTORY:  Elevated BUN/creatinine;    COMPARISON:  None.    FINDINGS:  Grayscale and color Doppler sonographic evaluation of the kidneys and urinary bladder.    The right kidney measures 8 cm. The left kidney measures 7 cm.   No hydronephrosis.  There is heterogeneous renal parenchymal echogenicity.    Urinary bladder unremarkable.  Impression: 1. No hydronephrosis.  2. Suspect medical renal disease.    Electronically signed by: Bienvenido Loco  Date:    01/05/2024  Time:    20:02  X-Ray Chest AP Portable  Narrative: EXAMINATION:  XR CHEST AP PORTABLE    CLINICAL HISTORY:  Peripheral edema;    TECHNIQUE:  Frontal view(s) of the chest.    COMPARISON:  Radiography 08/13/2020    FINDINGS:  Normal cardiac silhouette.  The lungs are well-inflated.  Central pulmonary vascular congestion.  No confluent consolidation appreciated.  Similar small area of scarring within the mid right lung.  No significant pleural effusion or discernible pneumothorax.  Impression: Central pulmonary vascular congestion.    Electronically signed by: Grayson Carrillo  Date:    01/05/2024  Time:    17:06    Recent Labs   Lab 01/05/24  0500 01/05/24  1648 01/06/24  0507   WBC 9.26 15.75*  9.01   RBC 3.62* 3.56* 3.35*   HGB 11.6* 11.2* 10.5*   HCT 35.9* 33.8* 33.2*   MCV 99.2* 94.9* 99.1*   MCH 32.0* 31.5* 31.3*   MCHC 32.3* 33.1 31.6*   RDW 13.1 13.2 13.2    285 220   MPV 10.8* 10.4 10.2       Recent Labs   Lab 01/05/24  1648 01/05/24  2242 01/06/24  0507 01/07/24  0324    141 142 143   K 3.4* 3.8 3.6 3.9   CO2 16* 17* 18* 23   BUN 90.4* 90.7* 85.3* 70.3*   CREATININE 5.59* 5.18* 4.71* 3.65*   CALCIUM 9.4 8.7 8.7 8.2*   MG 2.10  --   --  1.70   ALBUMIN 3.0*  --  2.6* 2.3*   ALKPHOS 126  --  101 100   ALT 20  --  16 15   AST 22  --  17 18   BILITOT 0.5  --  0.5 0.5          Microbiology Results (last 7 days)       ** No results found for the last 168 hours. **             See below for Radiology    Scheduled Med:   allopurinoL  100 mg Oral Daily    amiodarone  200 mg Oral Daily    apixaban  5 mg Oral BID    aspirin  81 mg Oral Daily    atorvastatin  20 mg Oral Daily    buPROPion  150 mg Oral Daily    ceFEPime (MAXIPIME) IVPB  1 g Intravenous Q24H    docusate sodium  100 mg Oral BID    folic acid  1,000 mcg Oral Daily    gabapentin  100 mg Oral BID    metoprolol succinate  50 mg Oral BID    sodium bicarbonate  1,300 mg Oral Q8H        Continuous Infusions:   sodium bicarbonate 100 mEq in sodium chloride 0.45% 1,000 mL infusion 85 mL/hr at 01/07/24 0411        PRN Meds:  acetaminophen, aluminum-magnesium hydroxide-simethicone, dextrose 10%, dextrose 10%, glucagon (human recombinant), glucose, glucose, hydrALAZINE, insulin aspart U-100, melatonin, ondansetron, polyethylene glycol, prochlorperazine, senna-docusate 8.6-50 mg, sodium chloride 0.9%       __________________________________________________________________________________________________________________________________    Assessment/Plan:  LORI superimposed on CKD stage 4  Renal related volume overload  Acute UTI versus asymptomatic bacteriuria  Anemia of CKD  Hyperuricemia  Decreased mobility/due to vertebral fracture  Paroxysmal  AFib-currently NSR  Hypertension  Hyperlipidemia  T2DM    Above present on admission     _______________________________________________________________________________________________________________________________  presents for SNF for decrease in her output noted to be in acute renal failure.  Known history of CKD stage 4.    Renal function and put improving with IV fluid hydration.  Continue for now.  Continue to monitor renal indices, blood chemistry and volume status.    Continue antibiotic therapy for suspected UTI, follow-up on sensitivity report when available.    Continue supportive care  Continue checking vital signs q4hrs.   Nurse notified to page me if any changes occur     DVT prophylaxis initiated   Nutrition Status:  Cardiac diet    Consults:  Nephrologists    I have personally reviewed the specialist documentation and/or have spoken to the specialist with regard to the care of this patient; recommendations are noted above.     I have spent >30 minutes on the day of the visit; time spent includes face to face time and non-face to face time preparing to see the patient (eg, review of tests), independently reviewing and interpreting medical records, both past and current; documenting clinical information in the electronic or other health record, and communicating results to the patient/family/caregiver and care coordinator and nursing team.      Anticipated discharge and Disposition when medically stable:  Pending.     All diagnosis and differential diagnosis have been reviewed,  interpreted and communicated appropriately to care team. assessment and plan has been documented; I have personally reviewed the labs and test results that are presently available and pertinent to this hospital course; I have reviewed medical records based upon their availability.    All of the patient's questions have been  addressed and answered. Patient's is agreeable to the above stated plan.   I will continue to monitor  closely and make adjustments to medical management as needed.    Lalita Gunn, DO   01/07/2024        This note was created with the assistance of Dragon voice recognition software. There may be transcription errors as a result of using this technology however minimal. Effort has been made to assure accuracy of transcription but any obvious errors or omissions should be clarified with the author of the document.

## 2024-01-07 NOTE — PLAN OF CARE
Problem: Physical Therapy  Goal: Physical Therapy Goal  Description: Goals to be met by: 2023     Patient will increase functional independence with mobility by performin. Supine to sit with Stand-by Assistance  2. Sit to stand transfer with Stand-by Assistance  3. Gait  x 150 feet with Stand-by Assistance using Rolling Walker.     Outcome: Ongoing, Progressing

## 2024-01-07 NOTE — PLAN OF CARE
Problem: Adult Inpatient Plan of Care  Goal: Plan of Care Review  Outcome: Ongoing, Progressing  Flowsheets (Taken 1/7/2024 0303)  Plan of Care Reviewed With: patient  Goal: Patient-Specific Goal (Individualized)  Outcome: Ongoing, Progressing  Flowsheets (Taken 1/7/2024 0303)  Anxieties, Fears or Concerns: going back on dialysis  Individualized Care Needs: monitor labs, I/O, wts  Goal: Optimal Comfort and Wellbeing  Outcome: Ongoing, Progressing     Problem: Diabetes Comorbidity  Goal: Blood Glucose Level Within Targeted Range  Outcome: Ongoing, Progressing     Problem: Fluid and Electrolyte Imbalance (Acute Kidney Injury/Impairment)  Goal: Fluid and Electrolyte Balance  Outcome: Ongoing, Progressing     Problem: Oral Intake Inadequate (Acute Kidney Injury/Impairment)  Goal: Optimal Nutrition Intake  Outcome: Ongoing, Progressing     Problem: Fall Injury Risk  Goal: Absence of Fall and Fall-Related Injury  Outcome: Ongoing, Progressing

## 2024-01-07 NOTE — PT/OT/SLP EVAL
Physical Therapy Evaluation    Patient Name:  Debra Puentes   MRN:  [id number]    Recommendations:     Discharge therapy intensity: Moderate Intensity Therapy   Discharge Equipment Recommendations: to be determined by next level of care   Barriers to discharge: None    Assessment:     Debra Puentes is a 78 y.o. female admitted with a medical diagnosis of Acute kidney injury superimposed on CKD, volume overload, UTI, CKD, afib, recent T12 vertebral fx.  She presents with the following impairments/functional limitations: weakness, impaired endurance, impaired self care skills, impaired functional mobility, gait instability, impaired balance, pain. Pt reports she was receiving therapy at SNF prior to admission. She is to wear LSO with mobility. Pt is requiring Mod A for bed mobility, and Mod x2 for sit<>stand transfers w RW. Continue to recommend moderate intensity at d/c.     Rehab Prognosis: Good; patient would benefit from acute skilled PT services to address these deficits and reach maximum level of function.    Recent Surgery: * No surgery found *      Plan:     During this hospitalization, patient to be seen 5 x/week to address the identified rehab impairments via gait training, therapeutic activities, therapeutic exercises and progress toward the following goals:    Plan of Care Expires:  02/07/24    Subjective     Chief Complaint: back soreness  Patient/Family Comments/goals: to go home  Pain/Comfort:  Location 1: back  Pain Addressed 1: Pre-medicate for activity, Reposition    Patients cultural, spiritual, Caodaism conflicts given the current situation: no    Living Environment:  Pt lived in assistive living.  Prior to admission, patients level of function was ambulatory with walker prior to her spinal fracture.  Equipment used at home: cane, straight, shower chair, rollator.  DME owned (not currently used): .  Upon discharge, patient will have assistance from SNF/AL.    Objective:     Communicated  with nurse prior to session.  Patient found supine with telemetry, pulse ox (continuous), PureWick  upon PT entry to room.    General Precautions: Standard,    Orthopedic Precautions:spinal precautions   Braces: LSO  Respiratory Status: Room air      Exams:  Cognitive Exam:  Patient is oriented to Person, Place, Time, and Situation  Sensation:    -       Intact  RLE ROM: WNL  RLE Strength: Deficits: 3+/5 grossly   LLE ROM: WNL  LLE Strength: Deficits: 3+/5 grossly  Skin integrity: Visible skin intact      Functional Mobility:  Bed Mobility:     Scooting: stand by assistance  Supine to Sit: moderate assistance  Sit to Supine: moderate assistance  Transfers:     Sit to Stand:  moderate assistance and of 2 persons with rolling walker  Gait: 4 side steps at EOB W RW Min A x2.       AM-PAC 6 CLICK MOBILITY  Total Score:13       Treatment & Education:    Patient provided with verbal education education regarding PT role/goals/POC and home exercise program.  Understanding was verbalized.     Patient left supine with all lines intact and call button in reach.    GOALS:   Multidisciplinary Problems       Physical Therapy Goals          Problem: Physical Therapy    Goal Priority Disciplines Outcome Goal Variances Interventions   Physical Therapy Goal     PT, PT/OT Ongoing, Progressing     Description: Goals to be met by: 2023     Patient will increase functional independence with mobility by performin. Supine to sit with Stand-by Assistance  2. Sit to stand transfer with Stand-by Assistance  3. Gait  x 150 feet with Stand-by Assistance using Rolling Walker.                          History:     Past Medical History:   Diagnosis Date    Cancer     Chronic kidney disease, unspecified     Diabetes mellitus, type 2     History of arteriovenostomy for renal dialysis     Hyperlipidemia     Hypertension        Past Surgical History:   Procedure Laterality Date    HYSTERECTOMY      KIDNEY STONE SURGERY      TONSILLECTOMY          Time Tracking:     PT Received On: 01/07/24  PT Start Time: 0925     PT Stop Time: 0948  PT Total Time (min): 23 min     Billable Minutes: Evaluation 23 01/07/2024

## 2024-01-07 NOTE — PROGRESS NOTES
NEPHROLOGY PROGRESS NOTE      Patient Demographics:  Name:  Debra Puentes  Age: 78 y.o.  MRN:  [id number]  Admission Date: 1/5/2024      Subjective:      Walked a bit with PT yesterday    Renal status much improved    Current Facility-Administered Medications   Medication Dose Route Frequency Provider Last Rate Last Admin    acetaminophen tablet 650 mg  650 mg Oral Q4H PRN Jack Razo MD        allopurinoL tablet 100 mg  100 mg Oral Daily Jack Razo MD   100 mg at 01/07/24 0914    aluminum-magnesium hydroxide-simethicone 200-200-20 mg/5 mL suspension 30 mL  30 mL Oral QID PRN Jack Razo MD        amiodarone tablet 200 mg  200 mg Oral Daily Jack Razo MD   200 mg at 01/07/24 0915    apixaban tablet 5 mg  5 mg Oral BID Jack Razo MD   5 mg at 01/07/24 0914    aspirin EC tablet 81 mg  81 mg Oral Daily Jack Razo MD   81 mg at 01/07/24 0915    atorvastatin tablet 20 mg  20 mg Oral Daily Jack Razo MD   20 mg at 01/07/24 0915    buPROPion TB24 tablet 150 mg  150 mg Oral Daily Jack Razo MD   150 mg at 01/07/24 0915    ceFEPIme (MAXIPIME) 1 g in dextrose 5 % in water (D5W) 100 mL IVPB (MB+)  1 g Intravenous Q24H Jack Razo MD   Stopped at 01/06/24 2259    dextrose 10% bolus 125 mL 125 mL  12.5 g Intravenous PRN Jack Razo MD        dextrose 10% bolus 250 mL 250 mL  25 g Intravenous PRN Jack Razo MD        docusate sodium capsule 100 mg  100 mg Oral BID Jack Razo MD   100 mg at 01/07/24 0915    folic acid tablet 1,000 mcg  1,000 mcg Oral Daily Jack Razo MD   1,000 mcg at 01/07/24 0915    gabapentin capsule 100 mg  100 mg Oral BID Jack Razo MD   100 mg at 01/07/24 0915    glucagon (human recombinant) injection 1 mg  1 mg Intramuscular PRN Jack Razo MD        glucose chewable tablet 16 g  16 g Oral PRN Jack Razo MD        glucose chewable tablet 24 g  24 g Oral PRN Dung, Jack FARNSWORTH MD        hydrALAZINE injection 10 mg  10 mg Intravenous Q2H PRN Nazia Rondon, AMINATA         insulin aspart U-100 injection 0-5 Units  0-5 Units Subcutaneous QID (AC + HS) PRN Jack Razo MD        melatonin tablet 6 mg  6 mg Oral Nightly PRN Jack Razo MD        metoprolol succinate (TOPROL-XL) 24 hr tablet 50 mg  50 mg Oral BID Jack Razo MD   50 mg at 01/07/24 0914    ondansetron injection 4 mg  4 mg Intravenous Q4H PRN Jack Razo MD        polyethylene glycol packet 17 g  17 g Oral BID PRN Jack Razo MD        prochlorperazine injection Soln 5 mg  5 mg Intravenous Q6H PRN Jack Razo MD        senna-docusate 8.6-50 mg per tablet 2 tablet  2 tablet Oral BID PRN Jack Razo MD        sodium bicarbonate 100 mEq in sodium chloride 0.45% 1,000 mL infusion   Intravenous Continuous DominicInna morin, ANP 85 mL/hr at 01/07/24 0411 New Bag at 01/07/24 0411    sodium bicarbonate tablet 1,300 mg  1,300 mg Oral Q8H Jack Razo MD   1,300 mg at 01/07/24 0511    sodium chloride 0.9% flush 10 mL  10 mL Intravenous PRN Jack Razo MD               Review of Systems   Constitutional:  Positive for malaise/fatigue.   HENT: Negative.     Eyes: Negative.    Respiratory: Negative.     Cardiovascular: Negative.    Gastrointestinal: Negative.    Genitourinary: Negative.    Musculoskeletal: Negative.    Skin: Negative.    Neurological:  Positive for weakness.         Objective:    /76   Pulse 78   Temp 98.3 °F (36.8 °C) (Oral)   Resp 18   Wt 95.7 kg (210 lb 15.7 oz)   SpO2 96%   BMI 32.08 kg/m²       Intake/Output Summary (Last 24 hours) at 1/7/2024 1005  Last data filed at 1/7/2024 0615  Gross per 24 hour   Intake 1956.98 ml   Output 651 ml   Net 1305.98 ml             Physical Exam  Vitals reviewed.   Constitutional:       Appearance: Normal appearance.   HENT:      Head: Normocephalic and atraumatic.      Nose: Nose normal.   Eyes:      Extraocular Movements: Extraocular movements intact.      Pupils: Pupils are equal, round, and reactive to light.   Cardiovascular:      Rate and Rhythm:  Normal rate and regular rhythm.      Pulses: Normal pulses.      Heart sounds: Normal heart sounds.   Pulmonary:      Effort: Pulmonary effort is normal.      Breath sounds: Normal breath sounds.   Abdominal:      General: Bowel sounds are normal.      Palpations: Abdomen is soft.   Musculoskeletal:         General: Normal range of motion.      Cervical back: Normal range of motion.      Comments: Some deconditioning   Skin:     General: Skin is warm and dry.   Neurological:      General: No focal deficit present.      Mental Status: She is alert and oriented to person, place, and time. Mental status is at baseline.   Psychiatric:         Mood and Affect: Mood normal.            Inpatient Diagnostics:  Recent Results (from the past 24 hour(s))   POCT glucose    Collection Time: 01/06/24 12:24 PM   Result Value Ref Range    POCT Glucose 107 70 - 110 mg/dL   POCT glucose    Collection Time: 01/06/24  4:57 PM   Result Value Ref Range    POCT Glucose 138 (H) 70 - 110 mg/dL   POCT glucose    Collection Time: 01/06/24  8:50 PM   Result Value Ref Range    POCT Glucose 115 (H) 70 - 110 mg/dL   Comprehensive Metabolic Panel    Collection Time: 01/07/24  3:24 AM   Result Value Ref Range    Sodium Level 143 136 - 145 mmol/L    Potassium Level 3.9 3.5 - 5.1 mmol/L    Chloride 108 (H) 98 - 107 mmol/L    Carbon Dioxide 23 23 - 31 mmol/L    Glucose Level 79 (L) 82 - 115 mg/dL    Blood Urea Nitrogen 70.3 (H) 9.8 - 20.1 mg/dL    Creatinine 3.65 (H) 0.55 - 1.02 mg/dL    Calcium Level Total 8.2 (L) 8.4 - 10.2 mg/dL    Protein Total 4.8 (L) 5.8 - 7.6 gm/dL    Albumin Level 2.3 (L) 3.4 - 4.8 g/dL    Globulin 2.5 2.4 - 3.5 gm/dL    Albumin/Globulin Ratio 0.9 (L) 1.1 - 2.0 ratio    Bilirubin Total 0.5 <=1.5 mg/dL    Alkaline Phosphatase 100 40 - 150 unit/L    Alanine Aminotransferase 15 0 - 55 unit/L    Aspartate Aminotransferase 18 5 - 34 unit/L    eGFR 12 mls/min/1.73/m2   Magnesium    Collection Time: 01/07/24  3:24 AM   Result Value  Ref Range    Magnesium Level 1.70 1.60 - 2.60 mg/dL   POCT glucose    Collection Time: 01/07/24  6:18 AM   Result Value Ref Range    POCT Glucose 83 70 - 110 mg/dL     A/P--NE 01/07    1---LORI/ Intravascular Depletion/ CKD IIIb-IV---Cont IVF--Indices much better  Baseline Creat 2.6-2.8 or so  Was on HD previously---She does not want to restart  2---Metabolic Acidosis---Resolved/ DC Bicarb gtt  3---Gm (-) Bobo UTI--Cont Cefepime/ Await Final Cx  4---A Fib--Cont Toprol XL/ Eliquis  5---Anemia secondary to Chronic Disease--Fe Studies reviewed---Follow H&H  6---Deconditioning---PT on board     UA--POSITIVE  URINE STUDIES--NEGATIVE  RENAL US--NEGATIVE     IV--1/2NS + 2 Amps Bicarb at 85cc/hr     ORDERS:  CMP/Mg in am  Change IVF to NS  Albumin x 1     Known to Luis                        Patient stable.  Discussed with NP.  Renal indices improved and near baseline.  D/c bicarb supplementation.  Continue IV hydration.  Monitor for renal improvement.  Continue hydration/supportive care.  Will follow.

## 2024-01-08 LAB
ALBUMIN SERPL-MCNC: 2.3 G/DL (ref 3.4–4.8)
ALBUMIN/GLOB SERPL: 1 RATIO (ref 1.1–2)
ALP SERPL-CCNC: 86 UNIT/L (ref 40–150)
ALT SERPL-CCNC: 14 UNIT/L (ref 0–55)
AST SERPL-CCNC: 16 UNIT/L (ref 5–34)
BILIRUB SERPL-MCNC: 0.5 MG/DL
BUN SERPL-MCNC: 54.7 MG/DL (ref 9.8–20.1)
CALCIUM SERPL-MCNC: 7.9 MG/DL (ref 8.4–10.2)
CHLORIDE SERPL-SCNC: 107 MMOL/L (ref 98–107)
CO2 SERPL-SCNC: 25 MMOL/L (ref 23–31)
CREAT SERPL-MCNC: 2.82 MG/DL (ref 0.55–1.02)
GFR SERPLBLD CREATININE-BSD FMLA CKD-EPI: 17 MLS/MIN/1.73/M2
GLOBULIN SER-MCNC: 2.3 GM/DL (ref 2.4–3.5)
GLUCOSE SERPL-MCNC: 91 MG/DL (ref 82–115)
MAGNESIUM SERPL-MCNC: 1.5 MG/DL (ref 1.6–2.6)
POCT GLUCOSE: 140 MG/DL (ref 70–110)
POCT GLUCOSE: 150 MG/DL (ref 70–110)
POCT GLUCOSE: 181 MG/DL (ref 70–110)
POTASSIUM SERPL-SCNC: 3.5 MMOL/L (ref 3.5–5.1)
PROT SERPL-MCNC: 4.6 GM/DL (ref 5.8–7.6)
SODIUM SERPL-SCNC: 140 MMOL/L (ref 136–145)

## 2024-01-08 PROCEDURE — 63600175 PHARM REV CODE 636 W HCPCS: Performed by: INTERNAL MEDICINE

## 2024-01-08 PROCEDURE — 25000003 PHARM REV CODE 250: Performed by: INTERNAL MEDICINE

## 2024-01-08 PROCEDURE — 25000003 PHARM REV CODE 250: Performed by: NURSE PRACTITIONER

## 2024-01-08 PROCEDURE — 21400001 HC TELEMETRY ROOM

## 2024-01-08 PROCEDURE — 83735 ASSAY OF MAGNESIUM: CPT | Performed by: NURSE PRACTITIONER

## 2024-01-08 PROCEDURE — 97530 THERAPEUTIC ACTIVITIES: CPT

## 2024-01-08 PROCEDURE — 80053 COMPREHEN METABOLIC PANEL: CPT | Performed by: NURSE PRACTITIONER

## 2024-01-08 PROCEDURE — 25000003 PHARM REV CODE 250: Performed by: HOSPITALIST

## 2024-01-08 RX ORDER — METHOCARBAMOL 500 MG/1
500 TABLET, FILM COATED ORAL 4 TIMES DAILY
Status: DISCONTINUED | OUTPATIENT
Start: 2024-01-08 | End: 2024-01-12

## 2024-01-08 RX ADMIN — ALLOPURINOL 100 MG: 100 TABLET ORAL at 09:01

## 2024-01-08 RX ADMIN — APIXABAN 5 MG: 5 TABLET, FILM COATED ORAL at 08:01

## 2024-01-08 RX ADMIN — CEFEPIME 1 G: 1 INJECTION, POWDER, FOR SOLUTION INTRAMUSCULAR; INTRAVENOUS at 10:01

## 2024-01-08 RX ADMIN — DOCUSATE SODIUM 100 MG: 100 CAPSULE, LIQUID FILLED ORAL at 09:01

## 2024-01-08 RX ADMIN — METOPROLOL SUCCINATE 50 MG: 50 TABLET, EXTENDED RELEASE ORAL at 08:01

## 2024-01-08 RX ADMIN — ATORVASTATIN CALCIUM 20 MG: 10 TABLET, FILM COATED ORAL at 09:01

## 2024-01-08 RX ADMIN — SODIUM BICARBONATE 650 MG TABLET 1300 MG: at 10:01

## 2024-01-08 RX ADMIN — SODIUM BICARBONATE 650 MG TABLET 1300 MG: at 05:01

## 2024-01-08 RX ADMIN — APIXABAN 5 MG: 5 TABLET, FILM COATED ORAL at 09:01

## 2024-01-08 RX ADMIN — FOLIC ACID 1000 MCG: 1 TABLET ORAL at 09:01

## 2024-01-08 RX ADMIN — SODIUM CHLORIDE: 9 INJECTION, SOLUTION INTRAVENOUS at 01:01

## 2024-01-08 RX ADMIN — AMIODARONE HYDROCHLORIDE 200 MG: 200 TABLET ORAL at 09:01

## 2024-01-08 RX ADMIN — SODIUM BICARBONATE 650 MG TABLET 1300 MG: at 01:01

## 2024-01-08 RX ADMIN — METOPROLOL SUCCINATE 50 MG: 50 TABLET, EXTENDED RELEASE ORAL at 09:01

## 2024-01-08 RX ADMIN — METHOCARBAMOL 500 MG: 500 TABLET ORAL at 01:01

## 2024-01-08 RX ADMIN — METHOCARBAMOL 500 MG: 500 TABLET ORAL at 05:01

## 2024-01-08 RX ADMIN — GABAPENTIN 100 MG: 100 CAPSULE ORAL at 09:01

## 2024-01-08 RX ADMIN — BUPROPION 150 MG: 150 TABLET, EXTENDED RELEASE ORAL at 09:01

## 2024-01-08 RX ADMIN — METHOCARBAMOL 500 MG: 500 TABLET ORAL at 10:01

## 2024-01-08 RX ADMIN — GABAPENTIN 100 MG: 100 CAPSULE ORAL at 08:01

## 2024-01-08 RX ADMIN — ASPIRIN 81 MG: 81 TABLET, COATED ORAL at 09:01

## 2024-01-08 NOTE — PLAN OF CARE
Problem: Adult Inpatient Plan of Care  Goal: Plan of Care Review  Outcome: Ongoing, Progressing  Goal: Patient-Specific Goal (Individualized)  Outcome: Ongoing, Progressing  Goal: Absence of Hospital-Acquired Illness or Injury  Outcome: Ongoing, Progressing  Goal: Optimal Comfort and Wellbeing  Outcome: Ongoing, Progressing  Goal: Readiness for Transition of Care  Outcome: Ongoing, Progressing     Problem: Diabetes Comorbidity  Goal: Blood Glucose Level Within Targeted Range  Outcome: Ongoing, Progressing     Problem: Fluid and Electrolyte Imbalance (Acute Kidney Injury/Impairment)  Goal: Fluid and Electrolyte Balance  Outcome: Ongoing, Progressing     Problem: Oral Intake Inadequate (Acute Kidney Injury/Impairment)  Goal: Optimal Nutrition Intake  Outcome: Ongoing, Progressing     Problem: Renal Function Impairment (Acute Kidney Injury/Impairment)  Goal: Effective Renal Function  Outcome: Ongoing, Progressing     Problem: Impaired Wound Healing  Goal: Optimal Wound Healing  Outcome: Ongoing, Progressing     Problem: Fall Injury Risk  Goal: Absence of Fall and Fall-Related Injury  Outcome: Ongoing, Progressing     Problem: Skin Injury Risk Increased  Goal: Skin Health and Integrity  Outcome: Ongoing, Progressing      You can access the FollowMyHealth Patient Portal offered by [facility] by registering at the following website: [email or web address]. By joining [facility]’s FollowMyHealth portal, you will also be able to view your health information using other applications (apps) compatible with our system.

## 2024-01-08 NOTE — PROGRESS NOTES
Inpatient Nutrition Assessment    Admit Date: 1/5/2024   Total duration of encounter: 3 days   Patient Age: 78 y.o.    Nutrition Recommendation/Prescription     Diet as tolerated.    Communication of Recommendations: reviewed with nurse    Nutrition Assessment     Chart Review    Reason Seen: continuous nutrition monitoring    Malnutrition Screening Tool Results   Have you recently lost weight without trying?: No  Have you been eating poorly because of a decreased appetite?: No   MST Score: 0   Diagnosis:  LORI superimposed on CKD stage 4  Renal related volume overload  Acute UTI versus asymptomatic bacteriuria  Anemia of CKD  Hyperuricemia  Decreased mobility/due to vertebral fracture  Paroxysmal AFib-currently NSR  Hypertension  Hyperlipidemia  T2DM    Relevant Medical History: CKD stage 4, previously was on temporary dialysis in 2020 through left upper extremity AV fistula subsequently stabilized at CKD stage 4 with baseline creatinine around 3.0, anemia of chronic disease, T2DM, HTN, HLD, atrial fibrillation on Eliquis, DDD and vertebral compression fracture/T12 recently on TLSO brace    Scheduled Medications:  allopurinoL, 100 mg, Daily  amiodarone, 200 mg, Daily  apixaban, 5 mg, BID  aspirin, 81 mg, Daily  atorvastatin, 20 mg, Daily  buPROPion, 150 mg, Daily  ceFEPime (MAXIPIME) IVPB, 1 g, Q24H  docusate sodium, 100 mg, BID  folic acid, 1,000 mcg, Daily  gabapentin, 100 mg, BID  metoprolol succinate, 50 mg, BID  sodium bicarbonate, 1,300 mg, Q8H    Continuous Infusions: none       PRN Medications: acetaminophen, aluminum-magnesium hydroxide-simethicone, dextrose 10%, dextrose 10%, glucagon (human recombinant), glucose, glucose, hydrALAZINE, insulin aspart U-100, melatonin, ondansetron, polyethylene glycol, prochlorperazine, senna-docusate 8.6-50 mg, sodium chloride 0.9%    Calorie Containing IV Medications: no significant kcals from medications at this time    Recent Labs   Lab 01/05/24  0500 01/05/24  0364  "01/05/24  2242 01/06/24  0507 01/07/24  0324 01/08/24  0355    141 141 142 143 140   K 4.1 3.4* 3.8 3.6 3.9 3.5   CALCIUM 9.7 9.4 8.7 8.7 8.2* 7.9*   PHOS  --   --   --  5.2*  --   --    MG  --  2.10  --   --  1.70 1.50*   CHLORIDE 107 107 110* 111* 108* 107   CO2 17* 16* 17* 18* 23 25   BUN 88.4* 90.4* 90.7* 85.3* 70.3* 54.7*   CREATININE 4.76* 5.59* 5.18* 4.71* 3.65* 2.82*   EGFRNORACEVR 9 7 8 9 12 17   GLUCOSE 123* 147* 143* 79* 79* 91   BILITOT  --  0.5  --  0.5 0.5 0.5   ALKPHOS  --  126  --  101 100 86   ALT  --  20  --  16 15 14   AST  --  22  --  17 18 16   ALBUMIN  --  3.0*  --  2.6* 2.3* 2.3*   CRP  --   --  69.10*  --   --   --    HGBA1C  --   --  5.6  --   --   --    WBC 9.26 15.75*  --  9.01  --   --    HGB 11.6* 11.2*  --  10.5*  --   --    HCT 35.9* 33.8*  --  33.2*  --   --      Nutrition Orders:  Diet Cardiac    Appetite/Oral Intake: good/% of meals  Factors Affecting Nutritional Intake: none identified  Food/Latter-day/Cultural Preferences: none reported  Food Allergies: none reported  Last Bowel Movement: 01/07/24  Wound(s): no pressure injuries documented at this time     Comments    1/8/24 Good appetite, no weight loss.    Anthropometrics    Height: 5' 8" (172.7 cm),    Last Weight: 96.5 kg (212 lb 11.9 oz) (01/08/24 0546), Weight Method: Bed Scale   Body mass index is 32.35 kg/m².   BMI Classification: obese grade I (BMI 30-34.9)     Ideal Body Weight (IBW), Female: 140 lb                                   Usual Weight Provided By: patient denies unintentional weight loss    Wt Readings from Last 5 Encounters:   01/08/24 96.5 kg (212 lb 11.9 oz)   10/27/23 104.3 kg (230 lb)   10/23/23 103 kg (227 lb 1.2 oz)   08/08/23 99.8 kg (220 lb)   09/05/22 94.3 kg (208 lb)     Weight Change(s) Since Admission: fluctuations noted, possibly fluid-related, CKD  Wt Readings from Last 1 Encounters:   01/08/24 0546 96.5 kg (212 lb 11.9 oz)   01/07/24 0436 95.7 kg (210 lb 15.7 oz)   01/05/24 1445 90.7 " kg (200 lb)   Admit Weight: 90.7 kg (200 lb) (01/05/24 1445), Weight Method: Bed Scale    Nutrition Goals & Monitoring     Dietitian will monitor: food and beverage intake and weight    Nutrition Risk/Follow-Up: low (follow-up in 5-7 days)   Please consult if re-assessment needed sooner.

## 2024-01-08 NOTE — PLAN OF CARE
Problem: Adult Inpatient Plan of Care  Goal: Plan of Care Review  Outcome: Ongoing, Progressing  Flowsheets (Taken 1/8/2024 5477)  Plan of Care Reviewed With: patient  Goal: Patient-Specific Goal (Individualized)  Outcome: Ongoing, Progressing  Goal: Absence of Hospital-Acquired Illness or Injury  Outcome: Ongoing, Progressing  Goal: Optimal Comfort and Wellbeing  Outcome: Ongoing, Progressing     Problem: Diabetes Comorbidity  Goal: Blood Glucose Level Within Targeted Range  Outcome: Ongoing, Progressing     Problem: Fluid and Electrolyte Imbalance (Acute Kidney Injury/Impairment)  Goal: Fluid and Electrolyte Balance  Outcome: Ongoing, Progressing

## 2024-01-08 NOTE — PLAN OF CARE
01/08/24 1347   Discharge Assessment   Assessment Type Discharge Planning Assessment   Confirmed/corrected address, phone number and insurance Yes   Source of Information patient   Communicated FRAN with patient/caregiver Date not available/Unable to determine   Reason For Admission abnormal labs   People in Home facility resident   Facility Arrived From: [facility]   Do you expect to return to your current living situation? Other (see comments)  (Asssisted living requesting that patient return to SNF due to patient inability to care for herself)   Current cognitive status: Alert/Oriented   Walking or Climbing Stairs Difficulty yes   Walking or Climbing Stairs ambulation difficulty, requires equipment   Mobility Management Rollator, Wheelchair   Dressing/Bathing Difficulty yes   Dressing/Bathing bathing difficulty, requires equipment;bathing difficulty, assistance 1 person   Dressing/Bathing Management wheelchair, shower chair   Home Layout Able to live on 1st floor   Equipment Currently Used at Home rollator;wheelchair;shower chair   Do you currently have service(s) that help you manage your care at home? No   Do you take prescription medications? Yes   Do you have prescription coverage? Yes   Coverage BC/BS   Who is going to help you get home at discharge? Unsure   How do you get to doctors appointments? family or friend will provide   Are you on dialysis? No   Do you take coumadin? No   Discharge Plan A Assisted Living   Discharge Plan B Skilled Nursing Facility   Discharge Plan discussed with: Patient

## 2024-01-08 NOTE — PROGRESS NOTES
[facility] - 4th Floor Medical Telemetry  Nephrology  Progress Note    Patient Name: Debra Puentes  MRN: [id number]  Admission Date: 1/5/2024  Hospital Length of Stay: 3 days  Attending Provider: Peter Horton MD   Primary Care Physician: Logan Smith MD  Principal Problem:Acute kidney injury superimposed on CKD    Consults  Subjective:   Debra Puentes is a 78 y.o. well known to Dr Smith with a history of chronic kidney disease stage IV, baseline Cr 2.6-2.8. She has a history of dialysis dependence in 2020 and sees Dr Smith monthly.  Patient is currently a resident of [facility] post T12 compression fracture suffered at end of October 2023. Prior to incident she was able to complete ADL and was in assisted living.     Patient was sent to ED from [facility] due to abnormal labs indicative of LORI. Initial BUN 90/ Cr 5.18/ GFR 8. Urine culture positive for both E Coli and Enterobacter on day #4 of Maxipime. She has difficulty getting reliable access to water at the NH.     Interval History:   Renal indices back to baseline with treatment of UTI and IV hydration. Her complaint is today back pain.     Review of patient's allergies indicates:   Allergen Reactions    Lactose Diarrhea    Codeine Other (See Comments) and Nausea And Vomiting     Current Facility-Administered Medications   Medication Frequency    acetaminophen tablet 650 mg Q4H PRN    allopurinoL tablet 100 mg Daily    aluminum-magnesium hydroxide-simethicone 200-200-20 mg/5 mL suspension 30 mL QID PRN    amiodarone tablet 200 mg Daily    apixaban tablet 5 mg BID    aspirin EC tablet 81 mg Daily    atorvastatin tablet 20 mg Daily    buPROPion TB24 tablet 150 mg Daily    ceFEPIme (MAXIPIME) 1 g in dextrose 5 % in water (D5W) 100 mL IVPB (MB+) Q24H    dextrose 10% bolus 125 mL 125 mL PRN    dextrose 10% bolus 250 mL 250 mL PRN    docusate sodium capsule 100 mg BID    folic acid tablet 1,000 mcg Daily    gabapentin capsule 100 mg  BID    glucagon (human recombinant) injection 1 mg PRN    glucose chewable tablet 16 g PRN    glucose chewable tablet 24 g PRN    hydrALAZINE injection 10 mg Q2H PRN    insulin aspart U-100 injection 0-5 Units QID (AC + HS) PRN    melatonin tablet 6 mg Nightly PRN    metoprolol succinate (TOPROL-XL) 24 hr tablet 50 mg BID    ondansetron injection 4 mg Q4H PRN    polyethylene glycol packet 17 g BID PRN    prochlorperazine injection Soln 5 mg Q6H PRN    senna-docusate 8.6-50 mg per tablet 2 tablet BID PRN    sodium bicarbonate tablet 1,300 mg Q8H    sodium chloride 0.9% flush 10 mL PRN       Objective:     Vital Signs (Most Recent):  Temp: 98.5 °F (36.9 °C) (01/08/24 0721)  Pulse: 60 (01/08/24 0904)  Resp: 18 (01/08/24 0721)  BP: (!) 158/74 (01/08/24 0904)  SpO2: 95 % (01/08/24 0721) Vital Signs (24h Range):  Temp:  [97.8 °F (36.6 °C)-98.5 °F (36.9 °C)] 98.5 °F (36.9 °C)  Pulse:  [58-80] 60  Resp:  [18-20] 18  SpO2:  [94 %-95 %] 95 %  BP: (133-158)/(58-80) 158/74     Weight: 96.5 kg (212 lb 11.9 oz) (01/08/24 0546)  Body mass index is 32.35 kg/m².  Body surface area is 2.15 meters squared.    I/O last 3 completed shifts:  In: 4523.7 [P.O.:1856; I.V.:2475.8; IV Piggyback:191.9]  Out: 2811 [Urine:2810; Stool:1]    Physical Exam  Constitutional:       General: She is not in acute distress.     Appearance: She is obese.   HENT:      Head: Atraumatic.      Mouth/Throat:      Mouth: Mucous membranes are moist.   Eyes:      Extraocular Movements: Extraocular movements intact.      Conjunctiva/sclera: Conjunctivae normal.   Cardiovascular:      Rate and Rhythm: Normal rate and regular rhythm.      Pulses: Normal pulses.      Heart sounds: Normal heart sounds.   Pulmonary:      Effort: Pulmonary effort is normal.      Breath sounds: Normal breath sounds.   Abdominal:      General: There is no distension.      Palpations: Abdomen is soft.   Musculoskeletal:         General: No swelling.      Cervical back: Neck supple.    Skin:     General: Skin is warm.   Neurological:      Mental Status: She is alert and oriented to person, place, and time.   Psychiatric:         Mood and Affect: Mood normal.         Behavior: Behavior normal.         Significant Labs:sureBMP:   Recent Labs   Lab 01/08/24  0355      K 3.5   CO2 25   BUN 54.7*   CREATININE 2.82*   CALCIUM 7.9*   MG 1.50*     CBC:   Recent Labs   Lab 01/06/24  0507   WBC 9.01   RBC 3.35*   HGB 10.5*   HCT 33.2*      MCV 99.1*   MCH 31.3*   MCHC 31.6*     Microbiology Results (last 7 days)       ** No results found for the last 168 hours. **          Specimen (24h ago, onward)      None          Recent Labs   Lab 01/05/24  0500   PROTEINUA Negative   BACTERIA Many*   LEUKOCYTESUR Moderate*   UROBILINOGEN 0.2       Significant Imaging:  US Retroperitoneal Complete [[id number]] Resulted: 01/05/24 2002   Order Status: Completed Updated: 01/05/24 2004   Narrative:     EXAMINATION:  US RETROPERITONEAL COMPLETE    CLINICAL HISTORY:  Elevated BUN/creatinine;    COMPARISON:  None.    FINDINGS:  Grayscale and color Doppler sonographic evaluation of the kidneys and urinary bladder.    The right kidney measures 8 cm. The left kidney measures 7 cm.   No hydronephrosis.  There is heterogeneous renal parenchymal echogenicity.    Urinary bladder unremarkable.   Impression:       1. No hydronephrosis.  2. Suspect medical renal disease.      Electronically signed by: Bienvenido Loco  Date: 01/05/2024     X-Ray Chest AP Portable [[id number]] Resulted: 01/05/24 1706   Order Status: Completed Updated: 01/05/24 1709   Narrative:     EXAMINATION:  XR CHEST AP PORTABLE    CLINICAL HISTORY:  Peripheral edema;    TECHNIQUE:  Frontal view(s) of the chest.    COMPARISON:  Radiography 08/13/2020    FINDINGS:  Normal cardiac silhouette.  The lungs are well-inflated.  Central pulmonary vascular congestion.  No confluent consolidation appreciated.  Similar small area of scarring within the mid right  lung.  No significant pleural effusion or discernible pneumothorax.   Impression:       Central pulmonary vascular congestion.      Electronically signed by: Grayson Carrillo  Date: 01/05/2024  Time: 17:06       Assessment/Plan:   LORI on CKD IV secondary to acute infection and dehydration   UTI - E. Coli and Enterococcus. On Maxipime Day 4  Clinical dehydration resolved with IVF and oral hydration   T12 Compression fracture in October 2023 with resulting physical deconditioning     -Renal indices have returned to baseline with Cr 2.8 today  -Discontinue IVF and continue with oral intake.   -OK to discharge when ok by primary. She has an existing appointment with Dr Smith monthly, next appointment at the end of January. No need for additional appointment.     DEE Garrett  Nephrology  [facility] - 4th Floor Medical Telemetry

## 2024-01-08 NOTE — PROGRESS NOTES
[facility]  Hospital Medicine Progress Note        Chief Complaint: Inpatient Follow-up     HPI:   78-year-old female with medical history of CKD stage 4, previously was on temporary dialysis in 2020 through left upper extremity AV fistula subsequently stabilized at CKD stage 4 with baseline creatinine around 3.0, anemia of chronic disease, T2DM, HTN, HLD, atrial fibrillation on Eliquis, DDD and vertebral compression fracture/T12 recently on TLSO brace.     Present to the ED due to abnormal labs done at the nursing home showing worsening renal function.  Patient report noting decreased urination over the past week and bilateral lower extremity swelling.  Report she still having back pain and bilateral lower extremity weakness and mostly using wheelchair since her vertebral fracture.  Denies shortness of breath, orthopnea or PND.     On arrival to ED he was afebrile and hemodynamically stable.  Labs notable for WBC 15.75, hemoglobin 11.2, platelets 285, sodium 141, potassium 3.4, chloride 107, CO2 16, creatinine 5.59, BUN 90.4.  Retroperitoneal ultrasound showed no hydronephrosis, bladder unremarkable and not distended.  Chest x-ray show pulmonary vascular congestion.  Urinalysis suggestive of UTI.     She was given 500 mL normal saline bolus and started at 75 mL/hour and referred to hospital medicine service for further evaluation and management.       Interval Hx:  Patient states that she was feeling much better.  Denies any shortness a breath.  No metallic taste or confusion.  She was hoping that she can go back to the skilled nursing facility this morning.  We discussed the improvement in her creatinine and will wait to see what renal thinks this morning.    Objective/physical exam:  General: In no acute distress, afebrile  Chest: Clear to auscultation bilaterally  Heart: RRR, +S1, S2, no appreciable murmur  Abdomen: Soft, nontender, BS +  MSK: Warm, no lower extremity edema, no  clubbing or cyanosis  Neurologic: Alert and oriented x4, Cranial nerve II-XII intact, Strength 5/5 in all 4 extremities    VITAL SIGNS: 24 HRS MIN & MAX LAST   Temp  Min: 97.8 °F (36.6 °C)  Max: 98.5 °F (36.9 °C) 97.9 °F (36.6 °C)   BP  Min: 132/76  Max: 148/64 (!) 138/58   Pulse  Min: 62  Max: 80  62   Resp  Min: 18  Max: 20 18   SpO2  Min: 94 %  Max: 95 % (!) 94 %       Recent Labs   Lab 01/05/24  0500 01/05/24  1648 01/06/24  0507   WBC 9.26 15.75* 9.01   RBC 3.62* 3.56* 3.35*   HGB 11.6* 11.2* 10.5*   HCT 35.9* 33.8* 33.2*   MCV 99.2* 94.9* 99.1*   MCH 32.0* 31.5* 31.3*   MCHC 32.3* 33.1 31.6*   RDW 13.1 13.2 13.2    285 220   MPV 10.8* 10.4 10.2       Recent Labs   Lab 01/05/24  1648 01/05/24  2242 01/06/24  0507 01/07/24  0324 01/08/24  0355      < > 142 143 140   K 3.4*   < > 3.6 3.9 3.5   CO2 16*   < > 18* 23 25   BUN 90.4*   < > 85.3* 70.3* 54.7*   CREATININE 5.59*   < > 4.71* 3.65* 2.82*   CALCIUM 9.4   < > 8.7 8.2* 7.9*   MG 2.10  --   --  1.70 1.50*   ALBUMIN 3.0*  --  2.6* 2.3* 2.3*   ALKPHOS 126  --  101 100 86   ALT 20  --  16 15 14   AST 22  --  17 18 16   BILITOT 0.5  --  0.5 0.5 0.5    < > = values in this interval not displayed.          Microbiology Results (last 7 days)       ** No results found for the last 168 hours. **             Radiology:  US Retroperitoneal Complete  Narrative: EXAMINATION:  US RETROPERITONEAL COMPLETE    CLINICAL HISTORY:  Elevated BUN/creatinine;    COMPARISON:  None.    FINDINGS:  Grayscale and color Doppler sonographic evaluation of the kidneys and urinary bladder.    The right kidney measures 8 cm. The left kidney measures 7 cm.   No hydronephrosis.  There is heterogeneous renal parenchymal echogenicity.    Urinary bladder unremarkable.  Impression: 1. No hydronephrosis.  2. Suspect medical renal disease.    Electronically signed by: Bienvenido Loco  Date:    01/05/2024  Time:    20:02  X-Ray Chest AP Portable  Narrative: EXAMINATION:  XR CHEST AP  PORTABLE    CLINICAL HISTORY:  Peripheral edema;    TECHNIQUE:  Frontal view(s) of the chest.    COMPARISON:  Radiography 08/13/2020    FINDINGS:  Normal cardiac silhouette.  The lungs are well-inflated.  Central pulmonary vascular congestion.  No confluent consolidation appreciated.  Similar small area of scarring within the mid right lung.  No significant pleural effusion or discernible pneumothorax.  Impression: Central pulmonary vascular congestion.    Electronically signed by: Grayson Carrillo  Date:    01/05/2024  Time:    17:06      Scheduled Med:   allopurinoL  100 mg Oral Daily    amiodarone  200 mg Oral Daily    apixaban  5 mg Oral BID    aspirin  81 mg Oral Daily    atorvastatin  20 mg Oral Daily    buPROPion  150 mg Oral Daily    ceFEPime (MAXIPIME) IVPB  1 g Intravenous Q24H    docusate sodium  100 mg Oral BID    folic acid  1,000 mcg Oral Daily    gabapentin  100 mg Oral BID    metoprolol succinate  50 mg Oral BID    sodium bicarbonate  1,300 mg Oral Q8H        Continuous Infusions:   sodium chloride 0.9% 75 mL/hr at 01/08/24 0139        PRN Meds:  acetaminophen, aluminum-magnesium hydroxide-simethicone, dextrose 10%, dextrose 10%, glucagon (human recombinant), glucose, glucose, hydrALAZINE, insulin aspart U-100, melatonin, ondansetron, polyethylene glycol, prochlorperazine, senna-docusate 8.6-50 mg, sodium chloride 0.9%       Assessment/Plan:   LORI superimposed on CKD stage 4  Renal related volume overload  Acute UTI versus asymptomatic bacteriuria  Anemia of CKD  Hyperuricemia  Decreased mobility/due to vertebral fracture  Paroxysmal AFib-currently NSR  Hypertension  Hyperlipidemia  T2DM    Symptomatically the patient continues to make improvement.    She was currently on cefepime for UTI.  Urine culture is pending.  Currently growing E coli and Enterobacter.  She would previous cultures from 3 months ago that showed multiple organisms that were sensitive to cefepime.  Will continue with this for now  and wait for final culture results.  Will like to transitioned to oral soon.  Her renal function continues to improve.  Creatinine 2.82 today.    She was tolerating p.o..  Working with therapy.  Plan to returned back to SNF upon discharge.  Will follow up Nephrology recommendations on further treatment and urine cultures and start working on discharge planning      Peter Horton MD   01/08/2024     All diagnosis and differential diagnosis have been reviewed; assessment and plan has been documented; I have personally reviewed the labs and test results that are presently available; I have reviewed the patients medication list; I have reviewed the consulting providers response and recommendations. I have reviewed or attempted to review medical records based upon their availability    All of the patient's questions have been  addressed and answered. Patient's is agreeable to the above stated plan. I will continue to monitor closely and make adjustments to medical management as needed.  _____________________________________________________________________

## 2024-01-08 NOTE — PT/OT/SLP PROGRESS
Physical Therapy Treatment    Patient Name:  Debra Puentes   MRN:  [id number]    Recommendations:     Discharge therapy intensity: Moderate Intensity Therapy   Discharge Equipment Recommendations: to be determined by next level of care  Barriers to discharge: Impaired mobility    Assessment:     Debra Puentes is a 78 y.o. female admitted with a medical diagnosis of Acute kidney injury superimposed on CKD, volume overload, UTI, CKD, afib, recent T12 vertebral fx. .  She presents with the following impairments/functional limitations: weakness, impaired endurance, impaired self care skills, impaired functional mobility, gait instability, impaired balance, pain. Pt continues to c/o back pain and wants to enquire about a kyphoplasty from the doctor. Today, pt is still requiring Mod x2 assist for transfers, and was able to ambulate 10ft x 2. Slow progress. Pt states she wants to go back to the assistive living with sitter assistance.     Rehab Prognosis: Good; patient would benefit from acute skilled PT services to address these deficits and reach maximum level of function.    Recent Surgery: * No surgery found *      Plan:     During this hospitalization, patient to be seen 5 x/week to address the identified rehab impairments via gait training, therapeutic activities, therapeutic exercises and progress toward the following goals:    Plan of Care Expires:  02/07/24    Subjective     Chief Complaint: back pain  Patient/Family Comments/goals: to go back to the assistive living.   Pain/Comfort:  Location 1: back  Pain Addressed 1: Pre-medicate for activity, Reposition      Objective:     Communicated with nurse prior to session.  Patient found supine with telemetry, pulse ox (continuous), PureWick upon PT entry to room.     General Precautions: Standard,    Orthopedic Precautions: spinal precautions  Braces: LSO  Respiratory Status: Room air      Functional Mobility:  Bed Mobility:     Rolling Right: stand by  assistance  Supine to Sit: minimum assistance  Sit to Supine: moderate assistance  Transfers:     Sit to Stand:  moderate assistance and of 2 persons with rolling walker  Gait: 10ft x 2 with RW and Min A for steadying and safety. Pt with shaky legs.     Therapeutic Activities/Exercises:  Pt required extra time for bed mobility    Education:  Patient provided with verbal education education regarding discharge/DME recommendations.  Understanding was verbalized, however additional teaching warranted.     Patient left supine with all lines intact and call button in reach..    GOALS:   Multidisciplinary Problems       Physical Therapy Goals          Problem: Physical Therapy    Goal Priority Disciplines Outcome Goal Variances Interventions   Physical Therapy Goal     PT, PT/OT Ongoing, Progressing     Description: Goals to be met by: 2023     Patient will increase functional independence with mobility by performin. Supine to sit with Stand-by Assistance  2. Sit to stand transfer with Stand-by Assistance  3. Gait  x 150 feet with Stand-by Assistance using Rolling Walker.                          Time Tracking:     PT Received On: 24  PT Start Time: 1411     PT Stop Time: 1441  PT Total Time (min): 30 min     Billable Minutes: Therapeutic Activity 30    Treatment Type: Treatment  PT/PTA: PT     Number of PTA visits since last PT visit: 2024

## 2024-01-09 LAB
ANION GAP SERPL CALC-SCNC: 10 MEQ/L
BASOPHILS # BLD AUTO: 0.01 X10(3)/MCL
BASOPHILS NFR BLD AUTO: 0.1 %
BUN SERPL-MCNC: 46.2 MG/DL (ref 9.8–20.1)
CALCIUM SERPL-MCNC: 8.6 MG/DL (ref 8.4–10.2)
CHLORIDE SERPL-SCNC: 108 MMOL/L (ref 98–107)
CO2 SERPL-SCNC: 27 MMOL/L (ref 23–31)
CREAT SERPL-MCNC: 2.72 MG/DL (ref 0.55–1.02)
CREAT/UREA NIT SERPL: 17
EOSINOPHIL # BLD AUTO: 0.58 X10(3)/MCL (ref 0–0.9)
EOSINOPHIL NFR BLD AUTO: 8 %
ERYTHROCYTE [DISTWIDTH] IN BLOOD BY AUTOMATED COUNT: 12.7 % (ref 11.5–17)
GFR SERPLBLD CREATININE-BSD FMLA CKD-EPI: 17 MLS/MIN/1.73/M2
GLUCOSE SERPL-MCNC: 96 MG/DL (ref 82–115)
HCT VFR BLD AUTO: 32.3 % (ref 37–47)
HGB BLD-MCNC: 10.5 G/DL (ref 12–16)
IMM GRANULOCYTES # BLD AUTO: 0.03 X10(3)/MCL (ref 0–0.04)
IMM GRANULOCYTES NFR BLD AUTO: 0.4 %
LYMPHOCYTES # BLD AUTO: 1.36 X10(3)/MCL (ref 0.6–4.6)
LYMPHOCYTES NFR BLD AUTO: 18.9 %
MCH RBC QN AUTO: 31.3 PG (ref 27–31)
MCHC RBC AUTO-ENTMCNC: 32.5 G/DL (ref 33–36)
MCV RBC AUTO: 96.1 FL (ref 80–94)
MONOCYTES # BLD AUTO: 0.78 X10(3)/MCL (ref 0.1–1.3)
MONOCYTES NFR BLD AUTO: 10.8 %
NEUTROPHILS # BLD AUTO: 4.45 X10(3)/MCL (ref 2.1–9.2)
NEUTROPHILS NFR BLD AUTO: 61.8 %
NRBC BLD AUTO-RTO: 0 %
PLATELET # BLD AUTO: 205 X10(3)/MCL (ref 130–400)
PMV BLD AUTO: 10.5 FL (ref 7.4–10.4)
POCT GLUCOSE: 105 MG/DL (ref 70–110)
POCT GLUCOSE: 149 MG/DL (ref 70–110)
POCT GLUCOSE: 227 MG/DL (ref 70–110)
POTASSIUM SERPL-SCNC: 3.4 MMOL/L (ref 3.5–5.1)
RBC # BLD AUTO: 3.36 X10(6)/MCL (ref 4.2–5.4)
SODIUM SERPL-SCNC: 145 MMOL/L (ref 136–145)
WBC # SPEC AUTO: 7.21 X10(3)/MCL (ref 4.5–11.5)

## 2024-01-09 PROCEDURE — 80048 BASIC METABOLIC PNL TOTAL CA: CPT | Performed by: HOSPITALIST

## 2024-01-09 PROCEDURE — 25000003 PHARM REV CODE 250: Performed by: INTERNAL MEDICINE

## 2024-01-09 PROCEDURE — 11000001 HC ACUTE MED/SURG PRIVATE ROOM

## 2024-01-09 PROCEDURE — 85025 COMPLETE CBC W/AUTO DIFF WBC: CPT | Performed by: HOSPITALIST

## 2024-01-09 PROCEDURE — 63600175 PHARM REV CODE 636 W HCPCS: Performed by: INTERNAL MEDICINE

## 2024-01-09 PROCEDURE — 21400001 HC TELEMETRY ROOM

## 2024-01-09 PROCEDURE — 25000003 PHARM REV CODE 250: Performed by: HOSPITALIST

## 2024-01-09 PROCEDURE — 97530 THERAPEUTIC ACTIVITIES: CPT

## 2024-01-09 RX ORDER — METHOCARBAMOL 500 MG/1
500 TABLET, FILM COATED ORAL 4 TIMES DAILY
Qty: 40 TABLET | Refills: 0 | Status: SHIPPED | OUTPATIENT
Start: 2024-01-09 | End: 2024-01-16 | Stop reason: HOSPADM

## 2024-01-09 RX ORDER — CIPROFLOXACIN 500 MG/1
500 TABLET ORAL EVERY 12 HOURS
Status: DISCONTINUED | OUTPATIENT
Start: 2024-01-09 | End: 2024-01-10

## 2024-01-09 RX ORDER — CIPROFLOXACIN 500 MG/1
500 TABLET ORAL EVERY 12 HOURS
Qty: 20 TABLET | Refills: 0 | Status: SHIPPED | OUTPATIENT
Start: 2024-01-09 | End: 2024-01-16 | Stop reason: HOSPADM

## 2024-01-09 RX ADMIN — ALLOPURINOL 100 MG: 100 TABLET ORAL at 08:01

## 2024-01-09 RX ADMIN — DOCUSATE SODIUM 100 MG: 100 CAPSULE, LIQUID FILLED ORAL at 08:01

## 2024-01-09 RX ADMIN — GABAPENTIN 100 MG: 100 CAPSULE ORAL at 08:01

## 2024-01-09 RX ADMIN — AMIODARONE HYDROCHLORIDE 200 MG: 200 TABLET ORAL at 08:01

## 2024-01-09 RX ADMIN — ATORVASTATIN CALCIUM 20 MG: 10 TABLET, FILM COATED ORAL at 08:01

## 2024-01-09 RX ADMIN — METHOCARBAMOL 500 MG: 500 TABLET ORAL at 05:01

## 2024-01-09 RX ADMIN — CIPROFLOXACIN HYDROCHLORIDE 500 MG: 500 TABLET, FILM COATED ORAL at 08:01

## 2024-01-09 RX ADMIN — ASPIRIN 81 MG: 81 TABLET, COATED ORAL at 08:01

## 2024-01-09 RX ADMIN — SODIUM BICARBONATE 650 MG TABLET 1300 MG: at 01:01

## 2024-01-09 RX ADMIN — SODIUM BICARBONATE 650 MG TABLET 1300 MG: at 09:01

## 2024-01-09 RX ADMIN — CIPROFLOXACIN HYDROCHLORIDE 500 MG: 500 TABLET, FILM COATED ORAL at 09:01

## 2024-01-09 RX ADMIN — METOPROLOL SUCCINATE 50 MG: 50 TABLET, EXTENDED RELEASE ORAL at 08:01

## 2024-01-09 RX ADMIN — METHOCARBAMOL 500 MG: 500 TABLET ORAL at 08:01

## 2024-01-09 RX ADMIN — SODIUM BICARBONATE 650 MG TABLET 1300 MG: at 06:01

## 2024-01-09 RX ADMIN — APIXABAN 5 MG: 5 TABLET, FILM COATED ORAL at 09:01

## 2024-01-09 RX ADMIN — METHOCARBAMOL 500 MG: 500 TABLET ORAL at 01:01

## 2024-01-09 RX ADMIN — METHOCARBAMOL 500 MG: 500 TABLET ORAL at 09:01

## 2024-01-09 RX ADMIN — BUPROPION 150 MG: 150 TABLET, EXTENDED RELEASE ORAL at 08:01

## 2024-01-09 RX ADMIN — INSULIN ASPART 2 UNITS: 100 INJECTION, SOLUTION INTRAVENOUS; SUBCUTANEOUS at 11:01

## 2024-01-09 RX ADMIN — GABAPENTIN 100 MG: 100 CAPSULE ORAL at 09:01

## 2024-01-09 RX ADMIN — FOLIC ACID 1000 MCG: 1 TABLET ORAL at 08:01

## 2024-01-09 RX ADMIN — APIXABAN 5 MG: 5 TABLET, FILM COATED ORAL at 08:01

## 2024-01-09 NOTE — PT/OT/SLP PROGRESS
Physical Therapy Treatment    Patient Name:  Debra Puentes   MRN:  [id number]    Recommendations:     Discharge therapy intensity: Moderate Intensity Therapy   Discharge Equipment Recommendations: to be determined by next level of care  Barriers to discharge: Impaired mobility    Assessment:     Debra Puentes is a 78 y.o. female admitted with a medical diagnosis of Acute kidney injury superimposed on CKD.  She presents with the following impairments/functional limitations: weakness, impaired endurance, impaired self care skills, impaired functional mobility, gait instability, impaired balance, pain.    Rehab Prognosis: Good; patient would benefit from acute skilled PT services to address these deficits and reach maximum level of function.    Recent Surgery: * No surgery found *      Plan:     During this hospitalization, patient to be seen 5 x/week to address the identified rehab impairments via gait training, therapeutic activities, therapeutic exercises and progress toward the following goals:    Plan of Care Expires:  02/07/24    Subjective     Chief Complaint: none  Patient/Family Comments/goals: to get stronger  Pain/Comfort:  Pain Rating 1: 0/10      Objective:     Communicated with nurse prior to session.  Patient found supine with telemetry, pulse ox (continuous), PureWick upon PT entry to room.     General Precautions: Standard,    Orthopedic Precautions: spinal precautions  Braces: LSO  Respiratory Status: Room air      Functional Mobility:  Bed Mobility:     Scooting: contact guard assistance  Supine to Sit: minimum assistance  Sit to Supine: moderate assistance  Transfers:     Sit to Stand:  moderate assistance with rolling walker  Gait: 4 side steps along EOB w  RW and Min A.     Therapeutic Activities/Exercises:  Performed sit<>stand 4 times from EOB w RW. Mod A x1.    Education:  Patient provided with verbal education education regarding PT role/goals/POC.  Understanding was verbalized.      Patient left supine with all lines intact and call button in reach..    GOALS:   Multidisciplinary Problems       Physical Therapy Goals          Problem: Physical Therapy    Goal Priority Disciplines Outcome Goal Variances Interventions   Physical Therapy Goal     PT, PT/OT Ongoing, Progressing     Description: Goals to be met by: 2023     Patient will increase functional independence with mobility by performin. Supine to sit with Stand-by Assistance  2. Sit to stand transfer with Stand-by Assistance  3. Gait  x 150 feet with Stand-by Assistance using Rolling Walker.                          Time Tracking:     PT Received On: 24  PT Start Time: 1434     PT Stop Time: 1500  PT Total Time (min): 26 min     Billable Minutes: Therapeutic Activity 26    Treatment Type: Treatment  PT/PTA: PT     Number of PTA visits since last PT visit: 2     2024

## 2024-01-09 NOTE — PROGRESS NOTES
[facility] - 4th Floor Medical Telemetry  Nephrology  Progress Note    Patient Name: Debra Puentes  MRN: [id number]  Admission Date: 1/5/2024  Hospital Length of Stay: 4 days  Attending Provider: Peter Horton MD   Primary Care Physician: Logan Smith MD  Principal Problem:Acute kidney injury superimposed on CKD      Subjective:   Debra Puentes is a 78 y.o. well known to Dr Smith with a history of chronic kidney disease stage IV, baseline Cr 2.6-2.8. She has a history of dialysis dependence in 2020 and sees Dr Smith monthly.  Patient is currently a resident of [facility] post T12 compression fracture suffered at end of October 2023. Prior to incident she was able to complete ADL and was in assisted living.     Patient was sent to ED from [facility] due to abnormal labs indicative of LORI. Initial BUN 90/ Cr 5.18/ GFR 8. Urine culture positive for both E Coli and Enterobacter on day #4 of Maxipime. She has difficulty getting reliable access to water at the NH.     Interval History:   Renal indices back to baseline with treatment of UTI and IV hydration. Her complaint is today back pain.     1/9 - renal indices stable off IV hydration. Endorses back pain.     Review of patient's allergies indicates:   Allergen Reactions    Lactose Diarrhea    Codeine Other (See Comments) and Nausea And Vomiting     Current Facility-Administered Medications   Medication Frequency    acetaminophen tablet 650 mg Q4H PRN    allopurinoL tablet 100 mg Daily    aluminum-magnesium hydroxide-simethicone 200-200-20 mg/5 mL suspension 30 mL QID PRN    amiodarone tablet 200 mg Daily    apixaban tablet 5 mg BID    aspirin EC tablet 81 mg Daily    atorvastatin tablet 20 mg Daily    buPROPion TB24 tablet 150 mg Daily    ciprofloxacin HCl tablet 500 mg Q12H    dextrose 10% bolus 125 mL 125 mL PRN    dextrose 10% bolus 250 mL 250 mL PRN    docusate sodium capsule 100 mg BID    folic acid tablet 1,000 mcg Daily     gabapentin capsule 100 mg BID    glucagon (human recombinant) injection 1 mg PRN    glucose chewable tablet 16 g PRN    glucose chewable tablet 24 g PRN    hydrALAZINE injection 10 mg Q2H PRN    insulin aspart U-100 injection 0-5 Units QID (AC + HS) PRN    melatonin tablet 6 mg Nightly PRN    methocarbamoL tablet 500 mg QID    metoprolol succinate (TOPROL-XL) 24 hr tablet 50 mg BID    ondansetron injection 4 mg Q4H PRN    polyethylene glycol packet 17 g BID PRN    prochlorperazine injection Soln 5 mg Q6H PRN    senna-docusate 8.6-50 mg per tablet 2 tablet BID PRN    sodium bicarbonate tablet 1,300 mg Q8H    sodium chloride 0.9% flush 10 mL PRN       Objective:     Vital Signs (Most Recent):  Temp: 98.3 °F (36.8 °C) (01/09/24 0752)  Pulse: (!) 53 (01/09/24 0752)  Resp: 20 (01/09/24 0345)  BP: 138/66 (01/09/24 0752)  SpO2: 96 % (01/09/24 0345) Vital Signs (24h Range):  Temp:  [97.8 °F (36.6 °C)-98.6 °F (37 °C)] 98.3 °F (36.8 °C)  Pulse:  [53-64] 53  Resp:  [18-20] 20  SpO2:  [94 %-98 %] 96 %  BP: (130-159)/(62-79) 138/66     Weight: 96.5 kg (212 lb 11.9 oz) (01/08/24 0546)  Body mass index is 32.35 kg/m².  Body surface area is 2.15 meters squared.    I/O last 3 completed shifts:  In: 2826.7 [P.O.:1478; I.V.:1249.5; IV Piggyback:99.2]  Out: 2550 [Urine:2550]    Physical Exam  Constitutional:       General: She is not in acute distress.     Appearance: She is obese.   HENT:      Head: Atraumatic.      Mouth/Throat:      Mouth: Mucous membranes are moist.   Eyes:      Extraocular Movements: Extraocular movements intact.      Conjunctiva/sclera: Conjunctivae normal.   Cardiovascular:      Rate and Rhythm: Normal rate and regular rhythm.      Pulses: Normal pulses.      Heart sounds: Normal heart sounds.   Pulmonary:      Effort: Pulmonary effort is normal.      Breath sounds: Normal breath sounds.   Abdominal:      General: There is no distension.      Palpations: Abdomen is soft.   Musculoskeletal:         General: No  swelling.      Cervical back: Neck supple.   Skin:     General: Skin is warm.   Neurological:      Mental Status: She is alert and oriented to person, place, and time.   Psychiatric:         Mood and Affect: Mood normal.         Behavior: Behavior normal.         Significant Labs:sureBMP:   Recent Labs   Lab 01/08/24  0355 01/09/24  0358    145   K 3.5 3.4*   CO2 25 27   BUN 54.7* 46.2*   CREATININE 2.82* 2.72*   CALCIUM 7.9* 8.6   MG 1.50*  --        CBC:   Recent Labs   Lab 01/09/24  0358   WBC 7.21   RBC 3.36*   HGB 10.5*   HCT 32.3*      MCV 96.1*   MCH 31.3*   MCHC 32.5*       Microbiology Results (last 7 days)       ** No results found for the last 168 hours. **          Specimen (24h ago, onward)      None          Recent Labs   Lab 01/05/24  0500   PROTEINUA Negative   BACTERIA Many*   LEUKOCYTESUR Moderate*   UROBILINOGEN 0.2         Significant Imaging:  US Retroperitoneal Complete [[id number]] Resulted: 01/05/24 2002   Order Status: Completed Updated: 01/05/24 2004   Narrative:     EXAMINATION:  US RETROPERITONEAL COMPLETE    CLINICAL HISTORY:  Elevated BUN/creatinine;    COMPARISON:  None.    FINDINGS:  Grayscale and color Doppler sonographic evaluation of the kidneys and urinary bladder.    The right kidney measures 8 cm. The left kidney measures 7 cm.   No hydronephrosis.  There is heterogeneous renal parenchymal echogenicity.    Urinary bladder unremarkable.   Impression:       1. No hydronephrosis.  2. Suspect medical renal disease.      Electronically signed by: Bienvenido Loco  Date: 01/05/2024     X-Ray Chest AP Portable [[id number]] Resulted: 01/05/24 1706   Order Status: Completed Updated: 01/05/24 1709   Narrative:     EXAMINATION:  XR CHEST AP PORTABLE    CLINICAL HISTORY:  Peripheral edema;    TECHNIQUE:  Frontal view(s) of the chest.    COMPARISON:  Radiography 08/13/2020    FINDINGS:  Normal cardiac silhouette.  The lungs are well-inflated.  Central pulmonary vascular congestion.   No confluent consolidation appreciated.  Similar small area of scarring within the mid right lung.  No significant pleural effusion or discernible pneumothorax.   Impression:       Central pulmonary vascular congestion.      Electronically signed by: Grayson Carrillo  Date: 01/05/2024  Time: 17:06       Assessment/Plan:   LORI on CKD IV secondary to acute infection and dehydration   UTI - E. Coli and Enterococcus. On Maxipime Day 4  Clinical dehydration resolved with IVF and oral hydration   T12 Compression fracture in October 2023 with resulting physical deconditioning       -OK to discharge from renal standpoint. She has an existing appointment with Dr Smith monthly, next appointment at the end of January. No need for additional appointment.     DEE Garrett  Nephrology  [facility] - 4th Floor Medical Telemetry

## 2024-01-09 NOTE — PROGRESS NOTES
[facility]  Hospital Medicine Progress Note        Chief Complaint: Inpatient Follow-up     HPI:   78-year-old female with medical history of CKD stage 4, previously was on temporary dialysis in 2020 through left upper extremity AV fistula subsequently stabilized at CKD stage 4 with baseline creatinine around 3.0, anemia of chronic disease, T2DM, HTN, HLD, atrial fibrillation on Eliquis, DDD and vertebral compression fracture/T12 recently on TLSO brace.     Present to the ED due to abnormal labs done at the nursing home showing worsening renal function.  Patient report noting decreased urination over the past week and bilateral lower extremity swelling.  Report she still having back pain and bilateral lower extremity weakness and mostly using wheelchair since her vertebral fracture.  Denies shortness of breath, orthopnea or PND.     On arrival to ED he was afebrile and hemodynamically stable.  Labs notable for WBC 15.75, hemoglobin 11.2, platelets 285, sodium 141, potassium 3.4, chloride 107, CO2 16, creatinine 5.59, BUN 90.4.  Retroperitoneal ultrasound showed no hydronephrosis, bladder unremarkable and not distended.  Chest x-ray show pulmonary vascular congestion.  Urinalysis suggestive of UTI.     She was given 500 mL normal saline bolus and started at 75 mL/hour and referred to hospital medicine service for further evaluation and management.        Interval Hx:  Attempted to send patient back to assisted living but requiring too much assistance for facility.  Now looking into SNF placement.  IV antibioticcs discontinued. Now on oral Cipro.      Objective/physical exam:  General: In no acute distress, afebrile  Chest: Clear to auscultation bilaterally  Heart: RRR, +S1, S2, no appreciable murmur  Abdomen: Soft, nontender, BS +  MSK: Warm, no lower extremity edema, no clubbing or cyanosis  Neurologic: Alert and oriented x4, Cranial nerve II-XII intact, Strength 5/5 in all 4  extremities    VITAL SIGNS: 24 HRS MIN & MAX LAST   Temp  Min: 97.7 °F (36.5 °C)  Max: 98.4 °F (36.9 °C) 97.7 °F (36.5 °C)   BP  Min: 130/72  Max: 155/76 (!) 147/76   Pulse  Min: 53  Max: 64  (!) 56   Resp  Min: 18  Max: 20 20   SpO2  Min: 94 %  Max: 97 % 96 %       Recent Labs   Lab 01/05/24 1648 01/06/24  0507 01/09/24  0358   WBC 15.75* 9.01 7.21   RBC 3.56* 3.35* 3.36*   HGB 11.2* 10.5* 10.5*   HCT 33.8* 33.2* 32.3*   MCV 94.9* 99.1* 96.1*   MCH 31.5* 31.3* 31.3*   MCHC 33.1 31.6* 32.5*   RDW 13.2 13.2 12.7    220 205   MPV 10.4 10.2 10.5*       Recent Labs   Lab 01/05/24 1648 01/05/24 2242 01/06/24  0507 01/07/24  0324 01/08/24  0355 01/09/24  0358      < > 142 143 140 145   K 3.4*   < > 3.6 3.9 3.5 3.4*   CO2 16*   < > 18* 23 25 27   BUN 90.4*   < > 85.3* 70.3* 54.7* 46.2*   CREATININE 5.59*   < > 4.71* 3.65* 2.82* 2.72*   CALCIUM 9.4   < > 8.7 8.2* 7.9* 8.6   MG 2.10  --   --  1.70 1.50*  --    ALBUMIN 3.0*  --  2.6* 2.3* 2.3*  --    ALKPHOS 126  --  101 100 86  --    ALT 20  --  16 15 14  --    AST 22  --  17 18 16  --    BILITOT 0.5  --  0.5 0.5 0.5  --     < > = values in this interval not displayed.          Microbiology Results (last 7 days)       ** No results found for the last 168 hours. **             Radiology:  US Retroperitoneal Complete  Narrative: EXAMINATION:  US RETROPERITONEAL COMPLETE    CLINICAL HISTORY:  Elevated BUN/creatinine;    COMPARISON:  None.    FINDINGS:  Grayscale and color Doppler sonographic evaluation of the kidneys and urinary bladder.    The right kidney measures 8 cm. The left kidney measures 7 cm.   No hydronephrosis.  There is heterogeneous renal parenchymal echogenicity.    Urinary bladder unremarkable.  Impression: 1. No hydronephrosis.  2. Suspect medical renal disease.    Electronically signed by: Bienvenido Loco  Date:    01/05/2024  Time:    20:02  X-Ray Chest AP Portable  Narrative: EXAMINATION:  XR CHEST AP PORTABLE    CLINICAL HISTORY:  Peripheral  edema;    TECHNIQUE:  Frontal view(s) of the chest.    COMPARISON:  Radiography 08/13/2020    FINDINGS:  Normal cardiac silhouette.  The lungs are well-inflated.  Central pulmonary vascular congestion.  No confluent consolidation appreciated.  Similar small area of scarring within the mid right lung.  No significant pleural effusion or discernible pneumothorax.  Impression: Central pulmonary vascular congestion.    Electronically signed by: Grayson Carrillo  Date:    01/05/2024  Time:    17:06      Scheduled Med:   allopurinoL  100 mg Oral Daily    amiodarone  200 mg Oral Daily    apixaban  5 mg Oral BID    aspirin  81 mg Oral Daily    atorvastatin  20 mg Oral Daily    buPROPion  150 mg Oral Daily    ciprofloxacin HCl  500 mg Oral Q12H    docusate sodium  100 mg Oral BID    folic acid  1,000 mcg Oral Daily    gabapentin  100 mg Oral BID    methocarbamoL  500 mg Oral QID    metoprolol succinate  50 mg Oral BID    sodium bicarbonate  1,300 mg Oral Q8H        Continuous Infusions:       PRN Meds:  acetaminophen, aluminum-magnesium hydroxide-simethicone, dextrose 10%, dextrose 10%, glucagon (human recombinant), glucose, glucose, hydrALAZINE, insulin aspart U-100, melatonin, ondansetron, polyethylene glycol, prochlorperazine, senna-docusate 8.6-50 mg, sodium chloride 0.9%       Assessment/Plan:   LORI superimposed on CKD stage 4  Renal related volume overload  Acute UTI versus asymptomatic bacteriuria  Anemia of CKD  Hyperuricemia  Decreased mobility/due to vertebral fracture  Paroxysmal AFib-currently NSR  Hypertension  Hyperlipidemia  T2DM    Renal function stable, nephrology has signed off  Urine culture with E.coli and Enterobacter.  Cefepime discontinued and started on Cipro  PT/OT following.  Rec moderate intensity therapy.  No longer candidate for assisted living.  CM looking into SNF placement      Peter Horton MD   01/09/2024     All diagnosis and differential diagnosis have been reviewed; assessment and plan has  been documented; I have personally reviewed the labs and test results that are presently available; I have reviewed the patients medication list; I have reviewed the consulting providers response and recommendations. I have reviewed or attempted to review medical records based upon their availability    All of the patient's questions have been  addressed and answered. Patient's is agreeable to the above stated plan. I will continue to monitor closely and make adjustments to medical management as needed.  _____________________________________________________________________

## 2024-01-09 NOTE — PLAN OF CARE
01/09/24 1605   Discharge Reassessment   Assessment Type Discharge Planning Reassessment   Did the patient's condition or plan change since previous assessment? Yes   Discharge Plan discussed with: Patient   Communicated FRAN with patient/caregiver Date not available/Unable to determine   Discharge Plan A Skilled Nursing Facility   Discharge Plan B Skilled Nursing Facility   Post-Acute Status   Post-Acute Authorization Placement   Post-Acute Placement Status Referrals Sent     [facility]. She informed me that patient is not appropriate to return and will need skilled nursing facility. I did discuss this with patient and she is in agreement. Freedom of choice obtained. Referral sent to [facility].

## 2024-01-09 NOTE — DISCHARGE SUMMARY
[facility]  Hospital Medicine Discharge Summary    Admit Date: 1/5/2024  Discharge Date and Time: 1/9/20246:45 AM  Admitting Physician: Hospitalist team   Discharging Physician: Peter Horton MD.  Primary Care Physician: Logan Smith MD      Discharge Diagnoses:  LORI superimposed on CKD stage 4  Renal related volume overload  Acute UTI versus asymptomatic bacteriuria  Anemia of CKD  Hyperuricemia  Decreased mobility/due to vertebral fracture  Paroxysmal AFib-currently NSR  Hypertension  Hyperlipidemia  T2DM    Hospital Course:   78-year-old female with medical history of CKD stage 4, previously was on temporary dialysis in 2020 through left upper extremity AV fistula subsequently stabilized at CKD stage 4 with baseline creatinine around 3.0, anemia of chronic disease, T2DM, HTN, HLD, atrial fibrillation on Eliquis, DDD and vertebral compression fracture/T12 recently on TLSO brace.     Present to the ED due to abnormal labs done at the nursing home showing worsening renal function.  Patient report noting decreased urination over the past week and bilateral lower extremity swelling.  Report she still having back pain and bilateral lower extremity weakness and mostly using wheelchair since her vertebral fracture.  Denies shortness of breath, orthopnea or PND.     On arrival to ED he was afebrile and hemodynamically stable.  Labs notable for WBC 15.75, hemoglobin 11.2, platelets 285, sodium 141, potassium 3.4, chloride 107, CO2 16, creatinine 5.59, BUN 90.4.  Retroperitoneal ultrasound showed no hydronephrosis, bladder unremarkable and not distended.  Chest x-ray show pulmonary vascular congestion.  Urinalysis suggestive of UTI.     She was given 500 mL normal saline bolus and started at 75 mL/hour and referred to hospital medicine service for further evaluation and management.  Urine cultures returned with E coli and Enterobacter, both of which were sensitive to ciprofloxacin.   "Cefepime was discontinued and she was started on oral Cipro prior to discharge.  Will complete a total of 14 days.  Nephrology continues to follow along as well.  Renal function improved.  The recommend follow up in clinic and making sure she was adequate oral intake to maintain hydration.  Patient reports that she does not get enough therapy at assisted living.  Will see if case management can assist with this.  Physical therapy did recommend moderate intensity therapy as an outpatient.       Vitals:  Blood pressure (!) 155/76, pulse (!) 58, temperature 98.4 °F (36.9 °C), temperature source Oral, resp. rate 20, height 5' 8" (1.727 m), weight 96.5 kg (212 lb 11.9 oz), SpO2 96 %..    Physical Exam:  Awake, Alert, Oriented x 3, No new focal Neurologic deficit, Normal Affect  NC/AT, PERRLA, EOMI  Supple neck, no JVD, No cervical lymphadenopathy  Symmetrical chest, Good air entry bilaterally. No rhonchi, wheezes, crackles appreciated  RRR, No gallop, rub or murmur  +ve Bowel sounds x4, Abd soft and non tender, no rebound, guarding or rigidity  No Cyanosis, cludding or edema, No new rash or bruises    Procedures Performed: No admission procedures for hospital encounter.     Significant Diagnostic Studies: See Full reports for all details  Admission on 01/05/2024   Component Date Value    Sodium Level 01/05/2024 141     Potassium Level 01/05/2024 3.4 (L)     Chloride 01/05/2024 107     Carbon Dioxide 01/05/2024 16 (L)     Glucose Level 01/05/2024 147 (H)     Blood Urea Nitrogen 01/05/2024 90.4 (H)     Creatinine 01/05/2024 5.59 (H)     Calcium Level Total 01/05/2024 9.4     Protein Total 01/05/2024 6.4     Albumin Level 01/05/2024 3.0 (L)     Globulin 01/05/2024 3.4     Albumin/Globulin Ratio 01/05/2024 0.9 (L)     Bilirubin Total 01/05/2024 0.5     Alkaline Phosphatase 01/05/2024 126     Alanine Aminotransferase 01/05/2024 20     Aspartate Aminotransfera* 01/05/2024 22     eGFR 01/05/2024 7     Magnesium Level 01/05/2024 " 2.10     WBC 01/05/2024 15.75 (H)     RBC 01/05/2024 3.56 (L)     Hgb 01/05/2024 11.2 (L)     Hct 01/05/2024 33.8 (L)     MCV 01/05/2024 94.9 (H)     MCH 01/05/2024 31.5 (H)     MCHC 01/05/2024 33.1     RDW 01/05/2024 13.2     Platelet 01/05/2024 285     MPV 01/05/2024 10.4     Neut % 01/05/2024 93.7     Lymph % 01/05/2024 1.5     Mono % 01/05/2024 3.8     Eos % 01/05/2024 0.3     Basophil % 01/05/2024 0.1     Lymph # 01/05/2024 0.24 (L)     Neut # 01/05/2024 14.75 (H)     Mono # 01/05/2024 0.60     Eos # 01/05/2024 0.05     Baso # 01/05/2024 0.02     IG# 01/05/2024 0.09 (H)     IG% 01/05/2024 0.6     NRBC% 01/05/2024 0.0     Urine Sodium 01/05/2024 42.0     Urine Urea Nitrogen 01/05/2024 444.0     Urine Protein Level 01/05/2024 11.8     Urine Creatinine 01/05/2024 86.7     Urine Protein/Creatinine* 01/05/2024 0.1     Creatine Kinase 01/05/2024 67     Vit D 25 OH 01/05/2024 50.1     Parathyroid Hormone Inta* 01/05/2024 145.9 (H)     Hemoglobin A1c 01/05/2024 5.6     Estimated Average Glucose 01/05/2024 114.0     Sed Rate 01/05/2024 33 (H)     C-Reactive Protein 01/05/2024 69.10 (H)     Uric Acid 01/05/2024 11.5 (H)     Sodium Level 01/05/2024 141     Potassium Level 01/05/2024 3.8     Chloride 01/05/2024 110 (H)     Carbon Dioxide 01/05/2024 17 (L)     Glucose Level 01/05/2024 143 (H)     Blood Urea Nitrogen 01/05/2024 90.7 (H)     Creatinine 01/05/2024 5.18 (H)     BUN/Creatinine Ratio 01/05/2024 18     Calcium Level Total 01/05/2024 8.7     Anion Gap 01/05/2024 14.0     eGFR 01/05/2024 8     Iron Binding Capacity Un* 01/05/2024 83     Iron Level 01/05/2024 30 (L)     Transferrin 01/05/2024 107 (L)     Iron Binding Capacity To* 01/05/2024 113 (L)     Iron Saturation 01/05/2024 27     Ferritin Level 01/05/2024 1,906.26 (H)     Vitamin B12 Level 01/05/2024 1,297 (H)     Folate Level 01/05/2024 16.1     Natriuretic Peptide 01/05/2024 104.2 (H)     Phosphorus Level 01/06/2024 5.2 (H)     WBC 01/06/2024 9.01     RBC  01/06/2024 3.35 (L)     Hgb 01/06/2024 10.5 (L)     Hct 01/06/2024 33.2 (L)     MCV 01/06/2024 99.1 (H)     MCH 01/06/2024 31.3 (H)     MCHC 01/06/2024 31.6 (L)     RDW 01/06/2024 13.2     Platelet 01/06/2024 220     MPV 01/06/2024 10.2     NRBC% 01/06/2024 0.0     Sodium Level 01/06/2024 142     Potassium Level 01/06/2024 3.6     Chloride 01/06/2024 111 (H)     Carbon Dioxide 01/06/2024 18 (L)     Glucose Level 01/06/2024 79 (L)     Blood Urea Nitrogen 01/06/2024 85.3 (H)     Creatinine 01/06/2024 4.71 (H)     Calcium Level Total 01/06/2024 8.7     Protein Total 01/06/2024 5.4 (L)     Albumin Level 01/06/2024 2.6 (L)     Globulin 01/06/2024 2.8     Albumin/Globulin Ratio 01/06/2024 0.9 (L)     Bilirubin Total 01/06/2024 0.5     Alkaline Phosphatase 01/06/2024 101     Alanine Aminotransferase 01/06/2024 16     Aspartate Aminotransfera* 01/06/2024 17     eGFR 01/06/2024 9     POCT Glucose 01/06/2024 89     POCT Glucose 01/06/2024 107     POCT Glucose 01/06/2024 138 (H)     POCT Glucose 01/06/2024 115 (H)     Sodium Level 01/07/2024 143     Potassium Level 01/07/2024 3.9     Chloride 01/07/2024 108 (H)     Carbon Dioxide 01/07/2024 23     Glucose Level 01/07/2024 79 (L)     Blood Urea Nitrogen 01/07/2024 70.3 (H)     Creatinine 01/07/2024 3.65 (H)     Calcium Level Total 01/07/2024 8.2 (L)     Protein Total 01/07/2024 4.8 (L)     Albumin Level 01/07/2024 2.3 (L)     Globulin 01/07/2024 2.5     Albumin/Globulin Ratio 01/07/2024 0.9 (L)     Bilirubin Total 01/07/2024 0.5     Alkaline Phosphatase 01/07/2024 100     Alanine Aminotransferase 01/07/2024 15     Aspartate Aminotransfera* 01/07/2024 18     eGFR 01/07/2024 12     Magnesium Level 01/07/2024 1.70     POCT Glucose 01/07/2024 83     POCT Glucose 01/07/2024 106     POCT Glucose 01/07/2024 216 (H)     POCT Glucose 01/07/2024 178 (H)     Sodium Level 01/08/2024 140     Potassium Level 01/08/2024 3.5     Chloride 01/08/2024 107     Carbon Dioxide 01/08/2024 25      Glucose Level 01/08/2024 91     Blood Urea Nitrogen 01/08/2024 54.7 (H)     Creatinine 01/08/2024 2.82 (H)     Calcium Level Total 01/08/2024 7.9 (L)     Protein Total 01/08/2024 4.6 (L)     Albumin Level 01/08/2024 2.3 (L)     Globulin 01/08/2024 2.3 (L)     Albumin/Globulin Ratio 01/08/2024 1.0 (L)     Bilirubin Total 01/08/2024 0.5     Alkaline Phosphatase 01/08/2024 86     Alanine Aminotransferase 01/08/2024 14     Aspartate Aminotransfera* 01/08/2024 16     eGFR 01/08/2024 17     Magnesium Level 01/08/2024 1.50 (L)     POCT Glucose 01/08/2024 140 (H)     POCT Glucose 01/08/2024 150 (H)     POCT Glucose 01/08/2024 181 (H)     Sodium Level 01/09/2024 145     Potassium Level 01/09/2024 3.4 (L)     Chloride 01/09/2024 108 (H)     Carbon Dioxide 01/09/2024 27     Glucose Level 01/09/2024 96     Blood Urea Nitrogen 01/09/2024 46.2 (H)     Creatinine 01/09/2024 2.72 (H)     BUN/Creatinine Ratio 01/09/2024 17     Calcium Level Total 01/09/2024 8.6     Anion Gap 01/09/2024 10.0     eGFR 01/09/2024 17     WBC 01/09/2024 7.21     RBC 01/09/2024 3.36 (L)     Hgb 01/09/2024 10.5 (L)     Hct 01/09/2024 32.3 (L)     MCV 01/09/2024 96.1 (H)     MCH 01/09/2024 31.3 (H)     MCHC 01/09/2024 32.5 (L)     RDW 01/09/2024 12.7     Platelet 01/09/2024 205     MPV 01/09/2024 10.5 (H)     Neut % 01/09/2024 61.8     Lymph % 01/09/2024 18.9     Mono % 01/09/2024 10.8     Eos % 01/09/2024 8.0     Basophil % 01/09/2024 0.1     Lymph # 01/09/2024 1.36     Neut # 01/09/2024 4.45     Mono # 01/09/2024 0.78     Eos # 01/09/2024 0.58     Baso # 01/09/2024 0.01     IG# 01/09/2024 0.03     IG% 01/09/2024 0.4     NRBC% 01/09/2024 0.0         Microbiology Results (last 7 days)       ** No results found for the last 168 hours. **             US Retroperitoneal Complete    Result Date: 1/5/2024  EXAMINATION: US RETROPERITONEAL COMPLETE CLINICAL HISTORY: Elevated BUN/creatinine; COMPARISON: None. FINDINGS: Grayscale and color Doppler sonographic  evaluation of the kidneys and urinary bladder. The right kidney measures 8 cm. The left kidney measures 7 cm.   No hydronephrosis.  There is heterogeneous renal parenchymal echogenicity. Urinary bladder unremarkable.     1. No hydronephrosis. 2. Suspect medical renal disease. Electronically signed by: Bienvenido Loco Date:    01/05/2024 Time:    20:02    X-Ray Chest AP Portable    Result Date: 1/5/2024  EXAMINATION: XR CHEST AP PORTABLE CLINICAL HISTORY: Peripheral edema; TECHNIQUE: Frontal view(s) of the chest. COMPARISON: Radiography 08/13/2020 FINDINGS: Normal cardiac silhouette.  The lungs are well-inflated.  Central pulmonary vascular congestion.  No confluent consolidation appreciated.  Similar small area of scarring within the mid right lung.  No significant pleural effusion or discernible pneumothorax.     Central pulmonary vascular congestion. Electronically signed by: Grayson Carrillo Date:    01/05/2024 Time:    17:06  - pulls last radiology orders        Medication List        START taking these medications      ciprofloxacin HCl 500 MG tablet  Commonly known as: CIPRO  Take 1 tablet (500 mg total) by mouth every 12 (twelve) hours. for 10 days     methocarbamoL 500 MG Tab  Commonly known as: ROBAXIN  Take 1 tablet (500 mg total) by mouth 4 (four) times daily. for 10 days            CONTINUE taking these medications      acetaminophen 325 MG tablet  Commonly known as: TYLENOL  Take 2 tablets (650 mg total) by mouth every 6 (six) hours as needed for Pain.     amiodarone 200 MG Tab  Commonly known as: PACERONE     amLODIPine 5 MG tablet  Commonly known as: NORVASC     apixaban 5 mg Tab  Commonly known as: ELIQUIS  Take 1 tablet (5 mg total) by mouth 2 (two) times daily.     ascorbic acid (vitamin C) 1000 MG tablet  Commonly known as: VITAMIN C     aspirin 81 MG EC tablet  Commonly known as: ECOTRIN     buPROPion 150 MG TB24 tablet  Commonly known as: WELLBUTRIN XL     cholecalciferol (vitamin D3) 50 mcg (2,000  unit) Cap capsule  Commonly known as: VITAMIN D3     DAILY-GEETA (WITH FOLIC ACID) 400 mcg Tab  Generic drug: multivitamin with folic acid     docusate sodium 100 MG capsule  Commonly known as: COLACE     folic acid 1 MG tablet  Commonly known as: FOLVITE     gabapentin 100 MG capsule  Commonly known as: NEURONTIN  Take 1 capsule (100 mg total) by mouth 2 (two) times daily.     metoprolol succinate 50 MG 24 hr tablet  Commonly known as: TOPROL-XL     rOPINIRole 0.5 MG tablet  Commonly known as: REQUIP     rosuvastatin 10 MG tablet  Commonly known as: CRESTOR     sodium bicarbonate 650 MG tablet     vitamin E 400 UNIT capsule               Where to Get Your Medications        These medications were sent to [facility]/pharmacy #[id number] - [place] - [address] [facility] AT [place]  [address] [address]      Hours: 24-hours Phone: [phone]   ciprofloxacin HCl 500 MG tablet  methocarbamoL 500 MG Tab          Explained in detail to the patient about the discharge plan, medications, and follow-up visits. Pt understands and agrees with the treatment plan  Discharged Condition: stable  Diet:  Renal  Disposition:  SNF, [facility] assisted living    Medications Per [place] med rec  Activities as tolerated  Follow up with your PCP in 2 wks   For further questions contact hospitalist office    Discharge time 33 minutes    For worsening symptoms, chest pain, shortness of breath, increased abdominal pain, high grade fever, stroke or stroke like symptoms, immediately go to the nearest Emergency Room or call 911 as soon as possible.        Peter Camarillo M.D on 1/9/2024. at 6:45 AM.

## 2024-01-10 LAB
ANION GAP SERPL CALC-SCNC: 9 MEQ/L
BASOPHILS # BLD AUTO: 0.03 X10(3)/MCL
BASOPHILS NFR BLD AUTO: 0.3 %
BUN SERPL-MCNC: 35.6 MG/DL (ref 9.8–20.1)
CALCIUM SERPL-MCNC: 8.8 MG/DL (ref 8.4–10.2)
CHLORIDE SERPL-SCNC: 106 MMOL/L (ref 98–107)
CO2 SERPL-SCNC: 27 MMOL/L (ref 23–31)
CREAT SERPL-MCNC: 2.86 MG/DL (ref 0.55–1.02)
CREAT/UREA NIT SERPL: 12
EOSINOPHIL # BLD AUTO: 0.5 X10(3)/MCL (ref 0–0.9)
EOSINOPHIL NFR BLD AUTO: 5.2 %
ERYTHROCYTE [DISTWIDTH] IN BLOOD BY AUTOMATED COUNT: 13.1 % (ref 11.5–17)
GFR SERPLBLD CREATININE-BSD FMLA CKD-EPI: 16 MLS/MIN/1.73/M2
GLUCOSE SERPL-MCNC: 144 MG/DL (ref 82–115)
HCT VFR BLD AUTO: 33.2 % (ref 37–47)
HGB BLD-MCNC: 10.6 G/DL (ref 12–16)
IMM GRANULOCYTES # BLD AUTO: 0.05 X10(3)/MCL (ref 0–0.04)
IMM GRANULOCYTES NFR BLD AUTO: 0.5 %
LYMPHOCYTES # BLD AUTO: 0.83 X10(3)/MCL (ref 0.6–4.6)
LYMPHOCYTES NFR BLD AUTO: 8.6 %
MCH RBC QN AUTO: 31.8 PG (ref 27–31)
MCHC RBC AUTO-ENTMCNC: 31.9 G/DL (ref 33–36)
MCV RBC AUTO: 99.7 FL (ref 80–94)
MONOCYTES # BLD AUTO: 0.82 X10(3)/MCL (ref 0.1–1.3)
MONOCYTES NFR BLD AUTO: 8.5 %
NEUTROPHILS # BLD AUTO: 7.41 X10(3)/MCL (ref 2.1–9.2)
NEUTROPHILS NFR BLD AUTO: 76.9 %
NRBC BLD AUTO-RTO: 0 %
PLATELET # BLD AUTO: 172 X10(3)/MCL (ref 130–400)
PMV BLD AUTO: 10.7 FL (ref 7.4–10.4)
POCT GLUCOSE: 108 MG/DL (ref 70–110)
POCT GLUCOSE: 110 MG/DL (ref 70–110)
POCT GLUCOSE: 124 MG/DL (ref 70–110)
POCT GLUCOSE: 128 MG/DL (ref 70–110)
POTASSIUM SERPL-SCNC: 3.6 MMOL/L (ref 3.5–5.1)
RBC # BLD AUTO: 3.33 X10(6)/MCL (ref 4.2–5.4)
SODIUM SERPL-SCNC: 142 MMOL/L (ref 136–145)
WBC # SPEC AUTO: 9.64 X10(3)/MCL (ref 4.5–11.5)

## 2024-01-10 PROCEDURE — 80048 BASIC METABOLIC PNL TOTAL CA: CPT | Performed by: HOSPITALIST

## 2024-01-10 PROCEDURE — 11000001 HC ACUTE MED/SURG PRIVATE ROOM

## 2024-01-10 PROCEDURE — 85025 COMPLETE CBC W/AUTO DIFF WBC: CPT | Performed by: HOSPITALIST

## 2024-01-10 PROCEDURE — 97166 OT EVAL MOD COMPLEX 45 MIN: CPT

## 2024-01-10 PROCEDURE — 25000003 PHARM REV CODE 250: Performed by: HOSPITALIST

## 2024-01-10 PROCEDURE — 25000003 PHARM REV CODE 250: Performed by: INTERNAL MEDICINE

## 2024-01-10 PROCEDURE — 97530 THERAPEUTIC ACTIVITIES: CPT | Mod: CQ

## 2024-01-10 PROCEDURE — 25000003 PHARM REV CODE 250: Performed by: NURSE PRACTITIONER

## 2024-01-10 RX ORDER — CIPROFLOXACIN 500 MG/1
500 TABLET ORAL EVERY 24 HOURS
Status: DISCONTINUED | OUTPATIENT
Start: 2024-01-11 | End: 2024-01-11

## 2024-01-10 RX ORDER — SODIUM CHLORIDE 9 MG/ML
INJECTION, SOLUTION INTRAVENOUS CONTINUOUS
Status: DISCONTINUED | OUTPATIENT
Start: 2024-01-10 | End: 2024-01-15

## 2024-01-10 RX ADMIN — SODIUM BICARBONATE 650 MG TABLET 1300 MG: at 05:01

## 2024-01-10 RX ADMIN — DOCUSATE SODIUM 100 MG: 100 CAPSULE, LIQUID FILLED ORAL at 09:01

## 2024-01-10 RX ADMIN — METHOCARBAMOL 500 MG: 500 TABLET ORAL at 09:01

## 2024-01-10 RX ADMIN — CIPROFLOXACIN HYDROCHLORIDE 500 MG: 500 TABLET, FILM COATED ORAL at 09:01

## 2024-01-10 RX ADMIN — FOLIC ACID 1000 MCG: 1 TABLET ORAL at 09:01

## 2024-01-10 RX ADMIN — AMIODARONE HYDROCHLORIDE 200 MG: 200 TABLET ORAL at 09:01

## 2024-01-10 RX ADMIN — ASPIRIN 81 MG: 81 TABLET, COATED ORAL at 09:01

## 2024-01-10 RX ADMIN — METHOCARBAMOL 500 MG: 500 TABLET ORAL at 02:01

## 2024-01-10 RX ADMIN — METOPROLOL SUCCINATE 50 MG: 50 TABLET, EXTENDED RELEASE ORAL at 09:01

## 2024-01-10 RX ADMIN — SODIUM BICARBONATE 650 MG TABLET 1300 MG: at 02:01

## 2024-01-10 RX ADMIN — SODIUM CHLORIDE: 9 INJECTION, SOLUTION INTRAVENOUS at 06:01

## 2024-01-10 RX ADMIN — METHOCARBAMOL 500 MG: 500 TABLET ORAL at 04:01

## 2024-01-10 RX ADMIN — GABAPENTIN 100 MG: 100 CAPSULE ORAL at 09:01

## 2024-01-10 RX ADMIN — SODIUM CHLORIDE: 9 INJECTION, SOLUTION INTRAVENOUS at 02:01

## 2024-01-10 RX ADMIN — BUPROPION 150 MG: 150 TABLET, EXTENDED RELEASE ORAL at 09:01

## 2024-01-10 RX ADMIN — ALLOPURINOL 100 MG: 100 TABLET ORAL at 09:01

## 2024-01-10 RX ADMIN — ATORVASTATIN CALCIUM 20 MG: 10 TABLET, FILM COATED ORAL at 09:01

## 2024-01-10 RX ADMIN — SODIUM BICARBONATE 650 MG TABLET 1300 MG: at 09:01

## 2024-01-10 RX ADMIN — APIXABAN 5 MG: 5 TABLET, FILM COATED ORAL at 09:01

## 2024-01-10 NOTE — PLAN OF CARE
Called In the [facility]. And spoke with Forrest Kwon  who stated that he will review the referral and will get back with me.

## 2024-01-10 NOTE — NURSING
Patient noted to be confused at this time. She is awake, alert and oriented to person, situation. She is able to tell me she is here for a UTI, but she is not sure where she is, she stated that we are in the month of November, and she was speaking of her mother, who is no longer living. Patient stated she gets confused sometimes, but it takes her awhile to get re-oriented.  Notification of change in mentation sent to Dr. Horton.

## 2024-01-10 NOTE — PLAN OF CARE
Problem: Fluid and Electrolyte Imbalance (Acute Kidney Injury/Impairment)  Goal: Fluid and Electrolyte Balance  Outcome: Met

## 2024-01-10 NOTE — PROGRESS NOTES
Pharmacist Renal Dose Adjustment Note    Debra Puentes is a 78 y.o. female being treated with the medication cipro    Patient Data:    Vital Signs (Most Recent):  Temp: 98.1 °F (36.7 °C) (01/10/24 1129)  Pulse: 64 (01/10/24 1129)  Resp: 20 (01/10/24 0434)  BP: (!) 150/72 (01/10/24 1129)  SpO2: 96 % (01/10/24 1129) Vital Signs (72h Range):  Temp:  [97.7 °F (36.5 °C)-98.6 °F (37 °C)]   Pulse:  [53-65]   Resp:  [18-20]   BP: (121-159)/(58-80)   SpO2:  [94 %-98 %]      Recent Labs   Lab 01/07/24  0324 01/08/24  0355 01/09/24  0358   CREATININE 3.65* 2.82* 2.72*     Serum creatinine: 2.72 mg/dL (H) 01/09/24 0358  Estimated creatinine clearance: 20 mL/min (A)    Medication:cipro dose: 500mg frequency q12h will be changed to medication:cipro dose:500mg frequency:q24h based on CrCl = 20 mL/min.    Pharmacist's Name: Sharon Romeo  Pharmacist's Extension: [phone]

## 2024-01-10 NOTE — PROGRESS NOTES
[facility]  Hospital Medicine Progress Note        Chief Complaint: Inpatient Follow-up     HPI:   78-year-old female with medical history of CKD stage 4, previously was on temporary dialysis in 2020 through left upper extremity AV fistula subsequently stabilized at CKD stage 4 with baseline creatinine around 3.0, anemia of chronic disease, T2DM, HTN, HLD, atrial fibrillation on Eliquis, DDD and vertebral compression fracture/T12 recently on TLSO brace.     Present to the ED due to abnormal labs done at the nursing home showing worsening renal function.  Patient report noting decreased urination over the past week and bilateral lower extremity swelling.  Report she still having back pain and bilateral lower extremity weakness and mostly using wheelchair since her vertebral fracture.  Denies shortness of breath, orthopnea or PND.     On arrival to ED he was afebrile and hemodynamically stable.  Labs notable for WBC 15.75, hemoglobin 11.2, platelets 285, sodium 141, potassium 3.4, chloride 107, CO2 16, creatinine 5.59, BUN 90.4.  Retroperitoneal ultrasound showed no hydronephrosis, bladder unremarkable and not distended.  Chest x-ray show pulmonary vascular congestion.  Urinalysis suggestive of UTI.     She was given 500 mL normal saline bolus and started at 75 mL/hour and referred to hospital medicine service for further evaluation and management.        Interval Hx:  Doing well.  No complaints.  Afebrile.  We discussed that she will not be going back to this is to living and now looking into SNF placement at the same facility.  She was voices understanding is appreciative of our help.     Objective/physical exam:  General: In no acute distress, afebrile  Chest: Clear to auscultation bilaterally  Heart: RRR, +S1, S2, no appreciable murmur  Abdomen: Soft, nontender, BS +  MSK: Warm, no lower extremity edema, no clubbing or cyanosis  Neurologic: Alert and oriented x4, Cranial nerve II-XII  intact, Strength 5/5 in all 4 extremities    VITAL SIGNS: 24 HRS MIN & MAX LAST   Temp  Min: 97.7 °F (36.5 °C)  Max: 98.3 °F (36.8 °C) 97.9 °F (36.6 °C)   BP  Min: 124/72  Max: 158/75 124/72   Pulse  Min: 53  Max: 63  63   Resp  Min: 20  Max: 20 20   SpO2  Min: 96 %  Max: 97 % 97 %       Recent Labs   Lab 01/05/24 1648 01/06/24  0507 01/09/24  0358   WBC 15.75* 9.01 7.21   RBC 3.56* 3.35* 3.36*   HGB 11.2* 10.5* 10.5*   HCT 33.8* 33.2* 32.3*   MCV 94.9* 99.1* 96.1*   MCH 31.5* 31.3* 31.3*   MCHC 33.1 31.6* 32.5*   RDW 13.2 13.2 12.7    220 205   MPV 10.4 10.2 10.5*       Recent Labs   Lab 01/05/24 1648 01/05/24 2242 01/06/24  0507 01/07/24  0324 01/08/24  0355 01/09/24  0358      < > 142 143 140 145   K 3.4*   < > 3.6 3.9 3.5 3.4*   CO2 16*   < > 18* 23 25 27   BUN 90.4*   < > 85.3* 70.3* 54.7* 46.2*   CREATININE 5.59*   < > 4.71* 3.65* 2.82* 2.72*   CALCIUM 9.4   < > 8.7 8.2* 7.9* 8.6   MG 2.10  --   --  1.70 1.50*  --    ALBUMIN 3.0*  --  2.6* 2.3* 2.3*  --    ALKPHOS 126  --  101 100 86  --    ALT 20  --  16 15 14  --    AST 22  --  17 18 16  --    BILITOT 0.5  --  0.5 0.5 0.5  --     < > = values in this interval not displayed.          Microbiology Results (last 7 days)       ** No results found for the last 168 hours. **             Radiology:  US Retroperitoneal Complete  Narrative: EXAMINATION:  US RETROPERITONEAL COMPLETE    CLINICAL HISTORY:  Elevated BUN/creatinine;    COMPARISON:  None.    FINDINGS:  Grayscale and color Doppler sonographic evaluation of the kidneys and urinary bladder.    The right kidney measures 8 cm. The left kidney measures 7 cm.   No hydronephrosis.  There is heterogeneous renal parenchymal echogenicity.    Urinary bladder unremarkable.  Impression: 1. No hydronephrosis.  2. Suspect medical renal disease.    Electronically signed by: Bienvenido Loco  Date:    01/05/2024  Time:    20:02  X-Ray Chest AP Portable  Narrative: EXAMINATION:  XR CHEST AP PORTABLE    CLINICAL  HISTORY:  Peripheral edema;    TECHNIQUE:  Frontal view(s) of the chest.    COMPARISON:  Radiography 08/13/2020    FINDINGS:  Normal cardiac silhouette.  The lungs are well-inflated.  Central pulmonary vascular congestion.  No confluent consolidation appreciated.  Similar small area of scarring within the mid right lung.  No significant pleural effusion or discernible pneumothorax.  Impression: Central pulmonary vascular congestion.    Electronically signed by: Grayson Carrillo  Date:    01/05/2024  Time:    17:06      Scheduled Med:   allopurinoL  100 mg Oral Daily    amiodarone  200 mg Oral Daily    apixaban  5 mg Oral BID    aspirin  81 mg Oral Daily    atorvastatin  20 mg Oral Daily    buPROPion  150 mg Oral Daily    ciprofloxacin HCl  500 mg Oral Q12H    docusate sodium  100 mg Oral BID    folic acid  1,000 mcg Oral Daily    gabapentin  100 mg Oral BID    methocarbamoL  500 mg Oral QID    metoprolol succinate  50 mg Oral BID    sodium bicarbonate  1,300 mg Oral Q8H        Continuous Infusions:       PRN Meds:  acetaminophen, aluminum-magnesium hydroxide-simethicone, dextrose 10%, dextrose 10%, glucagon (human recombinant), glucose, glucose, hydrALAZINE, insulin aspart U-100, melatonin, ondansetron, polyethylene glycol, prochlorperazine, senna-docusate 8.6-50 mg, sodium chloride 0.9%       Assessment/Plan:   LORI superimposed on CKD stage 4  Renal related volume overload  Acute UTI, POA  Anemia of CKD  Hyperuricemia  Decreased mobility/due to vertebral fracture  Paroxysmal AFib-currently NSR  Hypertension  Hyperlipidemia  T2DM.    Gave her a break from labs this morning.  Can repeat BMP tomorrow.    She was switched her to oral Cipro from cefepime.  Will need 9 more days of treatment.  Will follow up renal function tomorrow.  Can follow up with Nephrology as an outpatient  Chronic medical issues are stable.  Continue to monitor her vital signs while inpatient      Peter Horton MD   01/10/2024     All diagnosis and  differential diagnosis have been reviewed; assessment and plan has been documented; I have personally reviewed the labs and test results that are presently available; I have reviewed the patients medication list; I have reviewed the consulting providers response and recommendations. I have reviewed or attempted to review medical records based upon their availability    All of the patient's questions have been  addressed and answered. Patient's is agreeable to the above stated plan. I will continue to monitor closely and make adjustments to medical management as needed.  _____________________________________________________________________

## 2024-01-10 NOTE — PT/OT/SLP PROGRESS
Physical Therapy Treatment    Patient Name:  Debra Puentes   MRN:  [id number]    Recommendations:     Discharge therapy intensity: Moderate Intensity Therapy   Discharge Equipment Recommendations: to be determined by next level of care  Barriers to discharge: Impaired mobility    Assessment:     Debra Puentes is a 78 y.o. female admitted with a medical diagnosis of Acute kidney injury superimposed on CKD, volume overload, UTI, CKD, afib, recent T12 vertebral fx. .  She presents with the following impairments/functional limitations: weakness, impaired endurance, impaired balance, impaired self care skills, impaired functional mobility, impaired cognition, gait instability.     Pt appeared confused today. Noted pt must have removed her purewick as it was tangled in her sheets with fecal matter on spread onto the sheets. Changed sheets, gown and chucks. Notified nurse. No reports of back pain t/o session until returning to bed.     Rehab Prognosis: Good; patient would benefit from acute skilled PT services to address these deficits and reach maximum level of function.    Recent Surgery: * No surgery found *      Plan:     During this hospitalization, patient to be seen 5 x/week to address the identified rehab impairments via gait training, therapeutic activities, therapeutic exercises and progress toward the following goals:    Plan of Care Expires:  02/07/24    Subjective     Chief Complaint: back pain  Patient/Family Comments/goals: to get better.   Pain/Comfort:  Pain Rating 1: 0/10      Objective:     Communicated with nurse prior to session.  Patient found HOB elevated with pulse ox (continuous), telemetry, PureWick upon PT entry to room.     General Precautions: Standard,    Orthopedic Precautions: spinal precautions  Braces: LSO  Respiratory Status: Room air      Functional Mobility:  Bed Mobility:  *LSO donned/doffed at EOB  Supine to sit with mod assist at trunk and min assist for LE's  Scooting  anteriorly with max assist  Sit to supine with max assist  Transfers:    Sit<->stand x 2 trials; Min assist x 2 first trial and mod assist x 2 second trial  Toileting:   CGA for balance while performing total assist keya-care and brief change.  Fatigued quickly requiring rest break  Gait: 2' at bedside with RW, min assist for RW management, min assist for balance.     Therapeutic Activities/Exercises:      Education:  Patient provided with verbal education education regarding discharge/DME recommendations.  Understanding was verbalized, however additional teaching warranted.     Patient left HOB elevated with all lines intact and call button in reach.    GOALS:   Multidisciplinary Problems       Physical Therapy Goals          Problem: Physical Therapy    Goal Priority Disciplines Outcome Goal Variances Interventions   Physical Therapy Goal     PT, PT/OT Ongoing, Progressing     Description: Goals to be met by: 2023     Patient will increase functional independence with mobility by performin. Supine to sit with Stand-by Assistance  2. Sit to stand transfer with Stand-by Assistance  3. Gait  x 150 feet with Stand-by Assistance using Rolling Walker.                          Time Tracking:     PT Received On: 01/10/24  PT Start Time: 1351     PT Stop Time: 1423  PT Total Time (min): 32 min     Billable Minutes: Therapeutic Activity 2 units    Treatment Type: Treatment  PT/PTA: PTA     Number of PTA visits since last PT visit: 3     01/10/2024

## 2024-01-10 NOTE — PROGRESS NOTES
[facility] - 4th Floor Medical Telemetry  Nephrology  Progress Note    Patient Name: Debra Puentes  MRN: [id number]  Admission Date: 1/5/2024  Hospital Length of Stay: 5 days  Attending Provider: Peter Horton MD   Primary Care Physician: Logan Smith MD  Principal Problem:Acute kidney injury superimposed on CKD      Subjective:   Debra Puentes is a 78 y.o. well known to Dr Smith with a history of chronic kidney disease stage IV, baseline Cr 2.6-2.8. She has a history of dialysis dependence in 2020 and sees Dr Smith monthly.  [facility] post T12 compression fracture suffered at end of October 2023. Prior to incident she was able to complete ADL and was in assisted living.     Patient was sent to ED from [facility] due to abnormal labs indicative of LORI. Initial BUN 90/ Cr 5.18/ GFR 8. Urine culture positive for both E Coli and Enterobacter on day #4 of Maxipime. She has difficulty getting reliable access to water at the NH.     Interval History:   1/8 - Renal indices back to baseline with treatment of UTI and IV hydration. Her complaint is today back pain.   1/9 - renal indices stable off IV hydration. Endorses back pain.   1/10 - Mucous membranes are very dry today and preventing her from speaking clearly.  was determined by assisted living facility that she is not appropriate to return and needs SNF. Now remains inpatient pending SNF placement.     Review of patient's allergies indicates:   Allergen Reactions    Lactose Diarrhea    Codeine Other (See Comments) and Nausea And Vomiting     Current Facility-Administered Medications   Medication Frequency    acetaminophen tablet 650 mg Q4H PRN    allopurinoL tablet 100 mg Daily    aluminum-magnesium hydroxide-simethicone 200-200-20 mg/5 mL suspension 30 mL QID PRN    amiodarone tablet 200 mg Daily    apixaban tablet 5 mg BID    aspirin EC tablet 81 mg Daily    atorvastatin tablet 20 mg Daily     buPROPion TB24 tablet 150 mg Daily    ciprofloxacin HCl tablet 500 mg Q12H    dextrose 10% bolus 125 mL 125 mL PRN    dextrose 10% bolus 250 mL 250 mL PRN    docusate sodium capsule 100 mg BID    folic acid tablet 1,000 mcg Daily    gabapentin capsule 100 mg BID    glucagon (human recombinant) injection 1 mg PRN    glucose chewable tablet 16 g PRN    glucose chewable tablet 24 g PRN    hydrALAZINE injection 10 mg Q2H PRN    insulin aspart U-100 injection 0-5 Units QID (AC + HS) PRN    melatonin tablet 6 mg Nightly PRN    methocarbamoL tablet 500 mg QID    metoprolol succinate (TOPROL-XL) 24 hr tablet 50 mg BID    ondansetron injection 4 mg Q4H PRN    polyethylene glycol packet 17 g BID PRN    prochlorperazine injection Soln 5 mg Q6H PRN    senna-docusate 8.6-50 mg per tablet 2 tablet BID PRN    sodium bicarbonate tablet 1,300 mg Q8H    sodium chloride 0.9% flush 10 mL PRN       Objective:     Vital Signs (Most Recent):  Temp: 98.4 °F (36.9 °C) (01/10/24 0739)  Pulse: 64 (01/10/24 0739)  Resp: 20 (01/10/24 0434)  BP: 121/78 (01/10/24 0739)  SpO2: 97 % (01/10/24 0434) Vital Signs (24h Range):  Temp:  [97.7 °F (36.5 °C)-98.4 °F (36.9 °C)] 98.4 °F (36.9 °C)  Pulse:  [56-64] 64  Resp:  [20] 20  SpO2:  [96 %-97 %] 97 %  BP: (121-158)/(58-78) 121/78     Weight: 93.5 kg (206 lb 2.1 oz) (01/10/24 0550)  Body mass index is 31.34 kg/m².  Body surface area is 2.12 meters squared.    I/O last 3 completed shifts:  In: 1860 [P.O.:1860]  Out: 2300 [Urine:2300]    Physical Exam  Constitutional:       General: She is not in acute distress.     Appearance: She is obese.   HENT:      Head: Atraumatic.      Mouth/Throat:      Mouth: Mucous membranes are dry.   Eyes:      Extraocular Movements: Extraocular movements intact.      Conjunctiva/sclera: Conjunctivae normal.   Cardiovascular:      Rate and Rhythm: Normal rate and regular rhythm.      Pulses: Normal pulses.      Heart sounds: Normal heart sounds.   Pulmonary:      Effort:  Pulmonary effort is normal.      Breath sounds: Normal breath sounds.   Abdominal:      General: There is no distension.      Palpations: Abdomen is soft.   Musculoskeletal:         General: No swelling.      Cervical back: Neck supple.   Skin:     General: Skin is warm.   Neurological:      Mental Status: She is alert and oriented to person, place, and time.   Psychiatric:         Mood and Affect: Mood normal.         Behavior: Behavior normal.         Significant Labs:sureBMP:   Recent Labs   Lab 01/08/24  0355 01/09/24  0358    145   K 3.5 3.4*   CO2 25 27   BUN 54.7* 46.2*   CREATININE 2.82* 2.72*   CALCIUM 7.9* 8.6   MG 1.50*  --        CBC:   Recent Labs   Lab 01/09/24  0358   WBC 7.21   RBC 3.36*   HGB 10.5*   HCT 32.3*      MCV 96.1*   MCH 31.3*   MCHC 32.5*       Microbiology Results (last 7 days)       ** No results found for the last 168 hours. **          Specimen (24h ago, onward)      None          Recent Labs   Lab 01/05/24  0500   PROTEINUA Negative   BACTERIA Many*   LEUKOCYTESUR Moderate*   UROBILINOGEN 0.2         Significant Imaging:  US Retroperitoneal Complete [[id number]] Resulted: 01/05/24 2002   Order Status: Completed Updated: 01/05/24 2004   Narrative:     EXAMINATION:  US RETROPERITONEAL COMPLETE    CLINICAL HISTORY:  Elevated BUN/creatinine;    COMPARISON:  None.    FINDINGS:  Grayscale and color Doppler sonographic evaluation of the kidneys and urinary bladder.    The right kidney measures 8 cm. The left kidney measures 7 cm.   No hydronephrosis.  There is heterogeneous renal parenchymal echogenicity.    Urinary bladder unremarkable.   Impression:       1. No hydronephrosis.  2. Suspect medical renal disease.      Electronically signed by: Bienvenido Loco  Date: 01/05/2024     X-Ray Chest AP Portable [[id number]] Resulted: 01/05/24 1706   Order Status: Completed Updated: 01/05/24 1709   Narrative:     EXAMINATION:  XR CHEST AP PORTABLE    CLINICAL HISTORY:  Peripheral  edema;    TECHNIQUE:  Frontal view(s) of the chest.    COMPARISON:  Radiography 08/13/2020    FINDINGS:  Normal cardiac silhouette.  The lungs are well-inflated.  Central pulmonary vascular congestion.  No confluent consolidation appreciated.  Similar small area of scarring within the mid right lung.  No significant pleural effusion or discernible pneumothorax.   Impression:       Central pulmonary vascular congestion.      Electronically signed by: Grayson Carrillo  Date: 01/05/2024  Time: 17:06       Assessment/Plan:   LORI on CKD IV secondary to acute infection and dehydration   UTI - E. Coli and Enterococcus. On Maxipime Day 4  Clinical dehydration resolved with IVF and oral hydration   T12 Compression fracture in October 2023 with resulting physical deconditioning     Possibly dry mouth due to medication. She does endorse decreased oral intake as well so I will resume IVF with NS at 50 mL.hr. Labs tomorrow morning   CM working on SNF placement.   We will continue to monitor while inpatient.     DEE Garrett  Nephrology  [facility] - 4th Floor Medical Telemetry

## 2024-01-11 LAB
ANION GAP SERPL CALC-SCNC: 11 MEQ/L
BASOPHILS # BLD AUTO: 0.04 X10(3)/MCL
BASOPHILS NFR BLD AUTO: 0.5 %
BUN SERPL-MCNC: 35.8 MG/DL (ref 9.8–20.1)
CALCIUM SERPL-MCNC: 8.6 MG/DL (ref 8.4–10.2)
CHLORIDE SERPL-SCNC: 106 MMOL/L (ref 98–107)
CO2 SERPL-SCNC: 25 MMOL/L (ref 23–31)
CREAT SERPL-MCNC: 2.84 MG/DL (ref 0.55–1.02)
CREAT/UREA NIT SERPL: 13
EOSINOPHIL # BLD AUTO: 0.56 X10(3)/MCL (ref 0–0.9)
EOSINOPHIL NFR BLD AUTO: 6.6 %
ERYTHROCYTE [DISTWIDTH] IN BLOOD BY AUTOMATED COUNT: 13.2 % (ref 11.5–17)
GFR SERPLBLD CREATININE-BSD FMLA CKD-EPI: 16 MLS/MIN/1.73/M2
GLUCOSE SERPL-MCNC: 98 MG/DL (ref 82–115)
HCT VFR BLD AUTO: 31.2 % (ref 37–47)
HGB BLD-MCNC: 10.2 G/DL (ref 12–16)
IMM GRANULOCYTES # BLD AUTO: 0.15 X10(3)/MCL (ref 0–0.04)
IMM GRANULOCYTES NFR BLD AUTO: 1.8 %
LYMPHOCYTES # BLD AUTO: 1.67 X10(3)/MCL (ref 0.6–4.6)
LYMPHOCYTES NFR BLD AUTO: 19.6 %
MCH RBC QN AUTO: 31.9 PG (ref 27–31)
MCHC RBC AUTO-ENTMCNC: 32.7 G/DL (ref 33–36)
MCV RBC AUTO: 97.5 FL (ref 80–94)
MONOCYTES # BLD AUTO: 0.85 X10(3)/MCL (ref 0.1–1.3)
MONOCYTES NFR BLD AUTO: 10 %
NEUTROPHILS # BLD AUTO: 5.24 X10(3)/MCL (ref 2.1–9.2)
NEUTROPHILS NFR BLD AUTO: 61.5 %
NRBC BLD AUTO-RTO: 0 %
PLATELET # BLD AUTO: 159 X10(3)/MCL (ref 130–400)
PMV BLD AUTO: 11.1 FL (ref 7.4–10.4)
POCT GLUCOSE: 112 MG/DL (ref 70–110)
POCT GLUCOSE: 113 MG/DL (ref 70–110)
POTASSIUM SERPL-SCNC: 3.8 MMOL/L (ref 3.5–5.1)
RBC # BLD AUTO: 3.2 X10(6)/MCL (ref 4.2–5.4)
SODIUM SERPL-SCNC: 142 MMOL/L (ref 136–145)
WBC # SPEC AUTO: 8.51 X10(3)/MCL (ref 4.5–11.5)

## 2024-01-11 PROCEDURE — 80048 BASIC METABOLIC PNL TOTAL CA: CPT | Performed by: HOSPITALIST

## 2024-01-11 PROCEDURE — 97530 THERAPEUTIC ACTIVITIES: CPT | Mod: CQ

## 2024-01-11 PROCEDURE — 25000003 PHARM REV CODE 250: Performed by: INTERNAL MEDICINE

## 2024-01-11 PROCEDURE — 11000001 HC ACUTE MED/SURG PRIVATE ROOM

## 2024-01-11 PROCEDURE — 97116 GAIT TRAINING THERAPY: CPT | Mod: CQ

## 2024-01-11 PROCEDURE — 85025 COMPLETE CBC W/AUTO DIFF WBC: CPT | Performed by: HOSPITALIST

## 2024-01-11 PROCEDURE — 25000003 PHARM REV CODE 250: Performed by: HOSPITALIST

## 2024-01-11 RX ORDER — CIPROFLOXACIN 500 MG/1
500 TABLET ORAL EVERY 24 HOURS
Status: COMPLETED | OUTPATIENT
Start: 2024-01-12 | End: 2024-01-13

## 2024-01-11 RX ADMIN — GABAPENTIN 100 MG: 100 CAPSULE ORAL at 08:01

## 2024-01-11 RX ADMIN — ASPIRIN 81 MG: 81 TABLET, COATED ORAL at 12:01

## 2024-01-11 RX ADMIN — MELATONIN TAB 3 MG 6 MG: 3 TAB at 08:01

## 2024-01-11 RX ADMIN — DOCUSATE SODIUM 100 MG: 100 CAPSULE, LIQUID FILLED ORAL at 08:01

## 2024-01-11 RX ADMIN — AMIODARONE HYDROCHLORIDE 200 MG: 200 TABLET ORAL at 12:01

## 2024-01-11 RX ADMIN — METHOCARBAMOL 500 MG: 500 TABLET ORAL at 12:01

## 2024-01-11 RX ADMIN — METHOCARBAMOL 500 MG: 500 TABLET ORAL at 04:01

## 2024-01-11 RX ADMIN — DOCUSATE SODIUM 100 MG: 100 CAPSULE, LIQUID FILLED ORAL at 12:01

## 2024-01-11 RX ADMIN — METOPROLOL SUCCINATE 50 MG: 50 TABLET, EXTENDED RELEASE ORAL at 12:01

## 2024-01-11 RX ADMIN — METOPROLOL SUCCINATE 50 MG: 50 TABLET, EXTENDED RELEASE ORAL at 08:01

## 2024-01-11 RX ADMIN — CIPROFLOXACIN HYDROCHLORIDE 500 MG: 500 TABLET, FILM COATED ORAL at 12:01

## 2024-01-11 RX ADMIN — APIXABAN 5 MG: 5 TABLET, FILM COATED ORAL at 08:01

## 2024-01-11 RX ADMIN — ALLOPURINOL 100 MG: 100 TABLET ORAL at 12:01

## 2024-01-11 RX ADMIN — SODIUM BICARBONATE 650 MG TABLET 1300 MG: at 05:01

## 2024-01-11 RX ADMIN — SODIUM BICARBONATE 650 MG TABLET 1300 MG: at 04:01

## 2024-01-11 RX ADMIN — BUPROPION 150 MG: 150 TABLET, EXTENDED RELEASE ORAL at 12:01

## 2024-01-11 RX ADMIN — SODIUM BICARBONATE 650 MG TABLET 1300 MG: at 08:01

## 2024-01-11 RX ADMIN — FOLIC ACID 1000 MCG: 1 TABLET ORAL at 12:01

## 2024-01-11 RX ADMIN — GABAPENTIN 100 MG: 100 CAPSULE ORAL at 12:01

## 2024-01-11 RX ADMIN — METHOCARBAMOL 500 MG: 500 TABLET ORAL at 08:01

## 2024-01-11 RX ADMIN — APIXABAN 5 MG: 5 TABLET, FILM COATED ORAL at 12:01

## 2024-01-11 RX ADMIN — ATORVASTATIN CALCIUM 20 MG: 10 TABLET, FILM COATED ORAL at 12:01

## 2024-01-11 NOTE — PLAN OF CARE
Problem: Occupational Therapy  Goal: Occupational Therapy Goal  Description: Goals to be met by: 2/10/24     Patient will increase functional independence with ADLs by performing:    UE Dressing with Set-up Assistance.  LE Dressing with Stand-by Assistance.  Grooming while seated with Modified [place].  Toileting from bedside commode with Stand-by Assistance for hygiene and clothing management.   Toilet transfer to bedside commode with Stand-by Assistance.    Outcome: Ongoing, Progressing

## 2024-01-11 NOTE — PT/OT/SLP PROGRESS
Physical Therapy Treatment    Patient Name:  Debra Puentes   MRN:  [id number]    Recommendations:     Discharge therapy intensity: Moderate Intensity Therapy   Discharge Equipment Recommendations: to be determined by next level of care  Barriers to discharge: Impaired mobility    Assessment:     Debra Puentes is a 78 y.o. female admitted with a medical diagnosis of Acute kidney injury superimposed on CKD, volume overload, UTI, CKD, afib, recent T12 vertebral fx. .  She presents with the following impairments/functional limitations: weakness, impaired endurance, impaired balance, impaired self care skills, impaired functional mobility, impaired cognition, gait instability.       Rehab Prognosis: Good; patient would benefit from acute skilled PT services to address these deficits and reach maximum level of function.    Recent Surgery: * No surgery found *      Plan:     During this hospitalization, patient to be seen 5 x/week to address the identified rehab impairments via gait training, therapeutic activities, therapeutic exercises and progress toward the following goals:    Plan of Care Expires:  02/07/24    Subjective     Chief Complaint: back pain  Patient/Family Comments/goals: to get better.   Pain/Comfort:  Pain Rating 1: 0/10      Objective:     Communicated with nurse prior to session.  Patient found HOB elevated with pulse ox (continuous), telemetry, PureWick upon PT entry to room.     General Precautions: Standard,    Orthopedic Precautions: spinal precautions  Braces: LSO  Respiratory Status: Room air      Functional Mobility:  Bed Mobility:  *LSO donned at EOB  Supine to sit with mod assist to complete; difficulty following verbal instructions requiring TC's  Transfers:    Sit<->stand x 2 trials with mod assist. Cues for positioning and technique with poor carryover  Balance:   CGA with UE support on bed   Assisted with donning brace 2/2 pt too anxious and fearful of falling without UE support on  bed.   Gait: 10' and then 20' with RW, chair following 2/2 decreased endurance. VC's to correct flexed posture.     Therapeutic Activities/Exercises:  Total assist brief change standing at recliner. Fatigued following activity requiring rest break.     Education:  Patient provided with verbal education education regarding safety awareness and discharge/DME recommendations.  Understanding was verbalized, however additional teaching warranted.     Patient left up in chair with all lines intact, call button in reach, chair alarm on, and nurse notified.    GOALS:   Multidisciplinary Problems       Physical Therapy Goals          Problem: Physical Therapy    Goal Priority Disciplines Outcome Goal Variances Interventions   Physical Therapy Goal     PT, PT/OT Ongoing, Progressing     Description: Goals to be met by: 2023     Patient will increase functional independence with mobility by performin. Supine to sit with Stand-by Assistance  2. Sit to stand transfer with Stand-by Assistance  3. Gait  x 150 feet with Stand-by Assistance using Rolling Walker.                          Time Tracking:     PT Received On: 24  PT Start Time: 1344     PT Stop Time: 1425  PT Total Time (min): 41 min     Billable Minutes: Gait Training 1 unit and Therapeutic Activity 2 units    Treatment Type: Treatment  PT/PTA: PTA     Number of PTA visits since last PT visit: 4     2024

## 2024-01-11 NOTE — PROGRESS NOTES
[facility] - 4th Floor Medical Telemetry  Nephrology  Progress Note    Patient Name: Debra Puentes  MRN: [id number]  Admission Date: 1/5/2024  Hospital Length of Stay: 6 days  Attending Provider: Je Rodriguez MD   Primary Care Physician: Logan Smith MD  Principal Problem:Acute kidney injury superimposed on CKD      Subjective:   Debra Puentes is a 78 y.o. well known to Dr Smith with a history of chronic kidney disease stage IV, baseline Cr 2.6-2.8. She has a history of dialysis dependence in 2020 and sees Dr Smiht monthly.  Patient is currently a resident of [facility] post T12 compression fracture suffered at end of October 2023. Prior to incident she was able to complete ADL and was in assisted living.     Patient was sent to ED from [facility] due to abnormal labs indicative of LORI. Initial BUN 90/ Cr 5.18/ GFR 8. Urine culture positive for both E Coli and Enterobacter on day #4 of Maxipime. She has difficulty getting reliable access to water at the NH.     Interval History:   1/8 - Renal indices back to baseline with treatment of UTI and IV hydration. Her complaint is today back pain.   1/9 - renal indices stable off IV hydration. Endorses back pain.   1/10 - Mucous membranes are very dry today and preventing her from speaking clearly.  was determined by assisted living facility that she is not appropriate to return and needs SNF. Now remains inpatient pending SNF placement.   1/11 - endorses some improvement to dryness of MM with some IV hydration. She is eating some lunch though less than half her tray     Review of patient's allergies indicates:   Allergen Reactions    Lactose Diarrhea    Codeine Other (See Comments) and Nausea And Vomiting     Current Facility-Administered Medications   Medication Frequency    0.9%  NaCl infusion Continuous    acetaminophen tablet 650 mg Q4H PRN    allopurinoL tablet 100 mg Daily    aluminum-magnesium hydroxide-simethicone 200-200-20 mg/5  mL suspension 30 mL QID PRN    amiodarone tablet 200 mg Daily    apixaban tablet 5 mg BID    aspirin EC tablet 81 mg Daily    atorvastatin tablet 20 mg Daily    buPROPion TB24 tablet 150 mg Daily    ciprofloxacin HCl tablet 500 mg Daily    dextrose 10% bolus 125 mL 125 mL PRN    dextrose 10% bolus 250 mL 250 mL PRN    docusate sodium capsule 100 mg BID    folic acid tablet 1,000 mcg Daily    gabapentin capsule 100 mg BID    glucagon (human recombinant) injection 1 mg PRN    glucose chewable tablet 16 g PRN    glucose chewable tablet 24 g PRN    hydrALAZINE injection 10 mg Q2H PRN    insulin aspart U-100 injection 0-5 Units QID (AC + HS) PRN    melatonin tablet 6 mg Nightly PRN    methocarbamoL tablet 500 mg QID    metoprolol succinate (TOPROL-XL) 24 hr tablet 50 mg BID    ondansetron injection 4 mg Q4H PRN    polyethylene glycol packet 17 g BID PRN    prochlorperazine injection Soln 5 mg Q6H PRN    senna-docusate 8.6-50 mg per tablet 2 tablet BID PRN    sodium bicarbonate tablet 1,300 mg Q8H    sodium chloride 0.9% flush 10 mL PRN       Objective:     Vital Signs (Most Recent):  Temp: 98.4 °F (36.9 °C) (01/11/24 1130)  Pulse: 62 (01/11/24 1207)  Resp: 20 (01/11/24 0433)  BP: (!) 156/72 (01/11/24 1207)  SpO2: 96 % (01/11/24 1130) Vital Signs (24h Range):  Temp:  [97.4 °F (36.3 °C)-99.4 °F (37.4 °C)] 98.4 °F (36.9 °C)  Pulse:  [] 62  Resp:  [20] 20  SpO2:  [94 %-96 %] 96 %  BP: ()/(62-84) 156/72     Weight: 93.2 kg (205 lb 7.5 oz) (01/11/24 0716)  Body mass index is 31.24 kg/m².  Body surface area is 2.11 meters squared.    I/O last 3 completed shifts:  In: 1800 [P.O.:1800]  Out: 1750 [Urine:1750]    Physical Exam  Constitutional:       General: She is not in acute distress.     Appearance: She is obese.   HENT:      Head: Atraumatic.      Mouth/Throat:      Mouth: Mucous membranes are dry.   Eyes:      Extraocular Movements: Extraocular movements intact.      Conjunctiva/sclera: Conjunctivae normal.    Cardiovascular:      Rate and Rhythm: Normal rate and regular rhythm.      Pulses: Normal pulses.      Heart sounds: Normal heart sounds.   Pulmonary:      Effort: Pulmonary effort is normal.      Breath sounds: Normal breath sounds.   Abdominal:      General: There is no distension.      Palpations: Abdomen is soft.   Musculoskeletal:         General: No swelling.      Cervical back: Neck supple.   Skin:     General: Skin is warm.   Neurological:      Mental Status: She is alert and oriented to person, place, and time.   Psychiatric:         Mood and Affect: Mood normal.         Behavior: Behavior normal.         Significant Labs:sureBMP:   Recent Labs   Lab 01/08/24  0355 01/09/24  0358 01/11/24  0356      < > 142   K 3.5   < > 3.8   CO2 25   < > 25   BUN 54.7*   < > 35.8*   CREATININE 2.82*   < > 2.84*   CALCIUM 7.9*   < > 8.6   MG 1.50*  --   --     < > = values in this interval not displayed.       CBC:   Recent Labs   Lab 01/11/24  0356   WBC 8.51   RBC 3.20*   HGB 10.2*   HCT 31.2*      MCV 97.5*   MCH 31.9*   MCHC 32.7*       Microbiology Results (last 7 days)       ** No results found for the last 168 hours. **          Specimen (24h ago, onward)      None          Recent Labs   Lab 01/05/24  0500   PROTEINUA Negative   BACTERIA Many*   LEUKOCYTESUR Moderate*   UROBILINOGEN 0.2         Significant Imaging:  US Retroperitoneal Complete [[id number]] Resulted: 01/05/24 2002   Order Status: Completed Updated: 01/05/24 2004   Narrative:     EXAMINATION:  US RETROPERITONEAL COMPLETE    CLINICAL HISTORY:  Elevated BUN/creatinine;    COMPARISON:  None.    FINDINGS:  Grayscale and color Doppler sonographic evaluation of the kidneys and urinary bladder.    The right kidney measures 8 cm. The left kidney measures 7 cm.   No hydronephrosis.  There is heterogeneous renal parenchymal echogenicity.    Urinary bladder unremarkable.   Impression:       1. No hydronephrosis.  2. Suspect medical renal  disease.      Electronically signed by: Bienvenido Loco  Date: 01/05/2024     X-Ray Chest AP Portable [[id number]] Resulted: 01/05/24 1706   Order Status: Completed Updated: 01/05/24 1709   Narrative:     EXAMINATION:  XR CHEST AP PORTABLE    CLINICAL HISTORY:  Peripheral edema;    TECHNIQUE:  Frontal view(s) of the chest.    COMPARISON:  Radiography 08/13/2020    FINDINGS:  Normal cardiac silhouette.  The lungs are well-inflated.  Central pulmonary vascular congestion.  No confluent consolidation appreciated.  Similar small area of scarring within the mid right lung.  No significant pleural effusion or discernible pneumothorax.   Impression:       Central pulmonary vascular congestion.      Electronically signed by: Grayson Carrillo  Date: 01/05/2024  Time: 17:06       Assessment/Plan:   LORI on CKD IV secondary to acute infection and dehydration   UTI - E. Coli and Enterococcus. On Maxipime Day 4  Clinical dehydration resolved with IVF and oral hydration   T12 Compression fracture in October 2023 with resulting physical deconditioning     Continue NS at 50 mL/hr  Encourage oral intake   Cm working on SNF  We will monitor her while she stays inpatient     DEE Garrett  Nephrology  [facility] - 4th Floor Medical Telemetry

## 2024-01-11 NOTE — PROGRESS NOTES
[facility]  Hospital Medicine Progress Note        Chief Complaint: Inpatient Follow-up for LORI on CKD    HPI:   78-year-old female with medical history of CKD stage 4, previously was on temporary dialysis in 2020 through left upper extremity AV fistula subsequently stabilized at CKD stage 4 with baseline creatinine around 3.0, anemia of chronic disease, T2DM, HTN, HLD, atrial fibrillation on Eliquis, DDD and recent vertebral compression fracture/T12  on TLSO brace.     Present to the ED due to abnormal labs done at the nursing home showing worsening renal function.  Patient report noting decreased urination over the past week and bilateral lower extremity swelling.  Report she still having back pain and bilateral lower extremity weakness and mostly using wheelchair since her vertebral fracture.  Denies shortness of breath, orthopnea or PND.     On arrival to ED he was afebrile and hemodynamically stable.  Labs notable for WBC 15.75, hemoglobin 11.2, platelets 285, sodium 141, potassium 3.4, chloride 107, CO2 16, creatinine 5.59, BUN 90.4.  Retroperitoneal ultrasound showed no hydronephrosis, bladder unremarkable and not distended.  Chest x-ray show pulmonary vascular congestion.  Urinalysis suggestive of UTI.     She was given 500 mL normal saline bolus and started at 75 mL/hour and referred to hospital medicine service for further evaluation and management. Nephrology was consulted to assist with management . Pt continued on gentle IVF replacement with improvement of renal parameter to baseline and improved UOP.  Urine culture grew out E. Coli and Enterococcus. Antibiotic changed from Cefepime to oral Cipro per sensitivity data.         Interval Hx:   Noted Nephrology re started IVF at slow rate due to persistent dehydration from poor oral intake of fluid.   Antibiotic transitioned to oral Cipro   PT/OT consulted and moderate intensity therapy suggested.     Case was discussed with  patient's nurse and  on the floor.    Objective/physical exam:  General: In no acute distress, afebrile  Chest: Clear to auscultation bilaterally  Heart: RRR, +S1, S2, no appreciable murmur  Abdomen: Soft, nontender, BS +  MSK: Warm, no lower extremity edema, no clubbing or cyanosis  Neurologic: Alert and oriented x3     VITAL SIGNS: 24 HRS MIN & MAX LAST   Temp  Min: 97.4 °F (36.3 °C)  Max: 99.4 °F (37.4 °C) 98.5 °F (36.9 °C)   BP  Min: 113/62  Max: 163/75 129/75   Pulse  Min: 59  Max: 71  (!) 59   Resp  Min: 20  Max: 20 20   SpO2  Min: 94 %  Max: 97 % 97 %     I have reviewed the following labs:  Recent Labs   Lab 01/09/24  0358 01/10/24  1534 01/11/24  0356   WBC 7.21 9.64 8.51   RBC 3.36* 3.33* 3.20*   HGB 10.5* 10.6* 10.2*   HCT 32.3* 33.2* 31.2*   MCV 96.1* 99.7* 97.5*   MCH 31.3* 31.8* 31.9*   MCHC 32.5* 31.9* 32.7*   RDW 12.7 13.1 13.2    172 159   MPV 10.5* 10.7* 11.1*     Recent Labs   Lab 01/05/24  1648 01/05/24  2242 01/06/24  0507 01/07/24  0324 01/08/24  0355 01/09/24  0358 01/10/24  1534 01/11/24  0356      < > 142 143 140 145 142 142   K 3.4*   < > 3.6 3.9 3.5 3.4* 3.6 3.8   CO2 16*   < > 18* 23 25 27 27 25   BUN 90.4*   < > 85.3* 70.3* 54.7* 46.2* 35.6* 35.8*   CREATININE 5.59*   < > 4.71* 3.65* 2.82* 2.72* 2.86* 2.84*   CALCIUM 9.4   < > 8.7 8.2* 7.9* 8.6 8.8 8.6   MG 2.10  --   --  1.70 1.50*  --   --   --    ALBUMIN 3.0*  --  2.6* 2.3* 2.3*  --   --   --    ALKPHOS 126  --  101 100 86  --   --   --    ALT 20  --  16 15 14  --   --   --    AST 22  --  17 18 16  --   --   --    BILITOT 0.5  --  0.5 0.5 0.5  --   --   --     < > = values in this interval not displayed.     Microbiology Results (last 7 days)       ** No results found for the last 168 hours. **             See below for Radiology    Scheduled Med:   allopurinoL  100 mg Oral Daily    amiodarone  200 mg Oral Daily    apixaban  5 mg Oral BID    aspirin  81 mg Oral Daily    atorvastatin  20 mg Oral Daily     buPROPion  150 mg Oral Daily    ciprofloxacin HCl  500 mg Oral Daily    docusate sodium  100 mg Oral BID    folic acid  1,000 mcg Oral Daily    gabapentin  100 mg Oral BID    methocarbamoL  500 mg Oral QID    metoprolol succinate  50 mg Oral BID    sodium bicarbonate  1,300 mg Oral Q8H      Continuous Infusions:   sodium chloride 0.9% 50 mL/hr at 01/10/24 1853      PRN Meds:  acetaminophen, aluminum-magnesium hydroxide-simethicone, dextrose 10%, dextrose 10%, glucagon (human recombinant), glucose, glucose, hydrALAZINE, insulin aspart U-100, melatonin, ondansetron, polyethylene glycol, prochlorperazine, senna-docusate 8.6-50 mg, sodium chloride 0.9%     Assessment/Plan:  LORI superimposed on CKD stage 4- improved to baseline   Clinical dehydration due to poor oral intake of fluid  Acute UTI due toE.coli, Enterobacter cloacae complex , POA  Anemia of chronic kidney disease , mild and stable   Hyperuricemia  Decreased mobility/due to vertebral fracture  Paroxysmal Afib on Eliquis-currently NSR    Hx- Hypertension, Hyperlipidemia, T2DM.    Plan-  IVF management per Nephro   Antibiotic transition to oral    Possible return to NH with SNF care once ok with Nephro     VTE prophylaxis: Eliquis    Patient condition:  Fair    Anticipated discharge and Disposition:     SNF placement       All diagnosis and differential diagnosis have been reviewed; assessment and plan has been documented; I have personally reviewed the labs and test results that are presently available; I have reviewed the patients medication list; I have reviewed the consulting providers response and recommendations. I have reviewed or attempted to review medical records based upon their availability    All of the patient's questions have been  addressed and answered. Patient's is agreeable to the above stated plan. I will continue to monitor closely and make adjustments to medical management as needed.  __________    Je Rodriguez MD   01/11/2024

## 2024-01-11 NOTE — PT/OT/SLP EVAL
Occupational Therapy  Evaluation    Name: Debra Puentes  MRN: [id number]  Admitting Diagnosis: UTI  Recent Surgery: * No surgery found *      Recommendations:     Discharge therapy intensity: Moderate Intensity Therapy   Discharge Equipment Recommendations:  to be determined by next level of care  Barriers to discharge:       Assessment:     Debra Puentes is a 78 y.o. female with a medical diagnosis of LORI, renal related volume overload, acute UTI, decreased mobility due to vertebral fx (T12).  She presents with the following performance deficits affecting function: weakness, impaired endurance, impaired self care skills, impaired functional mobility, gait instability, impaired balance, impaired cognition, decreased lower extremity function, decreased safety awareness.   Pt tolerated initial evaluation fairly well. Pt presenting with confusion, oriented to person with increased time for . RN made aware of pt confusion. Pt found with purewick doffed in sheets and soiled with urine and BM. Pt required Mod A x2-Max A for bed mobility with good adherence to spinal pxns. Pt required CGA for sitting balance, Min A x2 for initial sit>stand and remained standing for toilet hygiene. Pt easily fatigued in sustained standing, requiring seated rest break. Pt completed second sit>stand with Mod Ax2 and able to take side steps along EOB with Min A and assist for RW mgmt. Pt returned to supine and positioned comfortably. Pt would benefit from moderate intensity therapy upon discharge.     Rehab Prognosis: Good; patient would benefit from acute skilled OT services to address these deficits and reach maximum level of function.       Plan:     Patient to be seen 3 x/week to address the above listed problems via self-care/home management, therapeutic activities, therapeutic exercises  Plan of Care Expires: 24  Plan of Care Reviewed with: patient    Subjective     Chief Complaint: none  Patient/Family Comments/goals:  to get stronger    Occupational Profile:  Living Environment: lives in assisted living with walk in shower and shower chair  Previous level of function: required assist for ADLs from sister and assist with functional mobility using rollator  Equipment Used at Home: shower chair, rollator, wheelchair  Assistance upon Discharge: unknown    Pain/Comfort:       Patients cultural, spiritual, Baptist conflicts given the current situation: no    Objective:     OT communicated with RN prior to session.      Patient was found HOB elevated with pulse ox (continuous), telemetry, PureWick upon OT entry to room.    General Precautions: Standard, fall  Orthopedic Precautions: spinal precautions  Braces: LSO    Bed Mobility:    Patient completed Supine to Sit with moderate assistance x2  Patient completed Sit to Supine with maximal assistance x2    Functional Mobility/Transfers:  Patient completed Sit <> Stand Transfer with minimum assistance x2 with  rolling walker for initial stand; Mod A x2 for second stand  Functional Mobility: Min A for side steps ~2ft along EOB using RW, assist with RW mgmt    Activities of Daily Living:  Lower Body Dressing: maximal assistance    Toileting: maximal assistance hygiene in standing    Functional Cognition:  Orientation: oriented to Person (pt stating she was currently at [facility])  Safety Awareness: Impaired.     Upper Extremity Function:  Right Upper Extremity:   Strength: WFL    Left Upper Extremity:  Strength: WFL    Balance:   Static sitting balance: SBA  Dynamic sitting balance:CGA  Static standing balance:Min A at RW  Dynamic standing balance:Min A x2 at RW    Therapeutic Positioning  Risk for acquired pressure injuries is increased due to relative decrease in mobility d/t hospitalization .    OT interventions performed during the course of today's session:   Education was provided on benefits of and recommendations for therapeutic positioning    Skin assessment: all bony prominences  were assessed    Findings: no redness or breakdown noted    OT recommendations for therapeutic positioning throughout hospitalization:   Follow [facility] Pressure Injury Prevention Protocol    Patient Education:  Patient provided with verbal education education regarding OT role/goals/POC, post op precautions, fall prevention, safety awareness, and Discharge/DME recommendations.  Understanding was verbalized.     Patient left HOB elevated with all lines intact, call button in reach, RN notified, and RN present    GOALS:   Multidisciplinary Problems       Occupational Therapy Goals          Problem: Occupational Therapy    Goal Priority Disciplines Outcome Interventions   Occupational Therapy Goal     OT, PT/OT Ongoing, Progressing    Description: Goals to be met by: 2/10/24     Patient will increase functional independence with ADLs by performing:    UE Dressing with Set-up Assistance.  LE Dressing with Stand-by Assistance.  Grooming while seated with Modified [place].  Toileting from bedside commode with Stand-by Assistance for hygiene and clothing management.   Toilet transfer to bedside commode with Stand-by Assistance.                         History:     Past Medical History:   Diagnosis Date    Cancer     Chronic kidney disease, unspecified     Diabetes mellitus, type 2     History of arteriovenostomy for renal dialysis     Hyperlipidemia     Hypertension          Past Surgical History:   Procedure Laterality Date    HYSTERECTOMY      KIDNEY STONE SURGERY      TONSILLECTOMY         Time Tracking:     OT Date of Treatment: 01/10/24  OT Start Time: 1350  OT Stop Time: 1423  OT Total Time (min): 33 min    Billable Minutes:Evaluation moderate complexity    1/11/2024

## 2024-01-11 NOTE — PROGRESS NOTES
Inpatient Nutrition Assessment    Admit Date: 1/5/2024   Total duration of encounter: 6 days   Patient Age: 78 y.o.    Nutrition Recommendation/Prescription     Diet as tolerated.    Communication of Recommendations: reviewed with nurse    Nutrition Assessment     Chart Review    Reason Seen: follow-up    Malnutrition Screening Tool Results   Have you recently lost weight without trying?: No  Have you been eating poorly because of a decreased appetite?: No   MST Score: 0   Diagnosis:  LORI superimposed on CKD stage 4  Renal related volume overload  Acute UTI versus asymptomatic bacteriuria  Anemia of CKD  Hyperuricemia  Decreased mobility/due to vertebral fracture  Paroxysmal AFib-currently NSR  Hypertension  Hyperlipidemia  T2DM    Relevant Medical History: CKD stage 4, previously was on temporary dialysis in 2020 through left upper extremity AV fistula subsequently stabilized at CKD stage 4 with baseline creatinine around 3.0, anemia of chronic disease, T2DM, HTN, HLD, atrial fibrillation on Eliquis, DDD and vertebral compression fracture/T12 recently on TLSO brace    Scheduled Medications:  allopurinoL, 100 mg, Daily  amiodarone, 200 mg, Daily  apixaban, 5 mg, BID  aspirin, 81 mg, Daily  atorvastatin, 20 mg, Daily  buPROPion, 150 mg, Daily  ciprofloxacin HCl, 500 mg, Daily  docusate sodium, 100 mg, BID  folic acid, 1,000 mcg, Daily  gabapentin, 100 mg, BID  methocarbamoL, 500 mg, QID  metoprolol succinate, 50 mg, BID  sodium bicarbonate, 1,300 mg, Q8H    Continuous Infusions: none  sodium chloride 0.9%, Last Rate: 50 mL/hr at 01/10/24 1853      PRN Medications: acetaminophen, aluminum-magnesium hydroxide-simethicone, dextrose 10%, dextrose 10%, glucagon (human recombinant), glucose, glucose, hydrALAZINE, insulin aspart U-100, melatonin, ondansetron, polyethylene glycol, prochlorperazine, senna-docusate 8.6-50 mg, sodium chloride 0.9%    Calorie Containing IV Medications: no significant kcals from medications at  "this time    Recent Labs   Lab 01/05/24  0500 01/05/24  1648 01/05/24  2242 01/06/24  0507 01/07/24  0324 01/08/24  0355 01/09/24  0358 01/10/24  1534 01/11/24  0356    141 141 142 143 140 145 142 142   K 4.1 3.4* 3.8 3.6 3.9 3.5 3.4* 3.6 3.8   CALCIUM 9.7 9.4 8.7 8.7 8.2* 7.9* 8.6 8.8 8.6   PHOS  --   --   --  5.2*  --   --   --   --   --    MG  --  2.10  --   --  1.70 1.50*  --   --   --    CHLORIDE 107 107 110* 111* 108* 107 108* 106 106   CO2 17* 16* 17* 18* 23 25 27 27 25   BUN 88.4* 90.4* 90.7* 85.3* 70.3* 54.7* 46.2* 35.6* 35.8*   CREATININE 4.76* 5.59* 5.18* 4.71* 3.65* 2.82* 2.72* 2.86* 2.84*   EGFRNORACEVR 9 7 8 9 12 17 17 16 16   GLUCOSE 123* 147* 143* 79* 79* 91 96 144* 98   BILITOT  --  0.5  --  0.5 0.5 0.5  --   --   --    ALKPHOS  --  126  --  101 100 86  --   --   --    ALT  --  20  --  16 15 14  --   --   --    AST  --  22  --  17 18 16  --   --   --    ALBUMIN  --  3.0*  --  2.6* 2.3* 2.3*  --   --   --    CRP  --   --  69.10*  --   --   --   --   --   --    HGBA1C  --   --  5.6  --   --   --   --   --   --    WBC 9.26 15.75*  --  9.01  --   --  7.21 9.64 8.51   HGB 11.6* 11.2*  --  10.5*  --   --  10.5* 10.6* 10.2*   HCT 35.9* 33.8*  --  33.2*  --   --  32.3* 33.2* 31.2*       Nutrition Orders:  Diet Cardiac  Diet Adult Regular    Appetite/Oral Intake: good/% of meals  Factors Affecting Nutritional Intake: none identified  Food/Lutheran/Cultural Preferences: none reported  Food Allergies: none reported  Last Bowel Movement: 01/08/24  Wound(s): no pressure injuries documented at this time     Comments    1/8/24 Good appetite, no weight loss.  1/11/24 Good appetite continues per patient.    Anthropometrics    Height: 5' 8" (172.7 cm),    Last Weight: 93.2 kg (205 lb 7.5 oz) (01/11/24 0716), Weight Method: Bed Scale  BMI (Calculated): 31.2Body mass index is 31.24 kg/m².   BMI Classification: obese grade I (BMI 30-34.9)     Ideal Body Weight (IBW), Female: 140 lb                           "         Usual Weight Provided By: patient denies unintentional weight loss    Wt Readings from Last 5 Encounters:   01/11/24 93.2 kg (205 lb 7.5 oz)   10/27/23 104.3 kg (230 lb)   10/23/23 103 kg (227 lb 1.2 oz)   08/08/23 99.8 kg (220 lb)   09/05/22 94.3 kg (208 lb)     Weight Change(s) Since Admission: fluctuations noted, possibly fluid-related, CKD  Wt Readings from Last 1 Encounters:   01/11/24 0716 93.2 kg (205 lb 7.5 oz)   01/10/24 0550 93.5 kg (206 lb 2.1 oz)   01/08/24 0546 96.5 kg (212 lb 11.9 oz)   01/07/24 0436 95.7 kg (210 lb 15.7 oz)   01/05/24 1445 90.7 kg (200 lb)   Admit Weight: 90.7 kg (200 lb) (01/05/24 1445), Weight Method: Bed Scale    Nutrition Goals & Monitoring     Dietitian will monitor: food and beverage intake and weight    Nutrition Risk/Follow-Up: low (follow-up in 5-7 days)   Please consult if re-assessment needed sooner.

## 2024-01-12 LAB
ANION GAP SERPL CALC-SCNC: 8 MEQ/L
BNP BLD-MCNC: 362 PG/ML
BUN SERPL-MCNC: 33.3 MG/DL (ref 9.8–20.1)
CALCIUM SERPL-MCNC: 8.3 MG/DL (ref 8.4–10.2)
CHLORIDE SERPL-SCNC: 107 MMOL/L (ref 98–107)
CO2 SERPL-SCNC: 28 MMOL/L (ref 23–31)
CREAT SERPL-MCNC: 2.98 MG/DL (ref 0.55–1.02)
CREAT/UREA NIT SERPL: 11
GFR SERPLBLD CREATININE-BSD FMLA CKD-EPI: 15 MLS/MIN/1.73/M2
GLUCOSE SERPL-MCNC: 91 MG/DL (ref 82–115)
MAGNESIUM SERPL-MCNC: 1.6 MG/DL (ref 1.6–2.6)
PHOSPHATE SERPL-MCNC: 3.7 MG/DL (ref 2.3–4.7)
POCT GLUCOSE: 104 MG/DL (ref 70–110)
POCT GLUCOSE: 129 MG/DL (ref 70–110)
POTASSIUM SERPL-SCNC: 3.6 MMOL/L (ref 3.5–5.1)
SODIUM SERPL-SCNC: 143 MMOL/L (ref 136–145)

## 2024-01-12 PROCEDURE — 80048 BASIC METABOLIC PNL TOTAL CA: CPT | Performed by: NURSE PRACTITIONER

## 2024-01-12 PROCEDURE — 97530 THERAPEUTIC ACTIVITIES: CPT | Mod: CQ

## 2024-01-12 PROCEDURE — 25000003 PHARM REV CODE 250: Performed by: INTERNAL MEDICINE

## 2024-01-12 PROCEDURE — 11000001 HC ACUTE MED/SURG PRIVATE ROOM

## 2024-01-12 PROCEDURE — 83735 ASSAY OF MAGNESIUM: CPT | Performed by: INTERNAL MEDICINE

## 2024-01-12 PROCEDURE — 97116 GAIT TRAINING THERAPY: CPT | Mod: CQ

## 2024-01-12 PROCEDURE — 83880 ASSAY OF NATRIURETIC PEPTIDE: CPT | Performed by: INTERNAL MEDICINE

## 2024-01-12 PROCEDURE — 84100 ASSAY OF PHOSPHORUS: CPT | Performed by: INTERNAL MEDICINE

## 2024-01-12 RX ORDER — METHOCARBAMOL 500 MG/1
500 TABLET, FILM COATED ORAL 3 TIMES DAILY
Status: DISCONTINUED | OUTPATIENT
Start: 2024-01-12 | End: 2024-01-16 | Stop reason: HOSPADM

## 2024-01-12 RX ORDER — METOPROLOL SUCCINATE 25 MG/1
25 TABLET, EXTENDED RELEASE ORAL 2 TIMES DAILY
Status: DISCONTINUED | OUTPATIENT
Start: 2024-01-12 | End: 2024-01-16 | Stop reason: HOSPADM

## 2024-01-12 RX ADMIN — SODIUM BICARBONATE 650 MG TABLET 1300 MG: at 08:01

## 2024-01-12 RX ADMIN — ATORVASTATIN CALCIUM 20 MG: 10 TABLET, FILM COATED ORAL at 09:01

## 2024-01-12 RX ADMIN — CIPROFLOXACIN HYDROCHLORIDE 500 MG: 500 TABLET, FILM COATED ORAL at 09:01

## 2024-01-12 RX ADMIN — ALLOPURINOL 100 MG: 100 TABLET ORAL at 09:01

## 2024-01-12 RX ADMIN — MELATONIN TAB 3 MG 6 MG: 3 TAB at 08:01

## 2024-01-12 RX ADMIN — SODIUM BICARBONATE 650 MG TABLET 1300 MG: at 09:01

## 2024-01-12 RX ADMIN — ASPIRIN 81 MG: 81 TABLET, COATED ORAL at 09:01

## 2024-01-12 RX ADMIN — GABAPENTIN 100 MG: 100 CAPSULE ORAL at 09:01

## 2024-01-12 RX ADMIN — FOLIC ACID 1000 MCG: 1 TABLET ORAL at 09:01

## 2024-01-12 RX ADMIN — AMIODARONE HYDROCHLORIDE 200 MG: 200 TABLET ORAL at 09:01

## 2024-01-12 RX ADMIN — DOCUSATE SODIUM 100 MG: 100 CAPSULE, LIQUID FILLED ORAL at 09:01

## 2024-01-12 RX ADMIN — SODIUM BICARBONATE 650 MG TABLET 1300 MG: at 04:01

## 2024-01-12 RX ADMIN — DOCUSATE SODIUM 100 MG: 100 CAPSULE, LIQUID FILLED ORAL at 08:01

## 2024-01-12 RX ADMIN — METHOCARBAMOL 500 MG: 500 TABLET ORAL at 08:01

## 2024-01-12 RX ADMIN — GABAPENTIN 100 MG: 100 CAPSULE ORAL at 08:01

## 2024-01-12 RX ADMIN — BUPROPION 150 MG: 150 TABLET, EXTENDED RELEASE ORAL at 09:01

## 2024-01-12 RX ADMIN — APIXABAN 5 MG: 5 TABLET, FILM COATED ORAL at 09:01

## 2024-01-12 RX ADMIN — METOPROLOL SUCCINATE 25 MG: 25 TABLET, EXTENDED RELEASE ORAL at 09:01

## 2024-01-12 RX ADMIN — METHOCARBAMOL 500 MG: 500 TABLET ORAL at 04:01

## 2024-01-12 RX ADMIN — APIXABAN 5 MG: 5 TABLET, FILM COATED ORAL at 08:01

## 2024-01-12 NOTE — PROGRESS NOTES
[facility] Hospital Medicine Progress Note        Chief Complaint: Inpatient Follow-up for LORI on CKD    HPI:   78-year-old female with medical history of CKD stage 4, previously was on temporary dialysis in 2020 through left upper extremity AV fistula subsequently stabilized at CKD stage 4 with baseline creatinine around 3.0, anemia of chronic disease, T2DM, HTN, HLD, atrial fibrillation on Eliquis, DDD and recent vertebral compression fracture T12  on TLSO brace managed nonoperatively.     Pt presented to the ED due to abnormal labs done as outpatient by her Nephrologist after she returned to her Assisted living facility from SNF following vertebral fracture. Labs revealed  worsening renal function.  Patient report noting decreased urination over the past week and bilateral lower extremity swelling.  Report she is still having back pain and bilateral lower extremity weakness more on the Rt leg and mostly using wheelchair since her vertebral fracture.  Denies shortness of breath, orthopnea or PND.     On arrival to ED she was afebrile and hemodynamically stable.  Labs notable for WBC 15.75, hemoglobin 11.2, platelets 285, sodium 141, potassium 3.4, chloride 107, CO2 16, creatinine 5.59, BUN 90.4.  Retroperitoneal ultrasound showed no hydronephrosis, bladder unremarkable and not distended.  Chest x-ray showed pulmonary vascular congestion or interstitial scarring.  Urinalysis suggestive of UTI.     She was given 500 mL normal saline bolus and started at 75 mL/hour and referred to hospital medicine service for further evaluation and management. Nephrology was consulted to assist with management . Pt continued on gentle IVF replacement with improvement of renal parameter to baseline and improved UOP.  Urine culture grew out E. Coli and Enterococcus. Antibiotic changed from Cefepime to oral Cipro per sensitivity data. Pt continued on TLSO brace and PT/OT consulted and suggested moderate  intensity therapy. Pt's Assisted living facility suggested pt is not appropriate to return to the facility and need rehab via SNF placement.      Interval Hx:   Pt is sitting in the bedside chair with TLSO brace in place after PT session this morning.  Fully awake, alert, oriented x4.   Reports back pain still persist but decreased intensity. Mobility is improving with therapy.     Vitals revealed fluctuating BP normal to lows but favorable MAP. On RA and afebrile.   Labs showed Creatinine 2.9 today   Pt is encouraged oral fluid intake   Gentle hydration with NS continued per Nephro   Follow up CXR showed possible interstitial scarring.     Case was discussed with patient's nurse and  on the floor.    Objective/physical exam:  General: In no acute distress, afebrile  Chest: Clear to auscultation bilaterally  Heart: RRR, +S1, S2, no appreciable murmur  Abdomen: Soft, nontender, BS +  MSK: Warm, no lower extremity edema, no clubbing or cyanosis  Neurologic: Alert and oriented x4, Cranial nerve II-XII intact, Moves all ext spontaneously with good strength     VITAL SIGNS: 24 HRS MIN & MAX LAST   Temp  Min: 97.5 °F (36.4 °C)  Max: 98.5 °F (36.9 °C) 97.8 °F (36.6 °C)   BP  Min: 99/48  Max: 136/72 136/72   Pulse  Min: 59  Max: 97  60   No data recorded 20   SpO2  Min: 94 %  Max: 100 % 95 %     I have reviewed the following labs:  Recent Labs   Lab 01/09/24  0358 01/10/24  1534 01/11/24  0356   WBC 7.21 9.64 8.51   RBC 3.36* 3.33* 3.20*   HGB 10.5* 10.6* 10.2*   HCT 32.3* 33.2* 31.2*   MCV 96.1* 99.7* 97.5*   MCH 31.3* 31.8* 31.9*   MCHC 32.5* 31.9* 32.7*   RDW 12.7 13.1 13.2    172 159   MPV 10.5* 10.7* 11.1*     Recent Labs   Lab 01/06/24  0507 01/07/24  0324 01/08/24  0355 01/09/24  0358 01/10/24  1534 01/11/24  0356 01/12/24  0400    143 140   < > 142 142 143   K 3.6 3.9 3.5   < > 3.6 3.8 3.6   CO2 18* 23 25   < > 27 25 28   BUN 85.3* 70.3* 54.7*   < > 35.6* 35.8* 33.3*   CREATININE 4.71* 3.65*  2.82*   < > 2.86* 2.84* 2.98*   CALCIUM 8.7 8.2* 7.9*   < > 8.8 8.6 8.3*   MG  --  1.70 1.50*  --   --   --  1.60   ALBUMIN 2.6* 2.3* 2.3*  --   --   --   --    ALKPHOS 101 100 86  --   --   --   --    ALT 16 15 14  --   --   --   --    AST 17 18 16  --   --   --   --    BILITOT 0.5 0.5 0.5  --   --   --   --     < > = values in this interval not displayed.     Microbiology Results (last 7 days)       ** No results found for the last 168 hours. **             See below for Radiology    Scheduled Med:   allopurinoL  100 mg Oral Daily    amiodarone  200 mg Oral Daily    apixaban  5 mg Oral BID    aspirin  81 mg Oral Daily    atorvastatin  20 mg Oral Daily    buPROPion  150 mg Oral Daily    ciprofloxacin HCl  500 mg Oral Daily    docusate sodium  100 mg Oral BID    folic acid  1,000 mcg Oral Daily    gabapentin  100 mg Oral BID    methocarbamoL  500 mg Oral TID    metoprolol succinate  50 mg Oral BID    sodium bicarbonate  1,300 mg Oral Q8H      Continuous Infusions:   sodium chloride 0.9% 50 mL/hr at 01/10/24 1853      PRN Meds:  acetaminophen, aluminum-magnesium hydroxide-simethicone, dextrose 10%, dextrose 10%, glucagon (human recombinant), glucose, glucose, hydrALAZINE, insulin aspart U-100, melatonin, ondansetron, polyethylene glycol, prochlorperazine, senna-docusate 8.6-50 mg, sodium chloride 0.9%     Assessment/Plan:  LORI superimposed on CKD stage 4- improved to baseline   Clinical dehydration due to poor oral intake of fluid  Acute UTI due toE.coli, Enterobacter cloacae complex , POA  Anemia of chronic kidney disease , mild and stable   Decreased mobility and deconditioning due to T12 vertebral fracture  Paroxysmal Afib on Eliquis-currently NSR     Hx- Hypertension, Hyperlipidemia, T2DM, Gout     Plan-  IVF management per Nephro   Renal parameter improved to  baseline  Encouraged oral fluid intake  Antibiotic transition to oral  for UTI  Home meds are reviewed and resumed   Decrease Metoprolol to 25 mg bid due  to soft BP  Continue PT/OT service   Awaiting SNF placement  CM consulted to assist with DC planning     VTE prophylaxis: Eliquis     Patient condition:  Fair    Anticipated discharge and Disposition:     SNF placement     All diagnosis and differential diagnosis have been reviewed; assessment and plan has been documented; I have personally reviewed the labs and test results that are presently available; I have reviewed the patients medication list; I have reviewed the consulting providers response and recommendations. I have reviewed or attempted to review medical records based upon their availability    All of the patient's questions have been  addressed and answered. Patient's is agreeable to the above stated plan. I will continue to monitor closely and make adjustments to medical management as needed.  ____________________    Je Rodriguez MD   01/12/2024

## 2024-01-12 NOTE — PLAN OF CARE
Spoke to patient about discharge planning and notified that she has been denied by insurance for snf. I did discuss long term/half-way placement but she refused. She will hire extra help if necessary to return to assisted living. She will discuss this with her sister also. Spoke to Jaja at [facility]. She will Director give me a callback to discuss.

## 2024-01-12 NOTE — PROGRESS NOTES
[facility] - 4th Floor Medical Telemetry  Nephrology  Progress Note    Patient Name: Debra Puentes  MRN: [id number]  Admission Date: 1/5/2024  Hospital Length of Stay: 7 days  Attending Provider: Je Rodriguez MD   Primary Care Physician: Logan Smith MD  Principal Problem:Acute kidney injury superimposed on CKD      Subjective:   Debra Puentes is a 78 y.o. well known to Dr Smith with a history of chronic kidney disease stage IV, baseline Cr 2.6-2.8. She has a history of dialysis dependence in 2020 and sees Dr Smith monthly.  [facility] post T12 compression fracture suffered at end of October 2023. Prior to incident she was able to complete ADL and was in assisted living.     Patient was sent to ED from [facility] due to abnormal labs indicative of LORI. Initial BUN 90/ Cr 5.18/ GFR 8. Urine culture positive for both E Coli and Enterobacter on day #4 of Maxipime. She has difficulty getting reliable access to water at the NH.     Interval History:   1/8 - Renal indices back to baseline with treatment of UTI and IV hydration. Her complaint is today back pain.   1/9 - renal indices stable off IV hydration. Endorses back pain.   1/10 - Mucous membranes are very dry today and preventing her from speaking clearly.  was determined by assisted living facility that she is not appropriate to return and needs SNF. Now remains inpatient pending SNF placement.   1/11 - endorses some improvement to dryness of MM with some IV hydration. She is eating some lunch though less than half her tray     1/12  - Some slight worsening of renal function today. Noted to be more hypotensive over the past 24 hours which may be contributory factor. She is sitting in chair with back brace eating breakfast.     Review of patient's allergies indicates:   Allergen Reactions    Lactose Diarrhea    Codeine Other (See Comments) and Nausea And Vomiting     Current Facility-Administered  Medications   Medication Frequency    0.9%  NaCl infusion Continuous    acetaminophen tablet 650 mg Q4H PRN    allopurinoL tablet 100 mg Daily    aluminum-magnesium hydroxide-simethicone 200-200-20 mg/5 mL suspension 30 mL QID PRN    amiodarone tablet 200 mg Daily    apixaban tablet 5 mg BID    aspirin EC tablet 81 mg Daily    atorvastatin tablet 20 mg Daily    buPROPion TB24 tablet 150 mg Daily    ciprofloxacin HCl tablet 500 mg Daily    dextrose 10% bolus 125 mL 125 mL PRN    dextrose 10% bolus 250 mL 250 mL PRN    docusate sodium capsule 100 mg BID    folic acid tablet 1,000 mcg Daily    gabapentin capsule 100 mg BID    glucagon (human recombinant) injection 1 mg PRN    glucose chewable tablet 16 g PRN    glucose chewable tablet 24 g PRN    hydrALAZINE injection 10 mg Q2H PRN    insulin aspart U-100 injection 0-5 Units QID (AC + HS) PRN    melatonin tablet 6 mg Nightly PRN    methocarbamoL tablet 500 mg TID    metoprolol succinate (TOPROL-XL) 24 hr tablet 50 mg BID    ondansetron injection 4 mg Q4H PRN    polyethylene glycol packet 17 g BID PRN    prochlorperazine injection Soln 5 mg Q6H PRN    senna-docusate 8.6-50 mg per tablet 2 tablet BID PRN    sodium bicarbonate tablet 1,300 mg Q8H    sodium chloride 0.9% flush 10 mL PRN       Objective:     Vital Signs (Most Recent):  Temp: 98 °F (36.7 °C) (01/12/24 0720)  Pulse: 97 (01/12/24 0720)  Resp: 20 (01/11/24 0433)  BP: (!) 99/48 (01/12/24 0720)  SpO2: 100 % (01/12/24 0720) Vital Signs (24h Range):  Temp:  [97.5 °F (36.4 °C)-98.5 °F (36.9 °C)] 98 °F (36.7 °C)  Pulse:  [59-97] 97  SpO2:  [94 %-100 %] 100 %  BP: ()/(46-75) 99/48     Weight: 93.2 kg (205 lb 7.5 oz) (01/11/24 0716)  Body mass index is 31.24 kg/m².  Body surface area is 2.11 meters squared.    I/O last 3 completed shifts:  In: 620 [P.O.:620]  Out: 1150 [Urine:1150]    Physical Exam  Constitutional:       General: She is not in acute distress.     Appearance: She is obese.   HENT:      Head:  "Atraumatic.      Mouth/Throat:      Mouth: Mucous membranes are dry.   Eyes:      Extraocular Movements: Extraocular movements intact.      Conjunctiva/sclera: Conjunctivae normal.   Cardiovascular:      Rate and Rhythm: Normal rate and regular rhythm.      Pulses: Normal pulses.      Heart sounds: Normal heart sounds.   Pulmonary:      Effort: Pulmonary effort is normal.      Breath sounds: Normal breath sounds.   Abdominal:      General: There is no distension.      Palpations: Abdomen is soft.   Musculoskeletal:         General: No swelling.      Cervical back: Neck supple.   Skin:     General: Skin is warm.   Neurological:      Mental Status: She is alert and oriented to person, place, and time.   Psychiatric:         Mood and Affect: Mood normal.         Behavior: Behavior normal.         Significant Labs:sureBMP:   Recent Labs   Lab 01/12/24  0400      K 3.6   CO2 28   BUN 33.3*   CREATININE 2.98*   CALCIUM 8.3*   MG 1.60       CBC:   Recent Labs   Lab 01/11/24  0356   WBC 8.51   RBC 3.20*   HGB 10.2*   HCT 31.2*      MCV 97.5*   MCH 31.9*   MCHC 32.7*       Microbiology Results (last 7 days)       ** No results found for the last 168 hours. **          Specimen (24h ago, onward)      None          No results for input(s): "COLORU", "CLARITYU", "SPECGRAV", "PHUR", "PROTEINUA", "GLUCOSEU", "BILIRUBINCON", "BLOODU", "WBCU", "RBCU", "BACTERIA", "MUCUS", "NITRITE", "LEUKOCYTESUR", "UROBILINOGEN", "HYALINECASTS" in the last 168 hours.      Significant Imaging:  US Retroperitoneal Complete [[id number]] Resulted: 01/05/24 2002   Order Status: Completed Updated: 01/05/24 2004   Narrative:     EXAMINATION:  US RETROPERITONEAL COMPLETE    CLINICAL HISTORY:  Elevated BUN/creatinine;    COMPARISON:  None.    FINDINGS:  Grayscale and color Doppler sonographic evaluation of the kidneys and urinary bladder.    The right kidney measures 8 cm. The left kidney measures 7 cm.   No hydronephrosis.  There is " heterogeneous renal parenchymal echogenicity.    Urinary bladder unremarkable.   Impression:       1. No hydronephrosis.  2. Suspect medical renal disease.      Electronically signed by: Bienvenido Loco  Date: 01/05/2024     X-Ray Chest AP Portable [[id number]] Resulted: 01/05/24 1706   Order Status: Completed Updated: 01/05/24 1709   Narrative:     EXAMINATION:  XR CHEST AP PORTABLE    CLINICAL HISTORY:  Peripheral edema;    TECHNIQUE:  Frontal view(s) of the chest.    COMPARISON:  Radiography 08/13/2020    FINDINGS:  Normal cardiac silhouette.  The lungs are well-inflated.  Central pulmonary vascular congestion.  No confluent consolidation appreciated.  Similar small area of scarring within the mid right lung.  No significant pleural effusion or discernible pneumothorax.   Impression:       Central pulmonary vascular congestion.      Electronically signed by: Grayson Carrillo  Date: 01/05/2024  Time: 17:06       Assessment/Plan:   LORI on CKD IV secondary to acute infection and dehydration   UTI - E. Coli and Enterococcus. On Maxipime Day 4  Clinical dehydration resolved with IVF and oral hydration   T12 Compression fracture in October 2023 with resulting physical deconditioning     Continue NS.   Hold BP medications today if ok from primary   Repeat lab in the morning     DEE Garrett  Nephrology  [facility] - 4th Floor Medical Telemetry

## 2024-01-12 NOTE — PLAN OF CARE
Problem: Oral Intake Inadequate (Acute Kidney Injury/Impairment)  Goal: Optimal Nutrition Intake  Outcome: Ongoing, Progressing

## 2024-01-12 NOTE — PT/OT/SLP PROGRESS
Physical Therapy Treatment    Patient Name:  Debra Puentes   MRN:  [id number]    Recommendations:     Discharge therapy intensity: Moderate Intensity Therapy   Discharge Equipment Recommendations: to be determined by next level of care  Barriers to discharge: Impaired mobility    Assessment:     Debra Puentes is a 78 y.o. female admitted with a medical diagnosis of Acute kidney injury superimposed on CKD, volume overload, UTI, CKD, afib, recent T12 vertebral fx. .  She presents with the following impairments/functional limitations: weakness, impaired endurance, impaired balance, impaired self care skills, impaired functional mobility, impaired cognition, gait instability.       Rehab Prognosis: Good; patient would benefit from acute skilled PT services to address these deficits and reach maximum level of function.    Recent Surgery: * No surgery found *      Plan:     During this hospitalization, patient to be seen 5 x/week to address the identified rehab impairments via gait training, therapeutic activities, therapeutic exercises and progress toward the following goals:    Plan of Care Expires:  02/07/24    Subjective     Chief Complaint: back pain  Patient/Family Comments/goals: to get better.   Pain/Comfort:  Pain Rating 1: other (see comments) (back pain noted when transitioning supine to sit; did not rate)  Pain Addressed 1: Reposition      Objective:     Communicated with nurse prior to session.  Patient found HOB elevated with pulse ox (continuous), telemetry, PureWick upon PT entry to room.     General Precautions: Standard,    Orthopedic Precautions: spinal precautions  Braces: LSO  Respiratory Status: Room air      Functional Mobility:  Bed Mobility:  *LSO donned at EOB  Supine to sit with min assist; improved sequencing   Transfers:    Sit<->stand x 2 trials with mod assist. Cues for positioning and technique with poor carryover  Balance:   Progressed from BRENTON UE on bed to pt's thighs and  performed anterior/posterior weight shifting   Gait: 18' with RW, min assist, decreased step length BRENTON, slow evleia and chair close in tow. Reports R knee weakness and fear of falling limiting distance.     Therapeutic Activities/Exercises:  Total assist brief change standing at recliner.     Education:  Patient provided with verbal education education regarding safety awareness and discharge/DME recommendations.  Understanding was verbalized, however additional teaching warranted.     Patient left up in chair with all lines intact, call button in reach, chair alarm on, and nurse notified.    GOALS:   Multidisciplinary Problems       Physical Therapy Goals          Problem: Physical Therapy    Goal Priority Disciplines Outcome Goal Variances Interventions   Physical Therapy Goal     PT, PT/OT Ongoing, Progressing     Description: Goals to be met by: 2023     Patient will increase functional independence with mobility by performin. Supine to sit with Stand-by Assistance  2. Sit to stand transfer with Stand-by Assistance  3. Gait  x 150 feet with Stand-by Assistance using Rolling Walker.                          Time Tracking:     PT Received On: 24  PT Start Time: 0850     PT Stop Time: 0914  PT Total Time (min): 24 min     Billable Minutes: Gait Training 1 unit and Therapeutic Activity 1 unit    Treatment Type: Treatment  PT/PTA: PTA     Number of PTA visits since last PT visit: 5     2024

## 2024-01-12 NOTE — PLAN OF CARE
Received call from Guerda from the assisted living. The are currently working on arranging additional equipment and sitters for patient to return. Will follow up.

## 2024-01-13 LAB
ANION GAP SERPL CALC-SCNC: 8 MEQ/L
BUN SERPL-MCNC: 34.3 MG/DL (ref 9.8–20.1)
CALCIUM SERPL-MCNC: 8.4 MG/DL (ref 8.4–10.2)
CHLORIDE SERPL-SCNC: 108 MMOL/L (ref 98–107)
CO2 SERPL-SCNC: 26 MMOL/L (ref 23–31)
CREAT SERPL-MCNC: 2.78 MG/DL (ref 0.55–1.02)
CREAT/UREA NIT SERPL: 12
GFR SERPLBLD CREATININE-BSD FMLA CKD-EPI: 17 MLS/MIN/1.73/M2
GLUCOSE SERPL-MCNC: 98 MG/DL (ref 82–115)
POTASSIUM SERPL-SCNC: 3.4 MMOL/L (ref 3.5–5.1)
SODIUM SERPL-SCNC: 142 MMOL/L (ref 136–145)

## 2024-01-13 PROCEDURE — 25000003 PHARM REV CODE 250: Performed by: INTERNAL MEDICINE

## 2024-01-13 PROCEDURE — 11000001 HC ACUTE MED/SURG PRIVATE ROOM

## 2024-01-13 PROCEDURE — 80048 BASIC METABOLIC PNL TOTAL CA: CPT | Performed by: NURSE PRACTITIONER

## 2024-01-13 RX ORDER — POTASSIUM CHLORIDE 20 MEQ/1
20 TABLET, EXTENDED RELEASE ORAL ONCE
Status: COMPLETED | OUTPATIENT
Start: 2024-01-13 | End: 2024-01-13

## 2024-01-13 RX ADMIN — SODIUM BICARBONATE 650 MG TABLET 1300 MG: at 09:01

## 2024-01-13 RX ADMIN — AMIODARONE HYDROCHLORIDE 200 MG: 200 TABLET ORAL at 09:01

## 2024-01-13 RX ADMIN — METHOCARBAMOL 500 MG: 500 TABLET ORAL at 08:01

## 2024-01-13 RX ADMIN — GABAPENTIN 100 MG: 100 CAPSULE ORAL at 08:01

## 2024-01-13 RX ADMIN — METOPROLOL SUCCINATE 25 MG: 25 TABLET, EXTENDED RELEASE ORAL at 08:01

## 2024-01-13 RX ADMIN — FOLIC ACID 1000 MCG: 1 TABLET ORAL at 09:01

## 2024-01-13 RX ADMIN — METHOCARBAMOL 500 MG: 500 TABLET ORAL at 04:01

## 2024-01-13 RX ADMIN — METHOCARBAMOL 500 MG: 500 TABLET ORAL at 09:01

## 2024-01-13 RX ADMIN — GABAPENTIN 100 MG: 100 CAPSULE ORAL at 09:01

## 2024-01-13 RX ADMIN — POTASSIUM CHLORIDE 20 MEQ: 1500 TABLET, EXTENDED RELEASE ORAL at 09:01

## 2024-01-13 RX ADMIN — BUPROPION 150 MG: 150 TABLET, EXTENDED RELEASE ORAL at 09:01

## 2024-01-13 RX ADMIN — ATORVASTATIN CALCIUM 20 MG: 10 TABLET, FILM COATED ORAL at 09:01

## 2024-01-13 RX ADMIN — APIXABAN 5 MG: 5 TABLET, FILM COATED ORAL at 08:01

## 2024-01-13 RX ADMIN — ALLOPURINOL 100 MG: 100 TABLET ORAL at 09:01

## 2024-01-13 RX ADMIN — DOCUSATE SODIUM 100 MG: 100 CAPSULE, LIQUID FILLED ORAL at 09:01

## 2024-01-13 RX ADMIN — ASPIRIN 81 MG: 81 TABLET, COATED ORAL at 09:01

## 2024-01-13 RX ADMIN — SODIUM BICARBONATE 650 MG TABLET 1300 MG: at 08:01

## 2024-01-13 RX ADMIN — CIPROFLOXACIN HYDROCHLORIDE 500 MG: 500 TABLET, FILM COATED ORAL at 09:01

## 2024-01-13 RX ADMIN — MELATONIN TAB 3 MG 6 MG: 3 TAB at 08:01

## 2024-01-13 RX ADMIN — SODIUM BICARBONATE 650 MG TABLET 1300 MG: at 04:01

## 2024-01-13 RX ADMIN — DOCUSATE SODIUM 100 MG: 100 CAPSULE, LIQUID FILLED ORAL at 08:01

## 2024-01-13 RX ADMIN — METOPROLOL SUCCINATE 25 MG: 25 TABLET, EXTENDED RELEASE ORAL at 09:01

## 2024-01-13 RX ADMIN — APIXABAN 5 MG: 5 TABLET, FILM COATED ORAL at 09:01

## 2024-01-13 NOTE — PROGRESS NOTES
[facility] Hospital Medicine Progress Note        Chief Complaint: Inpatient Follow-up for LORI on CKD    HPI:   78-year-old female with medical history of CKD stage 4, previously was on temporary dialysis in 2020 through left upper extremity AV fistula subsequently stabilized at CKD stage 4 with baseline creatinine around 3.0, anemia of chronic disease, T2DM, HTN, HLD, atrial fibrillation on Eliquis, DDD and recent vertebral compression fracture T12  on TLSO brace managed nonoperatively.     Pt presented to the ED due to abnormal labs done as outpatient by her Nephrologist after she returned to her Assisted living facility from SNF following vertebral fracture. Labs revealed  worsening renal function.  Patient report noting decreased urination over the past week and bilateral lower extremity swelling.  Report she is still having back pain and bilateral lower extremity weakness more on the Rt leg and mostly using wheelchair since her vertebral fracture.  Denies shortness of breath, orthopnea or PND.     On arrival to ED she was afebrile and hemodynamically stable.  Labs notable for WBC 15.75, hemoglobin 11.2, platelets 285, sodium 141, potassium 3.4, chloride 107, CO2 16, creatinine 5.59, BUN 90.4.  Retroperitoneal ultrasound showed no hydronephrosis, bladder unremarkable and not distended.  Chest x-ray showed pulmonary vascular congestion or interstitial scarring.  Urinalysis suggestive of UTI.     She was given 500 mL normal saline bolus and started at 75 mL/hour and referred to hospital medicine service for further evaluation and management. Nephrology was consulted to assist with management . Pt continued on gentle IVF replacement with improvement of renal parameter to baseline and improved UOP.  Urine culture grew out E. Coli and Enterococcus. Antibiotic changed from Cefepime to oral Cipro per sensitivity data. Pt continued on TLSO brace and PT/OT consulted and suggested moderate  intensity therapy. Pt's Assisted living facility suggested pt is not appropriate to return to the facility and need rehab via SNF placement.      Interval Hx:   No acute events reported overnight.   Pt has no new c/o today   Reports still some muscle spasms in the back when she stands up and make turns.     Labs showed creatinine down to 2.7 today   No signs of volume overload.   UOP adequate.   K 3.4 and is being replaced.     Case was discussed with patient's nurse and  on the floor.    Objective/physical exam:  General: In no acute distress, afebrile  Chest: Clear to auscultation bilaterally  Heart: RRR, +S1, S2, no appreciable murmur  Abdomen: Soft, nontender, BS +  MSK: Warm, no lower extremity edema, no clubbing or cyanosis  Neurologic: Alert and oriented x4, Cranial nerve II-XII intact, Moves all ext spontaneously with good strength     VITAL SIGNS: 24 HRS MIN & MAX LAST   Temp  Min: 97.8 °F (36.6 °C)  Max: 98.8 °F (37.1 °C) 97.8 °F (36.6 °C)   BP  Min: 110/70  Max: 145/78 (!) 145/78   Pulse  Min: 61  Max: 80  62   No data recorded 20   SpO2  Min: 93 %  Max: 99 % 99 %     I have reviewed the following labs:  Recent Labs   Lab 01/09/24  0358 01/10/24  1534 01/11/24  0356   WBC 7.21 9.64 8.51   RBC 3.36* 3.33* 3.20*   HGB 10.5* 10.6* 10.2*   HCT 32.3* 33.2* 31.2*   MCV 96.1* 99.7* 97.5*   MCH 31.3* 31.8* 31.9*   MCHC 32.5* 31.9* 32.7*   RDW 12.7 13.1 13.2    172 159   MPV 10.5* 10.7* 11.1*     Recent Labs   Lab 01/07/24  0324 01/08/24  0355 01/09/24  0358 01/11/24  0356 01/12/24  0400 01/13/24  0316    140   < > 142 143 142   K 3.9 3.5   < > 3.8 3.6 3.4*   CO2 23 25   < > 25 28 26   BUN 70.3* 54.7*   < > 35.8* 33.3* 34.3*   CREATININE 3.65* 2.82*   < > 2.84* 2.98* 2.78*   CALCIUM 8.2* 7.9*   < > 8.6 8.3* 8.4   MG 1.70 1.50*  --   --  1.60  --    ALBUMIN 2.3* 2.3*  --   --   --   --    ALKPHOS 100 86  --   --   --   --    ALT 15 14  --   --   --   --    AST 18 16  --   --   --   --     BILITOT 0.5 0.5  --   --   --   --     < > = values in this interval not displayed.     Microbiology Results (last 7 days)       ** No results found for the last 168 hours. **             See below for Radiology    Scheduled Med:   allopurinoL  100 mg Oral Daily    amiodarone  200 mg Oral Daily    apixaban  5 mg Oral BID    aspirin  81 mg Oral Daily    atorvastatin  20 mg Oral Daily    buPROPion  150 mg Oral Daily    docusate sodium  100 mg Oral BID    folic acid  1,000 mcg Oral Daily    gabapentin  100 mg Oral BID    methocarbamoL  500 mg Oral TID    metoprolol succinate  25 mg Oral BID    sodium bicarbonate  1,300 mg Oral Q8H      Continuous Infusions:   sodium chloride 0.9% 50 mL/hr at 01/13/24 0345      PRN Meds:  acetaminophen, aluminum-magnesium hydroxide-simethicone, dextrose 10%, dextrose 10%, glucagon (human recombinant), glucose, glucose, hydrALAZINE, insulin aspart U-100, melatonin, ondansetron, polyethylene glycol, prochlorperazine, senna-docusate 8.6-50 mg, sodium chloride 0.9%     Assessment/Plan:  LORI superimposed on CKD stage 4- improved to baseline   Clinical dehydration due to poor oral intake of fluid  Acute UTI due toE.coli, Enterobacter cloacae complex , POA  Anemia of chronic kidney disease , mild and stable   Decreased mobility and deconditioning due to T12 vertebral fracture  Paroxysmal Afib on Eliquis-currently NSR     Hx- Hypertension, Hyperlipidemia, T2DM, Gout     Plan-  IVF management per Nephro   Renal parameter improved to  baseline  Encouraged to increase oral fluid intake  Antibiotic transition to oral  for UTI  Home meds are reviewed and resumed   Decrease Metoprolol to 25 mg bid due to soft BP  Continue PT/OT service   Awaiting SNF placement  CM consulted to assist with DC planning   Plan B- return to Assisted living with 24h sitter     VTE prophylaxis: Eliquis      Patient condition:  Fair     Anticipated discharge and Disposition:     SNF placement vs return to Assisted living  with 24h sitter        All diagnosis and differential diagnosis have been reviewed; assessment and plan has been documented; I have personally reviewed the labs and test results that are presently available; I have reviewed the patients medication list; I have reviewed the consulting providers response and recommendations. I have reviewed or attempted to review medical records based upon their availability    All of the patient's questions have been  addressed and answered. Patient's is agreeable to the above stated plan. I will continue to monitor closely and make adjustments to medical management as needed.      Je Rodriguez MD   01/13/2024

## 2024-01-14 LAB — POCT GLUCOSE: 151 MG/DL (ref 70–110)

## 2024-01-14 PROCEDURE — 11000001 HC ACUTE MED/SURG PRIVATE ROOM

## 2024-01-14 PROCEDURE — 25000003 PHARM REV CODE 250: Performed by: INTERNAL MEDICINE

## 2024-01-14 RX ADMIN — ATORVASTATIN CALCIUM 20 MG: 10 TABLET, FILM COATED ORAL at 09:01

## 2024-01-14 RX ADMIN — APIXABAN 5 MG: 5 TABLET, FILM COATED ORAL at 08:01

## 2024-01-14 RX ADMIN — SODIUM BICARBONATE 650 MG TABLET 1300 MG: at 09:01

## 2024-01-14 RX ADMIN — METHOCARBAMOL 500 MG: 500 TABLET ORAL at 08:01

## 2024-01-14 RX ADMIN — DOCUSATE SODIUM 100 MG: 100 CAPSULE, LIQUID FILLED ORAL at 09:01

## 2024-01-14 RX ADMIN — METHOCARBAMOL 500 MG: 500 TABLET ORAL at 09:01

## 2024-01-14 RX ADMIN — METOPROLOL SUCCINATE 25 MG: 25 TABLET, EXTENDED RELEASE ORAL at 09:01

## 2024-01-14 RX ADMIN — AMIODARONE HYDROCHLORIDE 200 MG: 200 TABLET ORAL at 09:01

## 2024-01-14 RX ADMIN — APIXABAN 5 MG: 5 TABLET, FILM COATED ORAL at 09:01

## 2024-01-14 RX ADMIN — SODIUM BICARBONATE 650 MG TABLET 1300 MG: at 03:01

## 2024-01-14 RX ADMIN — FOLIC ACID 1000 MCG: 1 TABLET ORAL at 09:01

## 2024-01-14 RX ADMIN — ASPIRIN 81 MG: 81 TABLET, COATED ORAL at 09:01

## 2024-01-14 RX ADMIN — BUPROPION 150 MG: 150 TABLET, EXTENDED RELEASE ORAL at 09:01

## 2024-01-14 RX ADMIN — DOCUSATE SODIUM 100 MG: 100 CAPSULE, LIQUID FILLED ORAL at 08:01

## 2024-01-14 RX ADMIN — GABAPENTIN 100 MG: 100 CAPSULE ORAL at 09:01

## 2024-01-14 RX ADMIN — METHOCARBAMOL 500 MG: 500 TABLET ORAL at 03:01

## 2024-01-14 RX ADMIN — METOPROLOL SUCCINATE 25 MG: 25 TABLET, EXTENDED RELEASE ORAL at 08:01

## 2024-01-14 RX ADMIN — GABAPENTIN 100 MG: 100 CAPSULE ORAL at 08:01

## 2024-01-14 RX ADMIN — ALLOPURINOL 100 MG: 100 TABLET ORAL at 09:01

## 2024-01-14 NOTE — PROGRESS NOTES
[facility] Hospital Medicine Progress Note        Chief Complaint: Inpatient Follow-up for LORI on CKD    HPI:   78-year-old female with medical history of CKD stage 4, previously was on temporary dialysis in 2020 through left upper extremity AV fistula subsequently stabilized at CKD stage 4 with baseline creatinine around 3.0, anemia of chronic disease, T2DM, HTN, HLD, atrial fibrillation on Eliquis, DDD and recent vertebral compression fracture T12  on TLSO brace managed nonoperatively.     Pt presented to the ED due to abnormal labs done as outpatient by her Nephrologist after she returned to her Assisted living facility from SNF following vertebral fracture. Labs revealed  worsening renal function.  Patient report noting decreased urination over the past week and bilateral lower extremity swelling.  Report she is still having back pain and bilateral lower extremity weakness more on the Rt leg and mostly using wheelchair since her vertebral fracture.  Denies shortness of breath, orthopnea or PND.     On arrival to ED she was afebrile and hemodynamically stable.  Labs notable for WBC 15.75, hemoglobin 11.2, platelets 285, sodium 141, potassium 3.4, chloride 107, CO2 16, creatinine 5.59, BUN 90.4.  Retroperitoneal ultrasound showed no hydronephrosis, bladder unremarkable and not distended.  Chest x-ray showed pulmonary vascular congestion or interstitial scarring.  Urinalysis suggestive of UTI.     She was given 500 mL normal saline bolus and started at 75 mL/hour and referred to hospital medicine service for further evaluation and management. Nephrology was consulted to assist with management . Pt continued on gentle IVF replacement with improvement of renal parameter to baseline and improved UOP.  Urine culture grew out E. Coli and Enterococcus. Antibiotic changed from Cefepime to oral Cipro per sensitivity data. Pt continued on TLSO brace and PT/OT consulted and suggested moderate  intensity therapy. Pt's Assisted living facility suggested pt is not appropriate to return to the facility and need rehab via SNF placement.  Plan B- return to Assisted living facility with 24h sitter with PT/OT  service.       Interval Hx:   No acute events reported overnight.   Pt has no new c/o today   Gentle hydration continues   No signs of volume overload.   UOP adequate  Repeat labs in am     Case was discussed with patient's nurse and  on the floor.    Objective/physical exam:  General: In no acute distress, afebrile  Chest: Clear to auscultation bilaterally  Heart: RRR, +S1, S2, no appreciable murmur  Abdomen: Soft, nontender, BS +  MSK: Warm, no lower extremity edema, no clubbing or cyanosis  Neurologic: Alert and oriented x4, Cranial nerve II-XII intact, Moves all ext spontaneously with good strength       VITAL SIGNS: 24 HRS MIN & MAX LAST   Temp  Min: 98 °F (36.7 °C)  Max: 98.7 °F (37.1 °C) 98.4 °F (36.9 °C)   BP  Min: 113/61  Max: 152/79 (!) 144/83   Pulse  Min: 59  Max: 81  62   Resp  Min: 18  Max: 18 18   SpO2  Min: 93 %  Max: 98 % 98 %     I have reviewed the following labs:  Recent Labs   Lab 01/09/24  0358 01/10/24  1534 01/11/24  0356   WBC 7.21 9.64 8.51   RBC 3.36* 3.33* 3.20*   HGB 10.5* 10.6* 10.2*   HCT 32.3* 33.2* 31.2*   MCV 96.1* 99.7* 97.5*   MCH 31.3* 31.8* 31.9*   MCHC 32.5* 31.9* 32.7*   RDW 12.7 13.1 13.2    172 159   MPV 10.5* 10.7* 11.1*     Recent Labs   Lab 01/08/24  0355 01/09/24  0358 01/11/24  0356 01/12/24  0400 01/13/24  0316      < > 142 143 142   K 3.5   < > 3.8 3.6 3.4*   CO2 25   < > 25 28 26   BUN 54.7*   < > 35.8* 33.3* 34.3*   CREATININE 2.82*   < > 2.84* 2.98* 2.78*   CALCIUM 7.9*   < > 8.6 8.3* 8.4   MG 1.50*  --   --  1.60  --    ALBUMIN 2.3*  --   --   --   --    ALKPHOS 86  --   --   --   --    ALT 14  --   --   --   --    AST 16  --   --   --   --    BILITOT 0.5  --   --   --   --     < > = values in this interval not displayed.      Microbiology Results (last 7 days)       ** No results found for the last 168 hours. **             See below for Radiology    Scheduled Med:   allopurinoL  100 mg Oral Daily    amiodarone  200 mg Oral Daily    apixaban  5 mg Oral BID    aspirin  81 mg Oral Daily    atorvastatin  20 mg Oral Daily    buPROPion  150 mg Oral Daily    docusate sodium  100 mg Oral BID    folic acid  1,000 mcg Oral Daily    gabapentin  100 mg Oral BID    methocarbamoL  500 mg Oral TID    metoprolol succinate  25 mg Oral BID    sodium bicarbonate  1,300 mg Oral Q8H      Continuous Infusions:   sodium chloride 0.9% 50 mL/hr at 01/13/24 0345      PRN Meds:  acetaminophen, aluminum-magnesium hydroxide-simethicone, dextrose 10%, dextrose 10%, glucagon (human recombinant), glucose, glucose, hydrALAZINE, insulin aspart U-100, melatonin, ondansetron, polyethylene glycol, prochlorperazine, senna-docusate 8.6-50 mg, sodium chloride 0.9%     Assessment/Plan:  LORI superimposed on CKD stage 4- improved to baseline   Clinical dehydration due to poor oral intake of fluid  Acute UTI due toE.coli, Enterobacter cloacae complex , POA  Anemia of chronic kidney disease , mild and stable   Decreased mobility and deconditioning due to T12 vertebral fracture  Paroxysmal Afib on Eliquis-currently NSR     Hx- Hypertension, Hyperlipidemia, T2DM, Gout     Plan-  IVF management per Nephro   Renal parameter improved to  baseline  Encouraged to increase oral fluid intake  Antibiotic transition to oral  for UTI and completed.   Home meds are reviewed and resumed   Decrease Metoprolol to 25 mg bid due to soft BP  Continue PT/OT service   Awaiting SNF placement  CM consulted to assist with DC planning   Plan B- return to Assisted living with 24h sitter/ PT/OT service      VTE prophylaxis: Eliquis      Patient condition:  Fair     Anticipated discharge and Disposition:     SNF placement vs return to Assisted living with 24h sitter       All diagnosis and differential  diagnosis have been reviewed; assessment and plan has been documented; I have personally reviewed the labs and test results that are presently available; I have reviewed the patients medication list; I have reviewed the consulting providers response and recommendations. I have reviewed or attempted to review medical records based upon their availability    All of the patient's questions have been  addressed and answered. Patient's is agreeable to the above stated plan. I will continue to monitor closely and make adjustments to medical management as needed.  _________________________    Je Rodriguez MD   01/14/2024

## 2024-01-15 LAB
ANION GAP SERPL CALC-SCNC: 10 MEQ/L
BUN SERPL-MCNC: 30.6 MG/DL (ref 9.8–20.1)
CALCIUM SERPL-MCNC: 8.5 MG/DL (ref 8.4–10.2)
CHLORIDE SERPL-SCNC: 110 MMOL/L (ref 98–107)
CO2 SERPL-SCNC: 22 MMOL/L (ref 23–31)
CREAT SERPL-MCNC: 2.38 MG/DL (ref 0.55–1.02)
CREAT/UREA NIT SERPL: 13
GFR SERPLBLD CREATININE-BSD FMLA CKD-EPI: 20 MLS/MIN/1.73/M2
GLUCOSE SERPL-MCNC: 90 MG/DL (ref 82–115)
POCT GLUCOSE: 127 MG/DL (ref 70–110)
POCT GLUCOSE: 145 MG/DL (ref 70–110)
POTASSIUM SERPL-SCNC: 3.6 MMOL/L (ref 3.5–5.1)
SODIUM SERPL-SCNC: 142 MMOL/L (ref 136–145)

## 2024-01-15 PROCEDURE — 25000003 PHARM REV CODE 250: Performed by: INTERNAL MEDICINE

## 2024-01-15 PROCEDURE — 11000001 HC ACUTE MED/SURG PRIVATE ROOM

## 2024-01-15 PROCEDURE — 80048 BASIC METABOLIC PNL TOTAL CA: CPT | Performed by: NURSE PRACTITIONER

## 2024-01-15 PROCEDURE — 97164 PT RE-EVAL EST PLAN CARE: CPT

## 2024-01-15 RX ADMIN — SODIUM BICARBONATE 650 MG TABLET 1300 MG: at 03:01

## 2024-01-15 RX ADMIN — METOPROLOL SUCCINATE 25 MG: 25 TABLET, EXTENDED RELEASE ORAL at 09:01

## 2024-01-15 RX ADMIN — APIXABAN 5 MG: 5 TABLET, FILM COATED ORAL at 09:01

## 2024-01-15 RX ADMIN — ALLOPURINOL 100 MG: 100 TABLET ORAL at 09:01

## 2024-01-15 RX ADMIN — DOCUSATE SODIUM 100 MG: 100 CAPSULE, LIQUID FILLED ORAL at 11:01

## 2024-01-15 RX ADMIN — MELATONIN TAB 3 MG 6 MG: 3 TAB at 11:01

## 2024-01-15 RX ADMIN — SODIUM BICARBONATE 650 MG TABLET 1300 MG: at 11:01

## 2024-01-15 RX ADMIN — ATORVASTATIN CALCIUM 20 MG: 10 TABLET, FILM COATED ORAL at 09:01

## 2024-01-15 RX ADMIN — SODIUM BICARBONATE 650 MG TABLET 1300 MG: at 05:01

## 2024-01-15 RX ADMIN — GABAPENTIN 100 MG: 100 CAPSULE ORAL at 11:01

## 2024-01-15 RX ADMIN — ASPIRIN 81 MG: 81 TABLET, COATED ORAL at 09:01

## 2024-01-15 RX ADMIN — FOLIC ACID 1000 MCG: 1 TABLET ORAL at 09:01

## 2024-01-15 RX ADMIN — BUPROPION 150 MG: 150 TABLET, EXTENDED RELEASE ORAL at 09:01

## 2024-01-15 RX ADMIN — GABAPENTIN 100 MG: 100 CAPSULE ORAL at 09:01

## 2024-01-15 RX ADMIN — METHOCARBAMOL 500 MG: 500 TABLET ORAL at 09:01

## 2024-01-15 RX ADMIN — METOPROLOL SUCCINATE 25 MG: 25 TABLET, EXTENDED RELEASE ORAL at 11:01

## 2024-01-15 RX ADMIN — METHOCARBAMOL 500 MG: 500 TABLET ORAL at 11:01

## 2024-01-15 RX ADMIN — APIXABAN 5 MG: 5 TABLET, FILM COATED ORAL at 11:01

## 2024-01-15 RX ADMIN — METHOCARBAMOL 500 MG: 500 TABLET ORAL at 03:01

## 2024-01-15 RX ADMIN — AMIODARONE HYDROCHLORIDE 200 MG: 200 TABLET ORAL at 09:01

## 2024-01-15 NOTE — PROGRESS NOTES
[facility] Hospital Medicine Progress Note        Chief Complaint: Inpatient Follow-up for LORI on CKD     HPI:   78-year-old female with medical history of CKD stage 4, previously was on temporary dialysis in 2020 through left upper extremity AV fistula subsequently stabilized at CKD stage 4 with baseline creatinine around 3.0, anemia of chronic disease, T2DM, HTN, HLD, atrial fibrillation on Eliquis, DDD and recent vertebral compression fracture T12  on TLSO brace managed nonoperatively.     Pt presented to the ED due to abnormal labs done as outpatient by her Nephrologist after she returned to her Assisted living facility from SNF following vertebral fracture. Labs revealed  worsening renal function.  Patient report noting decreased urination over the past week and bilateral lower extremity swelling.  Report she is still having back pain and bilateral lower extremity weakness more on the Rt leg and mostly using wheelchair since her vertebral fracture.  Denies shortness of breath, orthopnea or PND.     On arrival to ED she was afebrile and hemodynamically stable.  Labs notable for WBC 15.75, hemoglobin 11.2, platelets 285, sodium 141, potassium 3.4, chloride 107, CO2 16, creatinine 5.59, BUN 90.4.  Retroperitoneal ultrasound showed no hydronephrosis, bladder unremarkable and not distended.  Chest x-ray showed pulmonary vascular congestion or interstitial scarring.  Urinalysis suggestive of UTI.     She was given 500 mL normal saline bolus and started at 75 mL/hour and referred to hospital medicine service for further evaluation and management. Nephrology was consulted to assist with management . Pt continued on gentle IVF replacement with improvement of renal parameter to baseline and improved UOP.  Urine culture grew out E. Coli and Enterococcus. Antibiotic changed from Cefepime to oral Cipro per sensitivity data. Pt continued on TLSO brace and PT/OT consulted and suggested moderate  intensity therapy. Pt's Assisted living facility suggested pt is not appropriate to return to the facility and need rehab via SNF placement.  Plan B- return to Assisted living facility with 24h sitter with PT/OT  service.       Interval Hx:   No acute events reported overnight.   Labs this morning showed creatinine further improved to 2.3.  Discontinue IVF today   Possible discharge to Assisted living with 24h sitter care once DME delivered.       Case was discussed with patient's nurse and  on the floor.    Objective/physical exam:  General: In no acute distress, afebrile  Chest: Clear to auscultation bilaterally  Heart: RRR, +S1, S2, no appreciable murmur  Abdomen: Soft, nontender, BS +  MSK: Warm, no lower extremity edema, no clubbing or cyanosis  Neurologic: Alert and oriented x4, Cranial nerve II-XII intact, Moves all ext spontaneously with good strength     VITAL SIGNS: 24 HRS MIN & MAX LAST   Temp  Min: 97.8 °F (36.6 °C)  Max: 98.8 °F (37.1 °C) 98.5 °F (36.9 °C)   BP  Min: 113/62  Max: 146/75 (!) 146/75   Pulse  Min: 58  Max: 65  (!) 59   Resp  Min: 18  Max: 20 18   SpO2  Min: 95 %  Max: 96 % 96 %     I have reviewed the following labs:  Recent Labs   Lab 01/09/24  0358 01/10/24  1534 01/11/24  0356   WBC 7.21 9.64 8.51   RBC 3.36* 3.33* 3.20*   HGB 10.5* 10.6* 10.2*   HCT 32.3* 33.2* 31.2*   MCV 96.1* 99.7* 97.5*   MCH 31.3* 31.8* 31.9*   MCHC 32.5* 31.9* 32.7*   RDW 12.7 13.1 13.2    172 159   MPV 10.5* 10.7* 11.1*     Recent Labs   Lab 01/12/24  0400 01/13/24  0316 01/15/24  0415    142 142   K 3.6 3.4* 3.6   CO2 28 26 22*   BUN 33.3* 34.3* 30.6*   CREATININE 2.98* 2.78* 2.38*   CALCIUM 8.3* 8.4 8.5   MG 1.60  --   --      Microbiology Results (last 7 days)       ** No results found for the last 168 hours. **             See below for Radiology    Scheduled Med:   allopurinoL  100 mg Oral Daily    amiodarone  200 mg Oral Daily    apixaban  5 mg Oral BID    aspirin  81 mg Oral Daily     atorvastatin  20 mg Oral Daily    buPROPion  150 mg Oral Daily    docusate sodium  100 mg Oral BID    folic acid  1,000 mcg Oral Daily    gabapentin  100 mg Oral BID    methocarbamoL  500 mg Oral TID    metoprolol succinate  25 mg Oral BID    sodium bicarbonate  1,300 mg Oral Q8H      Continuous Infusions:     PRN Meds:  acetaminophen, aluminum-magnesium hydroxide-simethicone, dextrose 10%, dextrose 10%, glucagon (human recombinant), glucose, glucose, hydrALAZINE, insulin aspart U-100, melatonin, ondansetron, polyethylene glycol, prochlorperazine, senna-docusate 8.6-50 mg, sodium chloride 0.9%     Assessment/Plan:  LORI superimposed on CKD stage 4- improved to baseline   Clinical dehydration due to poor oral intake of fluid  Acute UTI due toE.coli, Enterobacter cloacae complex , POA  Anemia of chronic kidney disease , mild and stable   Decreased mobility and deconditioning due to T12 vertebral fracture  Paroxysmal Afib on Eliquis-currently NSR     Hx- Hypertension, Hyperlipidemia, T2DM, Gout     Plan-  IVF management per Nephro   Renal parameter improved to  baseline  Encouraged to increase oral fluid intake  Antibiotic transition to oral  for UTI and completed.   Home meds are reviewed and resumed   Decrease Metoprolol to 25 mg bid due to soft BP  Continue PT/OT service   Disposition- return to Assisted living with 24h sitter/ PT/OT service      VTE prophylaxis: Eliquis      Patient condition:  Fair     Anticipated discharge and Disposition:      return to Assisted living with 24h sitte once DME delivered         All diagnosis and differential diagnosis have been reviewed; assessment and plan has been documented; I have personally reviewed the labs and test results that are presently available; I have reviewed the patients medication list; I have reviewed the consulting providers response and recommendations. I have reviewed or attempted to review medical records based upon their availability    All of the  patient's questions have been  addressed and answered. Patient's is agreeable to the above stated plan. I will continue to monitor closely and make adjustments to medical management as needed.      Je Rodriguez MD   01/15/2024

## 2024-01-15 NOTE — PT/OT/SLP EVAL
Physical Therapy Re-Evaluation    Patient Name:  Debra Puentes   MRN:  [id number]    Recommendations:     Discharge therapy intensity: Moderate Intensity Therapy   Discharge Equipment Recommendations: to be determined by next level of care   Barriers to discharge: None    Assessment:     Debra Puentes is a 78 y.o. female admitted with a medical diagnosis of Acute kidney injury superimposed on CKD.  She presents with the following impairments/functional limitations: weakness, impaired endurance, impaired balance, impaired self care skills, impaired functional mobility, impaired cognition, gait instability. Pt continues to demonstrate LE weakness which impairs her mobility, however she is able to ambulate further distances before needing seated rest break. Pt was Mod A for supine > sit transfer, Mod A x2 for STS to RW. Pt ambulated 32' then another 38' with RW and CGA with one seated rest break between trials.     Rehab Prognosis: Good; patient would benefit from acute skilled PT services to address these deficits and reach maximum level of function.    Recent Surgery: * No surgery found *      Plan:     During this hospitalization, patient to be seen 5 x/week to address the identified rehab impairments via gait training, therapeutic activities, therapeutic exercises and progress toward the following goals:    Plan of Care Expires:  02/07/24    PT/PTA conference to discuss PT POC and patient's progression towards goals held with  Pt only seen by PTs .     Subjective     Chief Complaint: pt c/o having to wear purewick   Patient/Family Comments/goals: discharge back to [facility] today  Pain/Comfort:  Pain Rating 1: 0/10    Patients cultural, spiritual, Hindu conflicts given the current situation: no    Objective:     Communicated with nurse prior to session.  Patient found HOB elevated with pulse ox (continuous), telemetry, PureWick  upon PT entry to room.    General Precautions: Standard, fall  Orthopedic  Precautions:spinal precautions   Braces: LSO  Respiratory Status: Room air  Blood Pressure: NT      Exams:  RLE ROM: WNL  RLE Strength: hip flex 3-/5, knee extension 3+/5  LLE ROM: WNL  LLE Strength: hip flex 3-/5, knee extension 3+/5  Skin integrity: Visible skin intact      Functional Mobility:  Bed Mobility:     Supine to Sit: moderate assistance  Transfers:     Sit to Stand:  moderate assistance with rolling walker  Gait: Pt ambulated 32' w/ RW and CGA with LSO donned, seated rest break, then another 38' w/ RW and CGA. Pt demonstrating crouched gait posture, needing cues to stand upright and forward gaze.       AM-PAC 6 CLICK MOBILITY  Total Score:15       Treatment & Education:  Education Provided:  Role and goals of PT, transfer training, bed mobility, gait training, balance training, safety awareness, assistive device, strengthening exercises, and importance of participating in PT to return to PLOF.     Understanding was verbalized.     Patient left up in chair with all lines intact and call button in reach.    GOALS:   Multidisciplinary Problems       Physical Therapy Goals          Problem: Physical Therapy    Goal Priority Disciplines Outcome Goal Variances Interventions   Physical Therapy Goal     PT, PT/OT Ongoing, Progressing     Description: Goals to be met by: 2023     Patient will increase functional independence with mobility by performin. Supine to sit with Stand-by Assistance  2. Sit to stand transfer with Stand-by Assistance  3. Gait  x 150 feet with Stand-by Assistance using Rolling Walker.                          History:     Past Medical History:   Diagnosis Date    Cancer     Chronic kidney disease, unspecified     Diabetes mellitus, type 2     History of arteriovenostomy for renal dialysis     Hyperlipidemia     Hypertension        Past Surgical History:   Procedure Laterality Date    HYSTERECTOMY      KIDNEY STONE SURGERY      TONSILLECTOMY         Time Tracking:     PT Received  On: 01/15/24  PT Start Time: 0956     PT Stop Time: 1014  PT Total Time (min): 18 min     Billable Minutes: Re-eval 18      01/15/2024

## 2024-01-15 NOTE — PROGRESS NOTES
[facility] - 4th Floor Medical Telemetry  Nephrology  Progress Note    Patient Name: Debra Puentes  MRN: [id number]  Admission Date: 1/5/2024  Hospital Length of Stay: 10 days  Attending Provider: Je Rodriguez MD   Primary Care Physician: Logan Smith MD  Principal Problem:Acute kidney injury superimposed on CKD      Subjective:   Debra Puentes is a 78 y.o. well known to Dr Smith with a history of chronic kidney disease stage IV, baseline Cr 2.6-2.8. She has a history of dialysis dependence in 2020 and sees Dr Smith monthly.  [facility]12 compression fracture suffered at end of October 2023. Prior to incident she was able to complete ADL and was in assisted living.     Patient was sent to ED from [facility] due to abnormal labs indicative of LORI. Initial BUN 90/ Cr 5.18/ GFR 8. Urine culture positive for both E Coli and Enterobacter s/p IV Cefepime and PO Cipro. She has difficulty getting reliable access to water at the NH.     Interval History:   Renal function did have some recurrent worsening due to poor oral intake and some intermittent hypotension. Cr back to baseline this morning.     Review of patient's allergies indicates:   Allergen Reactions    Lactose Diarrhea    Codeine Other (See Comments) and Nausea And Vomiting     Current Facility-Administered Medications   Medication Frequency    acetaminophen tablet 650 mg Q4H PRN    allopurinoL tablet 100 mg Daily    aluminum-magnesium hydroxide-simethicone 200-200-20 mg/5 mL suspension 30 mL QID PRN    amiodarone tablet 200 mg Daily    apixaban tablet 5 mg BID    aspirin EC tablet 81 mg Daily    atorvastatin tablet 20 mg Daily    buPROPion TB24 tablet 150 mg Daily    dextrose 10% bolus 125 mL 125 mL PRN    dextrose 10% bolus 250 mL 250 mL PRN    docusate sodium capsule 100 mg BID    folic acid tablet 1,000 mcg Daily    gabapentin capsule 100 mg BID    glucagon (human recombinant)  injection 1 mg PRN    glucose chewable tablet 16 g PRN    glucose chewable tablet 24 g PRN    hydrALAZINE injection 10 mg Q2H PRN    insulin aspart U-100 injection 0-5 Units QID (AC + HS) PRN    melatonin tablet 6 mg Nightly PRN    methocarbamoL tablet 500 mg TID    metoprolol succinate (TOPROL-XL) 24 hr tablet 25 mg BID    ondansetron injection 4 mg Q4H PRN    polyethylene glycol packet 17 g BID PRN    prochlorperazine injection Soln 5 mg Q6H PRN    senna-docusate 8.6-50 mg per tablet 2 tablet BID PRN    sodium bicarbonate tablet 1,300 mg Q8H    sodium chloride 0.9% flush 10 mL PRN       Objective:     Vital Signs (Most Recent):  Temp: 97.9 °F (36.6 °C) (01/15/24 0733)  Pulse: (!) 58 (01/15/24 0733)  Resp: 18 (01/15/24 0733)  BP: 113/62 (01/15/24 0733)  SpO2: 96 % (01/15/24 0733) Vital Signs (24h Range):  Temp:  [97.8 °F (36.6 °C)-98.8 °F (37.1 °C)] 97.9 °F (36.6 °C)  Pulse:  [58-65] 58  Resp:  [18-20] 18  SpO2:  [95 %-98 %] 96 %  BP: (113-144)/(60-88) 113/62     Weight: 96.9 kg (213 lb 10 oz) (01/15/24 0524)  Body mass index is 32.48 kg/m².  Body surface area is 2.16 meters squared.    I/O last 3 completed shifts:  In: 960 [P.O.:960]  Out: 2700 [Urine:2700]    Physical Exam  Constitutional:       General: She is not in acute distress.     Appearance: She is obese.   HENT:      Head: Atraumatic.      Mouth/Throat:      Mouth: Mucous membranes are dry.   Eyes:      Extraocular Movements: Extraocular movements intact.      Conjunctiva/sclera: Conjunctivae normal.   Cardiovascular:      Rate and Rhythm: Normal rate and regular rhythm.      Pulses: Normal pulses.      Heart sounds: Normal heart sounds.   Pulmonary:      Effort: Pulmonary effort is normal.      Breath sounds: Normal breath sounds.   Abdominal:      General: There is no distension.      Palpations: Abdomen is soft.   Musculoskeletal:         General: No swelling.      Cervical back: Neck supple.   Skin:     General: Skin is warm.   Neurological:       "Mental Status: She is alert and oriented to person, place, and time.   Psychiatric:         Mood and Affect: Mood normal.         Behavior: Behavior normal.         Significant Labs:sureBMP:   Recent Labs   Lab 01/12/24  0400 01/13/24  0316 01/15/24  0415      < > 142   K 3.6   < > 3.6   CO2 28   < > 22*   BUN 33.3*   < > 30.6*   CREATININE 2.98*   < > 2.38*   CALCIUM 8.3*   < > 8.5   MG 1.60  --   --     < > = values in this interval not displayed.       CBC:   Recent Labs   Lab 01/11/24  0356   WBC 8.51   RBC 3.20*   HGB 10.2*   HCT 31.2*      MCV 97.5*   MCH 31.9*   MCHC 32.7*       Microbiology Results (last 7 days)       ** No results found for the last 168 hours. **          Specimen (24h ago, onward)      None          No results for input(s): "COLORU", "CLARITYU", "SPECGRAV", "PHUR", "PROTEINUA", "GLUCOSEU", "BILIRUBINCON", "BLOODU", "WBCU", "RBCU", "BACTERIA", "MUCUS", "NITRITE", "LEUKOCYTESUR", "UROBILINOGEN", "HYALINECASTS" in the last 168 hours.      Significant Imaging:  US Retroperitoneal Complete [[id number]] Resulted: 01/05/24 2002   Order Status: Completed Updated: 01/05/24 2004   Narrative:     EXAMINATION:  US RETROPERITONEAL COMPLETE    CLINICAL HISTORY:  Elevated BUN/creatinine;    COMPARISON:  None.    FINDINGS:  Grayscale and color Doppler sonographic evaluation of the kidneys and urinary bladder.    The right kidney measures 8 cm. The left kidney measures 7 cm.   No hydronephrosis.  There is heterogeneous renal parenchymal echogenicity.    Urinary bladder unremarkable.   Impression:       1. No hydronephrosis.  2. Suspect medical renal disease.      Electronically signed by: Bienvenido Loco  Date: 01/05/2024     X-Ray Chest AP Portable [[id number]] Resulted: 01/05/24 1706   Order Status: Completed Updated: 01/05/24 1709   Narrative:     EXAMINATION:  XR CHEST AP PORTABLE    CLINICAL HISTORY:  Peripheral edema;    TECHNIQUE:  Frontal view(s) of the chest.    COMPARISON:  Radiography " 08/13/2020    FINDINGS:  Normal cardiac silhouette.  The lungs are well-inflated.  Central pulmonary vascular congestion.  No confluent consolidation appreciated.  Similar small area of scarring within the mid right lung.  No significant pleural effusion or discernible pneumothorax.   Impression:       Central pulmonary vascular congestion.      Electronically signed by: Grayson Carrillo  Date: 01/05/2024  Time: 17:06       Assessment/Plan:   LORI on CKD IV secondary to acute infection and dehydration   UTI - E. Coli and Enterococcus. S/p Cefepime and PO Cipro  Clinical dehydration resolved with IVF and oral hydration   T12 Compression fracture in October 2023 with resulting physical deconditioning     Continue NS.   Renal function improving and is back to baseline  OK to dc back to assisted living from renal standpoint. Insurance denied SNF.     DEE Garrett  Nephrology  [facility] - 4th Floor Medical Telemetry

## 2024-01-15 NOTE — PLAN OF CARE
Problem: Diabetes Comorbidity  Goal: Blood Glucose Level Within Targeted Range  Outcome: Ongoing, Progressing     Problem: Oral Intake Inadequate (Acute Kidney Injury/Impairment)  Goal: Optimal Nutrition Intake  Outcome: Ongoing, Progressing     Problem: Pain Chronic (Persistent)  Goal: Acceptable Pain Control and Functional Ability  Outcome: Ongoing, Progressing

## 2024-01-16 VITALS
RESPIRATION RATE: 18 BRPM | TEMPERATURE: 98 F | OXYGEN SATURATION: 94 % | BODY MASS INDEX: 32.68 KG/M2 | HEART RATE: 60 BPM | HEIGHT: 68 IN | WEIGHT: 215.63 LBS | SYSTOLIC BLOOD PRESSURE: 151 MMHG | DIASTOLIC BLOOD PRESSURE: 74 MMHG

## 2024-01-16 LAB
POCT GLUCOSE: 103 MG/DL (ref 70–110)
POCT GLUCOSE: 129 MG/DL (ref 70–110)
POCT GLUCOSE: 201 MG/DL (ref 70–110)

## 2024-01-16 PROCEDURE — 25000003 PHARM REV CODE 250: Performed by: INTERNAL MEDICINE

## 2024-01-16 PROCEDURE — 97535 SELF CARE MNGMENT TRAINING: CPT

## 2024-01-16 PROCEDURE — 97530 THERAPEUTIC ACTIVITIES: CPT

## 2024-01-16 PROCEDURE — 97116 GAIT TRAINING THERAPY: CPT

## 2024-01-16 RX ORDER — METHOCARBAMOL 500 MG/1
500 TABLET, FILM COATED ORAL 3 TIMES DAILY PRN
Qty: 15 TABLET | Refills: 0 | Status: SHIPPED | OUTPATIENT
Start: 2024-01-16 | End: 2024-01-21

## 2024-01-16 RX ORDER — SODIUM BICARBONATE 650 MG/1
650 TABLET ORAL EVERY 8 HOURS
Qty: 90 TABLET | Refills: 0 | Status: SHIPPED | OUTPATIENT
Start: 2024-01-16 | End: 2024-02-15

## 2024-01-16 RX ORDER — METOPROLOL SUCCINATE 25 MG/1
25 TABLET, EXTENDED RELEASE ORAL 2 TIMES DAILY
Qty: 60 TABLET | Refills: 0 | Status: SHIPPED | OUTPATIENT
Start: 2024-01-16 | End: 2024-02-15

## 2024-01-16 RX ADMIN — GABAPENTIN 100 MG: 100 CAPSULE ORAL at 09:01

## 2024-01-16 RX ADMIN — APIXABAN 5 MG: 5 TABLET, FILM COATED ORAL at 09:01

## 2024-01-16 RX ADMIN — AMIODARONE HYDROCHLORIDE 200 MG: 200 TABLET ORAL at 09:01

## 2024-01-16 RX ADMIN — ALLOPURINOL 100 MG: 100 TABLET ORAL at 09:01

## 2024-01-16 RX ADMIN — METHOCARBAMOL 500 MG: 500 TABLET ORAL at 09:01

## 2024-01-16 RX ADMIN — ASPIRIN 81 MG: 81 TABLET, COATED ORAL at 09:01

## 2024-01-16 RX ADMIN — ATORVASTATIN CALCIUM 20 MG: 10 TABLET, FILM COATED ORAL at 09:01

## 2024-01-16 RX ADMIN — BUPROPION 150 MG: 150 TABLET, EXTENDED RELEASE ORAL at 09:01

## 2024-01-16 RX ADMIN — FOLIC ACID 1000 MCG: 1 TABLET ORAL at 09:01

## 2024-01-16 RX ADMIN — SODIUM BICARBONATE 650 MG TABLET 1300 MG: at 05:01

## 2024-01-16 NOTE — PT/OT/SLP PROGRESS
Occupational Therapy   Treatment    Name: Debra Puentes  MRN: [id number]  Admitting Diagnosis:  Acute kidney injury superimposed on CKD       Recommendations:     Recommended therapy intensity at discharge: Moderate Intensity Therapy   Discharge Equipment Recommendations:  to be determined by next level of care  Barriers to discharge:   (ongoing medical needs; severity of deficits)    Assessment:     Debra Puentes is a 78 y.o. female with a medical diagnosis of LORI, renal related volume overload, acute UTI, decreased mobility due to vertebral fx (T12). Performance deficits affecting function are weakness, impaired endurance, impaired self care skills, impaired functional mobility, gait instability, impaired balance, impaired cognition, decreased lower extremity function, decreased safety awareness.     Rehab Prognosis:  Good; patient would benefit from acute skilled OT services to address these deficits and reach maximum level of function.       Plan:     Patient to be seen 3 x/week to address the above listed problems via self-care/home management, therapeutic activities, therapeutic exercises  Plan of Care Expires: 02/09/24  Plan of Care Reviewed with: patient    Subjective     Pain/Comfort:  Pain Rating 1: 0/10    Objective:     Communicated with: NSG prior to session.  Patient found HOB elevated with Purewick, peripheral IV upon OT entry to room.    General Precautions: Standard, fall    Orthopedic Precautions:spinal precautions  Braces: LSO  Respiratory Status: Room air  Vital Signs: WNL throughout     Occupational Performance:     Bed Mobility:    Patient completed Rolling/Turning to Left with  moderate assistance  Patient completed Rolling/Turning to Right with moderate assistance  Patient completed Supine to Sit with contact guard assistance  Patient completed Sit to Supine with contact guard assistance     Functional Mobility/Transfers:  Patient completed Sit <> Stand Transfer with initially Min  Ax2, Mod Ax2 as she fatigued  with  rolling walker   Functional Mobility: ambulating with CGA using RW    Activities of Daily Living:  Toileting: mod A for bed mobility required ; max-total A for pericare and clothing mgmt  Upper body dressing: min A to doff/don LSO and robe    Therapeutic Positioning    OT interventions performed during the course of today's session in an effort to prevent and/or reduce acquired pressure injuries:   Education was provided on benefits of and recommendations for therapeutic positioning  Therapeutic positioning was provided at the conclusion of session to offload all bony prominences for the prevention and/or reduction of pressure injuries  Positioning recommendations were communicated to care team     Skin assessment: all bony prominences were assessed    Findings: new area of altered skin integrity discovered at left glute and left groin crease with skin tear, wide spread redness in perineum area. RN alerted      Patient Education:  Patient provided with verbal education education regarding OT role/goals/POC, fall prevention, safety awareness, Discharge/DME recommendations, and pressure ulcer prevention.  Understanding was verbalized.      Patient left supine with all lines intact, call button in reach, and NSG notified    GOALS:   Multidisciplinary Problems       Occupational Therapy Goals          Problem: Occupational Therapy    Goal Priority Disciplines Outcome Interventions   Occupational Therapy Goal     OT, PT/OT Ongoing, Progressing    Description: Goals to be met by: 2/10/24     Patient will increase functional independence with ADLs by performing:    UE Dressing with Set-up Assistance.  LE Dressing with Stand-by Assistance.  Grooming while seated with Modified [place].  Toileting from bedside commode with Stand-by Assistance for hygiene and clothing management.   Toilet transfer to bedside commode with Stand-by Assistance.                         Time Tracking:     OT  Date of Treatment:    OT Start Time: 0955  OT Stop Time: 1027  OT Total Time (min): 32 min    Billable Minutes:Self Care/Home Management 2    OT/SELENA: OT     Number of SELENA visits since last OT visit: 1 1/16/2024

## 2024-01-16 NOTE — PT/OT/SLP PROGRESS
Physical Therapy Treatment    Patient Name:  Debra Puentes   MRN:  [id number]    Recommendations:     Discharge therapy intensity: Moderate Intensity Therapy   Discharge Equipment Recommendations: to be determined by next level of care  Barriers to discharge: Impaired mobility    Assessment:     Debra Puentes is a 78 y.o. female admitted with a medical diagnosis of Acute kidney injury superimposed on CKD.  She presents with the following impairments/functional limitations: weakness, impaired endurance, impaired balance, impaired self care skills, impaired functional mobility, impaired cognition, gait instability. The pt tolerated session well. She is able to perform all mobility with min/mod A, increased assist required for sit to stand from a low surface. Progress as able.     Rehab Prognosis: Good; patient would benefit from acute skilled PT services to address these deficits and reach maximum level of function.    Recent Surgery: * No surgery found *      Plan:     During this hospitalization, patient to be seen 5 x/week to address the identified rehab impairments via gait training, therapeutic activities, therapeutic exercises and progress toward the following goals:    Plan of Care Expires:  02/07/24    Subjective     Chief Complaint: none  Patient/Family Comments/goals: return to PLOF  Pain/Comfort:  Location 1: back  Pain Addressed 1: Reposition, Distraction      Objective:     Communicated with NSG prior to session.  Patient found supine with   upon PT entry to room.     General Precautions: Standard, fall  Orthopedic Precautions: spinal precautions  Braces: LSO  Respiratory Status: Room air  Blood Pressure: NA  Skin Integrity:  breakdown on L glute area, redness bilateral sides. Breakdown L inner thigh region.      Functional Mobility:  Bed Mobility:     Rolling Left:  minimum assistance  Rolling Right: minimum assistance  Scooting: minimum assistance  Supine to Sit: minimum assistance  Sit to  Supine: minimum assistance  Transfers:     Sit to Stand:  moderate assistance with rolling walker  Gait: pt ambulates x 20 feet with min A/CGA with RW  Balance: pt sits EOB with SBA   Pt able to neil/doff brace with set-up    Education:  Patient provided with verbal education education regarding PT role/goals/POC and safety awareness.  Understanding was verbalized.     Patient left HOB elevated with all lines intact, call button in reach, and NSG notified..    GOALS:   Multidisciplinary Problems       Physical Therapy Goals          Problem: Physical Therapy    Goal Priority Disciplines Outcome Goal Variances Interventions   Physical Therapy Goal     PT, PT/OT Ongoing, Progressing     Description: Goals to be met by: 2023     Patient will increase functional independence with mobility by performin. Supine to sit with Stand-by Assistance  2. Sit to stand transfer with Stand-by Assistance  3. Gait  x 150 feet with Stand-by Assistance using Rolling Walker.                          Time Tracking:     PT Received On: 24  PT Start Time: 0956     PT Stop Time: 1028  PT Total Time (min): 32 min     Billable Minutes: Gait Training 15 and Therapeutic Activity 17    Treatment Type: Re-evaluation  PT/PTA: PT     Number of PTA visits since last PT visit: 2024

## 2024-01-16 NOTE — PLAN OF CARE
Problem: Oral Intake Inadequate (Acute Kidney Injury/Impairment)  Goal: Optimal Nutrition Intake  Outcome: Ongoing, Progressing     Problem: Pain Chronic (Persistent)  Goal: Acceptable Pain Control and Functional Ability  Outcome: Ongoing, Progressing     Problem: Diabetes Comorbidity  Goal: Blood Glucose Level Within Targeted Range  Outcome: Ongoing, Progressing

## 2024-01-16 NOTE — DISCHARGE SUMMARY
[facility] Hospital Medicine Discharge Summary    Admit Date: 1/5/2024  Discharge Date and Time: 1/16/20242:20 PM  Admitting Physician:  Team  Discharging Physician: Je Rodriguez MD.  Primary Care Physician: Logan Smith MD  Consults: Nephrology    Discharge Diagnoses:  LORI superimposed on CKD stage 4- improved to baseline   Clinical dehydration due to poor oral intake of fluid  Acute UTI due toE.coli, Enterobacter cloacae complex , POA  Anemia of chronic kidney disease , mild and stable   Decreased mobility and deconditioning due to T12 vertebral fracture  Paroxysmal Afib on Eliquis-currently NSR     Hx- Hypertension, Hyperlipidemia, T2DM, Gout    Hospital Course:   78-year-old female with medical history of CKD stage 4, previously was on temporary dialysis in 2020 through left upper extremity AV fistula subsequently stabilized at CKD stage 4 with baseline creatinine around 3.0, anemia of chronic disease, T2DM, HTN, HLD, atrial fibrillation on Eliquis, DDD and recent vertebral compression fracture T12  on TLSO brace managed nonoperatively.     Pt presented to the ED due to abnormal labs done as outpatient by her Nephrologist after she returned to her Assisted living facility from SNF following vertebral fracture. Labs revealed  worsening renal function.  Patient report noting decreased urination over the past week and bilateral lower extremity swelling.  Report she is still having back pain and bilateral lower extremity weakness more on the Rt leg and mostly using wheelchair since her vertebral fracture.  Denies shortness of breath, orthopnea or PND.     On arrival to ED she was afebrile and hemodynamically stable.  Labs notable for WBC 15.75, hemoglobin 11.2, platelets 285, sodium 141, potassium 3.4, chloride 107, CO2 16, creatinine 5.59, BUN 90.4.  Retroperitoneal ultrasound showed no hydronephrosis, bladder unremarkable and not distended.  Chest x-ray showed pulmonary  vascular congestion or interstitial scarring.  Urinalysis suggestive of UTI.     She was given 500 mL normal saline bolus and started at 75 mL/hour and referred to hospital medicine service for further evaluation and management. Nephrology was consulted to assist with management . Pt continued on gentle IVF replacement with improvement of renal parameter to baseline and improved UOP.  Urine culture grew out E. Coli and Enterococcus. Antibiotic changed from Cefepime to oral Cipro per sensitivity data. Pt continued on TLSO brace and PT/OT consulted and suggested moderate intensity therapy. Pt's Assisted living facility suggested pt is not appropriate to return to the facility and need rehab via SNF placement.  Plan B- return to Assisted living facility with 24h sitter with PT/OT  service.  Insurance denied  SNF placement. Pt returned to Assisted living with hospital bed and 24h sitter service with PT/OT program on 1/16/2024. Pt is examined and deemed stable for discharge to Assisted living.     Pt was seen and examined on the day of discharge  Vitals:  VITAL SIGNS: 24 HRS MIN & MAX LAST   Temp  Min: 97.5 °F (36.4 °C)  Max: 99.5 °F (37.5 °C) 97.5 °F (36.4 °C)   BP  Min: 114/69  Max: 151/74 (!) 151/74   Pulse  Min: 57  Max: 65  60   Resp  Min: 18  Max: 18 18   SpO2  Min: 92 %  Max: 99 % (!) 94 %       Physical Exam:  General: In no acute distress, afebrile  Chest: Clear to auscultation bilaterally  Heart: RRR, +S1, S2, no appreciable murmur  Abdomen: Soft, nontender, BS +  MSK: Warm, no lower extremity edema, no clubbing or cyanosis  Neurologic: Alert and oriented x4, Cranial nerve II-XII intact, Moves all ext spontaneously with good strength       Procedures Performed: No admission procedures for hospital encounter.     Significant Diagnostic Studies: See Full reports for all details    Recent Labs   Lab 01/10/24  1534 01/11/24  0356   WBC 9.64 8.51   RBC 3.33* 3.20*   HGB 10.6* 10.2*   HCT 33.2* 31.2*   MCV 99.7*  97.5*   MCH 31.8* 31.9*   MCHC 31.9* 32.7*   RDW 13.1 13.2    159   MPV 10.7* 11.1*       Recent Labs   Lab 01/12/24  0400 01/13/24  0316 01/15/24  0415    142 142   K 3.6 3.4* 3.6   CO2 28 26 22*   BUN 33.3* 34.3* 30.6*   CREATININE 2.98* 2.78* 2.38*   CALCIUM 8.3* 8.4 8.5   MG 1.60  --   --         Microbiology Results (last 7 days)       ** No results found for the last 168 hours. **             X-Ray Chest 1 View  Narrative: EXAMINATION:  XR CHEST 1 VIEW    CLINICAL HISTORY:  follow up Pulmonary congestion;    TECHNIQUE:  Single frontal view of the chest was performed.    COMPARISON:  01/05/2024    FINDINGS:  LINES AND TUBES: EKG/telemetry leads overlie the chest.    MEDIASTINUM AND TAMIKO: Cardiac silhouette is enlarged. There are calcifications at the mitral valve annulus.    LUNGS: Similar interstitial scarring.  Mild retrocardiac opacity.    PLEURA:No pleural effusion. No pneumothorax.    OTHER: Patient is rotated to the left.  Impression: Mild persistent retrocardiac opacity.    Electronically signed by: Oneida Gallagher  Date:    01/12/2024  Time:    06:44         Medication List        START taking these medications      methocarbamoL 500 MG Tab  Commonly known as: ROBAXIN  Take 1 tablet (500 mg total) by mouth 3 (three) times daily as needed (Muscle spasm).            CHANGE how you take these medications      metoprolol succinate 25 MG 24 hr tablet  Commonly known as: TOPROL-XL  Take 1 tablet (25 mg total) by mouth 2 (two) times daily.  What changed:   medication strength  how much to take     sodium bicarbonate 650 MG tablet  Take 1 tablet (650 mg total) by mouth every 8 (eight) hours.  What changed: when to take this            CONTINUE taking these medications      acetaminophen 325 MG tablet  Commonly known as: TYLENOL  Take 2 tablets (650 mg total) by mouth every 6 (six) hours as needed for Pain.     amiodarone 200 MG Tab  Commonly known as: PACERONE     apixaban 5 mg Tab  Commonly known  as: ELIQUIS  Take 1 tablet (5 mg total) by mouth 2 (two) times daily.     ascorbic acid (vitamin C) 1000 MG tablet  Commonly known as: VITAMIN C     aspirin 81 MG EC tablet  Commonly known as: ECOTRIN     buPROPion 150 MG TB24 tablet  Commonly known as: WELLBUTRIN XL     cholecalciferol (vitamin D3) 50 mcg (2,000 unit) Cap capsule  Commonly known as: VITAMIN D3     DAILY-GEETA (WITH FOLIC ACID) 400 mcg Tab  Generic drug: multivitamin with folic acid     docusate sodium 100 MG capsule  Commonly known as: COLACE     folic acid 1 MG tablet  Commonly known as: FOLVITE     gabapentin 100 MG capsule  Commonly known as: NEURONTIN  Take 1 capsule (100 mg total) by mouth 2 (two) times daily.     rOPINIRole 0.5 MG tablet  Commonly known as: REQUIP     rosuvastatin 10 MG tablet  Commonly known as: CRESTOR     vitamin E 400 UNIT capsule            STOP taking these medications      amLODIPine 5 MG tablet  Commonly known as: NORVASC               Where to Get Your Medications        These medications were sent to [facility] - [address] [address]      Phone: [phone]   methocarbamoL 500 MG Tab  metoprolol succinate 25 MG 24 hr tablet  sodium bicarbonate 650 MG tablet          Explained in detail to the patient about the discharge plan, medications, and follow-up visits. Pt understands and agrees with the treatment plan  Discharge Disposition: Skilled Nursing Facility   Discharged Condition: stable  Diet-   Dietary Orders (From admission, onward)       Start     Ordered    01/12/24 1452  Diet diabetic  Diet effective now         01/12/24 1451                   Medications Per DC med rec  Activities as tolerated   Follow-up Information       Logan Smith MD. Go to.    Specialty: Nephrology  Why: Follow up appointment on 1-24 at 11:00 am  Contact information:  [address]  [facility] [address] [phone]                           For further  questions contact hospitalist office    Discharge time 33 minutes    For worsening symptoms, chest pain, shortness of breath, increased abdominal pain, high grade fever, stroke or stroke like symptoms, immediately go to the nearest Emergency Room or call 911 as soon as possible.      Je Truong M.D, on 1/16/2024. at 2:20 PM.

## 2024-01-16 NOTE — PLAN OF CARE
01/16/24 1210   Final Note   Assessment Type Final Discharge Note   Anticipated Discharge Disposition Int Care Fac  (Return to [facility])   Post-Acute Status   Post-Acute Authorization Placement;Other   Post-Acute Placement Status Discharge Plan Changed   Other Status No Post-Acute Service Needs   Discharge Delays None known at this time     Spoke to Guerda at assisted living. They have hospital bed and sitters arranged for patient to return today. They will provide transportation. Discharge information faxed [phone]. They will arrange for therapy. They have in house therapy.

## 2024-01-16 NOTE — PROGRESS NOTES
[facility] - 4th Floor Medical Telemetry  Nephrology  Progress Note    Patient Name: Debra Puentes  MRN: [id number]  Admission Date: 1/5/2024  Hospital Length of Stay: 11 days  Attending Provider: Je Rodriguez MD   Primary Care Physician: Logan Smith MD  Principal Problem:Acute kidney injury superimposed on CKD      Subjective:   Debra Puentes is a 78 y.o. well known to Dr Smith with a history of chronic kidney disease stage IV, baseline Cr 2.6-2.8. She has a history of dialysis dependence in 2020 and sees Dr Smith monthly.  Patient is currently a resident of [facility] post T12 compression fracture suffered at end of October 2023. Prior to incident she was able to complete ADL and was in assisted living.     Patient was sent to ED from On license of [facility] due to abnormal labs indicative of LORI. Initial BUN 90/ Cr 5.18/ GFR 8. Urine culture positive for both E Coli and Enterobacter s/p IV Cefepime and PO Cipro. She has difficulty getting reliable access to water at the NH.     Interval History:   Renal function did have some recurrent worsening due to poor oral intake and some intermittent hypotension. Cr back to baseline yesterday. Pending discharge back to assisted living.     Review of patient's allergies indicates:   Allergen Reactions    Lactose Diarrhea    Codeine Other (See Comments) and Nausea And Vomiting     Current Facility-Administered Medications   Medication Frequency    acetaminophen tablet 650 mg Q4H PRN    allopurinoL tablet 100 mg Daily    aluminum-magnesium hydroxide-simethicone 200-200-20 mg/5 mL suspension 30 mL QID PRN    amiodarone tablet 200 mg Daily    apixaban tablet 5 mg BID    aspirin EC tablet 81 mg Daily    atorvastatin tablet 20 mg Daily    buPROPion TB24 tablet 150 mg Daily    dextrose 10% bolus 125 mL 125 mL PRN    dextrose 10% bolus 250 mL 250 mL PRN    docusate sodium capsule 100 mg BID    folic acid tablet 1,000 mcg Daily    gabapentin capsule 100 mg  BID    glucagon (human recombinant) injection 1 mg PRN    glucose chewable tablet 16 g PRN    glucose chewable tablet 24 g PRN    hydrALAZINE injection 10 mg Q2H PRN    insulin aspart U-100 injection 0-5 Units QID (AC + HS) PRN    melatonin tablet 6 mg Nightly PRN    methocarbamoL tablet 500 mg TID    metoprolol succinate (TOPROL-XL) 24 hr tablet 25 mg BID    ondansetron injection 4 mg Q4H PRN    polyethylene glycol packet 17 g BID PRN    prochlorperazine injection Soln 5 mg Q6H PRN    senna-docusate 8.6-50 mg per tablet 2 tablet BID PRN    sodium bicarbonate tablet 1,300 mg Q8H    sodium chloride 0.9% flush 10 mL PRN       Objective:     Vital Signs (Most Recent):  Temp: 98.8 °F (37.1 °C) (01/16/24 0735)  Pulse: (!) 57 (01/16/24 0735)  Resp: 18 (01/16/24 0735)  BP: (P) 120/72 (01/16/24 0921)  SpO2: (!) 94 % (01/16/24 0735) Vital Signs (24h Range):  Temp:  [97.9 °F (36.6 °C)-99.5 °F (37.5 °C)] 98.8 °F (37.1 °C)  Pulse:  [57-65] 57  Resp:  [18] 18  SpO2:  [92 %-99 %] 94 %  BP: (114-146)/(61-75) (P) 120/72     Weight: 97.8 kg (215 lb 9.8 oz) (01/16/24 0540)  Body mass index is 32.78 kg/m².  Body surface area is 2.17 meters squared.    I/O last 3 completed shifts:  In: 1480 [P.O.:1480]  Out: 975 [Urine:975]    Physical Exam  Constitutional:       General: She is not in acute distress.     Appearance: She is obese.   HENT:      Head: Atraumatic.      Mouth/Throat:      Mouth: Mucous membranes are dry.   Eyes:      Extraocular Movements: Extraocular movements intact.      Conjunctiva/sclera: Conjunctivae normal.   Cardiovascular:      Rate and Rhythm: Normal rate and regular rhythm.      Pulses: Normal pulses.      Heart sounds: Normal heart sounds.   Pulmonary:      Effort: Pulmonary effort is normal.      Breath sounds: Normal breath sounds.   Abdominal:      General: There is no distension.      Palpations: Abdomen is soft.   Musculoskeletal:         General: No swelling.      Cervical back: Neck supple.   Skin:      "General: Skin is warm.   Neurological:      Mental Status: She is alert and oriented to person, place, and time.   Psychiatric:         Mood and Affect: Mood normal.         Behavior: Behavior normal.         Significant Labs:sureBMP:   Recent Labs   Lab 01/12/24  0400 01/13/24  0316 01/15/24  0415      < > 142   K 3.6   < > 3.6   CO2 28   < > 22*   BUN 33.3*   < > 30.6*   CREATININE 2.98*   < > 2.38*   CALCIUM 8.3*   < > 8.5   MG 1.60  --   --     < > = values in this interval not displayed.       CBC:   Recent Labs   Lab 01/11/24  0356   WBC 8.51   RBC 3.20*   HGB 10.2*   HCT 31.2*      MCV 97.5*   MCH 31.9*   MCHC 32.7*       Microbiology Results (last 7 days)       ** No results found for the last 168 hours. **          Specimen (24h ago, onward)      None          No results for input(s): "COLORU", "CLARITYU", "SPECGRAV", "PHUR", "PROTEINUA", "GLUCOSEU", "BILIRUBINCON", "BLOODU", "WBCU", "RBCU", "BACTERIA", "MUCUS", "NITRITE", "LEUKOCYTESUR", "UROBILINOGEN", "HYALINECASTS" in the last 168 hours.      Significant Imaging:  US Retroperitoneal Complete [[id number]] Resulted: 01/05/24 2002   Order Status: Completed Updated: 01/05/24 2004   Narrative:     EXAMINATION:  US RETROPERITONEAL COMPLETE    CLINICAL HISTORY:  Elevated BUN/creatinine;    COMPARISON:  None.    FINDINGS:  Grayscale and color Doppler sonographic evaluation of the kidneys and urinary bladder.    The right kidney measures 8 cm. The left kidney measures 7 cm.   No hydronephrosis.  There is heterogeneous renal parenchymal echogenicity.    Urinary bladder unremarkable.   Impression:       1. No hydronephrosis.  2. Suspect medical renal disease.      Electronically signed by: Bienvenido Loco  Date: 01/05/2024     X-Ray Chest AP Portable [[id number]] Resulted: 01/05/24 1706   Order Status: Completed Updated: 01/05/24 1709   Narrative:     EXAMINATION:  XR CHEST AP PORTABLE    CLINICAL HISTORY:  Peripheral edema;    TECHNIQUE:  Frontal " view(s) of the chest.    COMPARISON:  Radiography 08/13/2020    FINDINGS:  Normal cardiac silhouette.  The lungs are well-inflated.  Central pulmonary vascular congestion.  No confluent consolidation appreciated.  Similar small area of scarring within the mid right lung.  No significant pleural effusion or discernible pneumothorax.   Impression:       Central pulmonary vascular congestion.      Electronically signed by: Grayson Carrillo  Date: 01/05/2024  Time: 17:06       Assessment/Plan:   LORI on CKD IV secondary to acute infection and dehydration   UTI - E. Coli and Enterococcus. S/p Cefepime and PO Cipro  Clinical dehydration resolved with IVF and oral hydration   T12 Compression fracture in October 2023 with resulting physical deconditioning     No change. Ok to discharge to assisted living. Follow with Luis. Established on monthly visits.     DEE Garrett  Nephrology  [facility] - 4th Floor Medical Telemetry

## 2024-01-17 ENCOUNTER — LAB REQUISITION (OUTPATIENT)
Dept: LAB | Facility: HOSPITAL | Age: 79
End: 2024-01-17
Payer: COMMERCIAL

## 2024-01-17 ENCOUNTER — PATIENT OUTREACH (OUTPATIENT)
Dept: ADMINISTRATIVE | Facility: CLINIC | Age: 79
End: 2024-01-17
Payer: COMMERCIAL

## 2024-01-17 DIAGNOSIS — D63.1 ANEMIA IN CHRONIC KIDNEY DISEASE (CODE): ICD-10-CM

## 2024-01-17 DIAGNOSIS — N18.4 CHRONIC KIDNEY DISEASE, STAGE 4 (SEVERE): ICD-10-CM

## 2024-01-17 DIAGNOSIS — E21.3 HYPERPARATHYROIDISM, UNSPECIFIED: ICD-10-CM

## 2024-01-17 LAB
ALBUMIN SERPL-MCNC: 2.5 G/DL (ref 3.4–4.8)
ALBUMIN/GLOB SERPL: 1 RATIO (ref 1.1–2)
ALP SERPL-CCNC: 92 UNIT/L (ref 40–150)
ALT SERPL-CCNC: 17 UNIT/L (ref 0–55)
AST SERPL-CCNC: 17 UNIT/L (ref 5–34)
BASOPHILS # BLD AUTO: 0.03 X10(3)/MCL
BASOPHILS NFR BLD AUTO: 0.5 %
BILIRUB SERPL-MCNC: 0.4 MG/DL
BUN SERPL-MCNC: 31.2 MG/DL (ref 9.8–20.1)
CALCIUM SERPL-MCNC: 8.5 MG/DL (ref 8.4–10.2)
CHLORIDE SERPL-SCNC: 110 MMOL/L (ref 98–107)
CO2 SERPL-SCNC: 25 MMOL/L (ref 23–31)
CREAT SERPL-MCNC: 2.74 MG/DL (ref 0.55–1.02)
DEPRECATED CALCIDIOL+CALCIFEROL SERPL-MC: 43.1 NG/ML (ref 30–80)
EOSINOPHIL # BLD AUTO: 0.5 X10(3)/MCL (ref 0–0.9)
EOSINOPHIL NFR BLD AUTO: 8.3 %
ERYTHROCYTE [DISTWIDTH] IN BLOOD BY AUTOMATED COUNT: 13.3 % (ref 11.5–17)
FERRITIN SERPL-MCNC: 1153.91 NG/ML (ref 4.63–204)
GFR SERPLBLD CREATININE-BSD FMLA CKD-EPI: 17 MLS/MIN/1.73/M2
GLOBULIN SER-MCNC: 2.4 GM/DL (ref 2.4–3.5)
GLUCOSE SERPL-MCNC: 72 MG/DL (ref 82–115)
HCT VFR BLD AUTO: 30.2 % (ref 37–47)
HGB BLD-MCNC: 9.8 G/DL (ref 12–16)
IMM GRANULOCYTES # BLD AUTO: 0.03 X10(3)/MCL (ref 0–0.04)
IMM GRANULOCYTES NFR BLD AUTO: 0.5 %
IRON SATN MFR SERPL: 30 % (ref 20–50)
IRON SERPL-MCNC: 41 UG/DL (ref 50–170)
LYMPHOCYTES # BLD AUTO: 1.28 X10(3)/MCL (ref 0.6–4.6)
LYMPHOCYTES NFR BLD AUTO: 21.3 %
MAGNESIUM SERPL-MCNC: 1.8 MG/DL (ref 1.6–2.6)
MCH RBC QN AUTO: 32.7 PG (ref 27–31)
MCHC RBC AUTO-ENTMCNC: 32.5 G/DL (ref 33–36)
MCV RBC AUTO: 100.7 FL (ref 80–94)
MONOCYTES # BLD AUTO: 0.65 X10(3)/MCL (ref 0.1–1.3)
MONOCYTES NFR BLD AUTO: 10.8 %
NEUTROPHILS # BLD AUTO: 3.52 X10(3)/MCL (ref 2.1–9.2)
NEUTROPHILS NFR BLD AUTO: 58.6 %
NRBC BLD AUTO-RTO: 0 %
PHOSPHATE SERPL-MCNC: 3.8 MG/DL (ref 2.3–4.7)
PLATELET # BLD AUTO: 164 X10(3)/MCL (ref 130–400)
PMV BLD AUTO: 12 FL (ref 7.4–10.4)
POTASSIUM SERPL-SCNC: 3.6 MMOL/L (ref 3.5–5.1)
PROT SERPL-MCNC: 4.9 GM/DL (ref 5.8–7.6)
PTH-INTACT SERPL-MCNC: 117.6 PG/ML (ref 8.7–77)
RBC # BLD AUTO: 3 X10(6)/MCL (ref 4.2–5.4)
SODIUM SERPL-SCNC: 145 MMOL/L (ref 136–145)
TIBC SERPL-MCNC: 136 UG/DL (ref 250–450)
TIBC SERPL-MCNC: 95 UG/DL (ref 70–310)
TRANSFERRIN SERPL-MCNC: 125 MG/DL (ref 173–360)
WBC # SPEC AUTO: 6.01 X10(3)/MCL (ref 4.5–11.5)

## 2024-01-17 PROCEDURE — 84100 ASSAY OF PHOSPHORUS: CPT | Performed by: INTERNAL MEDICINE

## 2024-01-17 PROCEDURE — 85025 COMPLETE CBC W/AUTO DIFF WBC: CPT | Performed by: INTERNAL MEDICINE

## 2024-01-17 PROCEDURE — 83540 ASSAY OF IRON: CPT | Performed by: INTERNAL MEDICINE

## 2024-01-17 PROCEDURE — 83970 ASSAY OF PARATHORMONE: CPT | Performed by: INTERNAL MEDICINE

## 2024-01-17 PROCEDURE — 82728 ASSAY OF FERRITIN: CPT | Performed by: INTERNAL MEDICINE

## 2024-01-17 PROCEDURE — 80053 COMPREHEN METABOLIC PANEL: CPT | Performed by: INTERNAL MEDICINE

## 2024-01-17 PROCEDURE — 83735 ASSAY OF MAGNESIUM: CPT | Performed by: INTERNAL MEDICINE

## 2024-01-17 PROCEDURE — 82306 VITAMIN D 25 HYDROXY: CPT | Performed by: INTERNAL MEDICINE

## 2024-01-25 ENCOUNTER — LAB REQUISITION (OUTPATIENT)
Dept: LAB | Facility: HOSPITAL | Age: 79
End: 2024-01-25
Payer: COMMERCIAL

## 2024-01-25 DIAGNOSIS — D63.1 ANEMIA IN CHRONIC KIDNEY DISEASE (CODE): ICD-10-CM

## 2024-01-25 LAB
CREAT 24H UR-MCNC: 105.1 MG/DAY (ref 950–2490)
CREAT UR-MCNC: 55.3 MG/DL (ref 45–106)
PROT 24H UR-MCNC: 217.6 MG/24 H (ref 0–165)
PROT UR STRIP-MCNC: 114.5 MG/DL
TOTAL VOLUME  (OHS): 190 ML
TOTAL VOLUME  (OHS): 190 ML

## 2024-01-25 PROCEDURE — 84156 ASSAY OF PROTEIN URINE: CPT | Performed by: INTERNAL MEDICINE

## 2024-01-25 PROCEDURE — 82570 ASSAY OF URINE CREATININE: CPT | Performed by: INTERNAL MEDICINE

## 2024-01-30 ENCOUNTER — ANESTHESIA EVENT (OUTPATIENT)
Dept: SURGERY | Facility: HOSPITAL | Age: 79
End: 2024-01-30
Payer: COMMERCIAL

## 2024-02-02 NOTE — PRE-PROCEDURE INSTRUCTIONS
"[facility]: Outpatient Surgery  Preprocedure Instructions    Expectations: "Because of inconsistent procedure completion times, an unexpected wait may occur. The physicians would like you to be here to prepare in the event they run ahead of time. We will make you as comfortable as possible and keep you informed. We apologize in advance if this happens."     Your arrival time for your surgery or procedure is 5:00 am.  We ask patients to arrive about 2 hours before surgery to allow for enough time to review your health history & medications, start your IV, complete any outstanding labwork or tests, and meet your Anesthesiologist.    We are located at [facility], [address] [place] [address].    Enter through the [place] entrance next to the Emergency Room, and come to the 6th floor to the Outpatient Surgery Department.    If you are in need of a wheelchair the morning of surgery please call [phone] about 15 minutes before you arrive. Parking is available in our parking garage located off [facility], between the hospital and [facility].      Visitory Policy:  You are allowed 2 adult visitors to be with you in the hospital. Please, no switching visitors in pre-op area. All hospital visitors should be in good current health.  No small children.  We will update you and your family hourly on the progression of surgery and any unexpected delays.      What to Bring:  Please have your ID, insurance cards, and all home medication bottles with you at check in.  Bring your CPAP machine if one is used at home.     Fasting:  Nothing to eat or drink after midnight the night before your procedure. This includes no ice, gum, hard candies, and/or tobacco products.    Medications:  Follow your doctor's instructions for taking any medications on the morning of your procedure.  If no instructions for taking medications were given, do not take any medications but bring your " medications in their bottles to your procedure check in.     Follow your doctor's preoperative instructions regarding skin prep, bowel prep, bathing, or showering prior to your procedure.  If any special soaps were provided to you, please use according to your doctor's instructions. If no instructions were given from your doctor, take a good bath or shower with antibacterial soap the night before and the morning of your procedure.  On the morning of procedure, wear loose, comfortable clothing.  No lotions, makeup, perfumes, colognes, deodorant, or jewelry to your procedure.  Removable items (glasses, contact lenses, dentures, retainers, hearing aids) need to be removed for your procedure.  Bring your storage containers for these items if you wear them.     Artificial nails, body jewelry, eyelash extensions, and/or hair extensions with metal clips are not allowed during your surgery.  If you currently wear any of these items, please arrange for them to be removed prior to your arrival to the hospital.     Outpatient or Same Day Surgeries:  Any patients receiving sedation/anesthesia are advised not to drive for 24 hours after their procedure.  We do not allow patients to drive themselves home after discharge.  If you are going home after your procedure, please have someone available to drive you home from the hospital.     You may call the Outpatient Surgery Department at [phone] with any questions or concerns.  We are looking forward to meeting you and taking great care of you for your procedure.  Thank you for choosing [facility] for your surgical needs.

## 2024-02-05 ENCOUNTER — ANESTHESIA (OUTPATIENT)
Dept: SURGERY | Facility: HOSPITAL | Age: 79
End: 2024-02-05
Payer: COMMERCIAL

## 2024-02-05 ENCOUNTER — HOSPITAL ENCOUNTER (OUTPATIENT)
Facility: HOSPITAL | Age: 79
End: 2024-02-05
Attending: NEUROLOGICAL SURGERY | Admitting: NEUROLOGICAL SURGERY
Payer: COMMERCIAL

## 2024-02-05 DIAGNOSIS — Z01.818 PREOP TESTING: ICD-10-CM

## 2024-02-05 DIAGNOSIS — S22.080S COMPRESSION FRACTURE OF T12 VERTEBRA, SEQUELA: Primary | ICD-10-CM

## 2024-02-05 LAB
APTT PPP: 26 SECONDS (ref 23.2–33.7)
INR PPP: 1.2
OHS QRS DURATION: 84 MS
OHS QTC CALCULATION: 459 MS
POCT GLUCOSE: 101 MG/DL (ref 70–110)
POCT GLUCOSE: 107 MG/DL (ref 70–110)
PROTHROMBIN TIME: 14.9 SECONDS (ref 12.5–14.5)

## 2024-02-05 PROCEDURE — 37000009 HC ANESTHESIA EA ADD 15 MINS: Performed by: NEUROLOGICAL SURGERY

## 2024-02-05 PROCEDURE — 37000008 HC ANESTHESIA 1ST 15 MINUTES: Performed by: NEUROLOGICAL SURGERY

## 2024-02-05 PROCEDURE — 63600175 PHARM REV CODE 636 W HCPCS: Performed by: NURSE ANESTHETIST, CERTIFIED REGISTERED

## 2024-02-05 PROCEDURE — 36000706: Performed by: NEUROLOGICAL SURGERY

## 2024-02-05 PROCEDURE — 25000003 PHARM REV CODE 250: Performed by: ANESTHESIOLOGY

## 2024-02-05 PROCEDURE — 25000003 PHARM REV CODE 250: Performed by: NURSE ANESTHETIST, CERTIFIED REGISTERED

## 2024-02-05 PROCEDURE — C1713 ANCHOR/SCREW BN/BN,TIS/BN: HCPCS | Performed by: NEUROLOGICAL SURGERY

## 2024-02-05 PROCEDURE — 27201423 OPTIME MED/SURG SUP & DEVICES STERILE SUPPLY: Performed by: NEUROLOGICAL SURGERY

## 2024-02-05 PROCEDURE — 36000707: Performed by: NEUROLOGICAL SURGERY

## 2024-02-05 PROCEDURE — 85730 THROMBOPLASTIN TIME PARTIAL: CPT | Performed by: NEUROLOGICAL SURGERY

## 2024-02-05 PROCEDURE — 25000003 PHARM REV CODE 250: Performed by: NEUROLOGICAL SURGERY

## 2024-02-05 PROCEDURE — 85610 PROTHROMBIN TIME: CPT | Performed by: NEUROLOGICAL SURGERY

## 2024-02-05 PROCEDURE — 71000033 HC RECOVERY, INTIAL HOUR: Performed by: NEUROLOGICAL SURGERY

## 2024-02-05 PROCEDURE — 71000016 HC POSTOP RECOV ADDL HR: Performed by: NEUROLOGICAL SURGERY

## 2024-02-05 PROCEDURE — 93005 ELECTROCARDIOGRAM TRACING: CPT | Mod: 59

## 2024-02-05 PROCEDURE — 93010 ELECTROCARDIOGRAM REPORT: CPT | Mod: ,,, | Performed by: STUDENT IN AN ORGANIZED HEALTH CARE EDUCATION/TRAINING PROGRAM

## 2024-02-05 PROCEDURE — D9220A PRA ANESTHESIA: Mod: ANES,,, | Performed by: ANESTHESIOLOGY

## 2024-02-05 PROCEDURE — 63600175 PHARM REV CODE 636 W HCPCS: Performed by: NEUROLOGICAL SURGERY

## 2024-02-05 PROCEDURE — D9220A PRA ANESTHESIA: Mod: CRNA,,, | Performed by: NURSE ANESTHETIST, CERTIFIED REGISTERED

## 2024-02-05 PROCEDURE — 71000015 HC POSTOP RECOV 1ST HR: Performed by: NEUROLOGICAL SURGERY

## 2024-02-05 DEVICE — IMPLANTABLE DEVICE: Type: IMPLANTABLE DEVICE | Site: SPINE THORACIC | Status: FUNCTIONAL

## 2024-02-05 RX ORDER — ONDANSETRON HYDROCHLORIDE 2 MG/ML
4 INJECTION, SOLUTION INTRAVENOUS DAILY PRN
Status: CANCELLED | OUTPATIENT
Start: 2024-02-05

## 2024-02-05 RX ORDER — METHOCARBAMOL 750 MG/1
750 TABLET, FILM COATED ORAL EVERY 8 HOURS PRN
Status: DISCONTINUED | OUTPATIENT
Start: 2024-02-05 | End: 2024-02-05 | Stop reason: HOSPADM

## 2024-02-05 RX ORDER — OXYCODONE AND ACETAMINOPHEN 5; 325 MG/1; MG/1
1 TABLET ORAL EVERY 4 HOURS PRN
Status: DISCONTINUED | OUTPATIENT
Start: 2024-02-05 | End: 2024-02-05 | Stop reason: HOSPADM

## 2024-02-05 RX ORDER — ROCURONIUM BROMIDE 10 MG/ML
INJECTION, SOLUTION INTRAVENOUS
Status: DISCONTINUED | OUTPATIENT
Start: 2024-02-05 | End: 2024-02-05

## 2024-02-05 RX ORDER — MIDAZOLAM HYDROCHLORIDE 1 MG/ML
2 INJECTION INTRAMUSCULAR; INTRAVENOUS ONCE AS NEEDED
Status: DISCONTINUED | OUTPATIENT
Start: 2024-02-05 | End: 2024-02-05 | Stop reason: HOSPADM

## 2024-02-05 RX ORDER — METOCLOPRAMIDE HYDROCHLORIDE 5 MG/ML
10 INJECTION INTRAMUSCULAR; INTRAVENOUS EVERY 10 MIN PRN
Status: CANCELLED | OUTPATIENT
Start: 2024-02-05

## 2024-02-05 RX ORDER — LIDOCAINE HYDROCHLORIDE 10 MG/ML
1 INJECTION, SOLUTION EPIDURAL; INFILTRATION; INTRACAUDAL; PERINEURAL ONCE
Status: DISCONTINUED | OUTPATIENT
Start: 2024-02-05 | End: 2024-02-05 | Stop reason: HOSPADM

## 2024-02-05 RX ORDER — PROPOFOL 10 MG/ML
VIAL (ML) INTRAVENOUS
Status: DISCONTINUED | OUTPATIENT
Start: 2024-02-05 | End: 2024-02-05

## 2024-02-05 RX ORDER — LORAZEPAM 2 MG/ML
0.25 INJECTION INTRAMUSCULAR ONCE AS NEEDED
Status: CANCELLED | OUTPATIENT
Start: 2024-02-05 | End: 2035-07-03

## 2024-02-05 RX ORDER — DEXAMETHASONE SODIUM PHOSPHATE 4 MG/ML
INJECTION, SOLUTION INTRA-ARTICULAR; INTRALESIONAL; INTRAMUSCULAR; INTRAVENOUS; SOFT TISSUE
Status: DISCONTINUED | OUTPATIENT
Start: 2024-02-05 | End: 2024-02-05

## 2024-02-05 RX ORDER — ONDANSETRON 4 MG/1
4 TABLET, ORALLY DISINTEGRATING ORAL ONCE
Status: COMPLETED | OUTPATIENT
Start: 2024-02-05 | End: 2024-02-05

## 2024-02-05 RX ORDER — CEFAZOLIN SODIUM 2 G/50ML
2 SOLUTION INTRAVENOUS
Status: DISCONTINUED | OUTPATIENT
Start: 2024-02-05 | End: 2024-02-05 | Stop reason: HOSPADM

## 2024-02-05 RX ORDER — PROPOFOL 10 MG/ML
VIAL (ML) INTRAVENOUS CONTINUOUS PRN
Status: DISCONTINUED | OUTPATIENT
Start: 2024-02-05 | End: 2024-02-05

## 2024-02-05 RX ORDER — LIDOCAINE HYDROCHLORIDE AND EPINEPHRINE 5; 5 MG/ML; UG/ML
INJECTION, SOLUTION INFILTRATION; PERINEURAL
Status: DISCONTINUED | OUTPATIENT
Start: 2024-02-05 | End: 2024-02-05 | Stop reason: HOSPADM

## 2024-02-05 RX ORDER — HYDROMORPHONE HYDROCHLORIDE 2 MG/ML
0.2 INJECTION, SOLUTION INTRAMUSCULAR; INTRAVENOUS; SUBCUTANEOUS EVERY 5 MIN PRN
Status: CANCELLED | OUTPATIENT
Start: 2024-02-05

## 2024-02-05 RX ORDER — ACETAMINOPHEN 325 MG/1
325 TABLET ORAL EVERY 6 HOURS PRN
Status: DISCONTINUED | OUTPATIENT
Start: 2024-02-05 | End: 2024-02-05 | Stop reason: HOSPADM

## 2024-02-05 RX ORDER — SUCCINYLCHOLINE CHLORIDE 20 MG/ML
INJECTION INTRAMUSCULAR; INTRAVENOUS
Status: DISCONTINUED | OUTPATIENT
Start: 2024-02-05 | End: 2024-02-05

## 2024-02-05 RX ORDER — OXYCODONE AND ACETAMINOPHEN 10; 325 MG/1; MG/1
1 TABLET ORAL EVERY 4 HOURS PRN
Status: DISCONTINUED | OUTPATIENT
Start: 2024-02-05 | End: 2024-02-05 | Stop reason: HOSPADM

## 2024-02-05 RX ORDER — SODIUM CHLORIDE, SODIUM GLUCONATE, SODIUM ACETATE, POTASSIUM CHLORIDE AND MAGNESIUM CHLORIDE 30; 37; 368; 526; 502 MG/100ML; MG/100ML; MG/100ML; MG/100ML; MG/100ML
INJECTION, SOLUTION INTRAVENOUS CONTINUOUS
Status: DISCONTINUED | OUTPATIENT
Start: 2024-02-05 | End: 2024-02-05 | Stop reason: HOSPADM

## 2024-02-05 RX ORDER — ONDANSETRON HYDROCHLORIDE 2 MG/ML
INJECTION, SOLUTION INTRAMUSCULAR; INTRAVENOUS
Status: DISCONTINUED | OUTPATIENT
Start: 2024-02-05 | End: 2024-02-05

## 2024-02-05 RX ORDER — FENTANYL CITRATE 50 UG/ML
INJECTION, SOLUTION INTRAMUSCULAR; INTRAVENOUS
Status: DISCONTINUED | OUTPATIENT
Start: 2024-02-05 | End: 2024-02-05

## 2024-02-05 RX ORDER — ONDANSETRON 4 MG/1
4 TABLET, ORALLY DISINTEGRATING ORAL EVERY 6 HOURS PRN
Status: DISCONTINUED | OUTPATIENT
Start: 2024-02-05 | End: 2024-02-05 | Stop reason: HOSPADM

## 2024-02-05 RX ORDER — DIPHENHYDRAMINE HYDROCHLORIDE 50 MG/ML
25 INJECTION INTRAMUSCULAR; INTRAVENOUS EVERY 6 HOURS PRN
Status: CANCELLED | OUTPATIENT
Start: 2024-02-05

## 2024-02-05 RX ORDER — LIDOCAINE HYDROCHLORIDE 20 MG/ML
INJECTION INTRAVENOUS
Status: DISCONTINUED | OUTPATIENT
Start: 2024-02-05 | End: 2024-02-05

## 2024-02-05 RX ADMIN — SUCCINYLCHOLINE CHLORIDE 120 MG: 20 INJECTION, SOLUTION INTRAMUSCULAR; INTRAVENOUS at 06:02

## 2024-02-05 RX ADMIN — PROPOFOL 150 MG: 10 INJECTION, EMULSION INTRAVENOUS at 06:02

## 2024-02-05 RX ADMIN — ONDANSETRON 4 MG: 4 TABLET, ORALLY DISINTEGRATING ORAL at 05:02

## 2024-02-05 RX ADMIN — FENTANYL CITRATE 100 MCG: 50 INJECTION, SOLUTION INTRAMUSCULAR; INTRAVENOUS at 06:02

## 2024-02-05 RX ADMIN — PROPOFOL 100 MCG/KG/MIN: 10 INJECTION, EMULSION INTRAVENOUS at 06:02

## 2024-02-05 RX ADMIN — DEXAMETHASONE SODIUM PHOSPHATE 4 MG: 4 INJECTION, SOLUTION INTRA-ARTICULAR; INTRALESIONAL; INTRAMUSCULAR; INTRAVENOUS; SOFT TISSUE at 07:02

## 2024-02-05 RX ADMIN — ROCURONIUM BROMIDE 10 MG: 10 SOLUTION INTRAVENOUS at 06:02

## 2024-02-05 RX ADMIN — ONDANSETRON 4 MG: 2 INJECTION INTRAMUSCULAR; INTRAVENOUS at 07:02

## 2024-02-05 RX ADMIN — SODIUM CHLORIDE, SODIUM GLUCONATE, SODIUM ACETATE, POTASSIUM CHLORIDE AND MAGNESIUM CHLORIDE 50 ML/HR: 526; 502; 368; 37; 30 INJECTION, SOLUTION INTRAVENOUS at 06:02

## 2024-02-05 RX ADMIN — LIDOCAINE HYDROCHLORIDE 40 MG: 20 INJECTION INTRAVENOUS at 06:02

## 2024-02-05 RX ADMIN — CEFAZOLIN SODIUM 2 G: 2 SOLUTION INTRAVENOUS at 06:02

## 2024-02-05 NOTE — DISCHARGE SUMMARY
[facility] Periop Services  Neurosurgery  Discharge Summary      Patient Name: Debra Puentes  MRN: [id number]  Admission Date: 2/5/2024  Hospital Length of Stay: 0 days  Discharge Date and Time: 2/5/2024 11:00 AM  Attending Physician: Dudley Arceo MD  Discharging Provider: WONG Rodriguez  Primary Care Provider: Logan Smith MD     Procedure(s) (LRB):  Kyphoplasty (N/A)     Hospital Course: Ms Puentes was brought in on 2/5/24 for T12 kyphoplasty. She tolerated the procedure without complication. She was brought to recovery following for observation. She was tolerating PO, her pain was controlled, and she was ambulating. She was given wound care instructions and post op precautions. She was given prescription for pain control. She was discharged home with 2 week post op follow up scheduled in office.     Consults:       Pending Diagnostic Studies:       None          Final Active Diagnoses:    Diagnosis Date Noted POA    Compression fracture of T12 vertebra [S22.080A] 10/27/2023 Yes      Problems Resolved During this Admission:      Discharged Condition: stable    Disposition: Intermediate Care Facili*    Follow Up:   Follow-up Information       Dudley Arceo MD. Call.    Specialty: Neurosurgery  Why: As needed for questions or concerns. Keep follow-up appointment.  Contact information:  [address]  [facility] [address] [phone]                           Patient Instructions:      Diet general     Call MD for:  temperature >100.4     Call MD for:  severe uncontrolled pain     Call MD for:  redness, tenderness, or signs of infection (pain, swelling, redness, odor or green/yellow discharge around incision site)     Remove dressing in 48 hours     Medications:  Reconciled Home Medications:      Medication List        CONTINUE taking these medications      acetaminophen 325 MG tablet  Commonly known as: TYLENOL  Take 2 tablets (650 mg total) by mouth every 6 (six) hours as  needed for Pain.     amiodarone 200 MG Tab  Commonly known as: PACERONE  Take 200 mg by mouth once daily.     apixaban 5 mg Tab  Commonly known as: ELIQUIS  Take 1 tablet (5 mg total) by mouth 2 (two) times daily.     ascorbic acid (vitamin C) 1000 MG tablet  Commonly known as: VITAMIN C  Take 1,000 mg by mouth.     aspirin 81 MG EC tablet  Commonly known as: ECOTRIN  Take 81 mg by mouth once daily.     buPROPion 150 MG TB24 tablet  Commonly known as: WELLBUTRIN XL  Take 150 mg by mouth once daily.     cholecalciferol (vitamin D3) 50 mcg (2,000 unit) Cap capsule  Commonly known as: VITAMIN D3  Take 2,000 Units by mouth once daily.     DAILY-GEETA (WITH FOLIC ACID) 400 mcg Tab  Generic drug: multivitamin with folic acid  Take 1 tablet by mouth once daily.     DAPAGLIFLOZ PROPANED-METFORMIN ORAL  Take 10 mg by mouth once daily.     DAPAGLIFLOZIN PROPANEDIOL ORAL  Take 10 mg by mouth.     docusate sodium 100 MG capsule  Commonly known as: COLACE  Take 1 capsule by mouth 2 (two) times daily.     folic acid 1 MG tablet  Commonly known as: FOLVITE  Take 1,000 mcg by mouth once daily.     gabapentin 100 MG capsule  Commonly known as: NEURONTIN  Take 1 capsule (100 mg total) by mouth 2 (two) times daily.     metoprolol succinate 25 MG 24 hr tablet  Commonly known as: TOPROL-XL  Take 1 tablet (25 mg total) by mouth 2 (two) times daily.     rOPINIRole 0.5 MG tablet  Commonly known as: REQUIP  Take 0.5 mg by mouth Daily.     rosuvastatin 10 MG tablet  Commonly known as: CRESTOR  Take 10 mg by mouth every evening.     sodium bicarbonate 650 MG tablet  Take 1 tablet (650 mg total) by mouth every 8 (eight) hours.     vitamin E 400 UNIT capsule  Take 400 Units by mouth every Mon, Wed, Fri.              WONG Rodriguez  Neurosurgery  [facility] - Periop Services

## 2024-02-05 NOTE — DISCHARGE INSTRUCTIONS
-NO driving and NO alcohol consumption for 24 hours and while taking narcotic pain medications.    -Keep incisions clean and dry for 48 hours. OK to shower afterwards. Do not tub bathe or submerge incision under water.    -NO heavy lifting. DO not lift objects greater than 10lbs. Use caution when lifting, bending, pulling, pushing, etc.    -Monitor site for infection: redness, swelling, drainage/pus/foul odor, fever, chills.    -Report to your nearest ER AND/OR notify your provider if you experience any SUDDEN/SEVERE chest/abdominal pain, profound weakness, trouble breathing, uncontrolled pain/bleeding.    BLEEDING: If surgical site begins to bleed uncontrollably, contact your doctor or go to ER.    NAUSEA: due to the anesthesia, you may experience nausea for up to 24 hours. If nausea and vomiting last longer, contact your doctor.     INFECTION:  watch for any signs or symptoms of infection such as chills, fever, redness or drainage at surgical site. Notify your doctor.    PAIN : take your pain medications as directed. If the pain medications are not helping, notify your doctor.

## 2024-02-05 NOTE — BRIEF OP NOTE
[facility] - Periop Services  Brief Operative Note    SUMMARY     Surgery Date: 2/5/2024     Surgeon(s) and Role:     * Dudley Arceo MD - Primary    Assisting Surgeon: Oscar Garcia PA-C      Pre-op Diagnosis:  Compression fracture of T12 vertebra, sequela [S22.080S]    Post-op Diagnosis:  Post-Op Diagnosis Codes:     * Compression fracture of T12 vertebra, sequela [S22.080S]    Procedure(s) (LRB):  Kyphoplasty (N/A)    T12 kyphoplasty. C arm assisted.     Anesthesia: General    Implants:  * No implants in log *    Operative Findings:  Dictated    Estimated Blood Loss: * No values recorded between 2/5/2024  7:13 AM and 2/5/2024  7:30 AM *    Estimated Blood Loss has been documented.         Specimens:   Specimen (24h ago, onward)      None            [id number]

## 2024-02-05 NOTE — OP NOTE
[facility]                       [address] [place]                      [place] [address]    PATIENT NAME:      SUSHANT SINGH  YOB: 1945  CSN:               [id number]  MRN:               [id number]  ADMIT DATE:        02/05/2024 04:59:00  PHYSICIAN:         Dudley Arceo MD                          OPERATIVE REPORT      DATE OF SURGERY:        SURGEON:  Dudley Arceo MD    Assistant: Oscar Garcia.    PREOPERATIVE DIAGNOSES:  T12 compression fracture, back pain, leg pain.    POSTOPERATIVE DIAGNOSES:  T12 compression fracture, back pain, leg pain.    PROCEDURE PERFORMED:  T12 kyphoplasty using PanMed cement, 3-4 mL were put from   the right side.  There were no additional complications.  No change in   monitoring.    INDICATION FOR SURGERY:  The patient is a 78-year-old with compression fracture,   here for kyphoplasty.  She has had failed conservative treatment.  She wants   brace.  She has increasing pain, now here for .  The risks, benefits   discussed, bleeding, infection, weakness, CSF leak, hemorrhage, reoperation   discussed in detail.  They want me to proceed with surgery.    DESCRIPTION OF PROCEDURE:  The patient was brought to the operating room and   intubated.  Turned prone.  Back was prepped and draped.  Monitoring device was   attached.  No change in signals from beginning to end.    Subsequently, we x-rayed AP lateral.  A stab incision we were able to put   introducer needles through the pedicles in the bone facet on the right side and   left side.  We then drilled and then we inflated the balloon from both sides.  A   good inflation of the balloon, it was nice.  The bone was very soft.  We then   put cement from the right side only because we were concerned about with the   balloon broken into and I was unsure if there were any issues at all from that   side, so we only put cement from the right side.  We will get  cement across to   the other side as well at about 4 to 5 mL altogether.   AP and lateral x-rays   and cement in good position.  No change in monitoring.  The needles were   removed.  Band-Aid and Steri-Strips were placed.  No complications.        ______________________________  MD MERISSA Verdugo/MICHELLE  DD:  02/05/2024  Time:  07:48AM  DT:  02/05/2024  Time:  10:26AM  Job #:  [id number]      OPERATIVE REPORT

## 2024-02-05 NOTE — ANESTHESIA PROCEDURE NOTES
Intubation    Date/Time: 2/5/2024 6:54 AM    Performed by: Milagro Hawthorne CRNA  Authorized by: Kan Mercado MD    Intubation:     Induction:  Intravenous    Intubated:  Postinduction    Mask Ventilation:  Easy mask    Attempts:  1    Attempted By:  CRNA    Method of Intubation:  Video laryngoscopy    Blade:  Muro 3    Laryngeal View Grade: Grade I - full view of cords      Difficult Airway Encountered?: No      Complications:  None    Airway Device:  Oral endotracheal tube    Airway Device Size:  8.0    Style/Cuff Inflation:  Cuffed    Inflation Amount (mL):  6    Tube secured:  21    Secured at:  The lips    Placement Verified By:  Colorimetric ETCO2 device    Complicating Factors:  None    Findings Post-Intubation:  BS equal bilateral

## 2024-02-05 NOTE — TRANSFER OF CARE
"Anesthesia Transfer of Care Note    Patient: Debra Puentes    Procedure(s) Performed: Procedure(s) (LRB):  Kyphoplasty (N/A)    Patient location: PACU    Anesthesia Type: general    Transport from OR: Transported from OR on 6-10 L/min O2 by face mask with adequate spontaneous ventilation    Post pain: adequate analgesia    Post assessment: no apparent anesthetic complications and tolerated procedure well    Post vital signs: stable    Level of consciousness: sedated and responds to stimulation    Nausea/Vomiting: no nausea/vomiting    Complications: none    Transfer of care protocol was followed      Last vitals: Visit Vitals  BP (!) 123/55   Pulse 66   Temp 36.6 °C (97.9 °F) (Skin)   Resp 20   Ht 5' 8" (1.727 m)   Wt 95.3 kg (210 lb)   SpO2 98%   Breastfeeding No   BMI 31.93 kg/m²     "

## 2024-02-05 NOTE — ANESTHESIA PREPROCEDURE EVALUATION
02/05/2024  Debra Puentes is a 78 y.o., female presents with compression fracture of T12 vertebra.      .  Diagnosis:   Compression fracture of T12 vertebra, sequela [S22.080S]   Pre-op diagnosis: Compression fracture of T12 vertebra, sequela [S22.080S]       The pt. comes to [facility] for the noted procedure under  [facility]..  Procedure: Kyphoplasty (Back) - T12 KYPHOPLASTY // PRONE LIZ // DRILL // MICROSCOPE //  DIRECT SPINE // NDM   Anesthesia type: Gen ETT         PMHx:  Other Medical History   Chronic kidney disease, unspecified Hypertension   Hyperlipidemia Cancer   Diabetes mellitus, type 2 History of arteriovenostomy for renal dialysis   Compression fracture of T12 vertebra, sequela Chronic atrial fibrillation   Coronary artery disease Depression   Anemia Restless leg syndrome   Vitamin D deficiency Obesity     Cardiac Clearance is on the chart:            Surgical History:  KIDNEY STONE SURGERY HYSTERECTOMY   TONSILLECTOMY          Vital signs:        Lab Data:          EKG:      Pre-op Assessment    I have reviewed the Patient Summary Reports.     I have reviewed the Nursing Notes. I have reviewed the NPO Status.   I have reviewed the Medications.     Review of Systems  Anesthesia Hx:  No problems with previous Anesthesia                Social:  Non-Smoker       Hematology/Oncology:  Hematology Normal   Oncology Normal                                   EENT/Dental:  EENT/Dental Normal denies chronic allergic rhinitis           Cardiovascular:  Exercise tolerance: good   Hypertension   CAD                Functional Capacity good / => 4 METS                         Pulmonary:  Pulmonary Normal                       Renal/:  Chronic Renal Disease, CKD                Hepatic/GI:  Hepatic/GI Normal                 Musculoskeletal:  Musculoskeletal Normal                Neurological:  Neurology Normal                                       Endocrine:  Diabetes           Dermatological:  Skin Normal    Psych:    depression                Physical Exam  General: Alert, Oriented, Well nourished and Cooperative    Airway:  Mallampati: II   Mouth Opening: Normal  TM Distance: Normal  Tongue: Normal  Neck ROM: Normal ROM    Dental:  Intact    Chest/Lungs:  Clear to auscultation, Normal Respiratory Rate    Heart:  Rate: Normal  Rhythm: Regular Rhythm        Anesthesia Plan  Type of Anesthesia, risks & benefits discussed:    Anesthesia Type: Gen ETT  Intra-op Monitoring Plan: Standard ASA Monitors  Post Op Pain Control Plan: IV/PO Opioids PRN  Induction:  IV and Inhalation  Airway Plan: Direct  Informed Consent: Informed consent signed with the Patient and all parties understand the risks and agree with anesthesia plan.  All questions answered. Patient consented to blood products? Yes  ASA Score: 3  Day of Surgery Review of History & Physical: H&P Update referred to the surgeon/provider.    Ready For Surgery From Anesthesia Perspective.     .

## 2024-02-05 NOTE — ANESTHESIA POSTPROCEDURE EVALUATION
Anesthesia Post Evaluation    Patient: Debra Puentes    Procedure(s) Performed: Procedure(s) (LRB):  Kyphoplasty (N/A)    Final Anesthesia Type: general      Patient location during evaluation: PACU  Patient participation: Yes- Able to Participate  Level of consciousness: awake and alert and oriented  Post-procedure vital signs: reviewed and stable  Pain management: adequate  Airway patency: patent  LUKE mitigation strategies: Verification of full reversal of neuromuscular block  PONV status at discharge: No PONV  Anesthetic complications: no      Cardiovascular status: blood pressure returned to baseline and stable  Respiratory status: spontaneous ventilation and unassisted  Hydration status: euvolemic  Follow-up not needed.  Comments: [facility]              Vitals Value Taken Time   /52 02/05/24 0911   Temp 36.6 °C (97.9 °F) 02/05/24 0744   Pulse 70 02/05/24 0911   Resp 20 02/05/24 0911   SpO2 98 % 02/05/24 0911         Event Time   Out of Recovery 08:38:00         Pain/Ian Score: Ian Score: 10 (2/5/2024  8:41 AM)

## 2024-02-06 VITALS
RESPIRATION RATE: 19 BRPM | TEMPERATURE: 98 F | OXYGEN SATURATION: 98 % | HEIGHT: 68 IN | SYSTOLIC BLOOD PRESSURE: 122 MMHG | WEIGHT: 210 LBS | HEART RATE: 68 BPM | DIASTOLIC BLOOD PRESSURE: 63 MMHG | BODY MASS INDEX: 31.83 KG/M2

## 2024-02-18 PROCEDURE — 81003 URINALYSIS AUTO W/O SCOPE: CPT | Performed by: NURSE PRACTITIONER

## 2024-02-18 PROCEDURE — 87086 URINE CULTURE/COLONY COUNT: CPT | Performed by: NURSE PRACTITIONER

## 2024-02-19 ENCOUNTER — LAB REQUISITION (OUTPATIENT)
Dept: LAB | Facility: HOSPITAL | Age: 79
End: 2024-02-19
Payer: COMMERCIAL

## 2024-02-19 DIAGNOSIS — R31.9 HEMATURIA, UNSPECIFIED: ICD-10-CM

## 2024-02-19 LAB
APPEARANCE UR: CLEAR
BACTERIA #/AREA URNS AUTO: ABNORMAL /HPF
BILIRUB UR QL STRIP.AUTO: NEGATIVE
COLOR UR AUTO: ABNORMAL
GLUCOSE UR QL STRIP.AUTO: 500
KETONES UR QL STRIP.AUTO: NEGATIVE
LEUKOCYTE ESTERASE UR QL STRIP.AUTO: ABNORMAL
MUCOUS THREADS URNS QL MICRO: ABNORMAL /LPF
NITRITE UR QL STRIP.AUTO: POSITIVE
PH UR STRIP.AUTO: 8.5 [PH]
PROT UR QL STRIP.AUTO: 100
RBC #/AREA URNS AUTO: ABNORMAL /HPF
RBC UR QL AUTO: ABNORMAL
SP GR UR STRIP.AUTO: 1.01 (ref 1–1.03)
SQUAMOUS #/AREA URNS AUTO: ABNORMAL /HPF
UROBILINOGEN UR STRIP-ACNC: 0.2
WBC #/AREA URNS AUTO: ABNORMAL /HPF

## 2024-02-20 ENCOUNTER — LAB REQUISITION (OUTPATIENT)
Dept: LAB | Facility: HOSPITAL | Age: 79
End: 2024-02-20
Payer: COMMERCIAL

## 2024-02-20 DIAGNOSIS — D63.1 ANEMIA IN CHRONIC KIDNEY DISEASE (CODE): ICD-10-CM

## 2024-02-20 DIAGNOSIS — E55.9 VITAMIN D DEFICIENCY, UNSPECIFIED: ICD-10-CM

## 2024-02-20 DIAGNOSIS — E21.3 HYPERPARATHYROIDISM, UNSPECIFIED: ICD-10-CM

## 2024-02-20 LAB
ALBUMIN SERPL-MCNC: 2.6 G/DL (ref 3.4–4.8)
ALBUMIN/GLOB SERPL: 0.8 RATIO (ref 1.1–2)
ALP SERPL-CCNC: 109 UNIT/L (ref 40–150)
ALT SERPL-CCNC: 12 UNIT/L (ref 0–55)
AST SERPL-CCNC: 20 UNIT/L (ref 5–34)
BASOPHILS # BLD AUTO: 0.02 X10(3)/MCL
BASOPHILS NFR BLD AUTO: 0.4 %
BILIRUB SERPL-MCNC: 0.5 MG/DL
BUN SERPL-MCNC: 35.3 MG/DL (ref 9.8–20.1)
CALCIUM SERPL-MCNC: 9.4 MG/DL (ref 8.4–10.2)
CHLORIDE SERPL-SCNC: 109 MMOL/L (ref 98–107)
CO2 SERPL-SCNC: 23 MMOL/L (ref 23–31)
CREAT SERPL-MCNC: 3.14 MG/DL (ref 0.55–1.02)
DEPRECATED CALCIDIOL+CALCIFEROL SERPL-MC: 51.2 NG/ML (ref 30–80)
EOSINOPHIL # BLD AUTO: 0.39 X10(3)/MCL (ref 0–0.9)
EOSINOPHIL NFR BLD AUTO: 7.8 %
ERYTHROCYTE [DISTWIDTH] IN BLOOD BY AUTOMATED COUNT: 13.5 % (ref 11.5–17)
FERRITIN SERPL-MCNC: 941.85 NG/ML (ref 4.63–204)
GFR SERPLBLD CREATININE-BSD FMLA CKD-EPI: 15 MLS/MIN/1.73/M2
GLOBULIN SER-MCNC: 3.1 GM/DL (ref 2.4–3.5)
GLUCOSE SERPL-MCNC: 65 MG/DL (ref 82–115)
HCT VFR BLD AUTO: 32.5 % (ref 37–47)
HGB BLD-MCNC: 10.4 G/DL (ref 12–16)
IMM GRANULOCYTES # BLD AUTO: 0.01 X10(3)/MCL (ref 0–0.04)
IMM GRANULOCYTES NFR BLD AUTO: 0.2 %
IRON SATN MFR SERPL: 43 % (ref 20–50)
IRON SERPL-MCNC: 56 UG/DL (ref 50–170)
LYMPHOCYTES # BLD AUTO: 1.14 X10(3)/MCL (ref 0.6–4.6)
LYMPHOCYTES NFR BLD AUTO: 22.9 %
MAGNESIUM SERPL-MCNC: 2 MG/DL (ref 1.6–2.6)
MCH RBC QN AUTO: 32 PG (ref 27–31)
MCHC RBC AUTO-ENTMCNC: 32 G/DL (ref 33–36)
MCV RBC AUTO: 100 FL (ref 80–94)
MONOCYTES # BLD AUTO: 0.54 X10(3)/MCL (ref 0.1–1.3)
MONOCYTES NFR BLD AUTO: 10.8 %
NEUTROPHILS # BLD AUTO: 2.88 X10(3)/MCL (ref 2.1–9.2)
NEUTROPHILS NFR BLD AUTO: 57.9 %
NRBC BLD AUTO-RTO: 0 %
PHOSPHATE SERPL-MCNC: 3.5 MG/DL (ref 2.3–4.7)
PLATELET # BLD AUTO: 177 X10(3)/MCL (ref 130–400)
PMV BLD AUTO: 11.8 FL (ref 7.4–10.4)
POTASSIUM SERPL-SCNC: 4 MMOL/L (ref 3.5–5.1)
PROT SERPL-MCNC: 5.7 GM/DL (ref 5.8–7.6)
PTH-INTACT SERPL-MCNC: 130.7 PG/ML (ref 8.7–77)
RBC # BLD AUTO: 3.25 X10(6)/MCL (ref 4.2–5.4)
SODIUM SERPL-SCNC: 143 MMOL/L (ref 136–145)
TIBC SERPL-MCNC: 130 UG/DL (ref 250–450)
TIBC SERPL-MCNC: 74 UG/DL (ref 70–310)
TRANSFERRIN SERPL-MCNC: 120 MG/DL (ref 173–360)
WBC # SPEC AUTO: 4.98 X10(3)/MCL (ref 4.5–11.5)

## 2024-02-20 PROCEDURE — 82728 ASSAY OF FERRITIN: CPT | Performed by: INTERNAL MEDICINE

## 2024-02-20 PROCEDURE — 84100 ASSAY OF PHOSPHORUS: CPT | Performed by: INTERNAL MEDICINE

## 2024-02-20 PROCEDURE — 85025 COMPLETE CBC W/AUTO DIFF WBC: CPT | Performed by: INTERNAL MEDICINE

## 2024-02-20 PROCEDURE — 82306 VITAMIN D 25 HYDROXY: CPT | Performed by: INTERNAL MEDICINE

## 2024-02-20 PROCEDURE — 83540 ASSAY OF IRON: CPT | Performed by: INTERNAL MEDICINE

## 2024-02-20 PROCEDURE — 80053 COMPREHEN METABOLIC PANEL: CPT | Performed by: INTERNAL MEDICINE

## 2024-02-20 PROCEDURE — 83735 ASSAY OF MAGNESIUM: CPT | Performed by: INTERNAL MEDICINE

## 2024-02-20 PROCEDURE — 83970 ASSAY OF PARATHORMONE: CPT | Performed by: INTERNAL MEDICINE

## 2024-02-21 DIAGNOSIS — N18.4 ANEMIA DUE TO STAGE 4 CHRONIC KIDNEY DISEASE TREATED WITH ERYTHROPOIETIN: Primary | ICD-10-CM

## 2024-02-21 DIAGNOSIS — D63.1 ANEMIA DUE TO STAGE 4 CHRONIC KIDNEY DISEASE TREATED WITH ERYTHROPOIETIN: Primary | ICD-10-CM

## 2024-02-21 LAB — BACTERIA UR CULT: ABNORMAL

## 2024-03-21 ENCOUNTER — LAB REQUISITION (OUTPATIENT)
Dept: LAB | Facility: HOSPITAL | Age: 79
End: 2024-03-21
Payer: COMMERCIAL

## 2024-03-21 DIAGNOSIS — E21.3 HYPERPARATHYROIDISM, UNSPECIFIED: ICD-10-CM

## 2024-03-21 DIAGNOSIS — N18.4 CHRONIC KIDNEY DISEASE, STAGE 4 (SEVERE): ICD-10-CM

## 2024-03-21 DIAGNOSIS — D63.1 ANEMIA IN CHRONIC KIDNEY DISEASE (CODE): ICD-10-CM

## 2024-03-21 LAB
ALBUMIN SERPL-MCNC: 2.7 G/DL (ref 3.4–4.8)
ALBUMIN/GLOB SERPL: 1.1 RATIO (ref 1.1–2)
ALP SERPL-CCNC: 100 UNIT/L (ref 40–150)
ALT SERPL-CCNC: 10 UNIT/L (ref 0–55)
AST SERPL-CCNC: 12 UNIT/L (ref 5–34)
BASOPHILS # BLD AUTO: 0.03 X10(3)/MCL
BASOPHILS NFR BLD AUTO: 0.5 %
BILIRUB SERPL-MCNC: 0.4 MG/DL
BUN SERPL-MCNC: 41.9 MG/DL (ref 9.8–20.1)
CALCIUM SERPL-MCNC: 8.5 MG/DL (ref 8.4–10.2)
CHLORIDE SERPL-SCNC: 110 MMOL/L (ref 98–107)
CO2 SERPL-SCNC: 24 MMOL/L (ref 23–31)
CREAT SERPL-MCNC: 3.14 MG/DL (ref 0.55–1.02)
DEPRECATED CALCIDIOL+CALCIFEROL SERPL-MC: 40.8 NG/ML (ref 30–80)
EOSINOPHIL # BLD AUTO: 0.51 X10(3)/MCL (ref 0–0.9)
EOSINOPHIL NFR BLD AUTO: 8.1 %
ERYTHROCYTE [DISTWIDTH] IN BLOOD BY AUTOMATED COUNT: 13.4 % (ref 11.5–17)
GFR SERPLBLD CREATININE-BSD FMLA CKD-EPI: 15 MLS/MIN/1.73/M2
GLOBULIN SER-MCNC: 2.4 GM/DL (ref 2.4–3.5)
GLUCOSE SERPL-MCNC: 82 MG/DL (ref 82–115)
HCT VFR BLD AUTO: 31.4 % (ref 37–47)
HGB BLD-MCNC: 10.3 G/DL (ref 12–16)
IMM GRANULOCYTES # BLD AUTO: 0.02 X10(3)/MCL (ref 0–0.04)
IMM GRANULOCYTES NFR BLD AUTO: 0.3 %
IRON SATN MFR SERPL: 34 % (ref 20–50)
IRON SERPL-MCNC: 50 UG/DL (ref 50–170)
LYMPHOCYTES # BLD AUTO: 1.49 X10(3)/MCL (ref 0.6–4.6)
LYMPHOCYTES NFR BLD AUTO: 23.6 %
MAGNESIUM SERPL-MCNC: 1.8 MG/DL (ref 1.6–2.6)
MCH RBC QN AUTO: 31.9 PG (ref 27–31)
MCHC RBC AUTO-ENTMCNC: 32.8 G/DL (ref 33–36)
MCV RBC AUTO: 97.2 FL (ref 80–94)
MONOCYTES # BLD AUTO: 0.69 X10(3)/MCL (ref 0.1–1.3)
MONOCYTES NFR BLD AUTO: 10.9 %
NEUTROPHILS # BLD AUTO: 3.57 X10(3)/MCL (ref 2.1–9.2)
NEUTROPHILS NFR BLD AUTO: 56.6 %
NRBC BLD AUTO-RTO: 0 %
PHOSPHATE SERPL-MCNC: 3.9 MG/DL (ref 2.3–4.7)
PLATELET # BLD AUTO: 180 X10(3)/MCL (ref 130–400)
PMV BLD AUTO: 11.4 FL (ref 7.4–10.4)
POTASSIUM SERPL-SCNC: 3.2 MMOL/L (ref 3.5–5.1)
PROT SERPL-MCNC: 5.1 GM/DL (ref 5.8–7.6)
PTH-INTACT SERPL-MCNC: 151.8 PG/ML (ref 8.7–77)
RBC # BLD AUTO: 3.23 X10(6)/MCL (ref 4.2–5.4)
SODIUM SERPL-SCNC: 146 MMOL/L (ref 136–145)
TIBC SERPL-MCNC: 148 UG/DL (ref 250–450)
TIBC SERPL-MCNC: 98 UG/DL (ref 70–310)
TRANSFERRIN SERPL-MCNC: 128 MG/DL (ref 173–360)
WBC # SPEC AUTO: 6.31 X10(3)/MCL (ref 4.5–11.5)

## 2024-03-21 PROCEDURE — 82306 VITAMIN D 25 HYDROXY: CPT | Performed by: INTERNAL MEDICINE

## 2024-03-21 PROCEDURE — 84100 ASSAY OF PHOSPHORUS: CPT | Performed by: INTERNAL MEDICINE

## 2024-03-21 PROCEDURE — 83540 ASSAY OF IRON: CPT | Performed by: INTERNAL MEDICINE

## 2024-03-21 PROCEDURE — 80053 COMPREHEN METABOLIC PANEL: CPT | Performed by: INTERNAL MEDICINE

## 2024-03-21 PROCEDURE — 83735 ASSAY OF MAGNESIUM: CPT | Performed by: INTERNAL MEDICINE

## 2024-03-21 PROCEDURE — 83970 ASSAY OF PARATHORMONE: CPT | Performed by: INTERNAL MEDICINE

## 2024-03-21 PROCEDURE — 85025 COMPLETE CBC W/AUTO DIFF WBC: CPT | Performed by: INTERNAL MEDICINE

## 2024-04-03 ENCOUNTER — LAB REQUISITION (OUTPATIENT)
Dept: LAB | Facility: HOSPITAL | Age: 79
End: 2024-04-03
Payer: COMMERCIAL

## 2024-04-03 DIAGNOSIS — N18.4 CHRONIC KIDNEY DISEASE, STAGE 4 (SEVERE): ICD-10-CM

## 2024-04-03 LAB
ANION GAP SERPL CALC-SCNC: 11 MEQ/L
BUN SERPL-MCNC: 39.6 MG/DL (ref 9.8–20.1)
CALCIUM SERPL-MCNC: 8.6 MG/DL (ref 8.4–10.2)
CHLORIDE SERPL-SCNC: 110 MMOL/L (ref 98–107)
CO2 SERPL-SCNC: 23 MMOL/L (ref 23–31)
CREAT SERPL-MCNC: 2.49 MG/DL (ref 0.55–1.02)
CREAT/UREA NIT SERPL: 16
GFR SERPLBLD CREATININE-BSD FMLA CKD-EPI: 19 MLS/MIN/1.73/M2
GLUCOSE SERPL-MCNC: 77 MG/DL (ref 82–115)
POTASSIUM SERPL-SCNC: 3.4 MMOL/L (ref 3.5–5.1)
SODIUM SERPL-SCNC: 144 MMOL/L (ref 136–145)

## 2024-04-03 PROCEDURE — 80048 BASIC METABOLIC PNL TOTAL CA: CPT | Performed by: INTERNAL MEDICINE

## 2024-04-04 ENCOUNTER — LAB REQUISITION (OUTPATIENT)
Dept: LAB | Facility: HOSPITAL | Age: 79
End: 2024-04-04
Payer: COMMERCIAL

## 2024-04-04 DIAGNOSIS — N18.4 CHRONIC KIDNEY DISEASE, STAGE 4 (SEVERE): ICD-10-CM

## 2024-04-04 LAB
APPEARANCE UR: ABNORMAL
BACTERIA #/AREA URNS AUTO: ABNORMAL /HPF
BILIRUB UR QL STRIP.AUTO: ABNORMAL
COLOR UR AUTO: ABNORMAL
GLUCOSE UR QL STRIP.AUTO: NEGATIVE
KETONES UR QL STRIP.AUTO: NEGATIVE
LEUKOCYTE ESTERASE UR QL STRIP.AUTO: ABNORMAL
NITRITE UR QL STRIP.AUTO: POSITIVE
PH UR STRIP.AUTO: >=9 [PH]
PROT UR QL STRIP.AUTO: ABNORMAL
RBC #/AREA URNS AUTO: >100 /HPF
RBC UR QL AUTO: ABNORMAL
SP GR UR STRIP.AUTO: 1 (ref 1–1.03)
SQUAMOUS #/AREA URNS AUTO: ABNORMAL /HPF
TRI-PHOS CRY URNS QL MICRO: ABNORMAL /HPF
UROBILINOGEN UR STRIP-ACNC: 0.2
WBC #/AREA URNS AUTO: >100 /HPF

## 2024-04-04 PROCEDURE — 81001 URINALYSIS AUTO W/SCOPE: CPT | Performed by: INTERNAL MEDICINE

## 2024-04-04 PROCEDURE — 81003 URINALYSIS AUTO W/O SCOPE: CPT | Performed by: INTERNAL MEDICINE

## 2024-04-04 PROCEDURE — 87086 URINE CULTURE/COLONY COUNT: CPT | Performed by: INTERNAL MEDICINE

## 2024-04-06 LAB — BACTERIA UR CULT: ABNORMAL

## 2024-04-08 PROBLEM — N17.9 ACUTE KIDNEY INJURY SUPERIMPOSED ON CKD: Status: RESOLVED | Noted: 2023-10-27 | Resolved: 2024-04-08

## 2024-04-08 PROBLEM — N18.9 ACUTE KIDNEY INJURY SUPERIMPOSED ON CKD: Status: RESOLVED | Noted: 2023-10-27 | Resolved: 2024-04-08

## 2024-04-11 ENCOUNTER — LAB REQUISITION (OUTPATIENT)
Dept: LAB | Facility: HOSPITAL | Age: 79
End: 2024-04-11
Payer: COMMERCIAL

## 2024-04-11 DIAGNOSIS — N39.0 URINARY TRACT INFECTION, SITE NOT SPECIFIED: ICD-10-CM

## 2024-04-11 DIAGNOSIS — N18.4 CHRONIC KIDNEY DISEASE, STAGE 4 (SEVERE): ICD-10-CM

## 2024-04-11 DIAGNOSIS — B96.4 PROTEUS (MIRABILIS) (MORGANII) AS THE CAUSE OF DISEASES CLASSIFIED ELSEWHERE: ICD-10-CM

## 2024-04-11 DIAGNOSIS — I12.9 HYPERTENSIVE CHRONIC KIDNEY DISEASE WITH STAGE 1 THROUGH STAGE 4 CHRONIC KIDNEY DISEASE, OR UNSPECIFIED CHRONIC KIDNEY DISEASE: ICD-10-CM

## 2024-04-11 LAB
ALBUMIN SERPL-MCNC: 3 G/DL (ref 3.4–4.8)
ALBUMIN/GLOB SERPL: 1 RATIO (ref 1.1–2)
ALP SERPL-CCNC: 128 UNIT/L (ref 40–150)
ALT SERPL-CCNC: 10 UNIT/L (ref 0–55)
AST SERPL-CCNC: 12 UNIT/L (ref 5–34)
BILIRUB SERPL-MCNC: 0.4 MG/DL
BUN SERPL-MCNC: 41.1 MG/DL (ref 9.8–20.1)
CALCIUM SERPL-MCNC: 9.4 MG/DL (ref 8.4–10.2)
CHLORIDE SERPL-SCNC: 107 MMOL/L (ref 98–107)
CO2 SERPL-SCNC: 26 MMOL/L (ref 23–31)
CREAT SERPL-MCNC: 2.85 MG/DL (ref 0.55–1.02)
CRP SERPL-MCNC: 9 MG/L
ERYTHROCYTE [DISTWIDTH] IN BLOOD BY AUTOMATED COUNT: 13.4 % (ref 11.5–17)
ERYTHROCYTE [SEDIMENTATION RATE] IN BLOOD: 19 MM/HR (ref 0–20)
GFR SERPLBLD CREATININE-BSD FMLA CKD-EPI: 16 MLS/MIN/1.73/M2
GLOBULIN SER-MCNC: 2.9 GM/DL (ref 2.4–3.5)
GLUCOSE SERPL-MCNC: 144 MG/DL (ref 82–115)
HCT VFR BLD AUTO: 35.8 % (ref 37–47)
HGB BLD-MCNC: 11.8 G/DL (ref 12–16)
MCH RBC QN AUTO: 32.5 PG (ref 27–31)
MCHC RBC AUTO-ENTMCNC: 33 G/DL (ref 33–36)
MCV RBC AUTO: 98.6 FL (ref 80–94)
NRBC BLD AUTO-RTO: 0 %
PLATELET # BLD AUTO: 186 X10(3)/MCL (ref 130–400)
PMV BLD AUTO: 11.7 FL (ref 7.4–10.4)
POTASSIUM SERPL-SCNC: 3.8 MMOL/L (ref 3.5–5.1)
PROT SERPL-MCNC: 5.9 GM/DL (ref 5.8–7.6)
RBC # BLD AUTO: 3.63 X10(6)/MCL (ref 4.2–5.4)
SODIUM SERPL-SCNC: 143 MMOL/L (ref 136–145)
WBC # SPEC AUTO: 6.54 X10(3)/MCL (ref 4.5–11.5)

## 2024-04-11 PROCEDURE — 80053 COMPREHEN METABOLIC PANEL: CPT | Performed by: INTERNAL MEDICINE

## 2024-04-11 PROCEDURE — 86140 C-REACTIVE PROTEIN: CPT | Performed by: INTERNAL MEDICINE

## 2024-04-11 PROCEDURE — 85027 COMPLETE CBC AUTOMATED: CPT | Performed by: INTERNAL MEDICINE

## 2024-04-11 PROCEDURE — 85652 RBC SED RATE AUTOMATED: CPT | Performed by: INTERNAL MEDICINE

## 2024-04-15 ENCOUNTER — LAB REQUISITION (OUTPATIENT)
Dept: LAB | Facility: HOSPITAL | Age: 79
End: 2024-04-15
Payer: COMMERCIAL

## 2024-04-15 DIAGNOSIS — N18.4 CHRONIC KIDNEY DISEASE, STAGE 4 (SEVERE): ICD-10-CM

## 2024-04-15 LAB
ALBUMIN SERPL-MCNC: 2.8 G/DL (ref 3.4–4.8)
ALBUMIN/GLOB SERPL: 0.9 RATIO (ref 1.1–2)
ALP SERPL-CCNC: 104 UNIT/L (ref 40–150)
ALT SERPL-CCNC: 9 UNIT/L (ref 0–55)
AST SERPL-CCNC: 14 UNIT/L (ref 5–34)
BASOPHILS # BLD AUTO: 0.02 X10(3)/MCL
BASOPHILS NFR BLD AUTO: 0.4 %
BILIRUB SERPL-MCNC: 0.3 MG/DL
BUN SERPL-MCNC: 41.8 MG/DL (ref 9.8–20.1)
CALCIUM SERPL-MCNC: 9 MG/DL (ref 8.4–10.2)
CHLORIDE SERPL-SCNC: 109 MMOL/L (ref 98–107)
CO2 SERPL-SCNC: 21 MMOL/L (ref 23–31)
CREAT SERPL-MCNC: 2.63 MG/DL (ref 0.55–1.02)
DEPRECATED CALCIDIOL+CALCIFEROL SERPL-MC: 45.6 NG/ML (ref 30–80)
EOSINOPHIL # BLD AUTO: 0.42 X10(3)/MCL (ref 0–0.9)
EOSINOPHIL NFR BLD AUTO: 7.8 %
ERYTHROCYTE [DISTWIDTH] IN BLOOD BY AUTOMATED COUNT: 13.1 % (ref 11.5–17)
GFR SERPLBLD CREATININE-BSD FMLA CKD-EPI: 18 MLS/MIN/1.73/M2
GLOBULIN SER-MCNC: 3.1 GM/DL (ref 2.4–3.5)
GLUCOSE SERPL-MCNC: 61 MG/DL (ref 82–115)
HCT VFR BLD AUTO: 34.6 % (ref 37–47)
HGB BLD-MCNC: 11.2 G/DL (ref 12–16)
IMM GRANULOCYTES # BLD AUTO: 0.01 X10(3)/MCL (ref 0–0.04)
IMM GRANULOCYTES NFR BLD AUTO: 0.2 %
LYMPHOCYTES # BLD AUTO: 1.27 X10(3)/MCL (ref 0.6–4.6)
LYMPHOCYTES NFR BLD AUTO: 23.5 %
MAGNESIUM SERPL-MCNC: 2.1 MG/DL (ref 1.6–2.6)
MCH RBC QN AUTO: 31.8 PG (ref 27–31)
MCHC RBC AUTO-ENTMCNC: 32.4 G/DL (ref 33–36)
MCV RBC AUTO: 98.3 FL (ref 80–94)
MONOCYTES # BLD AUTO: 0.7 X10(3)/MCL (ref 0.1–1.3)
MONOCYTES NFR BLD AUTO: 13 %
NEUTROPHILS # BLD AUTO: 2.98 X10(3)/MCL (ref 2.1–9.2)
NEUTROPHILS NFR BLD AUTO: 55.1 %
NRBC BLD AUTO-RTO: 0 %
PHOSPHATE SERPL-MCNC: 3.9 MG/DL (ref 2.3–4.7)
PLATELET # BLD AUTO: 161 X10(3)/MCL (ref 130–400)
PMV BLD AUTO: 11.9 FL (ref 7.4–10.4)
POTASSIUM SERPL-SCNC: 3.6 MMOL/L (ref 3.5–5.1)
PROT SERPL-MCNC: 5.9 GM/DL (ref 5.8–7.6)
PTH-INTACT SERPL-MCNC: 148.2 PG/ML (ref 8.7–77)
RBC # BLD AUTO: 3.52 X10(6)/MCL (ref 4.2–5.4)
SODIUM SERPL-SCNC: 142 MMOL/L (ref 136–145)
WBC # SPEC AUTO: 5.4 X10(3)/MCL (ref 4.5–11.5)

## 2024-04-15 PROCEDURE — 83735 ASSAY OF MAGNESIUM: CPT | Performed by: INTERNAL MEDICINE

## 2024-04-15 PROCEDURE — 85025 COMPLETE CBC W/AUTO DIFF WBC: CPT | Performed by: INTERNAL MEDICINE

## 2024-04-15 PROCEDURE — 84100 ASSAY OF PHOSPHORUS: CPT | Performed by: INTERNAL MEDICINE

## 2024-04-15 PROCEDURE — 83970 ASSAY OF PARATHORMONE: CPT | Performed by: INTERNAL MEDICINE

## 2024-04-15 PROCEDURE — 82306 VITAMIN D 25 HYDROXY: CPT | Performed by: INTERNAL MEDICINE

## 2024-04-15 PROCEDURE — 80053 COMPREHEN METABOLIC PANEL: CPT | Performed by: INTERNAL MEDICINE

## 2024-04-16 ENCOUNTER — LAB REQUISITION (OUTPATIENT)
Dept: LAB | Facility: HOSPITAL | Age: 79
End: 2024-04-16
Payer: COMMERCIAL

## 2024-04-16 DIAGNOSIS — B96.4 PROTEUS (MIRABILIS) (MORGANII) AS THE CAUSE OF DISEASES CLASSIFIED ELSEWHERE: ICD-10-CM

## 2024-04-16 DIAGNOSIS — E44.0 MODERATE PROTEIN-CALORIE MALNUTRITION: ICD-10-CM

## 2024-04-16 DIAGNOSIS — N39.0 URINARY TRACT INFECTION, SITE NOT SPECIFIED: ICD-10-CM

## 2024-04-16 LAB
ALBUMIN SERPL-MCNC: 3.2 G/DL (ref 3.4–4.8)
ALBUMIN/GLOB SERPL: 1.1 RATIO (ref 1.1–2)
ALP SERPL-CCNC: 115 UNIT/L (ref 40–150)
ALT SERPL-CCNC: 8 UNIT/L (ref 0–55)
AST SERPL-CCNC: 13 UNIT/L (ref 5–34)
BASOPHILS # BLD AUTO: 0.02 X10(3)/MCL
BASOPHILS NFR BLD AUTO: 0.3 %
BILIRUB SERPL-MCNC: 0.4 MG/DL
BUN SERPL-MCNC: 41.4 MG/DL (ref 9.8–20.1)
CALCIUM SERPL-MCNC: 9 MG/DL (ref 8.4–10.2)
CHLORIDE SERPL-SCNC: 108 MMOL/L (ref 98–107)
CK SERPL-CCNC: 25 U/L (ref 29–168)
CO2 SERPL-SCNC: 26 MMOL/L (ref 23–31)
CREAT SERPL-MCNC: 2.76 MG/DL (ref 0.55–1.02)
CRP SERPL-MCNC: 11 MG/L
EOSINOPHIL # BLD AUTO: 0.38 X10(3)/MCL (ref 0–0.9)
EOSINOPHIL NFR BLD AUTO: 6.5 %
ERYTHROCYTE [DISTWIDTH] IN BLOOD BY AUTOMATED COUNT: 13.2 % (ref 11.5–17)
ERYTHROCYTE [SEDIMENTATION RATE] IN BLOOD: 21 MM/HR (ref 0–20)
GFR SERPLBLD CREATININE-BSD FMLA CKD-EPI: 17 MLS/MIN/1.73/M2
GLOBULIN SER-MCNC: 2.8 GM/DL (ref 2.4–3.5)
GLUCOSE SERPL-MCNC: 165 MG/DL (ref 82–115)
HCT VFR BLD AUTO: 35.5 % (ref 37–47)
HGB BLD-MCNC: 11.5 G/DL (ref 12–16)
IMM GRANULOCYTES # BLD AUTO: 0.02 X10(3)/MCL (ref 0–0.04)
IMM GRANULOCYTES NFR BLD AUTO: 0.3 %
LYMPHOCYTES # BLD AUTO: 0.76 X10(3)/MCL (ref 0.6–4.6)
LYMPHOCYTES NFR BLD AUTO: 13 %
MCH RBC QN AUTO: 31.7 PG (ref 27–31)
MCHC RBC AUTO-ENTMCNC: 32.4 G/DL (ref 33–36)
MCV RBC AUTO: 97.8 FL (ref 80–94)
MONOCYTES # BLD AUTO: 0.5 X10(3)/MCL (ref 0.1–1.3)
MONOCYTES NFR BLD AUTO: 8.5 %
NEUTROPHILS # BLD AUTO: 4.17 X10(3)/MCL (ref 2.1–9.2)
NEUTROPHILS NFR BLD AUTO: 71.4 %
NRBC BLD AUTO-RTO: 0 %
PLATELET # BLD AUTO: 179 X10(3)/MCL (ref 130–400)
PMV BLD AUTO: 11.8 FL (ref 7.4–10.4)
POTASSIUM SERPL-SCNC: 3.7 MMOL/L (ref 3.5–5.1)
PROT SERPL-MCNC: 6 GM/DL (ref 5.8–7.6)
RBC # BLD AUTO: 3.63 X10(6)/MCL (ref 4.2–5.4)
SODIUM SERPL-SCNC: 146 MMOL/L (ref 136–145)
WBC # SPEC AUTO: 5.85 X10(3)/MCL (ref 4.5–11.5)

## 2024-04-16 PROCEDURE — 85025 COMPLETE CBC W/AUTO DIFF WBC: CPT | Performed by: INTERNAL MEDICINE

## 2024-04-16 PROCEDURE — 82550 ASSAY OF CK (CPK): CPT | Performed by: INTERNAL MEDICINE

## 2024-04-16 PROCEDURE — 85652 RBC SED RATE AUTOMATED: CPT | Performed by: INTERNAL MEDICINE

## 2024-04-16 PROCEDURE — 86140 C-REACTIVE PROTEIN: CPT | Performed by: INTERNAL MEDICINE

## 2024-04-16 PROCEDURE — 80053 COMPREHEN METABOLIC PANEL: CPT | Performed by: INTERNAL MEDICINE

## 2024-04-26 ENCOUNTER — LAB REQUISITION (OUTPATIENT)
Dept: LAB | Facility: HOSPITAL | Age: 79
End: 2024-04-26
Payer: COMMERCIAL

## 2024-04-26 DIAGNOSIS — N18.9 CHRONIC KIDNEY DISEASE, UNSPECIFIED: ICD-10-CM

## 2024-04-26 LAB
ANION GAP SERPL CALC-SCNC: 11 MEQ/L
APPEARANCE UR: CLEAR
BACTERIA #/AREA URNS AUTO: ABNORMAL /HPF
BILIRUB UR QL STRIP.AUTO: NEGATIVE
BUN SERPL-MCNC: 42.3 MG/DL (ref 9.8–20.1)
CALCIUM SERPL-MCNC: 8.7 MG/DL (ref 8.4–10.2)
CAOX CRY URNS QL MICRO: ABNORMAL /HPF
CHLORIDE SERPL-SCNC: 108 MMOL/L (ref 98–107)
CO2 SERPL-SCNC: 24 MMOL/L (ref 23–31)
COLOR UR AUTO: ABNORMAL
CREAT SERPL-MCNC: 2.54 MG/DL (ref 0.55–1.02)
CREAT/UREA NIT SERPL: 17
GFR SERPLBLD CREATININE-BSD FMLA CKD-EPI: 19 MLS/MIN/1.73/M2
GLUCOSE SERPL-MCNC: 71 MG/DL (ref 82–115)
GLUCOSE UR QL STRIP.AUTO: NEGATIVE
KETONES UR QL STRIP.AUTO: NEGATIVE
LEUKOCYTE ESTERASE UR QL STRIP.AUTO: ABNORMAL
NITRITE UR QL STRIP.AUTO: NEGATIVE
PH UR STRIP.AUTO: 6 [PH]
POTASSIUM SERPL-SCNC: 3.7 MMOL/L (ref 3.5–5.1)
PROT UR QL STRIP.AUTO: ABNORMAL
RBC #/AREA URNS AUTO: ABNORMAL /HPF
RBC UR QL AUTO: ABNORMAL
SODIUM SERPL-SCNC: 143 MMOL/L (ref 136–145)
SP GR UR STRIP.AUTO: 1.02 (ref 1–1.03)
SQUAMOUS #/AREA URNS AUTO: ABNORMAL /HPF
UROBILINOGEN UR STRIP-ACNC: 0.2
WBC #/AREA URNS AUTO: ABNORMAL /HPF

## 2024-04-26 PROCEDURE — 81003 URINALYSIS AUTO W/O SCOPE: CPT | Performed by: INTERNAL MEDICINE

## 2024-04-26 PROCEDURE — 80048 BASIC METABOLIC PNL TOTAL CA: CPT | Performed by: INTERNAL MEDICINE

## 2024-04-26 PROCEDURE — 87086 URINE CULTURE/COLONY COUNT: CPT | Performed by: INTERNAL MEDICINE

## 2024-04-30 LAB — BACTERIA UR CULT: ABNORMAL

## 2024-05-22 ENCOUNTER — LAB REQUISITION (OUTPATIENT)
Dept: LAB | Facility: HOSPITAL | Age: 79
End: 2024-05-22
Payer: COMMERCIAL

## 2024-05-22 DIAGNOSIS — N18.9 CHRONIC KIDNEY DISEASE, UNSPECIFIED: ICD-10-CM

## 2024-05-22 LAB
25(OH)D3+25(OH)D2 SERPL-MCNC: 45 NG/ML (ref 30–80)
ALBUMIN SERPL-MCNC: 2.9 G/DL (ref 3.4–4.8)
ALBUMIN/GLOB SERPL: 1.2 RATIO (ref 1.1–2)
ALP SERPL-CCNC: 112 UNIT/L (ref 40–150)
ALT SERPL-CCNC: 9 UNIT/L (ref 0–55)
ANION GAP SERPL CALC-SCNC: 12 MEQ/L
AST SERPL-CCNC: 11 UNIT/L (ref 5–34)
BASOPHILS # BLD AUTO: 0.02 X10(3)/MCL
BASOPHILS NFR BLD AUTO: 0.3 %
BILIRUB SERPL-MCNC: 0.4 MG/DL
BUN SERPL-MCNC: 49.4 MG/DL (ref 9.8–20.1)
CALCIUM SERPL-MCNC: 8.7 MG/DL (ref 8.4–10.2)
CHLORIDE SERPL-SCNC: 107 MMOL/L (ref 98–107)
CO2 SERPL-SCNC: 22 MMOL/L (ref 23–31)
CREAT SERPL-MCNC: 2.24 MG/DL (ref 0.55–1.02)
CREAT/UREA NIT SERPL: 22
EOSINOPHIL # BLD AUTO: 0.35 X10(3)/MCL (ref 0–0.9)
EOSINOPHIL NFR BLD AUTO: 5.4 %
ERYTHROCYTE [DISTWIDTH] IN BLOOD BY AUTOMATED COUNT: 12.4 % (ref 11.5–17)
GFR SERPLBLD CREATININE-BSD FMLA CKD-EPI: 22 ML/MIN/1.73/M2
GLOBULIN SER-MCNC: 2.5 GM/DL (ref 2.4–3.5)
GLUCOSE SERPL-MCNC: 94 MG/DL (ref 82–115)
HCT VFR BLD AUTO: 33 % (ref 37–47)
HGB BLD-MCNC: 11 G/DL (ref 12–16)
IMM GRANULOCYTES # BLD AUTO: 0.01 X10(3)/MCL (ref 0–0.04)
IMM GRANULOCYTES NFR BLD AUTO: 0.2 %
LYMPHOCYTES # BLD AUTO: 1.48 X10(3)/MCL (ref 0.6–4.6)
LYMPHOCYTES NFR BLD AUTO: 22.9 %
MAGNESIUM SERPL-MCNC: 1.8 MG/DL (ref 1.6–2.6)
MCH RBC QN AUTO: 31.5 PG (ref 27–31)
MCHC RBC AUTO-ENTMCNC: 33.3 G/DL (ref 33–36)
MCV RBC AUTO: 94.6 FL (ref 80–94)
MONOCYTES # BLD AUTO: 0.82 X10(3)/MCL (ref 0.1–1.3)
MONOCYTES NFR BLD AUTO: 12.7 %
NEUTROPHILS # BLD AUTO: 3.79 X10(3)/MCL (ref 2.1–9.2)
NEUTROPHILS NFR BLD AUTO: 58.5 %
NRBC BLD AUTO-RTO: 0 %
PHOSPHATE SERPL-MCNC: 4.1 MG/DL (ref 2.3–4.7)
PLATELET # BLD AUTO: 181 X10(3)/MCL (ref 130–400)
PMV BLD AUTO: 11.4 FL (ref 7.4–10.4)
POTASSIUM SERPL-SCNC: 4.1 MMOL/L (ref 3.5–5.1)
PROT SERPL-MCNC: 5.4 GM/DL (ref 5.8–7.6)
PTH-INTACT SERPL-MCNC: 176.4 PG/ML (ref 8.7–77)
RBC # BLD AUTO: 3.49 X10(6)/MCL (ref 4.2–5.4)
SODIUM SERPL-SCNC: 141 MMOL/L (ref 136–145)
WBC # SPEC AUTO: 6.47 X10(3)/MCL (ref 4.5–11.5)

## 2024-05-22 PROCEDURE — 84100 ASSAY OF PHOSPHORUS: CPT | Performed by: INTERNAL MEDICINE

## 2024-05-22 PROCEDURE — 83970 ASSAY OF PARATHORMONE: CPT | Performed by: INTERNAL MEDICINE

## 2024-05-22 PROCEDURE — 80053 COMPREHEN METABOLIC PANEL: CPT | Performed by: INTERNAL MEDICINE

## 2024-05-22 PROCEDURE — 83735 ASSAY OF MAGNESIUM: CPT | Performed by: INTERNAL MEDICINE

## 2024-05-22 PROCEDURE — 82306 VITAMIN D 25 HYDROXY: CPT | Performed by: INTERNAL MEDICINE

## 2024-05-22 PROCEDURE — 85025 COMPLETE CBC W/AUTO DIFF WBC: CPT | Performed by: INTERNAL MEDICINE

## 2024-05-23 PROCEDURE — 81001 URINALYSIS AUTO W/SCOPE: CPT | Performed by: INTERNAL MEDICINE

## 2024-05-23 PROCEDURE — 87086 URINE CULTURE/COLONY COUNT: CPT | Performed by: INTERNAL MEDICINE

## 2024-05-24 ENCOUNTER — LAB REQUISITION (OUTPATIENT)
Dept: LAB | Facility: HOSPITAL | Age: 79
End: 2024-05-24
Payer: COMMERCIAL

## 2024-05-24 DIAGNOSIS — N18.9 CHRONIC KIDNEY DISEASE, UNSPECIFIED: ICD-10-CM

## 2024-05-24 LAB
BACTERIA #/AREA URNS AUTO: ABNORMAL /HPF
BILIRUB UR QL STRIP.AUTO: NEGATIVE
CLARITY UR: ABNORMAL
COLOR UR AUTO: ABNORMAL
GLUCOSE UR QL STRIP: NEGATIVE
HGB UR QL STRIP: ABNORMAL
KETONES UR QL STRIP: NEGATIVE
LEUKOCYTE ESTERASE UR QL STRIP: ABNORMAL
NITRITE UR QL STRIP: NEGATIVE
PH UR STRIP: 7.5 [PH]
PROT UR QL STRIP: ABNORMAL
RBC #/AREA URNS AUTO: ABNORMAL /HPF
SP GR UR STRIP.AUTO: 1.01 (ref 1–1.03)
SQUAMOUS #/AREA URNS AUTO: ABNORMAL /HPF
UROBILINOGEN UR STRIP-ACNC: 0.2
WBC #/AREA URNS AUTO: ABNORMAL /HPF

## 2024-05-26 LAB — BACTERIA UR CULT: ABNORMAL

## 2024-05-31 ENCOUNTER — LAB REQUISITION (OUTPATIENT)
Dept: LAB | Facility: HOSPITAL | Age: 79
End: 2024-05-31
Payer: COMMERCIAL

## 2024-05-31 DIAGNOSIS — N39.0 URINARY TRACT INFECTION, SITE NOT SPECIFIED: ICD-10-CM

## 2024-05-31 LAB
ALBUMIN SERPL-MCNC: 3.2 G/DL (ref 3.4–4.8)
ALBUMIN/GLOB SERPL: 1.1 RATIO (ref 1.1–2)
ALP SERPL-CCNC: 152 UNIT/L (ref 40–150)
ALT SERPL-CCNC: 20 UNIT/L (ref 0–55)
ANION GAP SERPL CALC-SCNC: 10 MEQ/L
AST SERPL-CCNC: 21 UNIT/L (ref 5–34)
BASOPHILS # BLD AUTO: 0.02 X10(3)/MCL
BASOPHILS NFR BLD AUTO: 0.3 %
BILIRUB SERPL-MCNC: 0.5 MG/DL
BUN SERPL-MCNC: 61.8 MG/DL (ref 9.8–20.1)
CALCIUM SERPL-MCNC: 8.8 MG/DL (ref 8.4–10.2)
CHLORIDE SERPL-SCNC: 109 MMOL/L (ref 98–107)
CK SERPL-CCNC: 31 U/L (ref 29–168)
CO2 SERPL-SCNC: 21 MMOL/L (ref 23–31)
CREAT SERPL-MCNC: 3.04 MG/DL (ref 0.55–1.02)
CREAT/UREA NIT SERPL: 20
CRP SERPL-MCNC: 35.5 MG/L
EOSINOPHIL # BLD AUTO: 0.4 X10(3)/MCL (ref 0–0.9)
EOSINOPHIL NFR BLD AUTO: 5.7 %
ERYTHROCYTE [DISTWIDTH] IN BLOOD BY AUTOMATED COUNT: 12.5 % (ref 11.5–17)
ERYTHROCYTE [SEDIMENTATION RATE] IN BLOOD: 26 MM/HR (ref 0–20)
GFR SERPLBLD CREATININE-BSD FMLA CKD-EPI: 15 ML/MIN/1.73/M2
GLOBULIN SER-MCNC: 2.9 GM/DL (ref 2.4–3.5)
GLUCOSE SERPL-MCNC: 134 MG/DL (ref 82–115)
HCT VFR BLD AUTO: 35.9 % (ref 37–47)
HGB BLD-MCNC: 11.4 G/DL (ref 12–16)
IMM GRANULOCYTES # BLD AUTO: 0.03 X10(3)/MCL (ref 0–0.04)
IMM GRANULOCYTES NFR BLD AUTO: 0.4 %
LYMPHOCYTES # BLD AUTO: 0.99 X10(3)/MCL (ref 0.6–4.6)
LYMPHOCYTES NFR BLD AUTO: 14.2 %
MCH RBC QN AUTO: 30.9 PG (ref 27–31)
MCHC RBC AUTO-ENTMCNC: 31.8 G/DL (ref 33–36)
MCV RBC AUTO: 97.3 FL (ref 80–94)
MONOCYTES # BLD AUTO: 0.61 X10(3)/MCL (ref 0.1–1.3)
MONOCYTES NFR BLD AUTO: 8.7 %
NEUTROPHILS # BLD AUTO: 4.94 X10(3)/MCL (ref 2.1–9.2)
NEUTROPHILS NFR BLD AUTO: 70.7 %
NRBC BLD AUTO-RTO: 0 %
PLATELET # BLD AUTO: 200 X10(3)/MCL (ref 130–400)
PMV BLD AUTO: 11.3 FL (ref 7.4–10.4)
POTASSIUM SERPL-SCNC: 4.1 MMOL/L (ref 3.5–5.1)
PROT SERPL-MCNC: 6.1 GM/DL (ref 5.8–7.6)
RBC # BLD AUTO: 3.69 X10(6)/MCL (ref 4.2–5.4)
SODIUM SERPL-SCNC: 140 MMOL/L (ref 136–145)
WBC # SPEC AUTO: 6.99 X10(3)/MCL (ref 4.5–11.5)

## 2024-05-31 PROCEDURE — 86140 C-REACTIVE PROTEIN: CPT | Performed by: INTERNAL MEDICINE

## 2024-05-31 PROCEDURE — 82550 ASSAY OF CK (CPK): CPT | Performed by: INTERNAL MEDICINE

## 2024-05-31 PROCEDURE — 85652 RBC SED RATE AUTOMATED: CPT | Performed by: INTERNAL MEDICINE

## 2024-05-31 PROCEDURE — 85025 COMPLETE CBC W/AUTO DIFF WBC: CPT | Performed by: INTERNAL MEDICINE

## 2024-05-31 PROCEDURE — 80053 COMPREHEN METABOLIC PANEL: CPT | Performed by: INTERNAL MEDICINE

## 2024-06-05 ENCOUNTER — LAB REQUISITION (OUTPATIENT)
Dept: LAB | Facility: HOSPITAL | Age: 79
End: 2024-06-05
Payer: COMMERCIAL

## 2024-06-05 DIAGNOSIS — N39.0 URINARY TRACT INFECTION, SITE NOT SPECIFIED: ICD-10-CM

## 2024-06-05 LAB
ALBUMIN SERPL-MCNC: 3 G/DL (ref 3.4–4.8)
ALBUMIN/GLOB SERPL: 1 RATIO (ref 1.1–2)
ALP SERPL-CCNC: 138 UNIT/L (ref 40–150)
ALT SERPL-CCNC: 10 UNIT/L (ref 0–55)
ANION GAP SERPL CALC-SCNC: 9 MEQ/L
AST SERPL-CCNC: 13 UNIT/L (ref 5–34)
BASOPHILS # BLD AUTO: 0.03 X10(3)/MCL
BASOPHILS NFR BLD AUTO: 0.4 %
BILIRUB SERPL-MCNC: 0.4 MG/DL
BUN SERPL-MCNC: 49.3 MG/DL (ref 9.8–20.1)
CALCIUM SERPL-MCNC: 9 MG/DL (ref 8.4–10.2)
CHLORIDE SERPL-SCNC: 109 MMOL/L (ref 98–107)
CK SERPL-CCNC: 26 U/L (ref 29–168)
CO2 SERPL-SCNC: 23 MMOL/L (ref 23–31)
CREAT SERPL-MCNC: 2.48 MG/DL (ref 0.55–1.02)
CREAT/UREA NIT SERPL: 20
CRP SERPL-MCNC: 33.7 MG/L
EOSINOPHIL # BLD AUTO: 0.66 X10(3)/MCL (ref 0–0.9)
EOSINOPHIL NFR BLD AUTO: 9.2 %
ERYTHROCYTE [DISTWIDTH] IN BLOOD BY AUTOMATED COUNT: 12.4 % (ref 11.5–17)
ERYTHROCYTE [SEDIMENTATION RATE] IN BLOOD: 35 MM/HR (ref 0–20)
GFR SERPLBLD CREATININE-BSD FMLA CKD-EPI: 19 ML/MIN/1.73/M2
GLOBULIN SER-MCNC: 3 GM/DL (ref 2.4–3.5)
GLUCOSE SERPL-MCNC: 96 MG/DL (ref 82–115)
HCT VFR BLD AUTO: 36.1 % (ref 37–47)
HGB BLD-MCNC: 11.7 G/DL (ref 12–16)
IMM GRANULOCYTES # BLD AUTO: 0.02 X10(3)/MCL (ref 0–0.04)
IMM GRANULOCYTES NFR BLD AUTO: 0.3 %
LYMPHOCYTES # BLD AUTO: 0.82 X10(3)/MCL (ref 0.6–4.6)
LYMPHOCYTES NFR BLD AUTO: 11.5 %
MCH RBC QN AUTO: 31.4 PG (ref 27–31)
MCHC RBC AUTO-ENTMCNC: 32.4 G/DL (ref 33–36)
MCV RBC AUTO: 96.8 FL (ref 80–94)
MONOCYTES # BLD AUTO: 0.65 X10(3)/MCL (ref 0.1–1.3)
MONOCYTES NFR BLD AUTO: 9.1 %
NEUTROPHILS # BLD AUTO: 4.98 X10(3)/MCL (ref 2.1–9.2)
NEUTROPHILS NFR BLD AUTO: 69.5 %
NRBC BLD AUTO-RTO: 0 %
PLATELET # BLD AUTO: 211 X10(3)/MCL (ref 130–400)
PMV BLD AUTO: 11.1 FL (ref 7.4–10.4)
POTASSIUM SERPL-SCNC: 3.9 MMOL/L (ref 3.5–5.1)
PROT SERPL-MCNC: 6 GM/DL (ref 5.8–7.6)
RBC # BLD AUTO: 3.73 X10(6)/MCL (ref 4.2–5.4)
SODIUM SERPL-SCNC: 141 MMOL/L (ref 136–145)
WBC # SPEC AUTO: 7.16 X10(3)/MCL (ref 4.5–11.5)

## 2024-06-05 PROCEDURE — 85025 COMPLETE CBC W/AUTO DIFF WBC: CPT | Performed by: INTERNAL MEDICINE

## 2024-06-05 PROCEDURE — 85652 RBC SED RATE AUTOMATED: CPT | Performed by: INTERNAL MEDICINE

## 2024-06-05 PROCEDURE — 86140 C-REACTIVE PROTEIN: CPT | Performed by: INTERNAL MEDICINE

## 2024-06-05 PROCEDURE — 82550 ASSAY OF CK (CPK): CPT | Performed by: INTERNAL MEDICINE

## 2024-06-05 PROCEDURE — 80053 COMPREHEN METABOLIC PANEL: CPT | Performed by: INTERNAL MEDICINE

## 2024-06-12 ENCOUNTER — LAB REQUISITION (OUTPATIENT)
Dept: LAB | Facility: HOSPITAL | Age: 79
End: 2024-06-12
Payer: COMMERCIAL

## 2024-06-12 DIAGNOSIS — N39.0 URINARY TRACT INFECTION, SITE NOT SPECIFIED: ICD-10-CM

## 2024-06-12 LAB
ALBUMIN SERPL-MCNC: 2.8 G/DL (ref 3.4–4.8)
ALBUMIN/GLOB SERPL: 1 RATIO (ref 1.1–2)
ALP SERPL-CCNC: 142 UNIT/L (ref 40–150)
ALT SERPL-CCNC: 10 UNIT/L (ref 0–55)
ANION GAP SERPL CALC-SCNC: 11 MEQ/L
AST SERPL-CCNC: 13 UNIT/L (ref 5–34)
BASOPHILS # BLD AUTO: 0.02 X10(3)/MCL
BASOPHILS NFR BLD AUTO: 0.3 %
BILIRUB SERPL-MCNC: 0.6 MG/DL
BUN SERPL-MCNC: 36.4 MG/DL (ref 9.8–20.1)
CALCIUM SERPL-MCNC: 8.6 MG/DL (ref 8.4–10.2)
CHLORIDE SERPL-SCNC: 109 MMOL/L (ref 98–107)
CO2 SERPL-SCNC: 24 MMOL/L (ref 23–31)
CREAT SERPL-MCNC: 2.23 MG/DL (ref 0.55–1.02)
CREAT/UREA NIT SERPL: 16
CRP SERPL-MCNC: 48.9 MG/L
EOSINOPHIL # BLD AUTO: 0.68 X10(3)/MCL (ref 0–0.9)
EOSINOPHIL NFR BLD AUTO: 10.5 %
ERYTHROCYTE [DISTWIDTH] IN BLOOD BY AUTOMATED COUNT: 12.4 % (ref 11.5–17)
ERYTHROCYTE [SEDIMENTATION RATE] IN BLOOD: 37 MM/HR (ref 0–20)
GFR SERPLBLD CREATININE-BSD FMLA CKD-EPI: 22 ML/MIN/1.73/M2
GLOBULIN SER-MCNC: 2.9 GM/DL (ref 2.4–3.5)
GLUCOSE SERPL-MCNC: 147 MG/DL (ref 82–115)
HCT VFR BLD AUTO: 33.7 % (ref 37–47)
HGB BLD-MCNC: 11 G/DL (ref 12–16)
IMM GRANULOCYTES # BLD AUTO: 0.03 X10(3)/MCL (ref 0–0.04)
IMM GRANULOCYTES NFR BLD AUTO: 0.5 %
LYMPHOCYTES # BLD AUTO: 0.8 X10(3)/MCL (ref 0.6–4.6)
LYMPHOCYTES NFR BLD AUTO: 12.4 %
MCH RBC QN AUTO: 31 PG (ref 27–31)
MCHC RBC AUTO-ENTMCNC: 32.6 G/DL (ref 33–36)
MCV RBC AUTO: 94.9 FL (ref 80–94)
MONOCYTES # BLD AUTO: 0.6 X10(3)/MCL (ref 0.1–1.3)
MONOCYTES NFR BLD AUTO: 9.3 %
NEUTROPHILS # BLD AUTO: 4.34 X10(3)/MCL (ref 2.1–9.2)
NEUTROPHILS NFR BLD AUTO: 67 %
NRBC BLD AUTO-RTO: 0 %
PLATELET # BLD AUTO: 193 X10(3)/MCL (ref 130–400)
PMV BLD AUTO: 11 FL (ref 7.4–10.4)
POTASSIUM SERPL-SCNC: 4.1 MMOL/L (ref 3.5–5.1)
PROT SERPL-MCNC: 5.7 GM/DL (ref 5.8–7.6)
RBC # BLD AUTO: 3.55 X10(6)/MCL (ref 4.2–5.4)
SODIUM SERPL-SCNC: 144 MMOL/L (ref 136–145)
WBC # SPEC AUTO: 6.47 X10(3)/MCL (ref 4.5–11.5)

## 2024-06-12 PROCEDURE — 85025 COMPLETE CBC W/AUTO DIFF WBC: CPT | Performed by: INTERNAL MEDICINE

## 2024-06-12 PROCEDURE — 85652 RBC SED RATE AUTOMATED: CPT | Performed by: INTERNAL MEDICINE

## 2024-06-12 PROCEDURE — 86140 C-REACTIVE PROTEIN: CPT | Performed by: INTERNAL MEDICINE

## 2024-06-12 PROCEDURE — 80053 COMPREHEN METABOLIC PANEL: CPT | Performed by: INTERNAL MEDICINE

## 2024-06-19 PROCEDURE — 81001 URINALYSIS AUTO W/SCOPE: CPT | Performed by: INTERNAL MEDICINE

## 2024-06-19 PROCEDURE — 87086 URINE CULTURE/COLONY COUNT: CPT | Performed by: INTERNAL MEDICINE

## 2024-06-19 PROCEDURE — 81003 URINALYSIS AUTO W/O SCOPE: CPT | Performed by: INTERNAL MEDICINE

## 2024-06-19 PROCEDURE — 87186 SC STD MICRODIL/AGAR DIL: CPT | Performed by: INTERNAL MEDICINE

## 2024-06-20 ENCOUNTER — LAB REQUISITION (OUTPATIENT)
Dept: LAB | Facility: HOSPITAL | Age: 79
End: 2024-06-20
Payer: COMMERCIAL

## 2024-06-20 DIAGNOSIS — N18.4 CHRONIC KIDNEY DISEASE, STAGE 4 (SEVERE): ICD-10-CM

## 2024-06-20 LAB
BACTERIA #/AREA URNS AUTO: ABNORMAL /HPF
BILIRUB UR QL STRIP.AUTO: NEGATIVE
CLARITY UR: ABNORMAL
COLOR UR AUTO: ABNORMAL
GLUCOSE UR QL STRIP: NEGATIVE
HGB UR QL STRIP: ABNORMAL
KETONES UR QL STRIP: NEGATIVE
LEUKOCYTE ESTERASE UR QL STRIP: ABNORMAL
NITRITE UR QL STRIP: NEGATIVE
PH UR STRIP: 6.5 [PH]
PROT UR QL STRIP: 30
RBC #/AREA URNS AUTO: ABNORMAL /HPF
SP GR UR STRIP.AUTO: 1.01 (ref 1–1.03)
SQUAMOUS #/AREA URNS AUTO: ABNORMAL /HPF
UROBILINOGEN UR STRIP-ACNC: 0.2
WBC #/AREA URNS AUTO: ABNORMAL /HPF

## 2024-06-22 LAB — BACTERIA UR CULT: ABNORMAL

## 2024-07-09 ENCOUNTER — LAB REQUISITION (OUTPATIENT)
Dept: LAB | Facility: HOSPITAL | Age: 79
End: 2024-07-09
Payer: COMMERCIAL

## 2024-07-09 DIAGNOSIS — R09.89 OTHER SPECIFIED SYMPTOMS AND SIGNS INVOLVING THE CIRCULATORY AND RESPIRATORY SYSTEMS: ICD-10-CM

## 2024-07-09 DIAGNOSIS — R05.9 COUGH, UNSPECIFIED: ICD-10-CM

## 2024-07-09 DIAGNOSIS — U07.1 COVID-19 VIRUS DETECTED: ICD-10-CM

## 2024-07-09 LAB
FLUAV AG UPPER RESP QL IA.RAPID: NOT DETECTED
FLUBV AG UPPER RESP QL IA.RAPID: NOT DETECTED
SARS-COV-2 RNA RESP QL NAA+PROBE: DETECTED

## 2024-07-09 PROCEDURE — 0240U COVID/FLU A&B PCR: CPT | Performed by: NURSE PRACTITIONER

## 2024-07-23 ENCOUNTER — LAB REQUISITION (OUTPATIENT)
Dept: LAB | Facility: HOSPITAL | Age: 79
End: 2024-07-23
Payer: COMMERCIAL

## 2024-07-23 DIAGNOSIS — N18.4 CHRONIC KIDNEY DISEASE, STAGE 4 (SEVERE): ICD-10-CM

## 2024-07-23 LAB
ALBUMIN SERPL-MCNC: 2.6 G/DL (ref 3.4–4.8)
ALBUMIN/GLOB SERPL: 1 RATIO (ref 1.1–2)
ALP SERPL-CCNC: 130 UNIT/L (ref 40–150)
ALT SERPL-CCNC: 10 UNIT/L (ref 0–55)
ANION GAP SERPL CALC-SCNC: 8 MEQ/L
AST SERPL-CCNC: 12 UNIT/L (ref 5–34)
BASOPHILS # BLD AUTO: 0.01 X10(3)/MCL
BASOPHILS NFR BLD AUTO: 0.2 %
BILIRUB SERPL-MCNC: 0.4 MG/DL
BUN SERPL-MCNC: 40.3 MG/DL (ref 9.8–20.1)
CALCIUM SERPL-MCNC: 8.7 MG/DL (ref 8.4–10.2)
CHLORIDE SERPL-SCNC: 112 MMOL/L (ref 98–107)
CO2 SERPL-SCNC: 22 MMOL/L (ref 23–31)
CREAT SERPL-MCNC: 2.16 MG/DL (ref 0.55–1.02)
CREAT/UREA NIT SERPL: 19
EOSINOPHIL # BLD AUTO: 0.33 X10(3)/MCL (ref 0–0.9)
EOSINOPHIL NFR BLD AUTO: 6 %
ERYTHROCYTE [DISTWIDTH] IN BLOOD BY AUTOMATED COUNT: 13.4 % (ref 11.5–17)
GFR SERPLBLD CREATININE-BSD FMLA CKD-EPI: 23 ML/MIN/1.73/M2
GLOBULIN SER-MCNC: 2.6 GM/DL (ref 2.4–3.5)
GLUCOSE SERPL-MCNC: 81 MG/DL (ref 82–115)
HCT VFR BLD AUTO: 31.8 % (ref 37–47)
HGB BLD-MCNC: 10.2 G/DL (ref 12–16)
IMM GRANULOCYTES # BLD AUTO: 0.02 X10(3)/MCL (ref 0–0.04)
IMM GRANULOCYTES NFR BLD AUTO: 0.4 %
LYMPHOCYTES # BLD AUTO: 1.33 X10(3)/MCL (ref 0.6–4.6)
LYMPHOCYTES NFR BLD AUTO: 24.2 %
MAGNESIUM SERPL-MCNC: 1.9 MG/DL (ref 1.6–2.6)
MCH RBC QN AUTO: 30.8 PG (ref 27–31)
MCHC RBC AUTO-ENTMCNC: 32.1 G/DL (ref 33–36)
MCV RBC AUTO: 96.1 FL (ref 80–94)
MONOCYTES # BLD AUTO: 0.62 X10(3)/MCL (ref 0.1–1.3)
MONOCYTES NFR BLD AUTO: 11.3 %
NEUTROPHILS # BLD AUTO: 3.19 X10(3)/MCL (ref 2.1–9.2)
NEUTROPHILS NFR BLD AUTO: 57.9 %
NRBC BLD AUTO-RTO: 0 %
PHOSPHATE SERPL-MCNC: 3.7 MG/DL (ref 2.3–4.7)
PLATELET # BLD AUTO: 185 X10(3)/MCL (ref 130–400)
PMV BLD AUTO: 11.2 FL (ref 7.4–10.4)
POTASSIUM SERPL-SCNC: 4 MMOL/L (ref 3.5–5.1)
PROT SERPL-MCNC: 5.2 GM/DL (ref 5.8–7.6)
RBC # BLD AUTO: 3.31 X10(6)/MCL (ref 4.2–5.4)
SODIUM SERPL-SCNC: 142 MMOL/L (ref 136–145)
WBC # BLD AUTO: 5.5 X10(3)/MCL (ref 4.5–11.5)

## 2024-07-23 PROCEDURE — 80053 COMPREHEN METABOLIC PANEL: CPT | Performed by: INTERNAL MEDICINE

## 2024-07-23 PROCEDURE — 85025 COMPLETE CBC W/AUTO DIFF WBC: CPT | Performed by: INTERNAL MEDICINE

## 2024-07-23 PROCEDURE — 83735 ASSAY OF MAGNESIUM: CPT | Performed by: INTERNAL MEDICINE

## 2024-07-23 PROCEDURE — 84100 ASSAY OF PHOSPHORUS: CPT | Performed by: INTERNAL MEDICINE

## 2024-10-01 ENCOUNTER — LAB REQUISITION (OUTPATIENT)
Dept: LAB | Facility: HOSPITAL | Age: 79
End: 2024-10-01
Payer: COMMERCIAL

## 2024-10-01 DIAGNOSIS — N18.4 CHRONIC KIDNEY DISEASE, STAGE 4 (SEVERE): ICD-10-CM

## 2024-10-01 LAB
25(OH)D3+25(OH)D2 SERPL-MCNC: 34 NG/ML (ref 30–80)
ALBUMIN SERPL-MCNC: 2.8 G/DL (ref 3.4–4.8)
ALBUMIN/GLOB SERPL: 1.1 RATIO (ref 1.1–2)
ALP SERPL-CCNC: 154 UNIT/L (ref 40–150)
ALT SERPL-CCNC: 9 UNIT/L (ref 0–55)
ANION GAP SERPL CALC-SCNC: 9 MEQ/L
AST SERPL-CCNC: 10 UNIT/L (ref 5–34)
BASOPHILS # BLD AUTO: 0.02 X10(3)/MCL
BASOPHILS NFR BLD AUTO: 0.4 %
BILIRUB SERPL-MCNC: 0.3 MG/DL
BUN SERPL-MCNC: 43.1 MG/DL (ref 9.8–20.1)
CALCIUM SERPL-MCNC: 8.5 MG/DL (ref 8.4–10.2)
CHLORIDE SERPL-SCNC: 112 MMOL/L (ref 98–107)
CO2 SERPL-SCNC: 22 MMOL/L (ref 23–31)
CREAT SERPL-MCNC: 2.33 MG/DL (ref 0.55–1.02)
CREAT/UREA NIT SERPL: 18
EOSINOPHIL # BLD AUTO: 0.34 X10(3)/MCL (ref 0–0.9)
EOSINOPHIL NFR BLD AUTO: 6.8 %
ERYTHROCYTE [DISTWIDTH] IN BLOOD BY AUTOMATED COUNT: 12.5 % (ref 11.5–17)
FERRITIN SERPL-MCNC: 434.22 NG/ML (ref 4.63–204)
GFR SERPLBLD CREATININE-BSD FMLA CKD-EPI: 21 ML/MIN/1.73/M2
GLOBULIN SER-MCNC: 2.5 GM/DL (ref 2.4–3.5)
GLUCOSE SERPL-MCNC: 81 MG/DL (ref 82–115)
HCT VFR BLD AUTO: 34.4 % (ref 37–47)
HGB BLD-MCNC: 10.9 G/DL (ref 12–16)
IMM GRANULOCYTES # BLD AUTO: 0.02 X10(3)/MCL (ref 0–0.04)
IMM GRANULOCYTES NFR BLD AUTO: 0.4 %
IRON SATN MFR SERPL: 31 % (ref 20–50)
IRON SERPL-MCNC: 52 UG/DL (ref 50–170)
LYMPHOCYTES # BLD AUTO: 1.32 X10(3)/MCL (ref 0.6–4.6)
LYMPHOCYTES NFR BLD AUTO: 26.5 %
MAGNESIUM SERPL-MCNC: 1.9 MG/DL (ref 1.6–2.6)
MCH RBC QN AUTO: 30.8 PG (ref 27–31)
MCHC RBC AUTO-ENTMCNC: 31.7 G/DL (ref 33–36)
MCV RBC AUTO: 97.2 FL (ref 80–94)
MONOCYTES # BLD AUTO: 0.71 X10(3)/MCL (ref 0.1–1.3)
MONOCYTES NFR BLD AUTO: 14.3 %
NEUTROPHILS # BLD AUTO: 2.57 X10(3)/MCL (ref 2.1–9.2)
NEUTROPHILS NFR BLD AUTO: 51.6 %
NRBC BLD AUTO-RTO: 0 %
PHOSPHATE SERPL-MCNC: 4.4 MG/DL (ref 2.3–4.7)
PLATELET # BLD AUTO: 182 X10(3)/MCL (ref 130–400)
PMV BLD AUTO: 11.8 FL (ref 7.4–10.4)
POTASSIUM SERPL-SCNC: 3.7 MMOL/L (ref 3.5–5.1)
PROT SERPL-MCNC: 5.3 GM/DL (ref 5.8–7.6)
PTH-INTACT SERPL-MCNC: 354.6 PG/ML (ref 8.7–77)
RBC # BLD AUTO: 3.54 X10(6)/MCL (ref 4.2–5.4)
SODIUM SERPL-SCNC: 143 MMOL/L (ref 136–145)
TIBC SERPL-MCNC: 117 UG/DL (ref 70–310)
TIBC SERPL-MCNC: 169 UG/DL (ref 250–450)
TRANSFERRIN SERPL-MCNC: 147 MG/DL (ref 173–360)
WBC # BLD AUTO: 4.98 X10(3)/MCL (ref 4.5–11.5)

## 2024-10-01 PROCEDURE — 80053 COMPREHEN METABOLIC PANEL: CPT | Performed by: INTERNAL MEDICINE

## 2024-10-01 PROCEDURE — 83735 ASSAY OF MAGNESIUM: CPT | Performed by: INTERNAL MEDICINE

## 2024-10-01 PROCEDURE — 82728 ASSAY OF FERRITIN: CPT | Performed by: INTERNAL MEDICINE

## 2024-10-01 PROCEDURE — 84100 ASSAY OF PHOSPHORUS: CPT | Performed by: INTERNAL MEDICINE

## 2024-10-01 PROCEDURE — 83540 ASSAY OF IRON: CPT | Performed by: INTERNAL MEDICINE

## 2024-10-01 PROCEDURE — 83970 ASSAY OF PARATHORMONE: CPT | Performed by: INTERNAL MEDICINE

## 2024-10-01 PROCEDURE — 85025 COMPLETE CBC W/AUTO DIFF WBC: CPT | Performed by: INTERNAL MEDICINE

## 2024-10-01 PROCEDURE — 82306 VITAMIN D 25 HYDROXY: CPT | Performed by: INTERNAL MEDICINE

## 2024-10-01 PROCEDURE — 83550 IRON BINDING TEST: CPT | Performed by: INTERNAL MEDICINE

## 2024-10-03 PROCEDURE — 87186 SC STD MICRODIL/AGAR DIL: CPT | Performed by: INTERNAL MEDICINE

## 2024-10-03 PROCEDURE — 81003 URINALYSIS AUTO W/O SCOPE: CPT | Performed by: INTERNAL MEDICINE

## 2024-10-03 PROCEDURE — 87077 CULTURE AEROBIC IDENTIFY: CPT | Performed by: INTERNAL MEDICINE

## 2024-10-03 PROCEDURE — 87086 URINE CULTURE/COLONY COUNT: CPT | Performed by: INTERNAL MEDICINE

## 2024-10-03 PROCEDURE — 81001 URINALYSIS AUTO W/SCOPE: CPT | Performed by: INTERNAL MEDICINE

## 2024-10-04 ENCOUNTER — LAB REQUISITION (OUTPATIENT)
Dept: LAB | Facility: HOSPITAL | Age: 79
End: 2024-10-04
Payer: COMMERCIAL

## 2024-10-04 DIAGNOSIS — N18.4 CHRONIC KIDNEY DISEASE, STAGE 4 (SEVERE): ICD-10-CM

## 2024-10-04 LAB
BACTERIA #/AREA URNS AUTO: ABNORMAL /HPF
BILIRUB UR QL STRIP.AUTO: NEGATIVE
CLARITY UR: ABNORMAL
COLOR UR AUTO: YELLOW
GLUCOSE UR QL STRIP: NEGATIVE
HGB UR QL STRIP: ABNORMAL
KETONES UR QL STRIP: NEGATIVE
LEUKOCYTE ESTERASE UR QL STRIP: ABNORMAL
NITRITE UR QL STRIP: NEGATIVE
PH UR STRIP: 6.5 [PH]
PROT UR QL STRIP: 30
RBC #/AREA URNS AUTO: ABNORMAL /HPF
SP GR UR STRIP.AUTO: 1.02 (ref 1–1.03)
SQUAMOUS #/AREA URNS AUTO: ABNORMAL /HPF
UROBILINOGEN UR STRIP-ACNC: 0.2
WBC #/AREA URNS AUTO: ABNORMAL /HPF

## 2024-10-06 LAB — BACTERIA UR CULT: ABNORMAL

## 2024-12-13 ENCOUNTER — LAB REQUISITION (OUTPATIENT)
Dept: LAB | Facility: HOSPITAL | Age: 79
End: 2024-12-13
Payer: COMMERCIAL

## 2024-12-13 DIAGNOSIS — N18.4 CHRONIC KIDNEY DISEASE, STAGE 4 (SEVERE): ICD-10-CM

## 2024-12-13 LAB
25(OH)D3+25(OH)D2 SERPL-MCNC: 32 NG/ML (ref 30–80)
ALBUMIN SERPL-MCNC: 2.8 G/DL (ref 3.4–4.8)
ALBUMIN/GLOB SERPL: 1.1 RATIO (ref 1.1–2)
ALP SERPL-CCNC: 175 UNIT/L (ref 40–150)
ALT SERPL-CCNC: 6 UNIT/L (ref 0–55)
ANION GAP SERPL CALC-SCNC: 10 MEQ/L
AST SERPL-CCNC: 8 UNIT/L (ref 5–34)
BASOPHILS # BLD AUTO: 0.02 X10(3)/MCL
BASOPHILS NFR BLD AUTO: 0.4 %
BILIRUB SERPL-MCNC: 0.4 MG/DL
BUN SERPL-MCNC: 45.4 MG/DL (ref 9.8–20.1)
CALCIUM SERPL-MCNC: 8.4 MG/DL (ref 8.4–10.2)
CHLORIDE SERPL-SCNC: 113 MMOL/L (ref 98–107)
CO2 SERPL-SCNC: 21 MMOL/L (ref 23–31)
CREAT SERPL-MCNC: 2.47 MG/DL (ref 0.55–1.02)
CREAT/UREA NIT SERPL: 18
EOSINOPHIL # BLD AUTO: 0.36 X10(3)/MCL (ref 0–0.9)
EOSINOPHIL NFR BLD AUTO: 6.4 %
ERYTHROCYTE [DISTWIDTH] IN BLOOD BY AUTOMATED COUNT: 13.3 % (ref 11.5–17)
GFR SERPLBLD CREATININE-BSD FMLA CKD-EPI: 19 ML/MIN/1.73/M2
GLOBULIN SER-MCNC: 2.5 GM/DL (ref 2.4–3.5)
GLUCOSE SERPL-MCNC: 93 MG/DL (ref 82–115)
HCT VFR BLD AUTO: 35.6 % (ref 37–47)
HGB BLD-MCNC: 11.6 G/DL (ref 12–16)
IMM GRANULOCYTES # BLD AUTO: 0.02 X10(3)/MCL (ref 0–0.04)
IMM GRANULOCYTES NFR BLD AUTO: 0.4 %
LYMPHOCYTES # BLD AUTO: 1.18 X10(3)/MCL (ref 0.6–4.6)
LYMPHOCYTES NFR BLD AUTO: 21 %
MAGNESIUM SERPL-MCNC: 2 MG/DL (ref 1.6–2.6)
MCH RBC QN AUTO: 30.8 PG (ref 27–31)
MCHC RBC AUTO-ENTMCNC: 32.6 G/DL (ref 33–36)
MCV RBC AUTO: 94.4 FL (ref 80–94)
MONOCYTES # BLD AUTO: 0.65 X10(3)/MCL (ref 0.1–1.3)
MONOCYTES NFR BLD AUTO: 11.6 %
NEUTROPHILS # BLD AUTO: 3.38 X10(3)/MCL (ref 2.1–9.2)
NEUTROPHILS NFR BLD AUTO: 60.2 %
NRBC BLD AUTO-RTO: 0 %
PHOSPHATE SERPL-MCNC: 4.1 MG/DL (ref 2.3–4.7)
PLATELET # BLD AUTO: 171 X10(3)/MCL (ref 130–400)
PMV BLD AUTO: 11.6 FL (ref 7.4–10.4)
POTASSIUM SERPL-SCNC: 4 MMOL/L (ref 3.5–5.1)
PROT SERPL-MCNC: 5.3 GM/DL (ref 5.8–7.6)
PTH-INTACT SERPL-MCNC: 327.8 PG/ML (ref 8.7–77)
RBC # BLD AUTO: 3.77 X10(6)/MCL (ref 4.2–5.4)
SODIUM SERPL-SCNC: 144 MMOL/L (ref 136–145)
WBC # BLD AUTO: 5.61 X10(3)/MCL (ref 4.5–11.5)

## 2024-12-13 PROCEDURE — 83970 ASSAY OF PARATHORMONE: CPT | Performed by: INTERNAL MEDICINE

## 2024-12-13 PROCEDURE — 85025 COMPLETE CBC W/AUTO DIFF WBC: CPT | Performed by: INTERNAL MEDICINE

## 2024-12-13 PROCEDURE — 84100 ASSAY OF PHOSPHORUS: CPT | Performed by: INTERNAL MEDICINE

## 2024-12-13 PROCEDURE — 83735 ASSAY OF MAGNESIUM: CPT | Performed by: INTERNAL MEDICINE

## 2024-12-13 PROCEDURE — 82306 VITAMIN D 25 HYDROXY: CPT | Performed by: INTERNAL MEDICINE

## 2024-12-13 PROCEDURE — 80053 COMPREHEN METABOLIC PANEL: CPT | Performed by: INTERNAL MEDICINE

## 2025-01-13 ENCOUNTER — LAB REQUISITION (OUTPATIENT)
Dept: LAB | Facility: HOSPITAL | Age: 80
End: 2025-01-13
Payer: COMMERCIAL

## 2025-01-13 DIAGNOSIS — E21.3 HYPERPARATHYROIDISM, UNSPECIFIED: ICD-10-CM

## 2025-01-13 DIAGNOSIS — D63.1 ANEMIA IN CHRONIC KIDNEY DISEASE (CODE): ICD-10-CM

## 2025-01-13 DIAGNOSIS — N18.4 CHRONIC KIDNEY DISEASE, STAGE 4 (SEVERE): ICD-10-CM

## 2025-01-13 LAB
25(OH)D3+25(OH)D2 SERPL-MCNC: 33 NG/ML (ref 30–80)
ALBUMIN SERPL-MCNC: 2.8 G/DL (ref 3.4–4.8)
ALBUMIN/GLOB SERPL: 1 RATIO (ref 1.1–2)
ALP SERPL-CCNC: 192 UNIT/L (ref 40–150)
ALT SERPL-CCNC: 7 UNIT/L (ref 0–55)
ANION GAP SERPL CALC-SCNC: 8 MEQ/L
AST SERPL-CCNC: 10 UNIT/L (ref 5–34)
BASOPHILS # BLD AUTO: 0.02 X10(3)/MCL
BASOPHILS NFR BLD AUTO: 0.3 %
BILIRUB SERPL-MCNC: 0.3 MG/DL
BUN SERPL-MCNC: 43 MG/DL (ref 9.8–20.1)
CALCIUM SERPL-MCNC: 8.3 MG/DL (ref 8.4–10.2)
CHLORIDE SERPL-SCNC: 112 MMOL/L (ref 98–107)
CO2 SERPL-SCNC: 21 MMOL/L (ref 23–31)
CREAT SERPL-MCNC: 2.42 MG/DL (ref 0.55–1.02)
CREAT/UREA NIT SERPL: 18
EOSINOPHIL # BLD AUTO: 0.42 X10(3)/MCL (ref 0–0.9)
EOSINOPHIL NFR BLD AUTO: 6.3 %
ERYTHROCYTE [DISTWIDTH] IN BLOOD BY AUTOMATED COUNT: 12.9 % (ref 11.5–17)
GFR SERPLBLD CREATININE-BSD FMLA CKD-EPI: 20 ML/MIN/1.73/M2
GLOBULIN SER-MCNC: 2.8 GM/DL (ref 2.4–3.5)
GLUCOSE SERPL-MCNC: 112 MG/DL (ref 82–115)
HCT VFR BLD AUTO: 35.3 % (ref 37–47)
HGB BLD-MCNC: 11.2 G/DL (ref 12–16)
IMM GRANULOCYTES # BLD AUTO: 0.03 X10(3)/MCL (ref 0–0.04)
IMM GRANULOCYTES NFR BLD AUTO: 0.4 %
LYMPHOCYTES # BLD AUTO: 1.49 X10(3)/MCL (ref 0.6–4.6)
LYMPHOCYTES NFR BLD AUTO: 22.2 %
MAGNESIUM SERPL-MCNC: 1.9 MG/DL (ref 1.6–2.6)
MCH RBC QN AUTO: 30.7 PG (ref 27–31)
MCHC RBC AUTO-ENTMCNC: 31.7 G/DL (ref 33–36)
MCV RBC AUTO: 96.7 FL (ref 80–94)
MONOCYTES # BLD AUTO: 0.63 X10(3)/MCL (ref 0.1–1.3)
MONOCYTES NFR BLD AUTO: 9.4 %
NEUTROPHILS # BLD AUTO: 4.12 X10(3)/MCL (ref 2.1–9.2)
NEUTROPHILS NFR BLD AUTO: 61.4 %
NRBC BLD AUTO-RTO: 0 %
PHOSPHATE SERPL-MCNC: 3.9 MG/DL (ref 2.3–4.7)
PLATELET # BLD AUTO: 204 X10(3)/MCL (ref 130–400)
PMV BLD AUTO: 11.1 FL (ref 7.4–10.4)
POTASSIUM SERPL-SCNC: 3.7 MMOL/L (ref 3.5–5.1)
PROT SERPL-MCNC: 5.6 GM/DL (ref 5.8–7.6)
PTH-INTACT SERPL-MCNC: 295.5 PG/ML (ref 8.7–77)
RBC # BLD AUTO: 3.65 X10(6)/MCL (ref 4.2–5.4)
SODIUM SERPL-SCNC: 141 MMOL/L (ref 136–145)
WBC # BLD AUTO: 6.71 X10(3)/MCL (ref 4.5–11.5)

## 2025-01-13 PROCEDURE — 85025 COMPLETE CBC W/AUTO DIFF WBC: CPT | Performed by: INTERNAL MEDICINE

## 2025-01-13 PROCEDURE — 84100 ASSAY OF PHOSPHORUS: CPT | Performed by: INTERNAL MEDICINE

## 2025-01-13 PROCEDURE — 83970 ASSAY OF PARATHORMONE: CPT | Performed by: INTERNAL MEDICINE

## 2025-01-13 PROCEDURE — 82306 VITAMIN D 25 HYDROXY: CPT | Performed by: INTERNAL MEDICINE

## 2025-01-13 PROCEDURE — 80053 COMPREHEN METABOLIC PANEL: CPT | Performed by: INTERNAL MEDICINE

## 2025-01-13 PROCEDURE — 83735 ASSAY OF MAGNESIUM: CPT | Performed by: INTERNAL MEDICINE

## 2025-02-12 ENCOUNTER — LAB REQUISITION (OUTPATIENT)
Dept: LAB | Facility: HOSPITAL | Age: 80
End: 2025-02-12
Payer: COMMERCIAL

## 2025-02-12 DIAGNOSIS — E21.3 HYPERPARATHYROIDISM, UNSPECIFIED: ICD-10-CM

## 2025-02-12 DIAGNOSIS — R31.9 HEMATURIA, UNSPECIFIED: ICD-10-CM

## 2025-02-12 DIAGNOSIS — N18.4 CHRONIC KIDNEY DISEASE, STAGE 4 (SEVERE): ICD-10-CM

## 2025-02-12 LAB
25(OH)D3+25(OH)D2 SERPL-MCNC: 30 NG/ML (ref 30–80)
ALBUMIN SERPL-MCNC: 2.8 G/DL (ref 3.4–4.8)
ALBUMIN/GLOB SERPL: 1 RATIO (ref 1.1–2)
ALP SERPL-CCNC: 159 UNIT/L (ref 40–150)
ALT SERPL-CCNC: 6 UNIT/L (ref 0–55)
ANION GAP SERPL CALC-SCNC: 9 MEQ/L
AST SERPL-CCNC: 8 UNIT/L (ref 5–34)
BACTERIA #/AREA URNS AUTO: ABNORMAL /HPF
BASOPHILS # BLD AUTO: 0.03 X10(3)/MCL
BASOPHILS NFR BLD AUTO: 0.6 %
BILIRUB SERPL-MCNC: 0.4 MG/DL
BILIRUB UR QL STRIP.AUTO: ABNORMAL
BUN SERPL-MCNC: 46.7 MG/DL (ref 9.8–20.1)
CALCIUM SERPL-MCNC: 8.2 MG/DL (ref 8.4–10.2)
CHLORIDE SERPL-SCNC: 112 MMOL/L (ref 98–107)
CLARITY UR: ABNORMAL
CO2 SERPL-SCNC: 22 MMOL/L (ref 23–31)
COLOR UR AUTO: ABNORMAL
CREAT SERPL-MCNC: 2.5 MG/DL (ref 0.55–1.02)
CREAT/UREA NIT SERPL: 19
EOSINOPHIL # BLD AUTO: 0.42 X10(3)/MCL (ref 0–0.9)
EOSINOPHIL NFR BLD AUTO: 7.7 %
ERYTHROCYTE [DISTWIDTH] IN BLOOD BY AUTOMATED COUNT: 13.2 % (ref 11.5–17)
GFR SERPLBLD CREATININE-BSD FMLA CKD-EPI: 19 ML/MIN/1.73/M2
GLOBULIN SER-MCNC: 2.7 GM/DL (ref 2.4–3.5)
GLUCOSE SERPL-MCNC: 90 MG/DL (ref 82–115)
GLUCOSE UR QL STRIP: 100
HCT VFR BLD AUTO: 34.9 % (ref 37–47)
HGB BLD-MCNC: 11.1 G/DL (ref 12–16)
HGB UR QL STRIP: ABNORMAL
IMM GRANULOCYTES # BLD AUTO: 0.02 X10(3)/MCL (ref 0–0.04)
IMM GRANULOCYTES NFR BLD AUTO: 0.4 %
KETONES UR QL STRIP: 15
LEUKOCYTE ESTERASE UR QL STRIP: ABNORMAL
LYMPHOCYTES # BLD AUTO: 1.25 X10(3)/MCL (ref 0.6–4.6)
LYMPHOCYTES NFR BLD AUTO: 23 %
MAGNESIUM SERPL-MCNC: 1.9 MG/DL (ref 1.6–2.6)
MCH RBC QN AUTO: 31.2 PG (ref 27–31)
MCHC RBC AUTO-ENTMCNC: 31.8 G/DL (ref 33–36)
MCV RBC AUTO: 98 FL (ref 80–94)
MONOCYTES # BLD AUTO: 0.61 X10(3)/MCL (ref 0.1–1.3)
MONOCYTES NFR BLD AUTO: 11.2 %
NEUTROPHILS # BLD AUTO: 3.1 X10(3)/MCL (ref 2.1–9.2)
NEUTROPHILS NFR BLD AUTO: 57.1 %
NITRITE UR QL STRIP: POSITIVE
NRBC BLD AUTO-RTO: 0 %
PH UR STRIP: 7 [PH]
PHOSPHATE SERPL-MCNC: 4.5 MG/DL (ref 2.3–4.7)
PLATELET # BLD AUTO: 171 X10(3)/MCL (ref 130–400)
PMV BLD AUTO: 11.2 FL (ref 7.4–10.4)
POTASSIUM SERPL-SCNC: 4.2 MMOL/L (ref 3.5–5.1)
PROT SERPL-MCNC: 5.5 GM/DL (ref 5.8–7.6)
PROT UR QL STRIP: >=300
PTH-INTACT SERPL-MCNC: 380.7 PG/ML (ref 8.7–77)
RBC # BLD AUTO: 3.56 X10(6)/MCL (ref 4.2–5.4)
RBC #/AREA URNS AUTO: >100 /HPF
SODIUM SERPL-SCNC: 143 MMOL/L (ref 136–145)
SP GR UR STRIP.AUTO: 1.01 (ref 1–1.03)
SQUAMOUS #/AREA URNS AUTO: ABNORMAL /HPF
UROBILINOGEN UR STRIP-ACNC: 2
WBC # BLD AUTO: 5.43 X10(3)/MCL (ref 4.5–11.5)
WBC #/AREA URNS AUTO: ABNORMAL /HPF

## 2025-02-12 PROCEDURE — 83735 ASSAY OF MAGNESIUM: CPT | Performed by: INTERNAL MEDICINE

## 2025-02-12 PROCEDURE — 84100 ASSAY OF PHOSPHORUS: CPT | Performed by: INTERNAL MEDICINE

## 2025-02-12 PROCEDURE — 81003 URINALYSIS AUTO W/O SCOPE: CPT | Performed by: INTERNAL MEDICINE

## 2025-02-12 PROCEDURE — 83970 ASSAY OF PARATHORMONE: CPT | Performed by: INTERNAL MEDICINE

## 2025-02-12 PROCEDURE — 80053 COMPREHEN METABOLIC PANEL: CPT | Performed by: INTERNAL MEDICINE

## 2025-02-12 PROCEDURE — 82306 VITAMIN D 25 HYDROXY: CPT | Performed by: INTERNAL MEDICINE

## 2025-02-12 PROCEDURE — 85025 COMPLETE CBC W/AUTO DIFF WBC: CPT | Performed by: INTERNAL MEDICINE

## 2025-02-12 PROCEDURE — 87186 SC STD MICRODIL/AGAR DIL: CPT | Performed by: INTERNAL MEDICINE

## 2025-02-14 LAB — BACTERIA UR CULT: ABNORMAL

## 2025-03-18 ENCOUNTER — LAB REQUISITION (OUTPATIENT)
Dept: LAB | Facility: HOSPITAL | Age: 80
End: 2025-03-18
Payer: COMMERCIAL

## 2025-03-18 DIAGNOSIS — E21.3 HYPERPARATHYROIDISM, UNSPECIFIED: ICD-10-CM

## 2025-03-18 DIAGNOSIS — D63.1 ANEMIA IN CHRONIC KIDNEY DISEASE (CODE): ICD-10-CM

## 2025-03-18 DIAGNOSIS — N18.4 CHRONIC KIDNEY DISEASE, STAGE 4 (SEVERE): ICD-10-CM

## 2025-03-18 LAB
25(OH)D3+25(OH)D2 SERPL-MCNC: 33 NG/ML (ref 30–80)
ALBUMIN SERPL-MCNC: 2.9 G/DL (ref 3.4–4.8)
ALBUMIN/GLOB SERPL: 1.1 RATIO (ref 1.1–2)
ALP SERPL-CCNC: 178 UNIT/L (ref 40–150)
ALT SERPL-CCNC: 9 UNIT/L (ref 0–55)
ANION GAP SERPL CALC-SCNC: 7 MEQ/L
AST SERPL-CCNC: 11 UNIT/L (ref 5–34)
BASOPHILS # BLD AUTO: 0.03 X10(3)/MCL
BASOPHILS NFR BLD AUTO: 0.5 %
BILIRUB SERPL-MCNC: 0.3 MG/DL
BUN SERPL-MCNC: 50.1 MG/DL (ref 9.8–20.1)
CALCIUM SERPL-MCNC: 8.3 MG/DL (ref 8.4–10.2)
CHLORIDE SERPL-SCNC: 114 MMOL/L (ref 98–107)
CO2 SERPL-SCNC: 20 MMOL/L (ref 23–31)
CREAT SERPL-MCNC: 2.4 MG/DL (ref 0.55–1.02)
CREAT/UREA NIT SERPL: 21
EOSINOPHIL # BLD AUTO: 0.41 X10(3)/MCL (ref 0–0.9)
EOSINOPHIL NFR BLD AUTO: 6.9 %
ERYTHROCYTE [DISTWIDTH] IN BLOOD BY AUTOMATED COUNT: 13.1 % (ref 11.5–17)
FERRITIN SERPL-MCNC: 297.11 NG/ML (ref 4.63–204)
GFR SERPLBLD CREATININE-BSD FMLA CKD-EPI: 20 ML/MIN/1.73/M2
GLOBULIN SER-MCNC: 2.7 GM/DL (ref 2.4–3.5)
GLUCOSE SERPL-MCNC: 122 MG/DL (ref 82–115)
HCT VFR BLD AUTO: 32.8 % (ref 37–47)
HGB BLD-MCNC: 10.4 G/DL (ref 12–16)
IMM GRANULOCYTES # BLD AUTO: 0.01 X10(3)/MCL (ref 0–0.04)
IMM GRANULOCYTES NFR BLD AUTO: 0.2 %
IRON SATN MFR SERPL: 22 % (ref 20–50)
IRON SERPL-MCNC: 40 UG/DL (ref 50–170)
LYMPHOCYTES # BLD AUTO: 1.17 X10(3)/MCL (ref 0.6–4.6)
LYMPHOCYTES NFR BLD AUTO: 19.6 %
MAGNESIUM SERPL-MCNC: 2 MG/DL (ref 1.6–2.6)
MCH RBC QN AUTO: 30.7 PG (ref 27–31)
MCHC RBC AUTO-ENTMCNC: 31.7 G/DL (ref 33–36)
MCV RBC AUTO: 96.8 FL (ref 80–94)
MONOCYTES # BLD AUTO: 0.64 X10(3)/MCL (ref 0.1–1.3)
MONOCYTES NFR BLD AUTO: 10.7 %
NEUTROPHILS # BLD AUTO: 3.7 X10(3)/MCL (ref 2.1–9.2)
NEUTROPHILS NFR BLD AUTO: 62.1 %
NRBC BLD AUTO-RTO: 0 %
PHOSPHATE SERPL-MCNC: 4.8 MG/DL (ref 2.3–4.7)
PLATELET # BLD AUTO: 193 X10(3)/MCL (ref 130–400)
PMV BLD AUTO: 11.1 FL (ref 7.4–10.4)
POTASSIUM SERPL-SCNC: 3.6 MMOL/L (ref 3.5–5.1)
PROT SERPL-MCNC: 5.6 GM/DL (ref 5.8–7.6)
PTH-INTACT SERPL-MCNC: 468.8 PG/ML (ref 8.7–77)
RBC # BLD AUTO: 3.39 X10(6)/MCL (ref 4.2–5.4)
SODIUM SERPL-SCNC: 141 MMOL/L (ref 136–145)
TIBC SERPL-MCNC: 141 UG/DL (ref 70–310)
TIBC SERPL-MCNC: 181 UG/DL (ref 250–450)
TRANSFERRIN SERPL-MCNC: 164 MG/DL
WBC # BLD AUTO: 5.96 X10(3)/MCL (ref 4.5–11.5)

## 2025-03-18 PROCEDURE — 83970 ASSAY OF PARATHORMONE: CPT | Performed by: INTERNAL MEDICINE

## 2025-03-18 PROCEDURE — 80053 COMPREHEN METABOLIC PANEL: CPT | Performed by: INTERNAL MEDICINE

## 2025-03-18 PROCEDURE — 83735 ASSAY OF MAGNESIUM: CPT | Performed by: INTERNAL MEDICINE

## 2025-03-18 PROCEDURE — 82728 ASSAY OF FERRITIN: CPT | Performed by: INTERNAL MEDICINE

## 2025-03-18 PROCEDURE — 82306 VITAMIN D 25 HYDROXY: CPT | Performed by: INTERNAL MEDICINE

## 2025-03-18 PROCEDURE — 84100 ASSAY OF PHOSPHORUS: CPT | Performed by: INTERNAL MEDICINE

## 2025-03-18 PROCEDURE — 83550 IRON BINDING TEST: CPT | Performed by: INTERNAL MEDICINE

## 2025-03-18 PROCEDURE — 85025 COMPLETE CBC W/AUTO DIFF WBC: CPT | Performed by: INTERNAL MEDICINE

## 2025-04-09 ENCOUNTER — LAB REQUISITION (OUTPATIENT)
Dept: LAB | Facility: HOSPITAL | Age: 80
End: 2025-04-09
Payer: COMMERCIAL

## 2025-04-09 DIAGNOSIS — E11.22 TYPE 2 DIABETES MELLITUS WITH DIABETIC CHRONIC KIDNEY DISEASE: ICD-10-CM

## 2025-04-09 DIAGNOSIS — E78.5 HYPERLIPIDEMIA, UNSPECIFIED: ICD-10-CM

## 2025-04-09 DIAGNOSIS — E05.90 THYROTOXICOSIS, UNSPECIFIED WITHOUT THYROTOXIC CRISIS OR STORM: ICD-10-CM

## 2025-04-09 DIAGNOSIS — E21.3 HYPERPARATHYROIDISM, UNSPECIFIED: ICD-10-CM

## 2025-04-09 DIAGNOSIS — E55.9 VITAMIN D DEFICIENCY, UNSPECIFIED: ICD-10-CM

## 2025-04-09 DIAGNOSIS — D63.1 ANEMIA IN CHRONIC KIDNEY DISEASE (CODE): ICD-10-CM

## 2025-04-09 DIAGNOSIS — E53.8 DEFICIENCY OF OTHER SPECIFIED B GROUP VITAMINS: ICD-10-CM

## 2025-04-09 LAB
ANION GAP SERPL CALC-SCNC: 14 MEQ/L
BUN SERPL-MCNC: 73.7 MG/DL (ref 9.8–20.1)
CALCIUM SERPL-MCNC: 8.5 MG/DL (ref 8.4–10.2)
CHLORIDE SERPL-SCNC: 105 MMOL/L (ref 98–107)
CHOLEST SERPL-MCNC: 155 MG/DL
CHOLEST/HDLC SERPL: 3 {RATIO} (ref 0–5)
CO2 SERPL-SCNC: 24 MMOL/L (ref 23–31)
CREAT SERPL-MCNC: 3.34 MG/DL (ref 0.55–1.02)
CREAT/UREA NIT SERPL: 22
EST. AVERAGE GLUCOSE BLD GHB EST-MCNC: 114 MG/DL
FOLATE SERPL-MCNC: 15.8 NG/ML (ref 7–31.4)
GFR SERPLBLD CREATININE-BSD FMLA CKD-EPI: 13 ML/MIN/1.73/M2
GLUCOSE SERPL-MCNC: 137 MG/DL (ref 82–115)
HBA1C MFR BLD: 5.6 %
HDLC SERPL-MCNC: 54 MG/DL (ref 35–60)
LDLC SERPL CALC-MCNC: 82 MG/DL (ref 50–140)
POTASSIUM SERPL-SCNC: 3.4 MMOL/L (ref 3.5–5.1)
SODIUM SERPL-SCNC: 143 MMOL/L (ref 136–145)
T3 SERPL-MCNC: 111.86 NG/DL (ref 60–180)
T4 SERPL-MCNC: 8.83 UG/DL (ref 4.87–11.72)
TRIGL SERPL-MCNC: 93 MG/DL (ref 37–140)
TSH SERPL-ACNC: <0.008 UIU/ML (ref 0.35–4.94)
VLDLC SERPL CALC-MCNC: 19 MG/DL

## 2025-04-09 PROCEDURE — 80048 BASIC METABOLIC PNL TOTAL CA: CPT | Performed by: INTERNAL MEDICINE

## 2025-04-09 PROCEDURE — 84480 ASSAY TRIIODOTHYRONINE (T3): CPT | Performed by: NURSE PRACTITIONER

## 2025-04-09 PROCEDURE — 84443 ASSAY THYROID STIM HORMONE: CPT | Performed by: NURSE PRACTITIONER

## 2025-04-09 PROCEDURE — 84436 ASSAY OF TOTAL THYROXINE: CPT | Performed by: NURSE PRACTITIONER

## 2025-04-09 PROCEDURE — 80061 LIPID PANEL: CPT | Performed by: NURSE PRACTITIONER

## 2025-04-09 PROCEDURE — 82746 ASSAY OF FOLIC ACID SERUM: CPT | Performed by: NURSE PRACTITIONER

## 2025-04-09 PROCEDURE — 83036 HEMOGLOBIN GLYCOSYLATED A1C: CPT | Performed by: NURSE PRACTITIONER

## 2025-04-16 ENCOUNTER — LAB REQUISITION (OUTPATIENT)
Dept: LAB | Facility: HOSPITAL | Age: 80
End: 2025-04-16
Payer: COMMERCIAL

## 2025-04-16 DIAGNOSIS — N18.5 CHRONIC KIDNEY DISEASE, STAGE 5: ICD-10-CM

## 2025-04-16 LAB
ANION GAP SERPL CALC-SCNC: 14 MEQ/L
BUN SERPL-MCNC: 72.6 MG/DL (ref 9.8–20.1)
CALCIUM SERPL-MCNC: 8.3 MG/DL (ref 8.4–10.2)
CHLORIDE SERPL-SCNC: 102 MMOL/L (ref 98–107)
CO2 SERPL-SCNC: 25 MMOL/L (ref 23–31)
CREAT SERPL-MCNC: 3.13 MG/DL (ref 0.55–1.02)
CREAT/UREA NIT SERPL: 23
GFR SERPLBLD CREATININE-BSD FMLA CKD-EPI: 15 ML/MIN/1.73/M2
GLUCOSE SERPL-MCNC: 119 MG/DL (ref 82–115)
POTASSIUM SERPL-SCNC: 2.8 MMOL/L (ref 3.5–5.1)
SODIUM SERPL-SCNC: 141 MMOL/L (ref 136–145)

## 2025-04-16 PROCEDURE — 80048 BASIC METABOLIC PNL TOTAL CA: CPT | Performed by: INTERNAL MEDICINE

## 2025-04-18 ENCOUNTER — LAB REQUISITION (OUTPATIENT)
Dept: LAB | Facility: HOSPITAL | Age: 80
End: 2025-04-18
Payer: COMMERCIAL

## 2025-04-18 DIAGNOSIS — E87.6 HYPOKALEMIA: ICD-10-CM

## 2025-04-18 LAB
ANION GAP SERPL CALC-SCNC: 14 MEQ/L
BUN SERPL-MCNC: 76 MG/DL (ref 9.8–20.1)
CALCIUM SERPL-MCNC: 8.5 MG/DL (ref 8.4–10.2)
CHLORIDE SERPL-SCNC: 107 MMOL/L (ref 98–107)
CO2 SERPL-SCNC: 23 MMOL/L (ref 23–31)
CREAT SERPL-MCNC: 3.3 MG/DL (ref 0.55–1.02)
CREAT/UREA NIT SERPL: 23
GFR SERPLBLD CREATININE-BSD FMLA CKD-EPI: 14 ML/MIN/1.73/M2
GLUCOSE SERPL-MCNC: 111 MG/DL (ref 82–115)
POTASSIUM SERPL-SCNC: 3.2 MMOL/L (ref 3.5–5.1)
SODIUM SERPL-SCNC: 144 MMOL/L (ref 136–145)

## 2025-04-18 PROCEDURE — 80048 BASIC METABOLIC PNL TOTAL CA: CPT | Performed by: INTERNAL MEDICINE

## 2025-04-23 ENCOUNTER — LAB REQUISITION (OUTPATIENT)
Dept: LAB | Facility: HOSPITAL | Age: 80
End: 2025-04-23
Payer: COMMERCIAL

## 2025-04-23 DIAGNOSIS — N18.5 CHRONIC KIDNEY DISEASE, STAGE 5: ICD-10-CM

## 2025-04-23 LAB
ANION GAP SERPL CALC-SCNC: 17 MEQ/L
BUN SERPL-MCNC: 80 MG/DL (ref 9.8–20.1)
CALCIUM SERPL-MCNC: 8.9 MG/DL (ref 8.4–10.2)
CHLORIDE SERPL-SCNC: 105 MMOL/L (ref 98–107)
CO2 SERPL-SCNC: 23 MMOL/L (ref 23–31)
CREAT SERPL-MCNC: 3.81 MG/DL (ref 0.55–1.02)
CREAT/UREA NIT SERPL: 21
GFR SERPLBLD CREATININE-BSD FMLA CKD-EPI: 11 ML/MIN/1.73/M2
GLUCOSE SERPL-MCNC: 142 MG/DL (ref 82–115)
POTASSIUM SERPL-SCNC: 3.8 MMOL/L (ref 3.5–5.1)
SODIUM SERPL-SCNC: 145 MMOL/L (ref 136–145)

## 2025-04-23 PROCEDURE — 80048 BASIC METABOLIC PNL TOTAL CA: CPT | Performed by: INTERNAL MEDICINE

## 2025-04-25 ENCOUNTER — HOSPITAL ENCOUNTER (EMERGENCY)
Facility: HOSPITAL | Age: 80
Discharge: HOME | End: 2025-04-25
Attending: EMERGENCY MEDICINE
Payer: COMMERCIAL

## 2025-04-25 VITALS
HEART RATE: 92 BPM | SYSTOLIC BLOOD PRESSURE: 135 MMHG | HEIGHT: 68 IN | BODY MASS INDEX: 34.86 KG/M2 | OXYGEN SATURATION: 98 % | WEIGHT: 230 LBS | TEMPERATURE: 98 F | DIASTOLIC BLOOD PRESSURE: 79 MMHG | RESPIRATION RATE: 17 BRPM

## 2025-04-25 DIAGNOSIS — R60.9 SWELLING: ICD-10-CM

## 2025-04-25 DIAGNOSIS — N30.01 ACUTE CYSTITIS WITH HEMATURIA: ICD-10-CM

## 2025-04-25 DIAGNOSIS — N18.5 STAGE 5 CHRONIC KIDNEY DISEASE NOT ON CHRONIC DIALYSIS: Primary | ICD-10-CM

## 2025-04-25 LAB
ALBUMIN SERPL-MCNC: 3.1 G/DL (ref 3.4–4.8)
ALBUMIN/GLOB SERPL: 0.8 RATIO (ref 1.1–2)
ALP SERPL-CCNC: 177 UNIT/L (ref 40–150)
ALT SERPL-CCNC: 9 UNIT/L (ref 0–55)
ANION GAP SERPL CALC-SCNC: 12 MEQ/L
AST SERPL-CCNC: 12 UNIT/L (ref 11–45)
BACTERIA #/AREA URNS AUTO: ABNORMAL /HPF
BASOPHILS # BLD AUTO: 0.02 X10(3)/MCL
BASOPHILS NFR BLD AUTO: 0.3 %
BILIRUB SERPL-MCNC: 0.4 MG/DL
BILIRUB UR QL STRIP.AUTO: NEGATIVE
BUN SERPL-MCNC: 79.4 MG/DL (ref 9.8–20.1)
CALCIUM SERPL-MCNC: 9.1 MG/DL (ref 8.4–10.2)
CHLORIDE SERPL-SCNC: 107 MMOL/L (ref 98–107)
CLARITY UR: ABNORMAL
CO2 SERPL-SCNC: 22 MMOL/L (ref 23–31)
COLOR UR AUTO: ABNORMAL
CREAT SERPL-MCNC: 3.17 MG/DL (ref 0.55–1.02)
CREAT/UREA NIT SERPL: 25
EOSINOPHIL # BLD AUTO: 0.29 X10(3)/MCL (ref 0–0.9)
EOSINOPHIL NFR BLD AUTO: 4.1 %
ERYTHROCYTE [DISTWIDTH] IN BLOOD BY AUTOMATED COUNT: 12.7 % (ref 11.5–17)
GFR SERPLBLD CREATININE-BSD FMLA CKD-EPI: 14 ML/MIN/1.73/M2
GLOBULIN SER-MCNC: 4.1 GM/DL (ref 2.4–3.5)
GLUCOSE SERPL-MCNC: 128 MG/DL (ref 82–115)
GLUCOSE UR QL STRIP: NORMAL
HCT VFR BLD AUTO: 35.3 % (ref 37–47)
HGB BLD-MCNC: 11.1 G/DL (ref 12–16)
HGB UR QL STRIP: ABNORMAL
IMM GRANULOCYTES # BLD AUTO: 0.03 X10(3)/MCL (ref 0–0.04)
IMM GRANULOCYTES NFR BLD AUTO: 0.4 %
KETONES UR QL STRIP: NEGATIVE
LEUKOCYTE ESTERASE UR QL STRIP: 500
LYMPHOCYTES # BLD AUTO: 0.72 X10(3)/MCL (ref 0.6–4.6)
LYMPHOCYTES NFR BLD AUTO: 10.1 %
MCH RBC QN AUTO: 29.7 PG (ref 27–31)
MCHC RBC AUTO-ENTMCNC: 31.4 G/DL (ref 33–36)
MCV RBC AUTO: 94.4 FL (ref 80–94)
MONOCYTES # BLD AUTO: 0.55 X10(3)/MCL (ref 0.1–1.3)
MONOCYTES NFR BLD AUTO: 7.7 %
NEUTROPHILS # BLD AUTO: 5.54 X10(3)/MCL (ref 2.1–9.2)
NEUTROPHILS NFR BLD AUTO: 77.4 %
NITRITE UR QL STRIP: NEGATIVE
NRBC BLD AUTO-RTO: 0 %
PH UR STRIP: 6 [PH]
PLATELET # BLD AUTO: 223 X10(3)/MCL (ref 130–400)
PMV BLD AUTO: 11 FL (ref 7.4–10.4)
POTASSIUM SERPL-SCNC: 3.4 MMOL/L (ref 3.5–5.1)
PROT SERPL-MCNC: 7.2 GM/DL (ref 5.8–7.6)
PROT UR QL STRIP: ABNORMAL
RBC # BLD AUTO: 3.74 X10(6)/MCL (ref 4.2–5.4)
RBC #/AREA URNS AUTO: >100 /HPF
SODIUM SERPL-SCNC: 141 MMOL/L (ref 136–145)
SP GR UR STRIP.AUTO: 1.01 (ref 1–1.03)
SQUAMOUS #/AREA URNS LPF: ABNORMAL /HPF
UROBILINOGEN UR STRIP-ACNC: NORMAL
WBC # BLD AUTO: 7.15 X10(3)/MCL (ref 4.5–11.5)
WBC #/AREA URNS AUTO: >100 /HPF

## 2025-04-25 PROCEDURE — 87186 SC STD MICRODIL/AGAR DIL: CPT

## 2025-04-25 PROCEDURE — 81001 URINALYSIS AUTO W/SCOPE: CPT

## 2025-04-25 PROCEDURE — 85025 COMPLETE CBC W/AUTO DIFF WBC: CPT

## 2025-04-25 PROCEDURE — 80053 COMPREHEN METABOLIC PANEL: CPT

## 2025-04-25 PROCEDURE — 99285 EMERGENCY DEPT VISIT HI MDM: CPT | Mod: 25

## 2025-04-25 RX ORDER — CEFDINIR 300 MG/1
300 CAPSULE ORAL DAILY
Qty: 7 CAPSULE | Refills: 0 | Status: SHIPPED | OUTPATIENT
Start: 2025-04-25 | End: 2025-05-02

## 2025-04-25 NOTE — ED PROVIDER NOTES
Encounter Date: 4/25/2025       History     Chief Complaint   Patient presents with    Abnormal Labs     Patient reports high creatinine on outpatient labs. Sent for fluids. PMH CKD. Denies any symptoms.      79 y.o. White female with a history of CKD, DM, and Hypertension presents to Emergency Department with a chief complaint of abnormal labs. Patient instructed to come to ER for evaluation due to elevated Cr. Patient reports she feels fine. Associated symptoms include BLE swelling. Patient reports she noticed swelling 4 months ago. The patient denies CP, SOB, fever, chills, vomiting, leg pain, or abdominal pain.. No other reported symptoms at this time      The history is provided by the patient. No  was used.   Illness   The problem has been unchanged. The pain is at a severity of 0/10. Pertinent negatives include no fever, no photophobia, no abdominal pain, no nausea, no vomiting, no stridor, no cough, no shortness of breath, no URI and no wheezing. Services received include tests performed. Recently, medical care has been given by a specialist.     Review of patient's allergies indicates:   Allergen Reactions    Lactose Diarrhea    Codeine Other (See Comments) and Nausea And Vomiting     Past Medical History:   Diagnosis Date    Anemia     Anemia due to stage 4 chronic kidney disease treated with erythropoietin 02/21/2024    Cancer     Chronic atrial fibrillation     Chronic kidney disease, unspecified     Compression fracture of T12 vertebra, sequela     Coronary artery disease     Depression     Diabetes mellitus, type 2     History of arteriovenostomy for renal dialysis     Hyperlipidemia     Hypertension     Obesity     Restless leg syndrome     Vitamin D deficiency      Past Surgical History:   Procedure Laterality Date    FIXATION KYPHOPLASTY N/A 2/5/2024    Procedure: Kyphoplasty;  Surgeon: Dudley Arceo MD;  Location: [facility];  Service: Neurosurgery;  Laterality: N/A;  T12  KYPHOPLASTY    HYSTERECTOMY      KIDNEY STONE SURGERY      TONSILLECTOMY       Family History   Problem Relation Name Age of Onset    No Known Problems Mother      No Known Problems Father      No Known Problems Sister      No Known Problems Brother       Social History[1]  Review of Systems   Constitutional:  Negative for chills, diaphoresis, fatigue and fever.   Eyes:  Negative for photophobia and visual disturbance.   Respiratory:  Negative for cough, shortness of breath, wheezing and stridor.    Cardiovascular:  Positive for leg swelling. Negative for chest pain and palpitations.   Gastrointestinal:  Negative for abdominal pain, nausea and vomiting.   All other systems reviewed and are negative.      Physical Exam     Initial Vitals [04/25/25 1226]   BP Pulse Resp Temp SpO2   (!) 153/72 107 20 97.8 °F (36.6 °C) (!) 94 %      MAP       --         Physical Exam    Nursing note and vitals reviewed.  Constitutional: She appears well-developed and well-nourished. She is not diaphoretic. She is cooperative.  Non-toxic appearance. No distress.   HENT:   Head: Normocephalic and atraumatic.   Right Ear: External ear normal.   Left Ear: External ear normal.   Nose: Nose normal.   Eyes: Conjunctivae and EOM are normal. Pupils are equal, round, and reactive to light.   Neck: Neck supple. No JVD present.   Normal range of motion.  Cardiovascular:  Normal rate, regular rhythm, S1 normal, S2 normal, normal heart sounds, intact distal pulses and normal pulses.           Pulmonary/Chest: Effort normal. No tachypnea and no bradypnea. No respiratory distress. She has no decreased breath sounds. She has no wheezes. She exhibits no tenderness.   Abdominal: Abdomen is soft. Bowel sounds are normal. She exhibits no distension. There is no abdominal tenderness.   No abdominal tenderness on exam. No guarding.  There is no rebound.   Musculoskeletal:         General: Edema present. Normal range of motion.      Cervical back: Normal range  of motion and neck supple.      Right lower leg: No tenderness. 1+ Edema present.      Left lower leg: No tenderness. 1+ Edema present.     Lymphadenopathy:     She has no cervical adenopathy.   Neurological: She is alert and oriented to person, place, and time. She has normal strength. No sensory deficit. GCS score is 15. GCS eye subscore is 4. GCS verbal subscore is 5. GCS motor subscore is 6.   Skin: Skin is warm and dry. Capillary refill takes less than 2 seconds.   Psychiatric: She has a normal mood and affect. Thought content normal.         ED Course   Procedures  Labs Reviewed   COMPREHENSIVE METABOLIC PANEL - Abnormal       Result Value    Sodium 141      Potassium 3.4 (*)     Chloride 107      CO2 22 (*)     Glucose 128 (*)     Blood Urea Nitrogen 79.4 (*)     Creatinine 3.17 (*)     Calcium 9.1      Protein Total 7.2      Albumin 3.1 (*)     Globulin 4.1 (*)     Albumin/Globulin Ratio 0.8 (*)     Bilirubin Total 0.4       (*)     ALT 9      AST 12      eGFR 14      Anion Gap 12.0      BUN/Creatinine Ratio 25     URINALYSIS, REFLEX TO URINE CULTURE - Abnormal    Color, UA Light-Brown (*)     Appearance, UA Turbid (*)     Specific Gravity, UA 1.010      pH, UA 6.0      Protein, UA 1+ (*)     Glucose, UA Normal      Ketones, UA Negative      Blood, UA 3+ (*)     Bilirubin, UA Negative      Urobilinogen, UA Normal      Nitrites, UA Negative      Leukocyte Esterase,  (*)     RBC, UA >100 (*)     WBC, UA >100 (*)     Bacteria, UA Moderate (*)     Squamous Epithelial Cells, UA None Seen     CBC WITH DIFFERENTIAL - Abnormal    WBC 7.15      RBC 3.74 (*)     Hgb 11.1 (*)     Hct 35.3 (*)     MCV 94.4 (*)     MCH 29.7      MCHC 31.4 (*)     RDW 12.7      Platelet 223      MPV 11.0 (*)     Neut % 77.4      Lymph % 10.1      Mono % 7.7      Eos % 4.1      Basophil % 0.3      Imm Grans % 0.4      Neut # 5.54      Lymph # 0.72      Mono # 0.55      Eos # 0.29      Baso # 0.02      Imm Gran # 0.03       NRBC% 0.0     CULTURE, URINE   CBC W/ AUTO DIFFERENTIAL    Narrative:     The following orders were created for panel order CBC auto differential.  Procedure                               Abnormality         Status                     ---------                               -----------         ------                     CBC with Differential[[id number]]       Abnormal            Final result                 Please view results for these tests on the individual orders.          Imaging Results              US Retroperitoneal Limited (Final result)  Result time 04/25/25 16:06:21      Final result by Oneida Gallagher MD (04/25/25 16:06:21)                   Impression:      Technically limited exam due to poor acoustic window.  No right hydronephrosis.  Left kidney not visible.      Electronically signed by: Oneida Gallagher  Date:    04/25/2025  Time:    16:06               Narrative:    EXAMINATION:  US RETROPERITONEAL LIMITED    CLINICAL HISTORY:  Acute kidney failure, unspecified    TECHNIQUE:  Retroperitoneal ultrasound.  Limited.    COMPARISON:  Retroperitoneal sonogram 01/05/2024    FINDINGS:  LIMITATIONS: Exam limited due to poor acoustic window related to shadowing bowel gas and/or body habitus.    RIGHT KIDNEY: The right kidney measures 11.8 cm.  No hydronephrosis. Mildly obscured by shadowing.    LEFT KIDNEY: Obscured by shadowing    BLADDER: Not imaged    OTHER: Aorta and IVC obscured by shadowing.                                       X-Ray Chest AP Portable (Final result)  Result time 04/25/25 14:51:40      Final result by Kody Stern MD (04/25/25 14:51:40)                   Impression:      No acute chest disease is identified..    Mild cardiomegaly.    Hiatus hernia      Electronically signed by: Kody Stern  Date:    04/25/2025  Time:    14:51               Narrative:    EXAMINATION:  XR CHEST AP PORTABLE    CLINICAL HISTORY:  , Edema, unspecified.    FINDINGS:  No alveolar  consolidation, effusion, or pneumothorax is seen.   The thoracic aorta is normal  cardiac silhouette is mildly enlarged, central pulmonary vessels and mediastinum are normal in size and are grossly unremarkable.   visualized osseous structures are grossly unremarkable..    Hiatus hernia is identified                                       Medications - No data to display    Medical Decision Making  Patient awake, alert, has non-labored breathing, and follows commands appropriately. Arrived to ED due for evaluation. Reports told her Cr was elevated and to f/u on Monday but wanted to be evaluated due to [employer] being this weekend and staffing at assisted living being low. Patient has no acute complaints. States she has been having swelling to BLE for several months and is currently on Lasix for this. NAD Noted.     Judging by the patient's chief complaint and pertinent history, the patient has the following possible differential diagnoses, including but not limited to the following: CKD, LORI, Swelling     Some of these are deemed to be lower likelihood and some more likely based on my physical exam and history combined with possible lab work and/or imaging studies. Please see the pertinent studies, and refer to the HPI. Some of these diagnoses will take further evaluation to fully rule out, perhaps as an outpatient and the patient was encouraged to follow up when discharged for more comprehensive evaluation.       Amount and/or Complexity of Data Reviewed  Labs: ordered. Decision-making details documented in ED Course.     Details: Cr 3.17, trending down from 2 days ago. UA- UTI. Informed patient of results.   Radiology: ordered. Decision-making details documented in ED Course.     Details: Informed patient of results.   Discussion of management or test interpretation with external provider(s): Discussed patient with Dr. Xiao who assessed the patient at bedside. Will discharge patient home with f/u  with nephrology for repeat labs and strict ER return precautions. Discussed plan of care and interventions with patient. Agreed to and aware of plan of care. Comfortable being discharged home. Patient discharged home. Patient denies new or additional complaints; no further tests indicated at this time. Verbalized understanding of instructions. No emergent or apparent distress noted prior to discharge. Strict ER return precautions given.       Risk  OTC drugs.  Prescription drug management.               ED Course as of 04/25/25 1637   Fri Apr 25, 2025   1426 Creatinine(!): 3.17  Trending down from labs 2 days ago.  [JA]   1426 Hemoglobin(!): 11.1 [JA]   1426 Hematocrit(!): 35.3 [JA]   1441 Bacteria, UA(!): Moderate [JA]   1441 WBC, UA(!): >100 [JA]   1441 RBC, UA(!): >100 [JA]   1441 Leukocyte Esterase, UA(!): 500 [JA]   1454 X-Ray Chest AP Portable  No alveolar consolidation, effusion, or pneumothorax is seen.   The thoracic aorta is normal  cardiac silhouette is mildly enlarged, central pulmonary vessels and mediastinum are normal in size and are grossly unremarkable.   visualized osseous structures are grossly unremarkable. Hiatus hernia is identified [JA]   1456 Dr. Smith, with nephrology paged.  [JA]   1500 Discussed patient with Babita, with nephrology who recommends US. No further instruction given. Patient can f/u with nephrology on Monday for continued trending of labs. Okay with treatment of UTI with cephalosporin.  [JA]   1612 US Retroperitoneal Limited  Technically limited exam due to poor acoustic window.  No right hydronephrosis.  Left kidney not visible [JA]      ED Course User Index  [JA] Paola Jaffe, NP                           Clinical Impression:  Final diagnoses:  [R60.9] Swelling  [N18.5] Stage 5 chronic kidney disease not on chronic dialysis (Primary)  [N30.01] Acute cystitis with hematuria          ED Disposition Condition    Discharge Good          ED Prescriptions       Medication  Sig Dispense Start Date End Date Auth. Provider    cefdinir (OMNICEF) 300 MG capsule Take 1 capsule (300 mg total) by mouth once daily. for 7 days 7 capsule 4/25/2025 5/2/2025 Paola Jaffe NP          Follow-up Information       Follow up With Specialties Details Why Contact Info    Levar Vazquez MD Internal Medicine Schedule an appointment as soon as possible for a visit on 4/28/2025  [address]  [facility] [address]  [phone]      [facility] - Emergency Dept Emergency Medicine Go to  If symptoms worsen, As needed [address] [facility] [id number] [phone]    Logan Smith MD Nephrology Go on 4/28/2025  [address]  [facility] [address]  [phone]               Paola Jaffe NP  04/25/25 1637         [1]   Social History  Tobacco Use    Smoking status: Never     Passive exposure: Never    Smokeless tobacco: Never   Substance Use Topics    Alcohol use: Never    Drug use: Never        Paola Jaffe NP  04/25/25 2022

## 2025-04-25 NOTE — FIRST PROVIDER EVALUATION
"Medical screening examination initiated.  I have conducted a focused provider triage encounter, findings are as follows:    Brief history of present illness:  79 year old female with history of CKD presents to ER with elevated kidney function     Vitals:    04/25/25 1226   BP: (!) 153/72   Pulse: 107   Resp: 20   Temp: 97.8 °F (36.6 °C)   TempSrc: Oral   SpO2: (!) 94%   Weight: 104.3 kg (230 lb)   Height: 5' 8" (1.727 m)       Pertinent physical exam:  awake and alert, nad    Brief workup plan:  labs, ua    Preliminary workup initiated; this workup will be continued and followed by the physician or advanced practice provider that is assigned to the patient when roomed.  "

## 2025-04-27 ENCOUNTER — RESULTS FOLLOW-UP (OUTPATIENT)
Dept: EMERGENCY MEDICINE | Facility: HOSPITAL | Age: 80
End: 2025-04-27
Payer: COMMERCIAL

## 2025-04-28 ENCOUNTER — LAB REQUISITION (OUTPATIENT)
Dept: LAB | Facility: HOSPITAL | Age: 80
End: 2025-04-28
Payer: COMMERCIAL

## 2025-04-28 DIAGNOSIS — E55.9 VITAMIN D DEFICIENCY, UNSPECIFIED: ICD-10-CM

## 2025-04-28 LAB
25(OH)D3+25(OH)D2 SERPL-MCNC: 39 NG/ML (ref 30–80)
ALBUMIN SERPL-MCNC: 2.8 G/DL (ref 3.4–4.8)
ALBUMIN/GLOB SERPL: 1 RATIO (ref 1.1–2)
ALP SERPL-CCNC: 161 UNIT/L (ref 40–150)
ALT SERPL-CCNC: 9 UNIT/L (ref 0–55)
ANION GAP SERPL CALC-SCNC: 13 MEQ/L
AST SERPL-CCNC: 11 UNIT/L (ref 11–45)
BACTERIA UR CULT: ABNORMAL
BASOPHILS # BLD AUTO: 0.03 X10(3)/MCL
BASOPHILS NFR BLD AUTO: 0.4 %
BILIRUB SERPL-MCNC: 0.3 MG/DL
BUN SERPL-MCNC: 73.6 MG/DL (ref 9.8–20.1)
CALCIUM SERPL-MCNC: 8.3 MG/DL (ref 8.4–10.2)
CHLORIDE SERPL-SCNC: 104 MMOL/L (ref 98–107)
CO2 SERPL-SCNC: 21 MMOL/L (ref 23–31)
CREAT SERPL-MCNC: 3.29 MG/DL (ref 0.55–1.02)
CREAT/UREA NIT SERPL: 22
EOSINOPHIL # BLD AUTO: 0.4 X10(3)/MCL (ref 0–0.9)
EOSINOPHIL NFR BLD AUTO: 5.8 %
ERYTHROCYTE [DISTWIDTH] IN BLOOD BY AUTOMATED COUNT: 12.5 % (ref 11.5–17)
GFR SERPLBLD CREATININE-BSD FMLA CKD-EPI: 14 ML/MIN/1.73/M2
GLOBULIN SER-MCNC: 2.8 GM/DL (ref 2.4–3.5)
GLUCOSE SERPL-MCNC: 173 MG/DL (ref 82–115)
HCT VFR BLD AUTO: 30.4 % (ref 37–47)
HGB BLD-MCNC: 9.7 G/DL (ref 12–16)
IMM GRANULOCYTES # BLD AUTO: 0.03 X10(3)/MCL (ref 0–0.04)
IMM GRANULOCYTES NFR BLD AUTO: 0.4 %
LYMPHOCYTES # BLD AUTO: 1.3 X10(3)/MCL (ref 0.6–4.6)
LYMPHOCYTES NFR BLD AUTO: 19 %
MAGNESIUM SERPL-MCNC: 2.1 MG/DL (ref 1.6–2.6)
MCH RBC QN AUTO: 30.3 PG (ref 27–31)
MCHC RBC AUTO-ENTMCNC: 31.9 G/DL (ref 33–36)
MCV RBC AUTO: 95 FL (ref 80–94)
MONOCYTES # BLD AUTO: 0.74 X10(3)/MCL (ref 0.1–1.3)
MONOCYTES NFR BLD AUTO: 10.8 %
NEUTROPHILS # BLD AUTO: 4.35 X10(3)/MCL (ref 2.1–9.2)
NEUTROPHILS NFR BLD AUTO: 63.6 %
NRBC BLD AUTO-RTO: 0 %
PHOSPHATE SERPL-MCNC: 6 MG/DL (ref 2.3–4.7)
PLATELET # BLD AUTO: 195 X10(3)/MCL (ref 130–400)
PMV BLD AUTO: 11.4 FL (ref 7.4–10.4)
POTASSIUM SERPL-SCNC: 3.3 MMOL/L (ref 3.5–5.1)
PROT SERPL-MCNC: 5.6 GM/DL (ref 5.8–7.6)
PTH-INTACT SERPL-MCNC: 671.9 PG/ML (ref 8.7–77)
RBC # BLD AUTO: 3.2 X10(6)/MCL (ref 4.2–5.4)
SODIUM SERPL-SCNC: 138 MMOL/L (ref 136–145)
WBC # BLD AUTO: 6.85 X10(3)/MCL (ref 4.5–11.5)

## 2025-04-28 PROCEDURE — 84100 ASSAY OF PHOSPHORUS: CPT | Performed by: INTERNAL MEDICINE

## 2025-04-28 PROCEDURE — 83735 ASSAY OF MAGNESIUM: CPT | Performed by: INTERNAL MEDICINE

## 2025-04-28 PROCEDURE — 85025 COMPLETE CBC W/AUTO DIFF WBC: CPT | Performed by: INTERNAL MEDICINE

## 2025-04-28 PROCEDURE — 82306 VITAMIN D 25 HYDROXY: CPT | Performed by: INTERNAL MEDICINE

## 2025-04-28 PROCEDURE — 80053 COMPREHEN METABOLIC PANEL: CPT | Performed by: INTERNAL MEDICINE

## 2025-04-28 PROCEDURE — 83970 ASSAY OF PARATHORMONE: CPT | Performed by: INTERNAL MEDICINE

## 2025-04-30 ENCOUNTER — LAB REQUISITION (OUTPATIENT)
Dept: LAB | Facility: HOSPITAL | Age: 80
End: 2025-04-30
Payer: COMMERCIAL

## 2025-04-30 DIAGNOSIS — N18.5 CHRONIC KIDNEY DISEASE, STAGE 5: ICD-10-CM

## 2025-04-30 LAB
ANION GAP SERPL CALC-SCNC: 14 MEQ/L
BUN SERPL-MCNC: 67.7 MG/DL (ref 9.8–20.1)
CALCIUM SERPL-MCNC: 8.8 MG/DL (ref 8.4–10.2)
CHLORIDE SERPL-SCNC: 103 MMOL/L (ref 98–107)
CO2 SERPL-SCNC: 24 MMOL/L (ref 23–31)
CREAT SERPL-MCNC: 3.04 MG/DL (ref 0.55–1.02)
CREAT/UREA NIT SERPL: 22
GFR SERPLBLD CREATININE-BSD FMLA CKD-EPI: 15 ML/MIN/1.73/M2
GLUCOSE SERPL-MCNC: 113 MG/DL (ref 82–115)
POTASSIUM SERPL-SCNC: 3.5 MMOL/L (ref 3.5–5.1)
SODIUM SERPL-SCNC: 141 MMOL/L (ref 136–145)

## 2025-04-30 PROCEDURE — 80048 BASIC METABOLIC PNL TOTAL CA: CPT | Performed by: INTERNAL MEDICINE

## 2025-06-20 ENCOUNTER — LAB REQUISITION (OUTPATIENT)
Dept: LAB | Facility: HOSPITAL | Age: 80
End: 2025-06-20
Payer: COMMERCIAL

## 2025-06-20 DIAGNOSIS — E11.22 TYPE 2 DIABETES MELLITUS WITH DIABETIC CHRONIC KIDNEY DISEASE: ICD-10-CM

## 2025-06-20 LAB
25(OH)D3+25(OH)D2 SERPL-MCNC: 30 NG/ML (ref 30–80)
ALBUMIN SERPL-MCNC: 2.8 G/DL (ref 3.4–4.8)
ALBUMIN/GLOB SERPL: 1 RATIO (ref 1.1–2)
ALP SERPL-CCNC: 164 UNIT/L (ref 40–150)
ALT SERPL-CCNC: 10 UNIT/L (ref 0–55)
ANION GAP SERPL CALC-SCNC: 8 MEQ/L
AST SERPL-CCNC: 9 UNIT/L (ref 11–45)
BASOPHILS # BLD AUTO: 0.01 X10(3)/MCL
BASOPHILS NFR BLD AUTO: 0.2 %
BILIRUB SERPL-MCNC: 0.4 MG/DL
BUN SERPL-MCNC: 55.8 MG/DL (ref 9.8–20.1)
CALCIUM SERPL-MCNC: 8.4 MG/DL (ref 8.4–10.2)
CHLORIDE SERPL-SCNC: 113 MMOL/L (ref 98–107)
CO2 SERPL-SCNC: 20 MMOL/L (ref 23–31)
CREAT SERPL-MCNC: 2.61 MG/DL (ref 0.55–1.02)
CREAT/UREA NIT SERPL: 21
EOSINOPHIL # BLD AUTO: 0.28 X10(3)/MCL (ref 0–0.9)
EOSINOPHIL NFR BLD AUTO: 5.8 %
ERYTHROCYTE [DISTWIDTH] IN BLOOD BY AUTOMATED COUNT: 13.9 % (ref 11.5–17)
FERRITIN SERPL-MCNC: 1148.86 NG/ML (ref 4.63–204)
GFR SERPLBLD CREATININE-BSD FMLA CKD-EPI: 18 ML/MIN/1.73/M2
GLOBULIN SER-MCNC: 2.9 GM/DL (ref 2.4–3.5)
GLUCOSE SERPL-MCNC: 133 MG/DL (ref 82–115)
HCT VFR BLD AUTO: 32.3 % (ref 37–47)
HGB BLD-MCNC: 10.1 G/DL (ref 12–16)
IMM GRANULOCYTES # BLD AUTO: 0.03 X10(3)/MCL (ref 0–0.04)
IMM GRANULOCYTES NFR BLD AUTO: 0.6 %
IRON SATN MFR SERPL: 42 % (ref 20–50)
IRON SERPL-MCNC: 73 UG/DL (ref 50–170)
LYMPHOCYTES # BLD AUTO: 0.78 X10(3)/MCL (ref 0.6–4.6)
LYMPHOCYTES NFR BLD AUTO: 16.2 %
MAGNESIUM SERPL-MCNC: 1.8 MG/DL (ref 1.6–2.6)
MCH RBC QN AUTO: 30.8 PG (ref 27–31)
MCHC RBC AUTO-ENTMCNC: 31.3 G/DL (ref 33–36)
MCV RBC AUTO: 98.5 FL (ref 80–94)
MONOCYTES # BLD AUTO: 0.57 X10(3)/MCL (ref 0.1–1.3)
MONOCYTES NFR BLD AUTO: 11.8 %
NEUTROPHILS # BLD AUTO: 3.15 X10(3)/MCL (ref 2.1–9.2)
NEUTROPHILS NFR BLD AUTO: 65.4 %
NRBC BLD AUTO-RTO: 0 %
PHOSPHATE SERPL-MCNC: 4.4 MG/DL (ref 2.3–4.7)
PLATELET # BLD AUTO: 183 X10(3)/MCL (ref 130–400)
PMV BLD AUTO: 11.4 FL (ref 7.4–10.4)
POTASSIUM SERPL-SCNC: 3.9 MMOL/L (ref 3.5–5.1)
PROT SERPL-MCNC: 5.7 GM/DL (ref 5.8–7.6)
PTH-INTACT SERPL-MCNC: 392.4 PG/ML (ref 8.7–77)
RBC # BLD AUTO: 3.28 X10(6)/MCL (ref 4.2–5.4)
SODIUM SERPL-SCNC: 141 MMOL/L (ref 136–145)
TIBC SERPL-MCNC: 100 UG/DL (ref 70–310)
TIBC SERPL-MCNC: 173 UG/DL (ref 250–450)
TRANSFERRIN SERPL-MCNC: 135 MG/DL
WBC # BLD AUTO: 4.82 X10(3)/MCL (ref 4.5–11.5)

## 2025-06-20 PROCEDURE — 83970 ASSAY OF PARATHORMONE: CPT | Performed by: INTERNAL MEDICINE

## 2025-06-20 PROCEDURE — 80053 COMPREHEN METABOLIC PANEL: CPT | Performed by: INTERNAL MEDICINE

## 2025-06-20 PROCEDURE — 83735 ASSAY OF MAGNESIUM: CPT | Performed by: INTERNAL MEDICINE

## 2025-06-20 PROCEDURE — 82306 VITAMIN D 25 HYDROXY: CPT | Performed by: INTERNAL MEDICINE

## 2025-06-20 PROCEDURE — 85025 COMPLETE CBC W/AUTO DIFF WBC: CPT | Performed by: INTERNAL MEDICINE

## 2025-06-20 PROCEDURE — 83550 IRON BINDING TEST: CPT | Performed by: INTERNAL MEDICINE

## 2025-06-20 PROCEDURE — 82728 ASSAY OF FERRITIN: CPT | Performed by: INTERNAL MEDICINE

## 2025-06-20 PROCEDURE — 84100 ASSAY OF PHOSPHORUS: CPT | Performed by: INTERNAL MEDICINE

## 2025-08-12 ENCOUNTER — LAB REQUISITION (OUTPATIENT)
Dept: LAB | Facility: HOSPITAL | Age: 80
End: 2025-08-12
Payer: COMMERCIAL

## 2025-08-12 DIAGNOSIS — N18.5 CHRONIC KIDNEY DISEASE, STAGE 5: ICD-10-CM

## 2025-08-12 DIAGNOSIS — E11.22 TYPE 2 DIABETES MELLITUS WITH DIABETIC CHRONIC KIDNEY DISEASE: ICD-10-CM

## 2025-08-12 DIAGNOSIS — E55.9 VITAMIN D DEFICIENCY, UNSPECIFIED: ICD-10-CM

## 2025-08-12 DIAGNOSIS — E21.3 HYPERPARATHYROIDISM, UNSPECIFIED: ICD-10-CM

## 2025-08-12 DIAGNOSIS — D63.1 ANEMIA IN CHRONIC KIDNEY DISEASE (CODE): ICD-10-CM

## 2025-08-12 LAB
25(OH)D3+25(OH)D2 SERPL-MCNC: 32 NG/ML (ref 30–80)
ALBUMIN SERPL-MCNC: 2.6 G/DL (ref 3.4–4.8)
ALBUMIN/GLOB SERPL: 0.8 RATIO (ref 1.1–2)
ALP SERPL-CCNC: 127 UNIT/L (ref 40–150)
ALT SERPL-CCNC: 9 UNIT/L (ref 0–55)
ANION GAP SERPL CALC-SCNC: 12 MEQ/L
AST SERPL-CCNC: 9 UNIT/L (ref 11–45)
BASOPHILS # BLD AUTO: 0.01 X10(3)/MCL
BASOPHILS NFR BLD AUTO: 0.2 %
BILIRUB SERPL-MCNC: 0.4 MG/DL
BUN SERPL-MCNC: 64.6 MG/DL (ref 9.8–20.1)
CALCIUM SERPL-MCNC: 7.9 MG/DL (ref 8.4–10.2)
CHLORIDE SERPL-SCNC: 105 MMOL/L (ref 98–107)
CO2 SERPL-SCNC: 24 MMOL/L (ref 23–31)
CREAT SERPL-MCNC: 3.46 MG/DL (ref 0.55–1.02)
CREAT/UREA NIT SERPL: 19
EOSINOPHIL # BLD AUTO: 0.55 X10(3)/MCL (ref 0–0.9)
EOSINOPHIL NFR BLD AUTO: 8.5 %
ERYTHROCYTE [DISTWIDTH] IN BLOOD BY AUTOMATED COUNT: 12.9 % (ref 11.5–17)
FERRITIN SERPL-MCNC: 816.99 NG/ML (ref 4.63–204)
GFR SERPLBLD CREATININE-BSD FMLA CKD-EPI: 13 ML/MIN/1.73/M2
GLOBULIN SER-MCNC: 3.3 GM/DL (ref 2.4–3.5)
GLUCOSE SERPL-MCNC: 89 MG/DL (ref 82–115)
HCT VFR BLD AUTO: 31.3 % (ref 37–47)
HGB BLD-MCNC: 10.1 G/DL (ref 12–16)
IMM GRANULOCYTES # BLD AUTO: 0.03 X10(3)/MCL (ref 0–0.04)
IMM GRANULOCYTES NFR BLD AUTO: 0.5 %
IRON SATN MFR SERPL: 28 % (ref 20–50)
IRON SERPL-MCNC: 42 UG/DL (ref 50–170)
LYMPHOCYTES # BLD AUTO: 0.92 X10(3)/MCL (ref 0.6–4.6)
LYMPHOCYTES NFR BLD AUTO: 14.2 %
MAGNESIUM SERPL-MCNC: 1.9 MG/DL (ref 1.6–2.6)
MCH RBC QN AUTO: 31.7 PG (ref 27–31)
MCHC RBC AUTO-ENTMCNC: 32.3 G/DL (ref 33–36)
MCV RBC AUTO: 98.1 FL (ref 80–94)
MONOCYTES # BLD AUTO: 0.83 X10(3)/MCL (ref 0.1–1.3)
MONOCYTES NFR BLD AUTO: 12.8 %
NEUTROPHILS # BLD AUTO: 4.12 X10(3)/MCL (ref 2.1–9.2)
NEUTROPHILS NFR BLD AUTO: 63.8 %
NRBC BLD AUTO-RTO: 0 %
PHOSPHATE SERPL-MCNC: 5.6 MG/DL (ref 2.3–4.7)
PLATELET # BLD AUTO: 201 X10(3)/MCL (ref 130–400)
PMV BLD AUTO: 10.9 FL (ref 7.4–10.4)
POTASSIUM SERPL-SCNC: 3.6 MMOL/L (ref 3.5–5.1)
PROT SERPL-MCNC: 5.9 GM/DL (ref 5.8–7.6)
PTH-INTACT SERPL-MCNC: 594.2 PG/ML (ref 8.7–77)
RBC # BLD AUTO: 3.19 X10(6)/MCL (ref 4.2–5.4)
SODIUM SERPL-SCNC: 141 MMOL/L (ref 136–145)
TIBC SERPL-MCNC: 107 UG/DL (ref 70–310)
TIBC SERPL-MCNC: 149 UG/DL (ref 250–450)
TRANSFERRIN SERPL-MCNC: 127 MG/DL
WBC # BLD AUTO: 6.46 X10(3)/MCL (ref 4.5–11.5)

## 2025-08-12 PROCEDURE — 85025 COMPLETE CBC W/AUTO DIFF WBC: CPT | Performed by: INTERNAL MEDICINE

## 2025-08-12 PROCEDURE — 82728 ASSAY OF FERRITIN: CPT | Performed by: INTERNAL MEDICINE

## 2025-08-12 PROCEDURE — 82306 VITAMIN D 25 HYDROXY: CPT | Performed by: INTERNAL MEDICINE

## 2025-08-12 PROCEDURE — 83735 ASSAY OF MAGNESIUM: CPT | Performed by: INTERNAL MEDICINE

## 2025-08-12 PROCEDURE — 83970 ASSAY OF PARATHORMONE: CPT | Performed by: INTERNAL MEDICINE

## 2025-08-12 PROCEDURE — 80053 COMPREHEN METABOLIC PANEL: CPT | Performed by: INTERNAL MEDICINE

## 2025-08-12 PROCEDURE — 84100 ASSAY OF PHOSPHORUS: CPT | Performed by: INTERNAL MEDICINE

## 2025-08-12 PROCEDURE — 83540 ASSAY OF IRON: CPT | Performed by: INTERNAL MEDICINE

## (undated) DEVICE — DRESSING TELFA PAD N ADH 2X3

## (undated) DEVICE — COVER C-ARM STRAP BAND 44X80IN

## (undated) DEVICE — Device

## (undated) DEVICE — GOWN X-LG STERILE BACK

## (undated) DEVICE — BLADE SURG STAINLESS STEEL #11

## (undated) DEVICE — DRAPE SURG W/TWL 17 5/8X23

## (undated) DEVICE — BENZOIN TINCTURE 0.66ML

## (undated) DEVICE — PILLOW HEAD REST

## (undated) DEVICE — SEE MEDLINE ITEM 146292

## (undated) DEVICE — SPONGE LAP 18X18 PREWASHED

## (undated) DEVICE — SHEET XLGE DRAPE

## (undated) DEVICE — DRAPE TOP 53X102IN

## (undated) DEVICE — DRAPE INCISE IOBAN 2 23X23IN

## (undated) DEVICE — GLOVE PROTEXIS BLUE LATEX 8

## (undated) DEVICE — GLOVE PROTEXIS LTX MICRO  7.5

## (undated) DEVICE — DRAPE C-ARMOR EQUIPMENT COVER

## (undated) DEVICE — BOWL STERILE LARGE 32OZ

## (undated) DEVICE — TOWEL OR BLUE STRL 16X26 4/PK

## (undated) DEVICE — SPONGE GAUZE 16PLY 4X4

## (undated) DEVICE — DRESSING TRANS 4X4 TEGADERM

## (undated) DEVICE — APPLICATOR CHLORAPREP ORN 26ML

## (undated) DEVICE — PAD DERMAPROX XL 22X14X.5IN

## (undated) DEVICE — GLOVE PROTEXIS BLUE LATEX 7

## (undated) DEVICE — CLOSURE SKIN STERI STRIP 1/2X4

## (undated) DEVICE — NDL HYPO 22GX1 1/2 SYR 10ML LL

## (undated) DEVICE — KIT POS JACKSON TABLE NO HDRST

## (undated) DEVICE — DRAPE TOWEL TIBURON 19X30IN

## (undated) DEVICE — BLADE EZ CLEAN 2 1/2

## (undated) DEVICE — CONTAINER SPECIMEN STRL 4OZ

## (undated) DEVICE — DRESSING TELFA N ADH 3X8

## (undated) DEVICE — COVER TABLE HVY DTY 60X90IN

## (undated) DEVICE — KIT SURGICAL TURNOVER

## (undated) DEVICE — CUP PROFEX GLASS GRADUATE 1OZ

## (undated) DEVICE — GLOVE PROTEXIS PI SYN SURG 6.5

## (undated) DEVICE — DRAPE ORTH SPLIT 77X108IN

## (undated) DEVICE — GAUZE VISTEC XR DTECT 16 4X4IN